# Patient Record
Sex: FEMALE | Race: WHITE | Employment: OTHER | URBAN - NONMETROPOLITAN AREA
[De-identification: names, ages, dates, MRNs, and addresses within clinical notes are randomized per-mention and may not be internally consistent; named-entity substitution may affect disease eponyms.]

---

## 2019-08-22 ENCOUNTER — TELEPHONE (OUTPATIENT)
Dept: FAMILY MEDICINE CLINIC | Age: 84
End: 2019-08-22

## 2019-09-13 ENCOUNTER — TELEPHONE (OUTPATIENT)
Dept: FAMILY MEDICINE CLINIC | Age: 84
End: 2019-09-13

## 2019-09-25 ENCOUNTER — APPOINTMENT (OUTPATIENT)
Dept: CT IMAGING | Age: 84
End: 2019-09-25
Payer: MEDICARE

## 2019-09-25 ENCOUNTER — HOSPITAL ENCOUNTER (OUTPATIENT)
Age: 84
Setting detail: OBSERVATION
Discharge: HOME HEALTH | End: 2019-09-26
Attending: EMERGENCY MEDICINE | Admitting: INTERNAL MEDICINE
Payer: MEDICARE

## 2019-09-25 DIAGNOSIS — S22.050A COMPRESSION FRACTURE OF T6 VERTEBRA (HCC): Primary | ICD-10-CM

## 2019-09-25 DIAGNOSIS — I71.20 THORACIC AORTIC ANEURYSM WITHOUT RUPTURE: ICD-10-CM

## 2019-09-25 DIAGNOSIS — S09.90XA CLOSED HEAD INJURY, INITIAL ENCOUNTER: ICD-10-CM

## 2019-09-25 DIAGNOSIS — S22.000A COMPRESSION FX, THORACIC SPINE, CLOSED, INITIAL ENCOUNTER (HCC): ICD-10-CM
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
## 2019-09-25 PROBLEM — Z86.73 HISTORY OF CARDIOEMBOLIC CEREBROVASCULAR ACCIDENT (CVA): Status: ACTIVE | Noted: 2019-09-25

## 2019-09-25 PROBLEM — M48.54XA COMPRESSION FRACTURE OF THORACIC SPINE, NON-TRAUMATIC, INITIAL ENCOUNTER (HCC): Status: ACTIVE | Noted: 2019-09-25

## 2019-09-25 PROBLEM — I10 HYPERTENSION: Status: ACTIVE | Noted: 2019-09-25

## 2019-09-25 PROBLEM — E03.9 HYPOTHYROIDISM: Status: ACTIVE | Noted: 2019-09-25

## 2019-09-25 LAB
ALBUMIN SERPL-MCNC: 3.8 G/DL (ref 3.5–5.2)
ALP BLD-CCNC: 105 U/L (ref 35–104)
ALT SERPL-CCNC: 18 U/L (ref 5–33)
ANION GAP SERPL CALCULATED.3IONS-SCNC: 14 MMOL/L (ref 7–19)
AST SERPL-CCNC: 17 U/L (ref 5–32)
BASOPHILS ABSOLUTE: 0 K/UL (ref 0–0.2)
BASOPHILS RELATIVE PERCENT: 0.3 % (ref 0–1)
BILIRUB SERPL-MCNC: 0.3 MG/DL (ref 0.2–1.2)
BUN BLDV-MCNC: 15 MG/DL (ref 8–23)
CALCIUM SERPL-MCNC: 8.9 MG/DL (ref 8.8–10.2)
CHLORIDE BLD-SCNC: 105 MMOL/L (ref 98–111)
CO2: 25 MMOL/L (ref 22–29)
CREAT SERPL-MCNC: 0.5 MG/DL (ref 0.5–0.9)
EOSINOPHILS ABSOLUTE: 0 K/UL (ref 0–0.6)
EOSINOPHILS RELATIVE PERCENT: 0.2 % (ref 0–5)
GFR NON-AFRICAN AMERICAN: >60
GLUCOSE BLD-MCNC: 125 MG/DL (ref 74–109)
HCT VFR BLD CALC: 47.6 % (ref 37–47)
HEMOGLOBIN: 14.9 G/DL (ref 12–16)
IMMATURE GRANULOCYTES #: 0.1 K/UL
INR BLD: 1.07 (ref 0.88–1.18)
LACTIC ACID: 1.8 MMOL/L (ref 0.5–1.9)
LYMPHOCYTES ABSOLUTE: 1.2 K/UL (ref 1.1–4.5)
LYMPHOCYTES RELATIVE PERCENT: 11.8 % (ref 20–40)
MAGNESIUM: 1.8 MG/DL (ref 1.6–2.4)
MCH RBC QN AUTO: 30.3 PG (ref 27–31)
MCHC RBC AUTO-ENTMCNC: 31.3 G/DL (ref 33–37)
MCV RBC AUTO: 96.9 FL (ref 81–99)
MONOCYTES ABSOLUTE: 0.8 K/UL (ref 0–0.9)
MONOCYTES RELATIVE PERCENT: 7.6 % (ref 0–10)
NEUTROPHILS ABSOLUTE: 7.9 K/UL (ref 1.5–7.5)
NEUTROPHILS RELATIVE PERCENT: 79.5 % (ref 50–65)
PDW BLD-RTO: 14.1 % (ref 11.5–14.5)
PLATELET # BLD: 229 K/UL (ref 130–400)
PMV BLD AUTO: 10 FL (ref 9.4–12.3)
POTASSIUM REFLEX MAGNESIUM: 4.2 MMOL/L (ref 3.5–5)
PROTHROMBIN TIME: 13.3 SEC (ref 12–14.6)
RBC # BLD: 4.91 M/UL (ref 4.2–5.4)
SODIUM BLD-SCNC: 144 MMOL/L (ref 136–145)
TOTAL PROTEIN: 7 G/DL (ref 6.6–8.7)
WBC # BLD: 9.9 K/UL (ref 4.8–10.8)

## 2019-09-25 PROCEDURE — 83605 ASSAY OF LACTIC ACID: CPT

## 2019-09-25 PROCEDURE — 85025 COMPLETE CBC W/AUTO DIFF WBC: CPT

## 2019-09-25 PROCEDURE — 70450 CT HEAD/BRAIN W/O DYE: CPT

## 2019-09-25 PROCEDURE — 2580000003 HC RX 258: Performed by: INTERNAL MEDICINE

## 2019-09-25 PROCEDURE — G0378 HOSPITAL OBSERVATION PER HR: HCPCS

## 2019-09-25 PROCEDURE — 93970 EXTREMITY STUDY: CPT

## 2019-09-25 PROCEDURE — 72125 CT NECK SPINE W/O DYE: CPT

## 2019-09-25 PROCEDURE — 97162 PT EVAL MOD COMPLEX 30 MIN: CPT

## 2019-09-25 PROCEDURE — 51798 US URINE CAPACITY MEASURE: CPT

## 2019-09-25 PROCEDURE — 6370000000 HC RX 637 (ALT 250 FOR IP): Performed by: INTERNAL MEDICINE

## 2019-09-25 PROCEDURE — 72128 CT CHEST SPINE W/O DYE: CPT

## 2019-09-25 PROCEDURE — 36415 COLL VENOUS BLD VENIPUNCTURE: CPT

## 2019-09-25 PROCEDURE — 6360000002 HC RX W HCPCS: Performed by: INTERNAL MEDICINE

## 2019-09-25 PROCEDURE — 96374 THER/PROPH/DIAG INJ IV PUSH: CPT

## 2019-09-25 PROCEDURE — 97530 THERAPEUTIC ACTIVITIES: CPT

## 2019-09-25 PROCEDURE — 6360000002 HC RX W HCPCS: Performed by: EMERGENCY MEDICINE

## 2019-09-25 PROCEDURE — 83735 ASSAY OF MAGNESIUM: CPT

## 2019-09-25 PROCEDURE — 99285 EMERGENCY DEPT VISIT HI MDM: CPT

## 2019-09-25 PROCEDURE — 85610 PROTHROMBIN TIME: CPT

## 2019-09-25 PROCEDURE — 99203 OFFICE O/P NEW LOW 30 MIN: CPT | Performed by: NEUROLOGICAL SURGERY

## 2019-09-25 PROCEDURE — 80053 COMPREHEN METABOLIC PANEL: CPT

## 2019-09-25 PROCEDURE — 96372 THER/PROPH/DIAG INJ SC/IM: CPT

## 2019-09-25 RX ORDER — OXYCODONE HYDROCHLORIDE AND ACETAMINOPHEN 5; 325 MG/1; MG/1
1 TABLET ORAL EVERY 6 HOURS PRN
Status: DISCONTINUED | OUTPATIENT
Start: 2019-09-25 | End: 2019-09-26 | Stop reason: HOSPADM

## 2019-09-25 RX ORDER — METOPROLOL SUCCINATE 50 MG/1
50 TABLET, EXTENDED RELEASE ORAL DAILY
COMMUNITY
End: 2019-10-17 | Stop reason: SDUPTHER

## 2019-09-25 RX ORDER — SERTRALINE HYDROCHLORIDE 100 MG/1
100 TABLET, FILM COATED ORAL DAILY
Status: DISCONTINUED | OUTPATIENT
Start: 2019-09-25 | End: 2019-09-26 | Stop reason: HOSPADM

## 2019-09-25 RX ORDER — ACETAMINOPHEN 325 MG/1
650 TABLET ORAL EVERY 4 HOURS PRN
Status: DISCONTINUED | OUTPATIENT
Start: 2019-09-25 | End: 2019-09-26 | Stop reason: HOSPADM

## 2019-09-25 RX ORDER — LEVOTHYROXINE SODIUM 175 UG/1
175 TABLET ORAL DAILY
COMMUNITY
End: 2019-10-17 | Stop reason: SDUPTHER

## 2019-09-25 RX ORDER — AMLODIPINE BESYLATE 5 MG/1
5 TABLET ORAL DAILY
COMMUNITY
End: 2019-10-17 | Stop reason: SDUPTHER

## 2019-09-25 RX ORDER — SODIUM CHLORIDE 0.9 % (FLUSH) 0.9 %
10 SYRINGE (ML) INJECTION PRN
Status: DISCONTINUED | OUTPATIENT
Start: 2019-09-25 | End: 2019-09-26 | Stop reason: HOSPADM

## 2019-09-25 RX ORDER — NITROFURANTOIN MACROCRYSTALS 100 MG/1
100 CAPSULE ORAL DAILY
COMMUNITY
End: 2019-09-27 | Stop reason: ALTCHOICE

## 2019-09-25 RX ORDER — PHENYTOIN SODIUM 100 MG/1
200 CAPSULE, EXTENDED RELEASE ORAL 2 TIMES DAILY
Status: DISCONTINUED | OUTPATIENT
Start: 2019-09-25 | End: 2019-09-26 | Stop reason: HOSPADM

## 2019-09-25 RX ORDER — PHENYTOIN SODIUM 100 MG/1
200 CAPSULE, EXTENDED RELEASE ORAL 2 TIMES DAILY
COMMUNITY
End: 2019-10-17 | Stop reason: SDUPTHER

## 2019-09-25 RX ORDER — SODIUM CHLORIDE 0.9 % (FLUSH) 0.9 %
10 SYRINGE (ML) INJECTION EVERY 12 HOURS SCHEDULED
Status: DISCONTINUED | OUTPATIENT
Start: 2019-09-25 | End: 2019-09-26 | Stop reason: HOSPADM

## 2019-09-25 RX ORDER — ONDANSETRON 2 MG/ML
INJECTION INTRAMUSCULAR; INTRAVENOUS
Status: DISPENSED
Start: 2019-09-25 | End: 2019-09-25

## 2019-09-25 RX ORDER — ONDANSETRON 2 MG/ML
4 INJECTION INTRAMUSCULAR; INTRAVENOUS EVERY 6 HOURS PRN
Status: DISCONTINUED | OUTPATIENT
Start: 2019-09-25 | End: 2019-09-26 | Stop reason: HOSPADM

## 2019-09-25 RX ORDER — ONDANSETRON 4 MG/1
4 TABLET, ORALLY DISINTEGRATING ORAL ONCE
Status: DISCONTINUED | OUTPATIENT
Start: 2019-09-25 | End: 2019-09-26 | Stop reason: HOSPADM

## 2019-09-25 RX ORDER — METOPROLOL SUCCINATE 50 MG/1
50 TABLET, EXTENDED RELEASE ORAL DAILY
Status: DISCONTINUED | OUTPATIENT
Start: 2019-09-25 | End: 2019-09-26 | Stop reason: HOSPADM

## 2019-09-25 RX ORDER — ACETAMINOPHEN 325 MG/1
650 TABLET ORAL EVERY 8 HOURS PRN
Status: DISCONTINUED | OUTPATIENT
Start: 2019-09-25 | End: 2019-09-26 | Stop reason: HOSPADM

## 2019-09-25 RX ORDER — SERTRALINE HYDROCHLORIDE 100 MG/1
100 TABLET, FILM COATED ORAL DAILY
COMMUNITY
End: 2019-10-17 | Stop reason: SDUPTHER

## 2019-09-25 RX ORDER — AMLODIPINE BESYLATE 5 MG/1
5 TABLET ORAL DAILY
Status: DISCONTINUED | OUTPATIENT
Start: 2019-09-25 | End: 2019-09-26 | Stop reason: HOSPADM

## 2019-09-25 RX ADMIN — LEVOTHYROXINE SODIUM 175 MCG: 50 TABLET ORAL at 12:42

## 2019-09-25 RX ADMIN — PHENYTOIN SODIUM 200 MG: 100 CAPSULE ORAL at 21:23

## 2019-09-25 RX ADMIN — METOPROLOL SUCCINATE 50 MG: 50 TABLET, EXTENDED RELEASE ORAL at 12:43

## 2019-09-25 RX ADMIN — SERTRALINE HYDROCHLORIDE 100 MG: 100 TABLET ORAL at 12:43

## 2019-09-25 RX ADMIN — PHENYTOIN SODIUM 200 MG: 100 CAPSULE ORAL at 12:43

## 2019-09-25 RX ADMIN — OXYCODONE HYDROCHLORIDE AND ACETAMINOPHEN 1 TABLET: 5; 325 TABLET ORAL at 18:43

## 2019-09-25 RX ADMIN — Medication 10 ML: at 12:43

## 2019-09-25 RX ADMIN — HYDROMORPHONE HYDROCHLORIDE 0.5 MG: 1 INJECTION, SOLUTION INTRAMUSCULAR; INTRAVENOUS; SUBCUTANEOUS at 04:13

## 2019-09-25 RX ADMIN — ENOXAPARIN SODIUM 40 MG: 40 INJECTION SUBCUTANEOUS at 12:44

## 2019-09-25 RX ADMIN — ACETAMINOPHEN 650 MG: 325 TABLET ORAL at 12:42

## 2019-09-25 RX ADMIN — AMLODIPINE BESYLATE 5 MG: 5 TABLET ORAL at 12:43

## 2019-09-25 SDOH — HEALTH STABILITY: MENTAL HEALTH: HOW OFTEN DO YOU HAVE A DRINK CONTAINING ALCOHOL?: NEVER

## 2019-09-25 ASSESSMENT — PAIN DESCRIPTION - ONSET
ONSET: ON-GOING
ONSET: ON-GOING

## 2019-09-25 ASSESSMENT — PAIN DESCRIPTION - DIRECTION: RADIATING_TOWARDS: RIGHT LEG

## 2019-09-25 ASSESSMENT — PAIN DESCRIPTION - ORIENTATION
ORIENTATION: LOWER
ORIENTATION: LOWER
ORIENTATION: MID

## 2019-09-25 ASSESSMENT — PAIN DESCRIPTION - PAIN TYPE
TYPE: ACUTE PAIN

## 2019-09-25 ASSESSMENT — PAIN DESCRIPTION - PROGRESSION
CLINICAL_PROGRESSION: NOT CHANGED
CLINICAL_PROGRESSION: NOT CHANGED

## 2019-09-25 ASSESSMENT — PAIN SCALES - GENERAL
PAINLEVEL_OUTOF10: 10
PAINLEVEL_OUTOF10: 2
PAINLEVEL_OUTOF10: 7
PAINLEVEL_OUTOF10: 3
PAINLEVEL_OUTOF10: 10
PAINLEVEL_OUTOF10: 7

## 2019-09-25 ASSESSMENT — PAIN DESCRIPTION - LOCATION
LOCATION: BACK

## 2019-09-25 ASSESSMENT — PAIN DESCRIPTION - DESCRIPTORS
DESCRIPTORS: PATIENT UNABLE TO DESCRIBE
DESCRIPTORS: PATIENT UNABLE TO DESCRIBE

## 2019-09-25 ASSESSMENT — PAIN - FUNCTIONAL ASSESSMENT
PAIN_FUNCTIONAL_ASSESSMENT: PREVENTS OR INTERFERES WITH MANY ACTIVE NOT PASSIVE ACTIVITIES
PAIN_FUNCTIONAL_ASSESSMENT: PREVENTS OR INTERFERES SOME ACTIVE ACTIVITIES AND ADLS

## 2019-09-25 ASSESSMENT — PAIN SCALES - WONG BAKER
WONGBAKER_NUMERICALRESPONSE: 6
WONGBAKER_NUMERICALRESPONSE: 0

## 2019-09-25 ASSESSMENT — ENCOUNTER SYMPTOMS
SHORTNESS OF BREATH: 0
EYES NEGATIVE: 1
RESPIRATORY NEGATIVE: 1
GASTROINTESTINAL NEGATIVE: 1
BACK PAIN: 1

## 2019-09-25 NOTE — PROGRESS NOTES
Walk-in shower  Bathroom Toilet: Handicap height  Bathroom Equipment: Shower chair, Grab bars around toilet  ADL Assistance: Independent  Homemaking Assistance: Independent  Ambulation Assistance: Independent  Transfer Assistance: Independent  Cognition   Cognition  Overall Cognitive Status: Exceptions  Following Commands: Follows one step commands with repetition  Attention Span: Attends with cues to redirect  Safety Judgement: Decreased awareness of need for assistance;Decreased awareness of need for safety  Insights: Decreased awareness of deficits  Initiation: Requires cues for some  Sequencing: Requires cues for some    Objective          PROM RLE (degrees)  RLE PROM: WFL  AROM RLE (degrees)  RLE General AROM: LIMITED OVERALL  PROM LLE (degrees)  LLE PROM: WFL  Strength RLE  Comment: GROSSLY +2/5  Strength LLE  Comment: GROSSLY 3/5        Bed mobility  Supine to Sit: Moderate assistance  Transfers  Sit to Stand: Minimal Assistance; Moderate Assistance  Bed to Chair: Dependent/Total(ERICKA MUNOZ)  Comment: STOOD X 2-3 WITH RW MIN-MOD ASSIST. UNABLE TO TAKE SIDE STEP EOB.  Pt FATIGUES QUICKLY  Ambulation  Ambulation?: No     Balance  Sitting - Dynamic: Good;-  Standing - Dynamic: [address]  Times per week: AT LEAST 7  Current Treatment Recommendations: ROM, Strengthening, Balance Training, Transfer Training, Functional Mobility Training, Gait Training, Patient/Caregiver Education & Training, Safety Education & Training  Safety Devices  Type of devices: Left in chair, Call light within reach    G-Code       OutComes Score                                                  AM-PAC Score             Goals  Short term goals  Time Frame for Short term goals: 14 DAYS  Short term goal 1: BED MOB SBA  Short term goal 2: SIT TO STAND CGA  Short term goal 3: BED TO CHAIR CGA  Short term goal 4: AMB 25' RW CGA       Therapy Time   Individual Concurrent Group Co-treatment   Time In           Time Out           Minutes Saul Fam, PT

## 2019-09-25 NOTE — H&P
[employer] - History & Physical      PCP: No primary care provider on file. Date of Admission: 9/25/2019    Date of Service: 9/25/2019    Chief Complaint:  Back pain    History Of Present Illness: The patient is a 80 y.o. female who is a poor historian with PMH CVA with residual slurred speech, generalized weakness and HTN who presented to the emergency department complaining of severe back pain that began after she fell at home. States she fell in the bathroom as she was turning to go back to bed and felt dizzy. She and daughter present in the room deny syncope. Back pain described in upper thoracic area, sharp, constant, worse with movement and deep breathing, better with rest. Denies radicular type pain in extremities. Denies loss of bowel/bladder function. Daughter notes that patient has had chronic swelling in left lower extremity after a recent fall resulting in knee pain. CT head/cervical spine negative. CT thoracic spine revealed acute mild superior endplate compression of T6 with approximately 25% loss of height. Past Medical History:        Diagnosis Date    Cerebral artery occlusion with cerebral infarction ([facility] [address]     Head injury     Hypertension     Hypothyroidism      Past Surgical History:        Procedure Laterality Date    JOINT REPLACEMENT      Bilateral Knee     Home Medications:  Prior to Admission medications    Medication Sig Start Date End Date Taking?  Authorizing Provider   levothyroxine (SYNTHROID) 175 MCG tablet Take 175 mcg by mouth Daily   Yes Historical Provider, MD   sertraline (ZOLOFT) 100 MG tablet Take 100 mg by mouth daily   Yes Historical Provider, MD   nitrofurantoin (MACRODANTIN) 100 MG capsule Take 100 mg by mouth daily   Yes Historical Provider, MD   amLODIPine (NORVASC) 5 MG tablet Take 5 mg by mouth daily   Yes Historical Provider, MD   metoprolol succinate (TOPROL XL) 50 MG extended release tablet Take 50 mg by mouth daily   Yes adenopathy, no carotid bruit, no JVD, supple, symmetrical, trachea midline   Lungs: diminished throughout, worse bilateral bases otherwise clear to auscultation bilaterally,no rales or wheezes   Heart: regular rate and rhythm, S1, S2 normal, no murmur  Abdomen:soft, non-tender; non-distended, normal bowel sounds no masses, no organomegaly  Extremities:1+ bilateral lower extremity edema worse on left,  No erythema, no tenderness to palpation. Upper/mid thoracic spine tenderness with light palpation  Skin: Skin color, texture, turgor normal. No rashes or lesions  Lymphatic: No palpable lymph node enlargment  Neurologic: Alert and oriented X 3,generalized weakness and normal tone. No focal deficits  Psychiatric: Alert and oriented, thought content appropriate, normal insight, mood appropriate    Diagnostic Data:  Ct Head Wo Contrast  No acute intracranial abnormality. No skull fracture. No evidence of a previous right middle ear and mastoid surgery. Chronic ischemic and atrophic changes. An area of encephalomalacia/chronic infarction in the left occipital lobe. The above study was initially reviewed and reported by stat rads. I do not find any discrepancies. Signed by Dr Paola Tripp on 9/25/2019 6:44 AM    CT Thoracic spine 09/25/2019  1. Acute mild superior endplate compression of T6 with approximately  25% loss of height. CT Cervical spine 09/25/2019  1. Age-related degenerative changes. 2. No acute fracture. Assessment/Plan:  Principal Problem:    Compression fx, thoracic spine, closed, initial encounter (HCC)-admit for pain control, PT/OT eval, consult [facility] with [place] vs need for rehab placement. Neurosurgery consultation to r/o any surgical need/recommendations on brace    Active Problems:    Fall-PT/OT eval      History of cardioembolic cerebrovascular accident (CVA)-noted, though patient not currently on aspirin/statin after verification of home medications.  Will investigate further

## 2019-09-25 NOTE — CONSULTS
(H) 09/25/2019    AST 17 09/25/2019    ALT 18 09/25/2019    LABGLOM >60 09/25/2019     Lab Results   Component Value Date    INR 1.07 09/25/2019    PROTIME 13.3 09/25/2019       Ct Head Wo Contrast    Result Date: 9/25/2019  No acute intracranial abnormality. No skull fracture. No evidence of a previous right middle ear and mastoid surgery. Chronic ischemic and atrophic changes. An area of encephalomalacia/chronic infarction in the left occipital lobe. The above study was initially reviewed and reported by stat rads. I do not find any discrepancies. Signed by Dr Vinod Lan on 9/25/2019 6:44 AM    Ct Cervical Spine Wo Contrast    Result Date: 9/25/2019  1. Age-related degenerative changes. 2. No acute fracture. Signed by Dr Geni Strong on 9/25/2019 7:20 AM    Ct Thoracic Spine Wo Contrast    Result Date: 9/25/2019  1. Acute mild superior endplate compression of T6 with approximately 25% loss of height. Signed by Dr Geni Strong on 9/25/2019 7:28 AM        IMAGING:    My interpretation of imaging studies:  I agree with the radiologist.  Mild compression deformity of T6 with ~20% height loss and minimal kyphosis. No retropulsion, canal compromise or posterior-element involvement. ASSESSMENT AND PLAN:  This is a 80 y.o. female who presents with back pain following a fall. Her CT reveals a mild T6 compression fracture without retropulsion or evidence of instability. I cannot explain her right leg weakness based on the available imaging. I recommend she mobilize with PT and if her pain is adequately controlled, I would not recommend bracing in her case as it is cumbersome and may increase her fall risk. If her pain cannot be controlled or she is unable to mobilize, she may be fit for a Jewitt or TLSO brace. I will plan to see her again as an outpatient in four weeks with follow up x-rays of her thoracic spine. Please call with additional questions or concerns.             Gabrielle Zarco Sierra Keller MD

## 2019-09-25 NOTE — ED PROVIDER NOTES
[facility] EMERGENCY DEPT  eMERGENCY dEPARTMENT eNCOUnter      Pt Name: Jennifer Tellez  MRN: [id number]  [place] 8/18/1931  Date of evaluation: 9/25/2019  Provider: Lew Stallings MD    [address] [place] [address]       Chief Complaint   Patient presents with    Fall    Back Pain         HISTORY OF PRESENT ILLNESS   (Location/Symptom, Timing/Onset,Context/Setting, Quality, Duration, Modifying Factors, Severity)  Note limiting factors. Jennifer Tellez is a 80 y.o. female who presents to the emergency department with complaints of a fall that occurred at home. Patient reports that she got up out of bed to go to the bathroom when she got into the bathroom she felt somewhat dizzy, stumbled backwards and fell against the wall. Reports that she struck her upper back on the floor and believes that she may have struck her head on the wall. She denies any loss of consciousness. Patient reports that she is not maintained on any anticoagulant medications. She described an acute onset of pain in her mid and upper back that is sharp in nature, without radiation and without relieving factors. She denies any numbness or tingling of her upper or lower extremities. Denies any loss of bladder control. Denies any pain in her hips, elbows or wrists. She describes these symptoms as moderate in severity and without relieving factors. HPI    NursingNotes were reviewed. REVIEW OF SYSTEMS    (2-9 systems for level 4, 10 or more for level 5)     Review of Systems   Constitutional: Negative for chills and fever. Respiratory: Negative for shortness of breath. Cardiovascular: Negative for chest pain and palpitations. Musculoskeletal: Positive for back pain and gait problem. Negative for neck pain. Neurological: Negative for dizziness and syncope. All other systems reviewed and are negative.            PAST MEDICALHISTORY     Past Medical History:   Diagnosis Date    Cerebral artery occlusion with cerebral infarction ([facility] [address]     Head injury Tyler Coma Scale  Eye Opening: Spontaneous  Best Verbal Response: Confused  Best Motor Response: Obeys commands  Tyler Coma Scale Score: 14        PHYSICAL EXAM    (up to 7 for level 4, 8 or more for level 5)     ED Triage Vitals [09/25/19 0317]   BP Temp Temp Source Pulse Resp SpO2 Height Weight   (!) 199/104 98.6 °F (37 °C) Oral 90 20 93 % -- 175 lb (79.4 kg)       Physical Exam   Constitutional: She is oriented to person, place, and time. She is cooperative. No distress. HENT:   Head: Atraumatic. Mouth/Throat: Oropharynx is clear and moist. Mucous membranes are not dry. No posterior oropharyngeal erythema. Eyes: Pupils are equal, round, and reactive to light. No scleral icterus. Neck: No tracheal deviation present. Cardiovascular: Normal rate, regular rhythm, normal heart sounds and intact distal pulses. No murmur heard. Pulmonary/Chest: Effort normal and breath sounds normal. No stridor. No respiratory distress. Abdominal: Soft. She exhibits no distension. There is no tenderness. There is no guarding. Musculoskeletal:        Right shoulder: She exhibits normal range of motion and no tenderness. Left shoulder: She exhibits normal range of motion and no tenderness. Right hip: She exhibits normal range of motion and no tenderness. Left hip: She exhibits normal range of motion and no tenderness. Right knee: She exhibits normal range of motion. No tenderness found. Left knee: She exhibits normal range of motion. No tenderness found. Back:    Tenderness to palpation is noted on the diagram.  No palpable step-off or deformity is noted. Neurological: She is alert and oriented to person, place, and time. She has normal strength. Skin: Capillary refill takes less than 2 seconds. No rash noted. No pallor. Nursing note and vitals reviewed.       DIAGNOSTIC RESULTS       RADIOLOGY:  Non-plain film images such as CT, Ultrasound and MRI are read by the radiologist. Bill Guevara radiographic images are visualized and preliminarily interpreted bythe emergency physician with the below findings:    CT HEAD:    No ICH, mass effect or edema. No evidence of acute cortical stroke. No skull fracture. Periventricular small vessel ischemic change. Encephalomalacia involving the left occipital lobe, likely from prior infarct. Visualized sinuses and mastoid air cells are clear. CT C SPINE:    No evidence of acute fracture or traumatic malalignment. Cervical spondylosis with varying degrees of central canal and foramina stenoses. CT T SPINE:    Suspected acute partial compression fracture involving the T6 vertebral body. MRI of the thoracic spine may be obtained for further evaluation, if clinically indicated. Heart is likely enlarged. Mild aneurysmal dilatation of the proximal descending intrathoracic aorta, measuring up to 3.5 cm in diameter. CTA of the chest may be obtained for further evaluation. CT Head WO Contrast   Final Result   No acute intracranial abnormality. No skull fracture. No evidence of a previous right middle ear and   mastoid surgery. Chronic ischemic and atrophic changes. An area of   encephalomalacia/chronic infarction in the left occipital lobe. The above study was initially reviewed and reported by stat rads. I do   not find any discrepancies. Signed by Dr Elin Echevarria on 9/25/2019 6:44 AM      CT Cervical Spine WO Contrast    (Results Pending)   CT Thoracic Spine WO Contrast    (Results Pending)           LABS:  Labs Reviewed - No data to display    All other labs were within normal range or not returned as of this dictation.     EMERGENCY DEPARTMENT COURSE and DIFFERENTIAL DIAGNOSIS/MDM:   Vitals:    Vitals:    09/25/19 0317 09/25/19 0515 09/25/19 0603   BP: (!) 199/104 (!) 170/73 (!) 168/88   Pulse: 90 92 88   Resp: 20 18 17   Temp: 98.6 °F (37 °C)     TempSrc: Oral     SpO2: 93% 92% 94%   Weight: 175 lb (79.4 kg)

## 2019-09-25 NOTE — ED NOTES
Attempted to sit patient up to ambulate. Pt unable to sit up assisted or unassisted. Pt HOB elevated to 45 degrees, explained to patient that we would give her a few minutes and try again. MD informed.       Oxana Adams RN  09/25/19 3013

## 2019-09-26 VITALS
WEIGHT: 175 LBS | SYSTOLIC BLOOD PRESSURE: 137 MMHG | OXYGEN SATURATION: 90 % | HEART RATE: 88 BPM | RESPIRATION RATE: 17 BRPM | DIASTOLIC BLOOD PRESSURE: 77 MMHG | TEMPERATURE: 96.8 F

## 2019-09-26 LAB
ANION GAP SERPL CALCULATED.3IONS-SCNC: 12 MMOL/L (ref 7–19)
BUN BLDV-MCNC: 17 MG/DL (ref 8–23)
CALCIUM SERPL-MCNC: 8.4 MG/DL (ref 8.8–10.2)
CHLORIDE BLD-SCNC: 104 MMOL/L (ref 98–111)
CO2: 27 MMOL/L (ref 22–29)
CREAT SERPL-MCNC: 0.6 MG/DL (ref 0.5–0.9)
GFR NON-AFRICAN AMERICAN: >60
GLUCOSE BLD-MCNC: 136 MG/DL (ref 74–109)
HCT VFR BLD CALC: 45 % (ref 37–47)
HEMOGLOBIN: 14.4 G/DL (ref 12–16)
MCH RBC QN AUTO: 30.1 PG (ref 27–31)
MCHC RBC AUTO-ENTMCNC: 32 G/DL (ref 33–37)
MCV RBC AUTO: 94.1 FL (ref 81–99)
PDW BLD-RTO: 14.2 % (ref 11.5–14.5)
PLATELET # BLD: 212 K/UL (ref 130–400)
PMV BLD AUTO: 9.9 FL (ref 9.4–12.3)
POTASSIUM SERPL-SCNC: 4.3 MMOL/L (ref 3.5–5)
RBC # BLD: 4.78 M/UL (ref 4.2–5.4)
SODIUM BLD-SCNC: 143 MMOL/L (ref 136–145)
WBC # BLD: 7.2 K/UL (ref 4.8–10.8)

## 2019-09-26 PROCEDURE — 6360000002 HC RX W HCPCS: Performed by: INTERNAL MEDICINE

## 2019-09-26 PROCEDURE — 2580000003 HC RX 258: Performed by: INTERNAL MEDICINE

## 2019-09-26 PROCEDURE — 97165 OT EVAL LOW COMPLEX 30 MIN: CPT

## 2019-09-26 PROCEDURE — 97530 THERAPEUTIC ACTIVITIES: CPT

## 2019-09-26 PROCEDURE — G0378 HOSPITAL OBSERVATION PER HR: HCPCS

## 2019-09-26 PROCEDURE — 97116 GAIT TRAINING THERAPY: CPT

## 2019-09-26 PROCEDURE — 6370000000 HC RX 637 (ALT 250 FOR IP): Performed by: INTERNAL MEDICINE

## 2019-09-26 PROCEDURE — 80048 BASIC METABOLIC PNL TOTAL CA: CPT

## 2019-09-26 PROCEDURE — 96372 THER/PROPH/DIAG INJ SC/IM: CPT

## 2019-09-26 PROCEDURE — 36415 COLL VENOUS BLD VENIPUNCTURE: CPT

## 2019-09-26 PROCEDURE — 85027 COMPLETE CBC AUTOMATED: CPT

## 2019-09-26 RX ORDER — OXYCODONE HYDROCHLORIDE AND ACETAMINOPHEN 5; 325 MG/1; MG/1
1 TABLET ORAL EVERY 6 HOURS PRN
Qty: 12 TABLET | Refills: 0 | Status: SHIPPED | OUTPATIENT
Start: 2019-09-26 | End: 2019-09-29

## 2019-09-26 RX ADMIN — PHENYTOIN SODIUM 200 MG: 100 CAPSULE ORAL at 08:33

## 2019-09-26 RX ADMIN — OXYCODONE HYDROCHLORIDE AND ACETAMINOPHEN 1 TABLET: 5; 325 TABLET ORAL at 13:39

## 2019-09-26 RX ADMIN — AMLODIPINE BESYLATE 5 MG: 5 TABLET ORAL at 08:33

## 2019-09-26 RX ADMIN — ACETAMINOPHEN 650 MG: 325 TABLET ORAL at 17:00

## 2019-09-26 RX ADMIN — METOPROLOL SUCCINATE 50 MG: 50 TABLET, EXTENDED RELEASE ORAL at 08:33

## 2019-09-26 RX ADMIN — Medication 10 ML: at 08:34

## 2019-09-26 RX ADMIN — ACETAMINOPHEN 650 MG: 325 TABLET ORAL at 08:33

## 2019-09-26 RX ADMIN — ENOXAPARIN SODIUM 40 MG: 40 INJECTION SUBCUTANEOUS at 08:33

## 2019-09-26 RX ADMIN — LEVOTHYROXINE SODIUM 175 MCG: 50 TABLET ORAL at 08:33

## 2019-09-26 RX ADMIN — SERTRALINE HYDROCHLORIDE 100 MG: 100 TABLET ORAL at 08:33

## 2019-09-26 RX ADMIN — OXYCODONE HYDROCHLORIDE AND ACETAMINOPHEN 1 TABLET: 5; 325 TABLET ORAL at 03:32

## 2019-09-26 ASSESSMENT — PAIN SCALES - GENERAL
PAINLEVEL_OUTOF10: 5
PAINLEVEL_OUTOF10: 7
PAINLEVEL_OUTOF10: 5
PAINLEVEL_OUTOF10: 7
PAINLEVEL_OUTOF10: 0
PAINLEVEL_OUTOF10: 3
PAINLEVEL_OUTOF10: 6
PAINLEVEL_OUTOF10: 7

## 2019-09-26 ASSESSMENT — PAIN DESCRIPTION - PAIN TYPE
TYPE: ACUTE PAIN
TYPE: ACUTE PAIN

## 2019-09-26 ASSESSMENT — PAIN DESCRIPTION - ONSET: ONSET: ON-GOING

## 2019-09-26 ASSESSMENT — PAIN DESCRIPTION - LOCATION
LOCATION: BACK
LOCATION: BACK;LEG
LOCATION: BACK;LEG

## 2019-09-26 ASSESSMENT — PAIN DESCRIPTION - ORIENTATION
ORIENTATION: LEFT
ORIENTATION: RIGHT
ORIENTATION: LOWER

## 2019-09-26 ASSESSMENT — PAIN DESCRIPTION - DESCRIPTORS: DESCRIPTORS: SORE

## 2019-09-26 ASSESSMENT — PAIN DESCRIPTION - DIRECTION: RADIATING_TOWARDS: RT LEG

## 2019-09-26 ASSESSMENT — PAIN DESCRIPTION - PROGRESSION: CLINICAL_PROGRESSION: NOT CHANGED

## 2019-09-26 ASSESSMENT — PAIN - FUNCTIONAL ASSESSMENT: PAIN_FUNCTIONAL_ASSESSMENT: PREVENTS OR INTERFERES SOME ACTIVE ACTIVITIES AND ADLS

## 2019-09-26 NOTE — PROGRESS NOTES
Occupational Therapy   Occupational Therapy Initial Assessment  Date: 2019   Patient Name: Louisa Nash  MRN: [id number]     : 1931    Date of Service: 2019    Discharge Recommendations:  Patient would benefit from continued therapy after discharge       Assessment   Assessment: Evaluation completed and tx initiated. Family observed difficulties with mobility patient still having and verbalize no concerns about their ability to manage her at home. Minimum mobility needs are to be able to perform a transfer to a wheelchair. May have difficulty ambulating on carpetted bandar. DME recommendations made. Instructed in gait belt use. Patient will require 24 hour care. Would benefit from further therapy. Treatment Diagnosis: T6 compression fx, fall  Assistance / Modification: hx of CVA  REQUIRES OT FOLLOW UP: Yes  Activity Tolerance  Activity Tolerance: Patient limited by pain; Patient limited by fatigue  Safety Devices  Safety Devices in place: Yes  Type of devices: Call light within reach; Left in chair;Chair alarm in place           Patient Diagnosis(es): The primary encounter diagnosis was Compression fracture of T6 vertebra ([facility] [address] Diagnoses of Fall, initial encounter, Closed head injury, initial encounter, Thoracic aortic aneurysm without rupture ([facility] [address] and Compression fx, thoracic spine, closed, initial encounter [facility]) were also pertinent to this visit. has a past medical history of Cerebral artery occlusion with cerebral infarction [facility]), Head injury, Hypertension, and Hypothyroidism. has a past surgical history that includes joint replacement.     Treatment Diagnosis: T6 compression fx, fall      Restrictions  Restrictions/Precautions  Restrictions/Precautions: Fall Risk    Subjective   General  Chart Reviewed: Yes  Patient assessed for rehabilitation services?: Yes  Family / Caregiver Present: Yes  Patient Currently in Pain: Yes  Pain Assessment  Pain Assessment: 0-10  Pain Level: 7  Pain

## 2019-09-26 NOTE — PROGRESS NOTES
Occupational Therapy  Facility/Department: [facility] SURG SERVICES  Daily Treatment Note  NAME: Julianne Flaherty  : 1931  MRN: [id number]    Date of Service: 2019    Discharge Recommendations:  Patient would benefit from continued therapy after discharge       Assessment   Performance deficits / Impairments: Decreased functional mobility ; Decreased strength;Decreased balance;Decreased safe awareness;Decreased endurance  Assessment: Pt had difficulty transfering BTB from recliner, required mod to max assist of 2. Pt demonstrated safety concerns due to reaching for bed to sit too soon during transfer BTB. Treatment Diagnosis: T6 compression fx, fall  Assistance / Modification: hx of CVA  REQUIRES OT FOLLOW UP: Yes  Activity Tolerance  Activity Tolerance: Patient limited by pain; Patient limited by fatigue  Safety Devices  Safety Devices in place: Yes  Type of devices: Call light within reach; Left in chair;Chair alarm in place         Patient Diagnosis(es): The primary encounter diagnosis was Compression fracture of T6 vertebra ([facility] [address] Diagnoses of Fall, initial encounter, Closed head injury, initial encounter, Thoracic aortic aneurysm without rupture ([facility] [address] and Compression fx, thoracic spine, closed, initial encounter [facility]) were also pertinent to this visit. has a past medical history of Cerebral artery occlusion with cerebral infarction [facility]), Head injury, Hypertension, and Hypothyroidism. has a past surgical history that includes joint replacement.     Restrictions  Restrictions/Precautions  Restrictions/Precautions: Fall Risk  Subjective   General  Chart Reviewed: Yes  Patient assessed for rehabilitation services?: Yes  Response to previous treatment: Patient with no complaints from previous session  Family / Caregiver Present: Yes  Pain Assessment  Pain Assessment: 0-10  Pain Level: 7  Pain Type: Acute pain  Pain Location: Back;Leg  Pain Orientation: Right  Vital Signs  Patient Currently in Pain: Yes

## 2019-09-26 NOTE — PROGRESS NOTES
Patient and family at bedside have attempted to set up physician appointments prior to hospital visit. Received specifics from home health, including appointment with Vinod Vale MD on October 17th . Spoke with office that confirmed this information although patient has two active charts due to given and preferred names. Office will return call. Home health will updated as information is provided. Voicemail placed for Dr. Nora Elena to determine follow up instructions regarding brace and discharge. Pending call back.  Electronically signed by Melody Arreguin RN on 9/26/2019 at 1:15 PM

## 2019-09-27 ENCOUNTER — OFFICE VISIT (OUTPATIENT)
Dept: FAMILY MEDICINE CLINIC | Age: 84
End: 2019-09-27
Payer: MEDICARE

## 2019-09-27 ENCOUNTER — TELEPHONE (OUTPATIENT)
Dept: FAMILY MEDICINE CLINIC | Age: 84
End: 2019-09-27

## 2019-09-27 VITALS
DIASTOLIC BLOOD PRESSURE: 68 MMHG | OXYGEN SATURATION: 94 % | SYSTOLIC BLOOD PRESSURE: 108 MMHG | HEART RATE: 83 BPM | TEMPERATURE: 96.9 F

## 2019-09-27 DIAGNOSIS — G40.909 SEIZURE DISORDER (HCC): ICD-10-CM

## 2019-09-27 DIAGNOSIS — I10 ESSENTIAL (PRIMARY) HYPERTENSION: ICD-10-CM

## 2019-09-27 DIAGNOSIS — Z23 ENCOUNTER FOR IMMUNIZATION: ICD-10-CM

## 2019-09-27 DIAGNOSIS — F41.8 DEPRESSION WITH ANXIETY: ICD-10-CM

## 2019-09-27 DIAGNOSIS — Z13.220 LIPID SCREENING: ICD-10-CM

## 2019-09-27 DIAGNOSIS — S22.000A COMPRESSION FX, THORACIC SPINE, CLOSED, INITIAL ENCOUNTER (HCC): Primary | ICD-10-CM

## 2019-09-27 DIAGNOSIS — R53.81 PHYSICAL DECONDITIONING: ICD-10-CM

## 2019-09-27 DIAGNOSIS — R73.9 HYPERGLYCEMIA: ICD-10-CM

## 2019-09-27 DIAGNOSIS — I25.10 CORONARY ARTERY DISEASE INVOLVING NATIVE CORONARY ARTERY WITHOUT ANGINA PECTORIS, UNSPECIFIED WHETHER NATIVE OR TRANSPLANTED HEART: ICD-10-CM

## 2019-09-27 DIAGNOSIS — M79.10 MYALGIA: ICD-10-CM

## 2019-09-27 DIAGNOSIS — E03.9 PRIMARY HYPOTHYROIDISM: ICD-10-CM

## 2019-09-27 DIAGNOSIS — E04.1 THYROID NODULE: ICD-10-CM

## 2019-09-27 PROCEDURE — 90653 IIV ADJUVANT VACCINE IM: CPT | Performed by: FAMILY MEDICINE

## 2019-09-27 PROCEDURE — G0008 ADMIN INFLUENZA VIRUS VAC: HCPCS | Performed by: FAMILY MEDICINE

## 2019-09-27 PROCEDURE — G8598 ASA/ANTIPLAT THER USED: HCPCS | Performed by: FAMILY MEDICINE

## 2019-09-27 PROCEDURE — 4040F PNEUMOC VAC/ADMIN/RCVD: CPT | Performed by: FAMILY MEDICINE

## 2019-09-27 PROCEDURE — G8421 BMI NOT CALCULATED: HCPCS | Performed by: FAMILY MEDICINE

## 2019-09-27 PROCEDURE — 1090F PRES/ABSN URINE INCON ASSESS: CPT | Performed by: FAMILY MEDICINE

## 2019-09-27 PROCEDURE — 1036F TOBACCO NON-USER: CPT | Performed by: FAMILY MEDICINE

## 2019-09-27 PROCEDURE — 99204 OFFICE O/P NEW MOD 45 MIN: CPT | Performed by: FAMILY MEDICINE

## 2019-09-27 PROCEDURE — G8427 DOCREV CUR MEDS BY ELIG CLIN: HCPCS | Performed by: FAMILY MEDICINE

## 2019-09-27 PROCEDURE — 1123F ACP DISCUSS/DSCN MKR DOCD: CPT | Performed by: FAMILY MEDICINE

## 2019-09-27 RX ORDER — ASPIRIN 81 MG/1
81 TABLET, CHEWABLE ORAL DAILY
Qty: 30 TABLET | Refills: 3 | Status: ON HOLD
Start: 2019-09-27 | End: 2020-02-26 | Stop reason: HOSPADM

## 2019-09-30 ENCOUNTER — TELEPHONE (OUTPATIENT)
Dept: INTERNAL MEDICINE CLINIC | Age: 84
End: 2019-09-30

## 2019-09-30 ENCOUNTER — TELEPHONE (OUTPATIENT)
Dept: FAMILY MEDICINE CLINIC | Age: 84
End: 2019-09-30

## 2019-09-30 NOTE — TELEPHONE ENCOUNTER
Called office for records on this pt and was told to fax a release form. I have faxed her signed release to them.  Just waiting for records to come through

## 2019-09-30 NOTE — TELEPHONE ENCOUNTER
She just established with me last week and I will follow for home health physical therapy and nursing

## 2019-10-02 ENCOUNTER — TELEPHONE (OUTPATIENT)
Dept: INTERNAL MEDICINE CLINIC | Age: 84
End: 2019-10-02

## 2019-10-02 PROBLEM — G40.909 SEIZURE DISORDER (HCC): Status: ACTIVE | Noted: 2019-10-02

## 2019-10-02 ASSESSMENT — ENCOUNTER SYMPTOMS
NAUSEA: 0
EYE PAIN: 0
ABDOMINAL PAIN: 0
EYE DISCHARGE: 0
CHEST TIGHTNESS: 0
CONSTIPATION: 0
FACIAL SWELLING: 0
COLOR CHANGE: 0
ANAL BLEEDING: 0
PHOTOPHOBIA: 0
STRIDOR: 0
DIARRHEA: 0
EYE ITCHING: 0
EYE REDNESS: 0
SINUS PRESSURE: 0
SHORTNESS OF BREATH: 0
COUGH: 0
WHEEZING: 0
SINUS PAIN: 0
BACK PAIN: 1
RHINORRHEA: 0

## 2019-10-04 ENCOUNTER — TELEPHONE (OUTPATIENT)
Dept: INTERNAL MEDICINE CLINIC | Age: 84
End: 2019-10-04

## 2019-10-04 DIAGNOSIS — M25.551 PAIN OF BOTH HIP JOINTS: Primary | ICD-10-CM

## 2019-10-04 DIAGNOSIS — M25.552 PAIN OF BOTH HIP JOINTS: Primary | ICD-10-CM

## 2019-10-04 LAB
ALBUMIN SERPL-MCNC: 3.7 G/DL (ref 3.5–5.2)
ALP BLD-CCNC: 138 U/L (ref 35–104)
ALT SERPL-CCNC: 27 U/L (ref 5–33)
ANION GAP SERPL CALCULATED.3IONS-SCNC: 12 MMOL/L (ref 7–19)
AST SERPL-CCNC: 21 U/L (ref 5–32)
BASOPHILS ABSOLUTE: 0 K/UL (ref 0–0.2)
BASOPHILS RELATIVE PERCENT: 0.5 % (ref 0–1)
BILIRUB SERPL-MCNC: <0.2 MG/DL (ref 0.2–1.2)
BUN BLDV-MCNC: 18 MG/DL (ref 8–23)
C-REACTIVE PROTEIN: 0.74 MG/DL (ref 0–0.5)
CALCIUM SERPL-MCNC: 8.5 MG/DL (ref 8.8–10.2)
CHLORIDE BLD-SCNC: 105 MMOL/L (ref 98–111)
CHOLESTEROL, TOTAL: 166 MG/DL (ref 160–199)
CO2: 25 MMOL/L (ref 22–29)
CREAT SERPL-MCNC: 0.5 MG/DL (ref 0.5–0.9)
EOSINOPHILS ABSOLUTE: 0.2 K/UL (ref 0–0.6)
EOSINOPHILS RELATIVE PERCENT: 2.6 % (ref 0–5)
GFR NON-AFRICAN AMERICAN: >60
GLUCOSE BLD-MCNC: 76 MG/DL (ref 74–109)
HBA1C MFR BLD: 5.6 % (ref 4–6)
HCT VFR BLD CALC: 44.3 % (ref 37–47)
HDLC SERPL-MCNC: 58 MG/DL (ref 65–121)
HEMOGLOBIN: 14.6 G/DL (ref 12–16)
IMMATURE GRANULOCYTES #: 0 K/UL
LDL CHOLESTEROL CALCULATED: 82 MG/DL
LYMPHOCYTES ABSOLUTE: 2.2 K/UL (ref 1.1–4.5)
LYMPHOCYTES RELATIVE PERCENT: 32.8 % (ref 20–40)
MCH RBC QN AUTO: 30.7 PG (ref 27–31)
MCHC RBC AUTO-ENTMCNC: 33 G/DL (ref 33–37)
MCV RBC AUTO: 93.3 FL (ref 81–99)
MONOCYTES ABSOLUTE: 0.6 K/UL (ref 0–0.9)
MONOCYTES RELATIVE PERCENT: 8.9 % (ref 0–10)
NEUTROPHILS ABSOLUTE: 3.6 K/UL (ref 1.5–7.5)
NEUTROPHILS RELATIVE PERCENT: 54.7 % (ref 50–65)
PDW BLD-RTO: 14.4 % (ref 11.5–14.5)
PLATELET # BLD: 327 K/UL (ref 130–400)
PMV BLD AUTO: 10.2 FL (ref 9.4–12.3)
POTASSIUM SERPL-SCNC: 4.2 MMOL/L (ref 3.5–5)
RBC # BLD: 4.75 M/UL (ref 4.2–5.4)
RHEUMATOID FACTOR: <10 IU/ML
SEDIMENTATION RATE, ERYTHROCYTE: 10 MM/HR (ref 0–25)
SODIUM BLD-SCNC: 142 MMOL/L (ref 136–145)
T4 FREE: 1.1 NG/DL (ref 0.9–1.7)
TOTAL PROTEIN: 6.8 G/DL (ref 6.6–8.7)
TRIGL SERPL-MCNC: 131 MG/DL (ref 0–149)
TSH SERPL DL<=0.05 MIU/L-ACNC: 3.22 UIU/ML (ref 0.27–4.2)
WBC # BLD: 6.6 K/UL (ref 4.8–10.8)

## 2019-10-07 ENCOUNTER — HOSPITAL ENCOUNTER (OUTPATIENT)
Dept: GENERAL RADIOLOGY | Age: 84
Discharge: HOME | End: 2019-10-07
Payer: MEDICARE

## 2019-10-07 DIAGNOSIS — M25.552 PAIN OF BOTH HIP JOINTS: ICD-10-CM

## 2019-10-07 DIAGNOSIS — M25.551 PAIN OF BOTH HIP JOINTS: ICD-10-CM

## 2019-10-07 LAB
ANA IGG, ELISA: NORMAL
C-PEPTIDE: 5.6 NG/ML (ref 0.8–3.5)
MISCELLANEOUS LAB TEST ORDER: NORMAL

## 2019-10-07 PROCEDURE — 73521 X-RAY EXAM HIPS BI 2 VIEWS: CPT

## 2019-10-08 DIAGNOSIS — S22.000A COMPRESSION FX, THORACIC SPINE, CLOSED, INITIAL ENCOUNTER (HCC): Primary | ICD-10-CM

## 2019-10-09 ENCOUNTER — OFFICE VISIT (OUTPATIENT)
Dept: NEUROSURGERY | Age: 84
End: 2019-10-09
Payer: MEDICARE

## 2019-10-09 ENCOUNTER — HOSPITAL ENCOUNTER (OUTPATIENT)
Dept: GENERAL RADIOLOGY | Age: 84
Discharge: HOME | End: 2019-10-09
Payer: MEDICARE

## 2019-10-09 ENCOUNTER — HOSPITAL ENCOUNTER (OUTPATIENT)
Dept: ULTRASOUND IMAGING | Age: 84
Discharge: HOME | End: 2019-10-09
Payer: MEDICARE

## 2019-10-09 VITALS
BODY MASS INDEX: 26.98 KG/M2 | HEART RATE: 78 BPM | WEIGHT: 178 LBS | SYSTOLIC BLOOD PRESSURE: 144 MMHG | DIASTOLIC BLOOD PRESSURE: 86 MMHG | HEIGHT: 68 IN

## 2019-10-09 DIAGNOSIS — E04.1 THYROID NODULE: ICD-10-CM

## 2019-10-09 DIAGNOSIS — S22.000A COMPRESSION FX, THORACIC SPINE, CLOSED, INITIAL ENCOUNTER (HCC): ICD-10-CM

## 2019-10-09 DIAGNOSIS — M54.50 ACUTE MIDLINE LOW BACK PAIN WITHOUT SCIATICA: ICD-10-CM

## 2019-10-09 DIAGNOSIS — S22.000A COMPRESSION FX, THORACIC SPINE, CLOSED, INITIAL ENCOUNTER (HCC): Primary | ICD-10-CM

## 2019-10-09 PROCEDURE — 99212 OFFICE O/P EST SF 10 MIN: CPT | Performed by: NEUROLOGICAL SURGERY

## 2019-10-09 PROCEDURE — 76536 US EXAM OF HEAD AND NECK: CPT

## 2019-10-09 PROCEDURE — 72070 X-RAY EXAM THORAC SPINE 2VWS: CPT

## 2019-10-09 PROCEDURE — 72100 X-RAY EXAM L-S SPINE 2/3 VWS: CPT

## 2019-10-09 ASSESSMENT — ENCOUNTER SYMPTOMS
EYES NEGATIVE: 1
GASTROINTESTINAL NEGATIVE: 1
RESPIRATORY NEGATIVE: 1

## 2019-10-17 RX ORDER — NITROFURANTOIN MACROCRYSTALS 100 MG/1
100 CAPSULE ORAL 4 TIMES DAILY
COMMUNITY
End: 2019-10-17 | Stop reason: SDUPTHER

## 2019-10-18 RX ORDER — NITROFURANTOIN MACROCRYSTALS 100 MG/1
100 CAPSULE ORAL NIGHTLY
Qty: 90 CAPSULE | Refills: 3 | Status: SHIPPED | OUTPATIENT
Start: 2019-10-18 | End: 2020-05-28 | Stop reason: SDUPTHER

## 2019-10-18 RX ORDER — AMLODIPINE BESYLATE 5 MG/1
5 TABLET ORAL DAILY
Qty: 90 TABLET | Refills: 3 | Status: SHIPPED | OUTPATIENT
Start: 2019-10-18 | End: 2020-10-12 | Stop reason: SDUPTHER

## 2019-10-18 RX ORDER — PHENYTOIN SODIUM 100 MG/1
200 CAPSULE, EXTENDED RELEASE ORAL 2 TIMES DAILY
Qty: 180 CAPSULE | Refills: 3 | Status: SHIPPED | OUTPATIENT
Start: 2019-10-18 | End: 2020-10-12 | Stop reason: SDUPTHER

## 2019-10-18 RX ORDER — METOPROLOL SUCCINATE 50 MG/1
50 TABLET, EXTENDED RELEASE ORAL DAILY
Qty: 90 TABLET | Refills: 3 | Status: SHIPPED | OUTPATIENT
Start: 2019-10-18 | End: 2020-10-12 | Stop reason: SDUPTHER

## 2019-10-18 RX ORDER — LEVOTHYROXINE SODIUM 175 UG/1
175 TABLET ORAL DAILY
Qty: 90 TABLET | Refills: 3 | Status: SHIPPED | OUTPATIENT
Start: 2019-10-18 | End: 2020-10-12 | Stop reason: SDUPTHER

## 2019-10-18 RX ORDER — SERTRALINE HYDROCHLORIDE 100 MG/1
100 TABLET, FILM COATED ORAL DAILY
Qty: 90 TABLET | Refills: 3 | Status: SHIPPED | OUTPATIENT
Start: 2019-10-18 | End: 2020-10-12 | Stop reason: SDUPTHER

## 2019-10-22 RX ORDER — LEVOTHYROXINE SODIUM 175 UG/1
175 TABLET ORAL DAILY
Qty: 90 TABLET | Refills: 3 | OUTPATIENT
Start: 2019-10-22

## 2019-10-22 RX ORDER — PHENYTOIN SODIUM 100 MG/1
200 CAPSULE, EXTENDED RELEASE ORAL 2 TIMES DAILY
Qty: 180 CAPSULE | Refills: 3 | OUTPATIENT
Start: 2019-10-22

## 2019-10-22 RX ORDER — NITROFURANTOIN MACROCRYSTALS 100 MG/1
100 CAPSULE ORAL NIGHTLY
Qty: 90 CAPSULE | Refills: 3 | OUTPATIENT
Start: 2019-10-22
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
## 2019-10-28 ENCOUNTER — TELEPHONE (OUTPATIENT)
Dept: INTERNAL MEDICINE CLINIC | Age: 84
End: 2019-10-28

## 2019-10-29 ENCOUNTER — TELEPHONE (OUTPATIENT)
Dept: FAMILY MEDICINE CLINIC | Age: 84
End: 2019-10-29

## 2019-10-30 ENCOUNTER — TELEPHONE (OUTPATIENT)
Dept: VASCULAR SURGERY | Facility: CLINIC | Age: 84
End: 2019-10-30

## 2019-10-30 DIAGNOSIS — B35.1 ONYCHOMYCOSIS OF TOENAIL: Primary | ICD-10-CM

## 2019-10-31 ENCOUNTER — TELEPHONE (OUTPATIENT)
Dept: INTERNAL MEDICINE CLINIC | Age: 84
End: 2019-10-31

## 2019-12-02 ENCOUNTER — OFFICE VISIT (OUTPATIENT)
Dept: FAMILY MEDICINE CLINIC | Age: 84
End: 2019-12-02
Payer: MEDICARE

## 2019-12-02 VITALS
WEIGHT: 180 LBS | HEART RATE: 76 BPM | DIASTOLIC BLOOD PRESSURE: 80 MMHG | BODY MASS INDEX: 28.93 KG/M2 | TEMPERATURE: 98.2 F | OXYGEN SATURATION: 97 % | HEIGHT: 66 IN | SYSTOLIC BLOOD PRESSURE: 114 MMHG

## 2019-12-02 DIAGNOSIS — I25.10 CORONARY ARTERY DISEASE INVOLVING NATIVE CORONARY ARTERY WITHOUT ANGINA PECTORIS, UNSPECIFIED WHETHER NATIVE OR TRANSPLANTED HEART: ICD-10-CM

## 2019-12-02 DIAGNOSIS — R06.02 SHORTNESS OF BREATH: ICD-10-CM

## 2019-12-02 DIAGNOSIS — G40.909 SEIZURE DISORDER (HCC): ICD-10-CM

## 2019-12-02 DIAGNOSIS — Z00.00 ANNUAL PHYSICAL EXAM: Primary | ICD-10-CM

## 2019-12-02 DIAGNOSIS — E03.9 PRIMARY HYPOTHYROIDISM: ICD-10-CM

## 2019-12-02 DIAGNOSIS — I10 ESSENTIAL (PRIMARY) HYPERTENSION: ICD-10-CM

## 2019-12-02 LAB — PRO-BNP: 126 PG/ML (ref 0–1800)

## 2019-12-02 PROCEDURE — G0439 PPPS, SUBSEQ VISIT: HCPCS | Performed by: FAMILY MEDICINE

## 2019-12-02 PROCEDURE — 1090F PRES/ABSN URINE INCON ASSESS: CPT | Performed by: FAMILY MEDICINE

## 2019-12-02 PROCEDURE — 99214 OFFICE O/P EST MOD 30 MIN: CPT | Performed by: FAMILY MEDICINE

## 2019-12-02 PROCEDURE — G8417 CALC BMI ABV UP PARAM F/U: HCPCS | Performed by: FAMILY MEDICINE

## 2019-12-02 PROCEDURE — 1123F ACP DISCUSS/DSCN MKR DOCD: CPT | Performed by: FAMILY MEDICINE

## 2019-12-02 PROCEDURE — G8598 ASA/ANTIPLAT THER USED: HCPCS | Performed by: FAMILY MEDICINE

## 2019-12-02 PROCEDURE — G8482 FLU IMMUNIZE ORDER/ADMIN: HCPCS | Performed by: FAMILY MEDICINE

## 2019-12-02 PROCEDURE — 1036F TOBACCO NON-USER: CPT | Performed by: FAMILY MEDICINE

## 2019-12-02 PROCEDURE — G8427 DOCREV CUR MEDS BY ELIG CLIN: HCPCS | Performed by: FAMILY MEDICINE

## 2019-12-02 PROCEDURE — 4040F PNEUMOC VAC/ADMIN/RCVD: CPT | Performed by: FAMILY MEDICINE

## 2019-12-02 ASSESSMENT — PATIENT HEALTH QUESTIONNAIRE - PHQ9
SUM OF ALL RESPONSES TO PHQ QUESTIONS 1-9: 0

## 2019-12-02 ASSESSMENT — LIFESTYLE VARIABLES: HOW OFTEN DO YOU HAVE A DRINK CONTAINING ALCOHOL: 0

## 2019-12-07 ASSESSMENT — ENCOUNTER SYMPTOMS
DIARRHEA: 0
ABDOMINAL PAIN: 0
CONSTIPATION: 0
COUGH: 0
BACK PAIN: 1
ANAL BLEEDING: 0
CHEST TIGHTNESS: 0
NAUSEA: 0
SHORTNESS OF BREATH: 0

## 2019-12-31 ENCOUNTER — HOSPITAL ENCOUNTER (OUTPATIENT)
Dept: GENERAL RADIOLOGY | Age: 84
Discharge: HOME | End: 2019-12-31
Payer: MEDICARE

## 2019-12-31 ENCOUNTER — OFFICE VISIT (OUTPATIENT)
Dept: URGENT CARE | Age: 84
End: 2019-12-31
Payer: MEDICARE

## 2019-12-31 VITALS
WEIGHT: 170 LBS | BODY MASS INDEX: 27.44 KG/M2 | DIASTOLIC BLOOD PRESSURE: 83 MMHG | HEART RATE: 74 BPM | TEMPERATURE: 98.6 F | OXYGEN SATURATION: 95 % | RESPIRATION RATE: 16 BRPM | SYSTOLIC BLOOD PRESSURE: 144 MMHG

## 2019-12-31 DIAGNOSIS — R05.9 COUGH: ICD-10-CM

## 2019-12-31 DIAGNOSIS — J06.9 URI WITH COUGH AND CONGESTION: Primary | ICD-10-CM

## 2019-12-31 PROCEDURE — G8427 DOCREV CUR MEDS BY ELIG CLIN: HCPCS | Performed by: NURSE PRACTITIONER

## 2019-12-31 PROCEDURE — 71046 X-RAY EXAM CHEST 2 VIEWS: CPT

## 2019-12-31 PROCEDURE — G8598 ASA/ANTIPLAT THER USED: HCPCS | Performed by: NURSE PRACTITIONER

## 2019-12-31 PROCEDURE — G8417 CALC BMI ABV UP PARAM F/U: HCPCS | Performed by: NURSE PRACTITIONER

## 2019-12-31 PROCEDURE — 4040F PNEUMOC VAC/ADMIN/RCVD: CPT | Performed by: NURSE PRACTITIONER

## 2019-12-31 PROCEDURE — 1123F ACP DISCUSS/DSCN MKR DOCD: CPT | Performed by: NURSE PRACTITIONER

## 2019-12-31 PROCEDURE — 1090F PRES/ABSN URINE INCON ASSESS: CPT | Performed by: NURSE PRACTITIONER

## 2019-12-31 PROCEDURE — 1036F TOBACCO NON-USER: CPT | Performed by: NURSE PRACTITIONER

## 2019-12-31 PROCEDURE — 99213 OFFICE O/P EST LOW 20 MIN: CPT | Performed by: NURSE PRACTITIONER

## 2019-12-31 PROCEDURE — G8482 FLU IMMUNIZE ORDER/ADMIN: HCPCS | Performed by: NURSE PRACTITIONER

## 2019-12-31 RX ORDER — AZITHROMYCIN 250 MG/1
TABLET, FILM COATED ORAL
Qty: 1 PACKET | Refills: 0 | Status: SHIPPED | OUTPATIENT
Start: 2019-12-31 | End: 2020-01-10 | Stop reason: ALTCHOICE

## 2019-12-31 RX ORDER — METHYLPREDNISOLONE 4 MG/1
TABLET ORAL
Qty: 1 KIT | Refills: 0 | Status: SHIPPED | OUTPATIENT
Start: 2019-12-31 | End: 2020-01-10 | Stop reason: ALTCHOICE

## 2019-12-31 RX ORDER — BENZONATATE 100 MG/1
100 CAPSULE ORAL 3 TIMES DAILY PRN
Qty: 21 CAPSULE | Refills: 0 | Status: SHIPPED | OUTPATIENT
Start: 2019-12-31 | End: 2020-01-07

## 2020-01-07 ENCOUNTER — TELEPHONE (OUTPATIENT)
Dept: CARDIOLOGY | Age: 85
End: 2020-01-07

## 2020-01-07 NOTE — TELEPHONE ENCOUNTER
Tried to call patient since upon reviewing Tejas Berry notes that he did not have all her cardiac records upon her last visit with him-had to L/M for patient to return my call-she may have only been to [facility] ([facility]) and [employer]

## 2020-01-10 ENCOUNTER — OFFICE VISIT (OUTPATIENT)
Dept: FAMILY MEDICINE CLINIC | Age: 85
End: 2020-01-10
Payer: MEDICARE

## 2020-01-10 VITALS
DIASTOLIC BLOOD PRESSURE: 78 MMHG | HEART RATE: 82 BPM | SYSTOLIC BLOOD PRESSURE: 124 MMHG | WEIGHT: 184 LBS | OXYGEN SATURATION: 97 % | TEMPERATURE: 97.4 F | BODY MASS INDEX: 29.7 KG/M2

## 2020-01-10 PROCEDURE — 4040F PNEUMOC VAC/ADMIN/RCVD: CPT | Performed by: CLINICAL NURSE SPECIALIST

## 2020-01-10 PROCEDURE — G8482 FLU IMMUNIZE ORDER/ADMIN: HCPCS | Performed by: CLINICAL NURSE SPECIALIST

## 2020-01-10 PROCEDURE — 96372 THER/PROPH/DIAG INJ SC/IM: CPT | Performed by: CLINICAL NURSE SPECIALIST

## 2020-01-10 PROCEDURE — 99213 OFFICE O/P EST LOW 20 MIN: CPT | Performed by: CLINICAL NURSE SPECIALIST

## 2020-01-10 PROCEDURE — 1090F PRES/ABSN URINE INCON ASSESS: CPT | Performed by: CLINICAL NURSE SPECIALIST

## 2020-01-10 PROCEDURE — 1123F ACP DISCUSS/DSCN MKR DOCD: CPT | Performed by: CLINICAL NURSE SPECIALIST

## 2020-01-10 PROCEDURE — G8417 CALC BMI ABV UP PARAM F/U: HCPCS | Performed by: CLINICAL NURSE SPECIALIST

## 2020-01-10 PROCEDURE — G8427 DOCREV CUR MEDS BY ELIG CLIN: HCPCS | Performed by: CLINICAL NURSE SPECIALIST

## 2020-01-10 PROCEDURE — 1036F TOBACCO NON-USER: CPT | Performed by: CLINICAL NURSE SPECIALIST

## 2020-01-10 RX ORDER — PREDNISONE 20 MG/1
TABLET ORAL
Qty: 12 TABLET | Refills: 0 | Status: SHIPPED | OUTPATIENT
Start: 2020-01-10 | End: 2020-02-17

## 2020-01-10 RX ORDER — CEFTRIAXONE 1 G/1
1 INJECTION, POWDER, FOR SOLUTION INTRAMUSCULAR; INTRAVENOUS ONCE
Status: COMPLETED | OUTPATIENT
Start: 2020-01-10 | End: 2020-01-10

## 2020-01-10 RX ORDER — METHYLPREDNISOLONE ACETATE 80 MG/ML
80 INJECTION, SUSPENSION INTRA-ARTICULAR; INTRALESIONAL; INTRAMUSCULAR; SOFT TISSUE ONCE
Status: COMPLETED | OUTPATIENT
Start: 2020-01-10 | End: 2020-01-10

## 2020-01-10 RX ORDER — DEXTROMETHORPHAN HYDROBROMIDE AND PROMETHAZINE HYDROCHLORIDE 15; 6.25 MG/5ML; MG/5ML
5 SYRUP ORAL 4 TIMES DAILY PRN
Qty: 140 ML | Refills: 0 | Status: SHIPPED | OUTPATIENT
Start: 2020-01-10 | End: 2020-01-17

## 2020-01-10 RX ORDER — CEFDINIR 300 MG/1
300 CAPSULE ORAL 2 TIMES DAILY
Qty: 14 CAPSULE | Refills: 0 | Status: SHIPPED | OUTPATIENT
Start: 2020-01-10 | End: 2020-01-17

## 2020-01-10 RX ADMIN — CEFTRIAXONE 1 G: 1 INJECTION, POWDER, FOR SOLUTION INTRAMUSCULAR; INTRAVENOUS at 15:15

## 2020-01-10 RX ADMIN — METHYLPREDNISOLONE ACETATE 80 MG: 80 INJECTION, SUSPENSION INTRA-ARTICULAR; INTRALESIONAL; INTRAMUSCULAR; SOFT TISSUE at 15:17

## 2020-01-10 ASSESSMENT — ENCOUNTER SYMPTOMS
RHINORRHEA: 0
FACIAL SWELLING: 0
SINUS PRESSURE: 0
EYE DISCHARGE: 0
SHORTNESS OF BREATH: 1
WHEEZING: 1
VOMITING: 0
COUGH: 1
NAUSEA: 0
CHEST TIGHTNESS: 1
SORE THROAT: 0
EYE PAIN: 0

## 2020-01-10 NOTE — PROGRESS NOTES
vomiting. Genitourinary: Negative for dysuria, frequency and urgency. Musculoskeletal: Negative for arthralgias and myalgias. Neurological: Negative for weakness, light-headedness and headaches. Hematological: Negative for adenopathy. OBJECTIVE:  /78   Pulse 82   Temp 97.4 °F (36.3 °C) (Temporal)   Wt 184 lb (83.5 kg)   SpO2 97%   BMI 29.70 kg/m²    Physical Exam  Vitals signs reviewed. Constitutional:       General: She is not in acute distress. Appearance: She is well-developed. She is not ill-appearing or toxic-appearing. HENT:      Head: Normocephalic and atraumatic. Nose: Nose normal. No congestion or rhinorrhea. Mouth/Throat:      Mouth: Mucous membranes are moist.      Pharynx: No oropharyngeal exudate or posterior oropharyngeal erythema. Eyes:      General:         Right eye: No discharge. Left eye: No discharge. Conjunctiva/sclera: Conjunctivae normal.      Pupils: Pupils are equal, round, and reactive to light. Neck:      Musculoskeletal: Normal range of motion and neck supple. Thyroid: No thyromegaly. Trachea: No tracheal deviation. Cardiovascular:      Rate and Rhythm: Normal rate and regular rhythm. Heart sounds: No murmur. Pulmonary:      Effort: Pulmonary effort is normal. No respiratory distress. Breath sounds: Examination of the right-upper field reveals wheezing. Examination of the left-upper field reveals wheezing. Examination of the right-middle field reveals wheezing. Examination of the right-lower field reveals wheezing. Examination of the left-lower field reveals wheezing. Wheezing present. No rales. Abdominal:      General: Bowel sounds are normal. There is no distension. Palpations: Abdomen is soft. Tenderness: There is no tenderness. There is no rebound. Genitourinary:     Vagina: Normal.   Musculoskeletal: Normal range of motion. General: No swelling, tenderness or deformity.

## 2020-01-17 ENCOUNTER — TELEPHONE (OUTPATIENT)
Dept: CARDIOLOGY | Age: 85
End: 2020-01-17

## 2020-01-17 NOTE — TELEPHONE ENCOUNTER
Tried patient again and able to leave message to inquire if she has had any cardiac work done in last few weeks since I should have all other records-left number to return [facility] call. [facility]

## 2020-02-17 ENCOUNTER — TELEPHONE (OUTPATIENT)
Dept: NEUROSURGERY | Age: 85
End: 2020-02-17

## 2020-02-17 ENCOUNTER — OFFICE VISIT (OUTPATIENT)
Dept: NEUROSURGERY | Age: 85
End: 2020-02-17
Payer: MEDICARE

## 2020-02-17 VITALS
OXYGEN SATURATION: 94 % | HEART RATE: 91 BPM | WEIGHT: 188 LBS | DIASTOLIC BLOOD PRESSURE: 86 MMHG | HEIGHT: 68 IN | SYSTOLIC BLOOD PRESSURE: 151 MMHG | BODY MASS INDEX: 28.49 KG/M2

## 2020-02-17 DIAGNOSIS — R56.9 CONVULSIONS, UNSPECIFIED CONVULSION TYPE (HCC): ICD-10-CM

## 2020-02-17 LAB
ALBUMIN SERPL-MCNC: 4.2 G/DL (ref 3.5–5.2)
ALP BLD-CCNC: 107 U/L (ref 35–104)
ALT SERPL-CCNC: 18 U/L (ref 5–33)
ANION GAP SERPL CALCULATED.3IONS-SCNC: 15 MMOL/L (ref 7–19)
AST SERPL-CCNC: 16 U/L (ref 5–32)
BASOPHILS ABSOLUTE: 0 K/UL (ref 0–0.2)
BASOPHILS RELATIVE PERCENT: 0.4 % (ref 0–1)
BILIRUB SERPL-MCNC: 0.3 MG/DL (ref 0.2–1.2)
BUN BLDV-MCNC: 17 MG/DL (ref 8–23)
CALCIUM SERPL-MCNC: 8.5 MG/DL (ref 8.8–10.2)
CHLORIDE BLD-SCNC: 105 MMOL/L (ref 98–111)
CO2: 26 MMOL/L (ref 22–29)
CREAT SERPL-MCNC: 0.5 MG/DL (ref 0.5–0.9)
EOSINOPHILS ABSOLUTE: 0.1 K/UL (ref 0–0.6)
EOSINOPHILS RELATIVE PERCENT: 1.1 % (ref 0–5)
GFR NON-AFRICAN AMERICAN: >60
GLUCOSE BLD-MCNC: 84 MG/DL (ref 74–109)
HCT VFR BLD CALC: 48.2 % (ref 37–47)
HEMOGLOBIN: 15.4 G/DL (ref 12–16)
IMMATURE GRANULOCYTES #: 0 K/UL
LYMPHOCYTES ABSOLUTE: 2.3 K/UL (ref 1.1–4.5)
LYMPHOCYTES RELATIVE PERCENT: 27.2 % (ref 20–40)
MCH RBC QN AUTO: 31.5 PG (ref 27–31)
MCHC RBC AUTO-ENTMCNC: 32 G/DL (ref 33–37)
MCV RBC AUTO: 98.6 FL (ref 81–99)
MONOCYTES ABSOLUTE: 0.6 K/UL (ref 0–0.9)
MONOCYTES RELATIVE PERCENT: 7.6 % (ref 0–10)
NEUTROPHILS ABSOLUTE: 5.4 K/UL (ref 1.5–7.5)
NEUTROPHILS RELATIVE PERCENT: 63.3 % (ref 50–65)
PDW BLD-RTO: 14.3 % (ref 11.5–14.5)
PHENYTOIN LEVEL: 21 UG/ML (ref 10–20)
PLATELET # BLD: 257 K/UL (ref 130–400)
PMV BLD AUTO: 10.6 FL (ref 9.4–12.3)
POTASSIUM SERPL-SCNC: 3.7 MMOL/L (ref 3.5–5)
RBC # BLD: 4.89 M/UL (ref 4.2–5.4)
SODIUM BLD-SCNC: 146 MMOL/L (ref 136–145)
TOTAL PROTEIN: 7.3 G/DL (ref 6.6–8.7)
WBC # BLD: 8.5 K/UL (ref 4.8–10.8)

## 2020-02-17 PROCEDURE — 4040F PNEUMOC VAC/ADMIN/RCVD: CPT | Performed by: NURSE PRACTITIONER

## 2020-02-17 PROCEDURE — G8427 DOCREV CUR MEDS BY ELIG CLIN: HCPCS | Performed by: NURSE PRACTITIONER

## 2020-02-17 PROCEDURE — 1036F TOBACCO NON-USER: CPT | Performed by: NURSE PRACTITIONER

## 2020-02-17 PROCEDURE — 1090F PRES/ABSN URINE INCON ASSESS: CPT | Performed by: NURSE PRACTITIONER

## 2020-02-17 PROCEDURE — G8482 FLU IMMUNIZE ORDER/ADMIN: HCPCS | Performed by: NURSE PRACTITIONER

## 2020-02-17 PROCEDURE — G8417 CALC BMI ABV UP PARAM F/U: HCPCS | Performed by: NURSE PRACTITIONER

## 2020-02-17 PROCEDURE — 99214 OFFICE O/P EST MOD 30 MIN: CPT | Performed by: NURSE PRACTITIONER

## 2020-02-17 PROCEDURE — 1123F ACP DISCUSS/DSCN MKR DOCD: CPT | Performed by: NURSE PRACTITIONER

## 2020-02-17 NOTE — PROGRESS NOTES
Hypothyroidism     Hypothyroidism     Memory problem     Neuropathy     Shingles     TIA (transient ischemic attack)     Urinary incontinence     UTI (urinary tract infection)        Past Surgical History:   Procedure Laterality Date    APPENDECTOMY  1947    CHOLECYSTECTOMY      EYE SURGERY  2009    bilateral cataract     HYSTERECTOMY, TOTAL ABDOMINAL      JOINT REPLACEMENT      Bilateral Knee    KNEE ARTHROPLASTY Right     LOBECTOMY  10/2015    sx--bilateral    PTCA  2002    with stent placement/bms to rca       Family History   Problem Relation Age of Onset    Diabetes Mother     Hypertension Mother    [facility] Rheum Arthritis Mother     Stroke Mother     Tuberculosis Father        Social History     Socioeconomic History    Marital status:       Spouse name: Not on file    Number of children: Not on file    Years of education: Not on file    Highest education level: Not on file   Occupational History    Not on file   Social Needs    Financial resource strain: Not on file    Food insecurity:     Worry: Not on file     Inability: Not on file    Transportation needs:     Medical: Not on file     Non-medical: Not on file   Tobacco Use    Smoking status: Never Smoker    Smokeless tobacco: Never Used   Substance and Sexual Activity    Alcohol use: Never     Frequency: Never    Drug use: Never    Sexual activity: Not on file   Lifestyle    Physical activity:     Days per week: Not on file     Minutes per session: Not on file    Stress: Not on file   Relationships    Social connections:     Talks on phone: Not on file     Gets together: Not on file     Attends Jew service: Not on file     Active member of club or organization: Not on file     Attends meetings of clubs or organizations: Not on file     Relationship status: Not on file    Intimate partner violence:     Fear of current or ex partner: Not on file     Emotionally abused: Not on file     Physically abused: Not on file 4 extremities  [x]Normal bulk and tone  [x]No tremor present  [x]No rigidity or bradykinesia noted  COMMENTS:    Sensory  [x]Sensation intact to light touch, pin prick, vibration, and proprioception BLE  [x]Sensation intact to light touch, pin prick, vibration, and proprioception BUE  COMMENTS:   Coordination [x]FTN normal bilaterally   [x]HTS normal bilaterally  [x]HIRA normal bilaterally. COMMENTS:   Reflexes  []Symmetric and non-pathological  [x]Toes down going bilaterally  [x]No clonus present  COMMENTS: hypoactive throughout    Gait                  []Normal steady gait    []Ataxic    []Spastic     []Magnetic     []Shuffling  COMMENTS: unsteady, with walker        LABS RECORD AND IMAGING REVIEW (As below and per HPI)    No results found for: [id number]  Lab Results   Component Value Date    WBC 6.6 10/04/2019    HGB 14.6 10/04/2019    HCT 44.3 10/04/2019    MCV 93.3 10/04/2019     10/04/2019     Lab Results   Component Value Date     10/04/2019    K 4.2 10/04/2019     10/04/2019    CO2 25 10/04/2019    BUN 18 10/04/2019    CREATININE 0.5 10/04/2019    GLUCOSE 76 10/04/2019    CALCIUM 8.5 (L) 10/04/2019    PROT 6.8 10/04/2019    LABALBU 3.7 10/04/2019    BILITOT <0.2 10/04/2019    ALKPHOS 138 (H) 10/04/2019    AST 21 10/04/2019    ALT 27 10/04/2019    LABGLOM >60 10/04/2019     Lab Results   Component Value Date    CHOL 166 10/04/2019    TRIG 131 10/04/2019    HDL 58 (L) 10/04/2019    LDLCALC 82 10/04/2019     Lab Results   Component Value Date    TSH 3.220 10/04/2019    T4FREE 1.1 10/04/2019     Lab Results   Component Value Date    CRP 0.74 (H) 10/04/2019    SEDRATE 10 10/04/2019        Xr Chest Standard (2 Vw)    Result Date: 12/31/2019  EXAMINATION: Chest 2 views 12/31/2019 HISTORY: Cough FINDINGS: Upright frontal and lateral projections of the chest demonstrate increased interstitial markings of the lungs which are chronic in nature.  There is atheromatous calcification and ectasia of the thoracic aorta and great vessels. There is thickening of the minor fissure on right. There is no evidence of acute consolidative pneumonia or effusion. There is no free air beneath the hemidiaphragms. 1.. Chronic changes of the lungs including increased interstitial markings. There is an ectatic thoracic aorta. 2. No evidence of lobar pneumonia or effusion. Signed by Dr Louise Yen on 12/31/2019 3:17 PM    Reviewed referral records     ASSESSMENT:    Rylan Saldivar is a 80y.o. year old female here for evaluation of seizures, prior history of skull fracture. Somewhat of a difficulty history to obtain today and no records from where she was treated previously. She is on Dilantin. Will plan for further work up with MRI brain and EEG. Will also plan for records review to help clarify history. Will plan to continue Dilantin, discussed side effects. Could consider memory agent on down the line. ICD-10-CM    1. Convulsions, unspecified convulsion type ([facility] [address] R56.9 EEG     MRI Brain W WO Contrast     Phenytoin level, total     CBC Auto Differential     Comprehensive Metabolic Panel   2. History of skull fracture Z87.81 MRI Brain W WO Contrast   3. Falls frequently R29.6 MRI Brain W WO Contrast       PLAN:  1. MRI brain   2. EEG  3. Continue Dilantin 200mg BID. Discussed side effects with patient and niece. 4. Dilantin level, routine labs today   5. Safety precautions, medication management/monitoring, no driving, increased supervision. 6. Fall precautions, use walker. 7. Epilepsy precautions and seizure first aid discussed. No driving, heights, swimming, tub baths, open flames, or heavy machinery. 8. Records from [facility] in [place], [place] (treated for skull fracture there possibly) and [facility] in [facility]   9. Return in about 2 months (around 4/17/2020) for follow up, sooner if worsening.     Yeni Valle, APRN    Note:  A total of >50% (>23 minutes) of 45 minutes was spent discussing the pathophysiology and treatment and/or coordination of care of the above diagnoses. This dictation was generated by voice recognition computer software. Although all attempts are made to edit the dictation for accuracy, there may be errors in the transcription that are not intended.

## 2020-02-17 NOTE — PROGRESS NOTES
REVIEW OF SYSTEMS    Constitutional: []Fever []Sweat []Chills [] Recent Injury [x] Denies all unless marked  HEENT:[]Headache  [] Head Injury/Hearing Loss  [] Sore Throat  [] Ear Ache/Dizziness  [x] Denies all unless marked  Spine:  [] Neck pain  [] Back pain  [] Sciaticia  [x] Denies all unless marked  Cardiovascular:[]Heart Disease []Chest Pain [] Palpitations  [x] Denies all unless marked  Pulmonary: []Shortness of Breath []Cough   [x] Denies all unless marke  Gastrointestinal: []Nausea  []Vomiting  []Abdominal Pain  []Constipation  []Diarrhea  []Dark Bloody Stools  [x] Denies all unless marked  Psychiatric/Behavioral:[] Depression [] Anxiety [x] Denies all unless marked  Genitourinary:   [] Frequency  [] Urgency  [] Incontinence [] Pain with Urination  [x] Denies all unless marked  Extremities: []Pain  []Swelling  [x] Denies all unless marked  Musculoskeletal: [] Muscle Pain  [] Joint Pain  [] Arthritis [] Muscle Cramps [] Muscle Twitches  [x] Denies all unless marked  Sleep: [] Insomnia [] Snoring [] Restless Legs [] Sleep Apnea  [] Daytime Sleepiness  [x] Denies all unless marked  Skin:[] Rash [] Skin Discoloration [x] Denies all unless marked   Neurological: []Visual Disturbance/Memory Loss [] Loss of Balance [] Slurred Speech/Weakness [x] Seizures  [] Vertigo/Dizziness [] Denies all unless marked

## 2020-02-23 ENCOUNTER — HOSPITAL ENCOUNTER (INPATIENT)
Age: 85
LOS: 3 days | Discharge: HOME HEALTH | DRG: 176 | End: 2020-02-26
Attending: PEDIATRICS | Admitting: HOSPITALIST
Payer: MEDICARE

## 2020-02-23 ENCOUNTER — APPOINTMENT (OUTPATIENT)
Dept: CT IMAGING | Age: 85
DRG: 176 | End: 2020-02-23
Payer: MEDICARE

## 2020-02-23 ENCOUNTER — APPOINTMENT (OUTPATIENT)
Dept: GENERAL RADIOLOGY | Age: 85
DRG: 176 | End: 2020-02-23
Payer: MEDICARE

## 2020-02-23 PROBLEM — I26.99 PULMONARY EMBOLISM WITHOUT ACUTE COR PULMONALE (HCC): Status: ACTIVE | Noted: 2020-02-23

## 2020-02-23 LAB
ALBUMIN SERPL-MCNC: 4.3 G/DL (ref 3.5–5.2)
ALP BLD-CCNC: 116 U/L (ref 35–104)
ALT SERPL-CCNC: 16 U/L (ref 5–33)
ANION GAP SERPL CALCULATED.3IONS-SCNC: 16 MMOL/L (ref 7–19)
APTT: 38.7 SEC (ref 26–36.2)
AST SERPL-CCNC: 15 U/L (ref 5–32)
BASE EXCESS ARTERIAL: 1 MMOL/L (ref -2–2)
BASOPHILS ABSOLUTE: 0 K/UL (ref 0–0.2)
BASOPHILS RELATIVE PERCENT: 0.4 % (ref 0–1)
BILIRUB SERPL-MCNC: 0.7 MG/DL (ref 0.2–1.2)
BILIRUBIN URINE: NEGATIVE
BLOOD, URINE: NEGATIVE
BUN BLDV-MCNC: 19 MG/DL (ref 8–23)
CALCIUM SERPL-MCNC: 8.9 MG/DL (ref 8.8–10.2)
CARBOXYHEMOGLOBIN ARTERIAL: 2.3 % (ref 0–5)
CHLORIDE BLD-SCNC: 102 MMOL/L (ref 98–111)
CLARITY: CLEAR
CO2: 24 MMOL/L (ref 22–29)
COLOR: ABNORMAL
CREAT SERPL-MCNC: 0.6 MG/DL (ref 0.5–0.9)
EOSINOPHILS ABSOLUTE: 0 K/UL (ref 0–0.6)
EOSINOPHILS RELATIVE PERCENT: 0.4 % (ref 0–5)
GFR NON-AFRICAN AMERICAN: >60
GLUCOSE BLD-MCNC: 123 MG/DL (ref 74–109)
GLUCOSE URINE: NEGATIVE MG/DL
HCO3 ARTERIAL: 25.2 MMOL/L (ref 22–26)
HCT VFR BLD CALC: 41.4 % (ref 37–47)
HCT VFR BLD CALC: 51.9 % (ref 37–47)
HEMOGLOBIN, ART, EXTENDED: 13.2 G/DL (ref 12–16)
HEMOGLOBIN: 13.2 G/DL (ref 12–16)
HEMOGLOBIN: 16.7 G/DL (ref 12–16)
IMMATURE GRANULOCYTES #: 0.1 K/UL
KETONES, URINE: NEGATIVE MG/DL
LACTIC ACID: 1.6 MMOL/L (ref 0.5–1.9)
LEUKOCYTE ESTERASE, URINE: NEGATIVE
LIPASE: 15 U/L (ref 13–60)
LYMPHOCYTES ABSOLUTE: 2.6 K/UL (ref 1.1–4.5)
LYMPHOCYTES RELATIVE PERCENT: 22.8 % (ref 20–40)
MCH RBC QN AUTO: 31.2 PG (ref 27–31)
MCH RBC QN AUTO: 31.2 PG (ref 27–31)
MCHC RBC AUTO-ENTMCNC: 31.9 G/DL (ref 33–37)
MCHC RBC AUTO-ENTMCNC: 32.2 G/DL (ref 33–37)
MCV RBC AUTO: 96.8 FL (ref 81–99)
MCV RBC AUTO: 97.9 FL (ref 81–99)
METHEMOGLOBIN ARTERIAL: 0.6 %
MONOCYTES ABSOLUTE: 1.1 K/UL (ref 0–0.9)
MONOCYTES RELATIVE PERCENT: 9.3 % (ref 0–10)
NEUTROPHILS ABSOLUTE: 7.5 K/UL (ref 1.5–7.5)
NEUTROPHILS RELATIVE PERCENT: 66.6 % (ref 50–65)
NITRITE, URINE: NEGATIVE
O2 CONTENT ARTERIAL: 15.8 ML/DL
O2 SAT, ARTERIAL: 85.4 %
O2 THERAPY: ABNORMAL
PCO2 ARTERIAL: 38 MMHG (ref 35–45)
PDW BLD-RTO: 13.8 % (ref 11.5–14.5)
PDW BLD-RTO: 14.1 % (ref 11.5–14.5)
PH ARTERIAL: 7.43 (ref 7.35–7.45)
PH UA: 6 (ref 5–8)
PHENYTOIN LEVEL: 16.4 UG/ML (ref 10–20)
PLATELET # BLD: 184 K/UL (ref 130–400)
PLATELET # BLD: 235 K/UL (ref 130–400)
PMV BLD AUTO: 10.4 FL (ref 9.4–12.3)
PMV BLD AUTO: 11.2 FL (ref 9.4–12.3)
PO2 ARTERIAL: 46 MMHG (ref 80–100)
POTASSIUM REFLEX MAGNESIUM: 4.3 MMOL/L (ref 3.5–5)
POTASSIUM, WHOLE BLOOD: 3.6
PRO-BNP: 674 PG/ML (ref 0–1800)
PROTEIN UA: ABNORMAL MG/DL
RAPID INFLUENZA  B AGN: NEGATIVE
RAPID INFLUENZA A AGN: NEGATIVE
RBC # BLD: 4.23 M/UL (ref 4.2–5.4)
RBC # BLD: 5.36 M/UL (ref 4.2–5.4)
SODIUM BLD-SCNC: 142 MMOL/L (ref 136–145)
SPECIFIC GRAVITY UA: 1.02 (ref 1–1.03)
TOTAL PROTEIN: 7.9 G/DL (ref 6.6–8.7)
TROPONIN: 0.01 NG/ML (ref 0–0.03)
URINE REFLEX TO CULTURE: ABNORMAL
UROBILINOGEN, URINE: 1 E.U./DL
WBC # BLD: 11.3 K/UL (ref 4.8–10.8)
WBC # BLD: 8.7 K/UL (ref 4.8–10.8)

## 2020-02-23 PROCEDURE — 85730 THROMBOPLASTIN TIME PARTIAL: CPT

## 2020-02-23 PROCEDURE — 70450 CT HEAD/BRAIN W/O DYE: CPT

## 2020-02-23 PROCEDURE — 99285 EMERGENCY DEPT VISIT HI MDM: CPT

## 2020-02-23 PROCEDURE — 83690 ASSAY OF LIPASE: CPT

## 2020-02-23 PROCEDURE — 80185 ASSAY OF PHENYTOIN TOTAL: CPT

## 2020-02-23 PROCEDURE — 84132 ASSAY OF SERUM POTASSIUM: CPT

## 2020-02-23 PROCEDURE — 2000000000 HC ICU R&B

## 2020-02-23 PROCEDURE — 36415 COLL VENOUS BLD VENIPUNCTURE: CPT

## 2020-02-23 PROCEDURE — 87040 BLOOD CULTURE FOR BACTERIA: CPT

## 2020-02-23 PROCEDURE — 2700000000 HC OXYGEN THERAPY PER DAY

## 2020-02-23 PROCEDURE — 93005 ELECTROCARDIOGRAM TRACING: CPT | Performed by: PEDIATRICS

## 2020-02-23 PROCEDURE — 84484 ASSAY OF TROPONIN QUANT: CPT

## 2020-02-23 PROCEDURE — 85027 COMPLETE CBC AUTOMATED: CPT

## 2020-02-23 PROCEDURE — 71046 X-RAY EXAM CHEST 2 VIEWS: CPT

## 2020-02-23 PROCEDURE — 81003 URINALYSIS AUTO W/O SCOPE: CPT

## 2020-02-23 PROCEDURE — 82803 BLOOD GASES ANY COMBINATION: CPT

## 2020-02-23 PROCEDURE — 85025 COMPLETE CBC W/AUTO DIFF WBC: CPT

## 2020-02-23 PROCEDURE — 83605 ASSAY OF LACTIC ACID: CPT

## 2020-02-23 PROCEDURE — 80053 COMPREHEN METABOLIC PANEL: CPT

## 2020-02-23 PROCEDURE — 36600 WITHDRAWAL OF ARTERIAL BLOOD: CPT

## 2020-02-23 PROCEDURE — 87804 INFLUENZA ASSAY W/OPTIC: CPT

## 2020-02-23 PROCEDURE — 2580000003 HC RX 258: Performed by: PEDIATRICS

## 2020-02-23 PROCEDURE — 6370000000 HC RX 637 (ALT 250 FOR IP): Performed by: HOSPITALIST

## 2020-02-23 PROCEDURE — 2580000003 HC RX 258: Performed by: HOSPITALIST

## 2020-02-23 PROCEDURE — 6360000002 HC RX W HCPCS: Performed by: PEDIATRICS

## 2020-02-23 PROCEDURE — 83880 ASSAY OF NATRIURETIC PEPTIDE: CPT

## 2020-02-23 PROCEDURE — 6360000004 HC RX CONTRAST MEDICATION: Performed by: PEDIATRICS

## 2020-02-23 PROCEDURE — 71275 CT ANGIOGRAPHY CHEST: CPT

## 2020-02-23 RX ORDER — MAGNESIUM SULFATE IN WATER 40 MG/ML
2 INJECTION, SOLUTION INTRAVENOUS PRN
Status: DISCONTINUED | OUTPATIENT
Start: 2020-02-23 | End: 2020-02-26 | Stop reason: HOSPADM

## 2020-02-23 RX ORDER — POTASSIUM CHLORIDE 7.45 MG/ML
10 INJECTION INTRAVENOUS PRN
Status: DISCONTINUED | OUTPATIENT
Start: 2020-02-23 | End: 2020-02-26 | Stop reason: HOSPADM

## 2020-02-23 RX ORDER — PHENYTOIN SODIUM 100 MG/1
200 CAPSULE, EXTENDED RELEASE ORAL 2 TIMES DAILY
Status: DISCONTINUED | OUTPATIENT
Start: 2020-02-23 | End: 2020-02-26 | Stop reason: HOSPADM

## 2020-02-23 RX ORDER — HEPARIN SODIUM 1000 [USP'U]/ML
80 INJECTION, SOLUTION INTRAVENOUS; SUBCUTANEOUS ONCE
Status: DISCONTINUED | OUTPATIENT
Start: 2020-02-23 | End: 2020-02-23

## 2020-02-23 RX ORDER — SODIUM CHLORIDE, SODIUM LACTATE, POTASSIUM CHLORIDE, AND CALCIUM CHLORIDE .6; .31; .03; .02 G/100ML; G/100ML; G/100ML; G/100ML
30 INJECTION, SOLUTION INTRAVENOUS ONCE
Status: COMPLETED | OUTPATIENT
Start: 2020-02-23 | End: 2020-02-23

## 2020-02-23 RX ORDER — SODIUM CHLORIDE 0.9 % (FLUSH) 0.9 %
10 SYRINGE (ML) INJECTION EVERY 12 HOURS SCHEDULED
Status: DISCONTINUED | OUTPATIENT
Start: 2020-02-23 | End: 2020-02-26 | Stop reason: HOSPADM

## 2020-02-23 RX ORDER — ONDANSETRON 2 MG/ML
4 INJECTION INTRAMUSCULAR; INTRAVENOUS EVERY 6 HOURS PRN
Status: DISCONTINUED | OUTPATIENT
Start: 2020-02-23 | End: 2020-02-26 | Stop reason: HOSPADM

## 2020-02-23 RX ORDER — AMLODIPINE BESYLATE 5 MG/1
5 TABLET ORAL DAILY
Status: DISCONTINUED | OUTPATIENT
Start: 2020-02-24 | End: 2020-02-26 | Stop reason: HOSPADM

## 2020-02-23 RX ORDER — SODIUM CHLORIDE 0.9 % (FLUSH) 0.9 %
10 SYRINGE (ML) INJECTION PRN
Status: DISCONTINUED | OUTPATIENT
Start: 2020-02-23 | End: 2020-02-26 | Stop reason: HOSPADM

## 2020-02-23 RX ORDER — HEPARIN SODIUM 10000 [USP'U]/100ML
18 INJECTION, SOLUTION INTRAVENOUS CONTINUOUS
Status: DISCONTINUED | OUTPATIENT
Start: 2020-02-24 | End: 2020-02-26 | Stop reason: HOSPADM

## 2020-02-23 RX ORDER — POTASSIUM CHLORIDE 20 MEQ/1
40 TABLET, EXTENDED RELEASE ORAL PRN
Status: DISCONTINUED | OUTPATIENT
Start: 2020-02-23 | End: 2020-02-26 | Stop reason: HOSPADM

## 2020-02-23 RX ORDER — ACETAMINOPHEN 325 MG/1
650 TABLET ORAL EVERY 6 HOURS PRN
Status: DISCONTINUED | OUTPATIENT
Start: 2020-02-23 | End: 2020-02-26 | Stop reason: HOSPADM

## 2020-02-23 RX ORDER — PROMETHAZINE HYDROCHLORIDE 25 MG/1
12.5 TABLET ORAL EVERY 6 HOURS PRN
Status: DISCONTINUED | OUTPATIENT
Start: 2020-02-23 | End: 2020-02-26 | Stop reason: HOSPADM

## 2020-02-23 RX ORDER — HEPARIN SODIUM 1000 [USP'U]/ML
80 INJECTION, SOLUTION INTRAVENOUS; SUBCUTANEOUS PRN
Status: DISCONTINUED | OUTPATIENT
Start: 2020-02-23 | End: 2020-02-26 | Stop reason: HOSPADM

## 2020-02-23 RX ORDER — ACETAMINOPHEN 650 MG/1
650 SUPPOSITORY RECTAL EVERY 6 HOURS PRN
Status: DISCONTINUED | OUTPATIENT
Start: 2020-02-23 | End: 2020-02-26 | Stop reason: HOSPADM

## 2020-02-23 RX ORDER — HEPARIN SODIUM 1000 [USP'U]/ML
40 INJECTION, SOLUTION INTRAVENOUS; SUBCUTANEOUS PRN
Status: DISCONTINUED | OUTPATIENT
Start: 2020-02-23 | End: 2020-02-26 | Stop reason: HOSPADM

## 2020-02-23 RX ORDER — IPRATROPIUM BROMIDE AND ALBUTEROL SULFATE 2.5; .5 MG/3ML; MG/3ML
1 SOLUTION RESPIRATORY (INHALATION)
Status: DISCONTINUED | OUTPATIENT
Start: 2020-02-24 | End: 2020-02-26 | Stop reason: HOSPADM

## 2020-02-23 RX ADMIN — IOPAMIDOL 90 ML: 755 INJECTION, SOLUTION INTRAVENOUS at 18:54

## 2020-02-23 RX ADMIN — Medication 500 MG: at 16:25

## 2020-02-23 RX ADMIN — SODIUM CHLORIDE, POTASSIUM CHLORIDE, SODIUM LACTATE AND CALCIUM CHLORIDE 1000 ML: 600; 310; 30; 20 INJECTION, SOLUTION INTRAVENOUS at 16:24

## 2020-02-23 RX ADMIN — CEFTRIAXONE 1 G: 1 INJECTION, POWDER, FOR SOLUTION INTRAMUSCULAR; INTRAVENOUS at 16:25

## 2020-02-23 RX ADMIN — PHENYTOIN SODIUM 200 MG: 100 CAPSULE ORAL at 23:16

## 2020-02-23 RX ADMIN — ENOXAPARIN SODIUM 90 MG: 100 INJECTION SUBCUTANEOUS at 19:36

## 2020-02-23 RX ADMIN — Medication 10 ML: at 23:17

## 2020-02-23 ASSESSMENT — ENCOUNTER SYMPTOMS
BLOOD IN STOOL: 0
CHEST TIGHTNESS: 0
ABDOMINAL PAIN: 0
CHOKING: 0
NAUSEA: 0
RHINORRHEA: 0
DIARRHEA: 0
VOMITING: 0
COUGH: 1
BACK PAIN: 0
SHORTNESS OF BREATH: 0
TROUBLE SWALLOWING: 0
SORE THROAT: 0
SINUS PAIN: 0

## 2020-02-23 ASSESSMENT — PAIN SCALES - GENERAL: PAINLEVEL_OUTOF10: 0

## 2020-02-23 NOTE — ED PROVIDER NOTES
[facility] EMERGENCY DEPT  eMERGENCY dEPARTMENT eNCOUnter      Pt Name: Saira Morris  MRN: [id number]  [place] 8/18/1931  Date of evaluation: 2/23/2020  Provider: Marc Manzanares MD    [address] [place] [address]       Chief Complaint   Patient presents with    Fatigue     states unable to walk today, normally able to ambulate with walker in home.  Cough     x2-3 days productive cough with white frothy cough         HISTORY OF PRESENT ILLNESS   (Location/Symptom, Timing/Onset,Context/Setting, Quality, Duration, Modifying Factors, Severity)  Note limiting factors. Saira Morris is a 80 y.o. female who presents to the emergency department due to fatigue and weakness. Patient states that she woke up at 7 AM today and was unable to walk with her walker. Patient has had a productive cough of white sputum for the past 2 to 3 days. Patient states she has had no fever, chills, vomiting, diarrhea, difficulty breathing, dysuria, or chest pain. Patient states \"I am not sick. \"  History is given by both patient and female family member. Patient did not want to come to the emergency department however since she is unable to ambulate family member thought it best to bring her in for evaluation. HPI    NursingNotes were reviewed. REVIEW OF SYSTEMS    (2-9 systems for level 4, 10 or more for level 5)     Review of Systems   Constitutional: Positive for activity change and fatigue. Negative for chills and fever. HENT: Negative for congestion, rhinorrhea, sinus pain, sore throat and trouble swallowing. Respiratory: Positive for cough (White sputum). Negative for choking, chest tightness and shortness of breath. Cardiovascular: Positive for leg swelling (Chronic, unchanged. ). Negative for chest pain and palpitations. Gastrointestinal: Negative for abdominal pain, blood in stool, diarrhea, nausea and vomiting. Genitourinary: Negative for difficulty urinating, dysuria, flank pain, hematuria and urgency.    Musculoskeletal: Negative for back pain, neck pain and neck stiffness. Skin: Negative for pallor and rash. Neurological: Positive for weakness (Generalized). Negative for dizziness, syncope and light-headedness. Psychiatric/Behavioral: Negative for agitation, behavioral problems and confusion. All other systems reviewed and are negative.            PAST MEDICALHISTORY     Past Medical History:   Diagnosis Date    Arthritis     Back pain     bulging disc    CAD (coronary artery disease)     BMS to RCA    Cerebral artery occlusion with cerebral infarction ([facility] [address]     small strokes,balance issues    Cystitis     Depression     Dizziness     Dysuria     Edema     ankles,mild pedal edema    Falls frequently     Head injury     Headache     Heartburn     [place] (hard of hearing)     Hyperlipidemia     Hypertension     Hypothyroidism     Hypothyroidism     Memory problem     Neuropathy     Shingles     TIA (transient ischemic attack)     Urinary incontinence     UTI (urinary tract infection)          SURGICAL HISTORY       Past Surgical History:   Procedure Laterality Date    APPENDECTOMY  1947    CHOLECYSTECTOMY      EYE SURGERY  2009    bilateral cataract     HYSTERECTOMY, TOTAL ABDOMINAL      JOINT REPLACEMENT      Bilateral Knee    KNEE ARTHROPLASTY Right     LOBECTOMY  10/2015    sx--bilateral    PTCA  2002    with stent placement/bms to rca         CURRENT MEDICATIONS     Previous Medications    AMLODIPINE (NORVASC) 5 MG TABLET    Take 1 tablet by mouth daily    ASPIRIN (ASPIRIN CHILDRENS) 81 MG CHEWABLE TABLET    Take 1 tablet by mouth daily    LEVOTHYROXINE (SYNTHROID) 175 MCG TABLET    Take 1 tablet by mouth Daily    METOPROLOL SUCCINATE (TOPROL XL) 50 MG EXTENDED RELEASE TABLET    Take 1 tablet by mouth daily    NITROFURANTOIN (MACRODANTIN) 100 MG CAPSULE    Take 1 capsule by mouth nightly    PHENYTOIN (DILANTIN) 100 MG ER CAPSULE    Take 2 capsules by mouth 2 times daily    SERTRALINE (ZOLOFT) 100 MG TABLET    Take 1 tablet by mouth daily       ALLERGIES     Morphine    FAMILY HISTORY       Family History   Problem Relation Age of Onset    Diabetes Mother     Hypertension Mother    [facility] Rheum Arthritis Mother     Stroke Mother     Tuberculosis Father           SOCIAL HISTORY       Social History     Socioeconomic History    Marital status:       Spouse name: None    Number of children: None    Years of education: None    Highest education level: None   Occupational History    None   Social Needs    Financial resource strain: None    Food insecurity:     Worry: None     Inability: None    Transportation needs:     Medical: None     Non-medical: None   Tobacco Use    Smoking status: Never Smoker    Smokeless tobacco: Never Used   Substance and Sexual Activity    Alcohol use: Never     Frequency: Never    Drug use: Never    Sexual activity: None   Lifestyle    Physical activity:     Days per week: None     Minutes per session: None    Stress: None   Relationships    Social connections:     Talks on phone: None     Gets together: None     Attends Yazidi service: None     Active member of club or organization: None     Attends meetings of clubs or organizations: None     Relationship status: None    Intimate partner violence:     Fear of current or ex partner: None     Emotionally abused: None     Physically abused: None     Forced sexual activity: None   Other Topics Concern    None   Social History Narrative    None       SCREENINGS    Tyler Coma Scale  Eye Opening: Spontaneous  Best Verbal Response: Oriented  Best Motor Response: Obeys commands  Tyler Coma Scale Score: 15        PHYSICAL EXAM    (up to 7 for level 4, 8 or more for level 5)     ED Triage Vitals   BP Temp Temp Source Pulse Resp SpO2 Height Weight   02/23/20 1325 02/23/20 1325 02/23/20 1325 02/23/20 1325 02/23/20 1325 02/23/20 1325 02/23/20 1322 02/23/20 1322   129/88 97.2 °F (36.2 °C) Oral 136 20 (!) 88 % 5' 8\" (1.727 m) 188 lb (85.3 kg)       Physical Exam  Vitals signs and nursing note reviewed. Constitutional:       Appearance: She is ill-appearing (Mildly). HENT:      Head: Normocephalic and atraumatic. Right Ear: External ear normal.      Left Ear: External ear normal.      Nose: Congestion present. Mouth/Throat:      Mouth: Mucous membranes are moist.      Pharynx: Oropharynx is clear. Posterior oropharyngeal erythema (Mild) present. No oropharyngeal exudate. Eyes:      General: No scleral icterus. Conjunctiva/sclera: Conjunctivae normal.      Pupils: Pupils are equal, round, and reactive to light. Neck:      Musculoskeletal: Neck supple. No neck rigidity. Cardiovascular:      Rate and Rhythm: Regular rhythm. Tachycardia present. Pulses: Normal pulses. Heart sounds: Normal heart sounds. Pulmonary:      Effort: Pulmonary effort is normal.      Breath sounds: Rales (Right lower lobe crackles) present. Abdominal:      General: Bowel sounds are normal.      Palpations: Abdomen is soft. Tenderness: There is no abdominal tenderness. There is no right CVA tenderness, left CVA tenderness or guarding. Musculoskeletal:         General: Swelling: Bilateral (chronic per patient)      Right lower leg: Edema (Bilateral, chronic per patient) present. Left lower leg: Edema present. Skin:     General: Skin is warm and dry. Capillary Refill: Capillary refill takes less than 2 seconds. Coloration: Skin is pale. Findings: No erythema. Neurological:      Mental Status: She is alert and oriented to person, place, and time. Cranial Nerves: Cranial nerve deficit (Diminished hearing, chronic) present. Motor: Weakness (Generalized) present.    Psychiatric:         Mood and Affect: Mood normal.         Behavior: Behavior normal.         DIAGNOSTIC RESULTS     EKG: All EKG's areinterpreted by the Emergency Department Physician who either signs or Co-signs this chart in the absence of a cardiologist.  EKG dated 2/23/2030 at 4:34 PM:  Normal sinus rhythm rate of 89. Mature atrial complex. . . QTc 484. Nonspecific T wave flattening in 1 and aVL. RADIOLOGY:  Non-plain film images such as CT, Ultrasound and MRI are read by the radiologist. Plain radiographic images are visualized and preliminarily interpreted bythe emergency physician with the below findings:          CTA PULMONARY W CONTRAST   Final Result   . 1. Numerous acute pulmonary emboli, right greater than left, with no   obvious right heart strain and no saddle embolism. Moderate thoracic   aortic ectasia with no evidence of dissection. 2. Heavy coronary artery calcified plaque is present. 3. Bibasilar atelectasis and no consolidating infiltrate. 4. Chronic compression fractures of T6, L1 and L2. Report called to Jemima West in the emergency department at 1917   hours on the date of examination. Signed by Dr Jemal Fernandez. Ryan on 2/23/2020 7:18 PM      CT Head WO Contrast   Final Result   Impression: No acute intracranial abnormality. Moderate atrophy and   advanced chronic small vessel white matter ischemic changes. Chronic   infarct in the medial left occipital lobe, stable. Signed by Dr Jemal Fernandez. Ryan on 2/23/2020 5:09 PM      XR CHEST STANDARD (2 VW)   Final Result   Impression:   1. No acute cardiopulmonary process.    Signed by Dr Drake Clements on 2/23/2020 4:14 PM              LABS:  Labs Reviewed   CBC WITH AUTO DIFFERENTIAL - Abnormal; Notable for the following components:       Result Value    WBC 11.3 (*)     Hemoglobin 16.7 (*)     Hematocrit 51.9 (*)     MCH 31.2 (*)     MCHC 32.2 (*)     Neutrophils % 66.6 (*)     Monocytes Absolute 1.10 (*)     All other components within normal limits   COMPREHENSIVE METABOLIC PANEL W/ REFLEX TO MG FOR LOW K - Abnormal; Notable for the following components:    Glucose 123 (*)     Alkaline Phosphatase 116 (*)     All other components within normal limits   BLOOD GAS, ARTERIAL - Abnormal; Notable for the following components:    pO2, Arterial 46.0 (*)     O2 Sat, Arterial 85.4 (*)     All other components within normal limits    Narrative:     CALL  Pinto  KLED tel. , verbal  RD NEWMAN RN, 02/23/2020 17:34, by [address] REFLEX TO CULTURE - Abnormal; Notable for the following components:    Protein, UA TRACE (*)     All other components within normal limits   RAPID INFLUENZA A/B ANTIGENS   CULTURE, BLOOD 1   CULTURE, BLOOD 2   LIPASE   LACTIC ACID, PLASMA   BRAIN NATRIURETIC PEPTIDE   TROPONIN   POTASSIUM, WHOLE BLOOD    Narrative:     CALL  Pinto  KLEOLIVIA tel. , verbal  RD NEWMAN RN, 02/23/2020 17:34, by [address], TOTAL       All other labs were within normal range or not returned as of this dictation. EMERGENCY DEPARTMENT COURSE and DIFFERENTIAL DIAGNOSIS/MDM:   Vitals:    Vitals:    02/23/20 1620 02/23/20 1730 02/23/20 1736 02/23/20 1800   BP:  (!) 101/59  (!) 142/50   Pulse: 96 85  83   Resp:  22 23 17   Temp:       TempSrc:       SpO2:  91% (!) 89% 94%   Weight:       Height:           MDM  70-year-old female presents with weakness and productive cough. Lab, EKG, and radiology results reviewed. CTA of the chest with multiple PEs. Discussed with Dr. Edi Sanders, hospitalist.  Will admit patient for further evaluation and treatment       Procedures    FINAL IMPRESSION      1. Acute pulmonary embolism without acute cor pulmonale, unspecified pulmonary embolism type ([address]    2. Hypoxemia          DISPOSITION/PLAN   DISPOSITION  Admit.              (Please note that portions of this note were completed with a voice recognition program.  Efforts were made to edit thedictations but occasionally words are mis-transcribed.)    Vida Pantoja MD (electronically signed)  Attending Emergency Physician          Vida Pantoja MD  02/23/20 2004

## 2020-02-23 NOTE — PROGRESS NOTES
Incoming Lab Results From [facility]     Component Value Ref Range & Units Status Collected Lab   pH, Arterial 7.430  7.350 - 7.450 Final 02/23/2020  5:31 PM [facility] Lab   pCO2, Arterial 38.0  35.0 - 45.0 mmHg Final 02/23/2020  5:31 PM [facility] Lab   pO2, Arterial 46. 0Low Panic   80.0 - 100.0 mmHg Final 02/23/2020  5:31 PM [address] [place] [address] Lab   HCO3, Arterial 25.2  22.0 - 26.0 mmol/L Final 02/23/2020  5:31 PM [facility] Excess, Arterial 1.0  -2.0 - 2.0 mmol/L Final 02/23/2020  5:31 PM [address] [place] [address] Lab   Hemoglobin, Art, Extended 13.2  12.0 - 16.0 g/dL Final 02/23/2020  5:31 PM [address] [place] [address] Lab   O2 Sat, Arterial 85.4Low Panic   >92 % Final 02/23/2020  5:31 PM [address] [place] [address] Lab   Carboxyhgb, Arterial 2.3  0.0 - 5.0 % Final 02/23/2020  5:31 PM  - [address]   0.0-1.5   (Smokers 1.5-5.0)    Methemoglobin, Arterial 0.6  <1.5 % Final 02/23/2020  5:31 PM [address] [place] [address] Lab   O2 Content, Arterial 15.8  Not Established mL/dL Final 02/23/2020  5:31 PM [address] [place] [address] Lab   O2 Therapy Unknown   Final 02/23/2020  5:31  [facility] Performed By     [address] Name Director Address Valid Date Range   [address] - [address] LAB Maria Guadalupe Stiles  [place],[address]  [address] [address] 08/30/17 0733-Present   Narrative   Performed by: [facility]   CALL  Blair VELASCO tel. , verbal  RD NEWMAN RN, 02/23/2020 17:34, by [facility]   Lab and Collection     Blood Gas, Arterial - 2/23/2020   Result History     Blood Gas, Arterial on 2/23/2020   Result Information     Flag: CriticalPanic   Status: Final result (Collected: 2/23/2020 17:31) Provider Status: Ordered   Click to Print Result   View SmartLink Info     Blood Gas, Arterial (Order #[id number]) on 2/23/20   Order Report     Order Details   SANTIAGO RR

## 2020-02-24 PROBLEM — I10 ESSENTIAL HYPERTENSION: Chronic | Status: ACTIVE | Noted: 2019-09-25

## 2020-02-24 PROBLEM — I82.402 DEEP VEIN THROMBOSIS (DVT) OF LEFT LOWER EXTREMITY (HCC): Status: ACTIVE | Noted: 2020-02-24

## 2020-02-24 PROBLEM — G40.909 SEIZURE DISORDER (HCC): Chronic | Status: ACTIVE | Noted: 2019-10-02

## 2020-02-24 PROBLEM — E03.9 ACQUIRED HYPOTHYROIDISM: Chronic | Status: ACTIVE | Noted: 2019-09-25

## 2020-02-24 LAB
ANION GAP SERPL CALCULATED.3IONS-SCNC: 11 MMOL/L (ref 7–19)
APTT: 101.8 SEC (ref 26–36.2)
APTT: 34.7 SEC (ref 26–36.2)
APTT: 59.2 SEC (ref 26–36.2)
APTT: 79.7 SEC (ref 26–36.2)
BUN BLDV-MCNC: 12 MG/DL (ref 8–23)
CALCIUM SERPL-MCNC: 8 MG/DL (ref 8.8–10.2)
CHLORIDE BLD-SCNC: 104 MMOL/L (ref 98–111)
CO2: 25 MMOL/L (ref 22–29)
CREAT SERPL-MCNC: 0.5 MG/DL (ref 0.5–0.9)
EKG P AXIS: 73 DEGREES
EKG P-R INTERVAL: 132 MS
EKG Q-T INTERVAL: 400 MS
EKG QRS DURATION: 98 MS
EKG QTC CALCULATION (BAZETT): 450 MS
EKG T AXIS: 66 DEGREES
GFR NON-AFRICAN AMERICAN: >60
GLUCOSE BLD-MCNC: 121 MG/DL (ref 74–109)
HCT VFR BLD CALC: 42.4 % (ref 37–47)
HEMOGLOBIN: 13.8 G/DL (ref 12–16)
MAGNESIUM: 1.8 MG/DL (ref 1.6–2.4)
MCH RBC QN AUTO: 31.6 PG (ref 27–31)
MCHC RBC AUTO-ENTMCNC: 32.5 G/DL (ref 33–37)
MCV RBC AUTO: 97 FL (ref 81–99)
PDW BLD-RTO: 13.8 % (ref 11.5–14.5)
PLATELET # BLD: 179 K/UL (ref 130–400)
PMV BLD AUTO: 10.3 FL (ref 9.4–12.3)
POTASSIUM REFLEX MAGNESIUM: 3.7 MMOL/L (ref 3.5–5)
RBC # BLD: 4.37 M/UL (ref 4.2–5.4)
SODIUM BLD-SCNC: 140 MMOL/L (ref 136–145)
WBC # BLD: 7.3 K/UL (ref 4.8–10.8)

## 2020-02-24 PROCEDURE — 87070 CULTURE OTHR SPECIMN AEROBIC: CPT

## 2020-02-24 PROCEDURE — 87205 SMEAR GRAM STAIN: CPT

## 2020-02-24 PROCEDURE — 93010 ELECTROCARDIOGRAM REPORT: CPT | Performed by: INTERNAL MEDICINE

## 2020-02-24 PROCEDURE — 99222 1ST HOSP IP/OBS MODERATE 55: CPT | Performed by: NURSE PRACTITIONER

## 2020-02-24 PROCEDURE — 2700000000 HC OXYGEN THERAPY PER DAY

## 2020-02-24 PROCEDURE — 1210000000 HC MED SURG R&B

## 2020-02-24 PROCEDURE — 85730 THROMBOPLASTIN TIME PARTIAL: CPT

## 2020-02-24 PROCEDURE — 93970 EXTREMITY STUDY: CPT

## 2020-02-24 PROCEDURE — 85027 COMPLETE CBC AUTOMATED: CPT

## 2020-02-24 PROCEDURE — 6370000000 HC RX 637 (ALT 250 FOR IP): Performed by: INTERNAL MEDICINE

## 2020-02-24 PROCEDURE — 6370000000 HC RX 637 (ALT 250 FOR IP): Performed by: HOSPITALIST

## 2020-02-24 PROCEDURE — 36415 COLL VENOUS BLD VENIPUNCTURE: CPT

## 2020-02-24 PROCEDURE — 83735 ASSAY OF MAGNESIUM: CPT

## 2020-02-24 PROCEDURE — 94640 AIRWAY INHALATION TREATMENT: CPT

## 2020-02-24 PROCEDURE — 6360000002 HC RX W HCPCS: Performed by: HOSPITALIST

## 2020-02-24 PROCEDURE — 80048 BASIC METABOLIC PNL TOTAL CA: CPT

## 2020-02-24 PROCEDURE — 2580000003 HC RX 258: Performed by: HOSPITALIST

## 2020-02-24 RX ADMIN — IPRATROPIUM BROMIDE AND ALBUTEROL SULFATE 1 AMPULE: .5; 3 SOLUTION RESPIRATORY (INHALATION) at 19:44

## 2020-02-24 RX ADMIN — PHENYTOIN SODIUM 200 MG: 100 CAPSULE ORAL at 10:10

## 2020-02-24 RX ADMIN — IPRATROPIUM BROMIDE AND ALBUTEROL SULFATE 1 AMPULE: .5; 3 SOLUTION RESPIRATORY (INHALATION) at 06:57

## 2020-02-24 RX ADMIN — IPRATROPIUM BROMIDE AND ALBUTEROL SULFATE 1 AMPULE: .5; 3 SOLUTION RESPIRATORY (INHALATION) at 10:46

## 2020-02-24 RX ADMIN — AZITHROMYCIN 500 MG: 500 INJECTION, POWDER, LYOPHILIZED, FOR SOLUTION INTRAVENOUS at 16:24

## 2020-02-24 RX ADMIN — ACETAMINOPHEN 650 MG: 325 TABLET ORAL at 10:10

## 2020-02-24 RX ADMIN — IPRATROPIUM BROMIDE AND ALBUTEROL SULFATE 1 AMPULE: .5; 3 SOLUTION RESPIRATORY (INHALATION) at 14:15

## 2020-02-24 RX ADMIN — Medication 10 ML: at 10:14

## 2020-02-24 RX ADMIN — HEPARIN SODIUM 18 UNITS/KG/HR: 10000 INJECTION, SOLUTION INTRAVENOUS at 06:00

## 2020-02-24 RX ADMIN — CEFTRIAXONE 1 G: 1 INJECTION, POWDER, FOR SOLUTION INTRAMUSCULAR; INTRAVENOUS at 16:25

## 2020-02-24 RX ADMIN — AMLODIPINE BESYLATE 5 MG: 5 TABLET ORAL at 10:10

## 2020-02-24 RX ADMIN — LEVOTHYROXINE SODIUM 175 MCG: 125 TABLET ORAL at 06:06

## 2020-02-24 ASSESSMENT — PAIN SCALES - GENERAL
PAINLEVEL_OUTOF10: 0
PAINLEVEL_OUTOF10: 5

## 2020-02-24 ASSESSMENT — ENCOUNTER SYMPTOMS
DIARRHEA: 0
BACK PAIN: 0
VOMITING: 0
CONSTIPATION: 0
SHORTNESS OF BREATH: 0
COUGH: 0
NAUSEA: 0

## 2020-02-24 ASSESSMENT — PULMONARY FUNCTION TESTS: PIF_VALUE: 23

## 2020-02-24 NOTE — PROGRESS NOTES
Transported to [facility] per w/c with transport and niece accompanied. Pt had no belongings such as glasses or dentures. Niece has a small overnite case with her.

## 2020-02-24 NOTE — CARE COORDINATION
CHARLES verified with [facility] outpt pharmacy that the maintenance dose of Eliquis would be 271.40 with insurance     Physician notified.     Electronically signed by IVA Oneil on 2/24/2020 at 3:31 PM

## 2020-02-24 NOTE — H&P
REPLACEMENT      Bilateral Knee    KNEE ARTHROPLASTY Right     LOBECTOMY  10/2015    sx--bilateral    PTCA  2002    with stent placement/bms to rca       Home Medications:  Prior to Admission medications    Medication Sig Start Date End Date Taking? Authorizing Provider   amLODIPine (NORVASC) 5 MG tablet Take 1 tablet by mouth daily 10/18/19  Yes Emily Villanueva MD   phenytoin (DILANTIN) 100 MG ER capsule Take 2 capsules by mouth 2 times daily 10/18/19  Yes Emily Villanueva MD   sertraline (ZOLOFT) 100 MG tablet Take 1 tablet by mouth daily 10/18/19  Yes Emily Villanueva MD   nitrofurantoin (MACRODANTIN) 100 MG capsule Take 1 capsule by mouth nightly 10/18/19  Yes Emily Villanueva MD   levothyroxine (SYNTHROID) 175 MCG tablet Take 1 tablet by mouth Daily 10/18/19  Yes Emily Villanueva MD   metoprolol succinate (TOPROL XL) 50 MG extended release tablet Take 1 tablet by mouth daily 10/18/19  Yes Emily Villanueva MD   aspirin (ASPIRIN CHILDRENS) 81 MG chewable tablet Take 1 tablet by mouth daily 9/27/19  Yes Emily Villanueva MD       Allergies:    Morphine    Social History:      Tobacco:   reports that she has never smoked. She has never used smokeless tobacco.  Alcohol:   reports no history of alcohol use. Illicit Drugs:     Family History:    Review of Systems:   Constitutional / general:  No fever / chills / sweats  HEENT: No sore throat / hoarseness / vision changes  GI: No nausea / vomiting / abdominal pain / diarrhea / constipation  :  No dysuria / hesitancy / urgency / hematuria   Musculoskeletal:  No edema / cyanosis / pain  Psychiatric:  No depression / anxiety / insomnia  Skin:  No new rashes / lesions    Physical Examination:        BP (!) 142/50   Pulse 83   Temp 97.2 °F (36.2 °C) (Oral)   Resp 17   Ht 5' 8\" (1.727 m)   Wt 188 lb (85.3 kg)   SpO2 94%   BMI 28.59 kg/m²   General appearance: No apparent distress, appears stated age and cooperative.  Well developed and well HCT 51.9*        BMP:  Recent Labs     02/23/20  1423 02/23/20  1731     --    K 4.3 3.6     --    CO2 24  --    BUN 19  --    CREATININE 0.6  --    CALCIUM 8.9  --      Recent Labs     02/23/20  1423   AST 15   ALT 16   BILITOT 0.7   ALKPHOS 116*     Coag Panel: No results for input(s): INR, PROTIME, APTT in the last 72 hours. Cardiac Enzymes:   Recent Labs     02/23/20  1423   TROPONINI 0.01     ABGs:  Lab Results   Component Value Date    PHART 7.430 02/23/2020    PO2ART 46.0 02/23/2020    ZWQ1MFK 38.0 02/23/2020     Urinalysis:  Lab Results   Component Value Date    NITRU Negative 02/23/2020    BLOODU Negative 02/23/2020    SPECGRAV 1.025 02/23/2020    GLUCOSEU Negative 02/23/2020       Active Hospital Problems    Diagnosis Date Noted    Pulmonary embolism without acute cor pulmonale ([facility] [address] [I26.99] 02/23/2020       Assessment and Plan: Active Problems:    Pulmonary embolism without acute cor pulmonale (HCC)  Resolved Problems:    * No resolved hospital problems. *    Admit, heparin gtt, vascular consult to be on safe side , will check venous doppler   H/o CAD  s/p stenting stable  ?  Atelectasis VS pneumonia started abx RT   DVT prophylaxis with Heparin          [facility]  2/23/2020  8:04 PM

## 2020-02-24 NOTE — PROGRESS NOTES
Hospitalist Progress Note    Patient:  Liam Kerr  YOB: 1931  Date of Service: 2/24/2020  MRN: [id number]   Acct: [de-identified]   Primary Care Physician: Celestina Guajardo MD  Advance Directive: Full Code  Admit Date: 2/23/2020       Hospital Day: 1  Referring Provider: Erum Dewitt DO    Patient Seen, Chart, Consults, Notes, Labs, Radiology studies reviewed. Subjective:  Liam Kerr is a 80 y.o. female  whom we are following for pulmonary embolism. She had a venous scan and has DVT extensively on the left side. She is currently on unfractionated heparin. We will see if we can get Eliquis covered on her insurance plan. Hemodynamics are stable she denies any pulmonary symptoms at present. I feel she is stable enough for transfer out of ICU.     Allergies:  Morphine    Medicines:  Current Facility-Administered Medications   Medication Dose Route Frequency Provider Last Rate Last Dose    amLODIPine (NORVASC) tablet 5 mg  5 mg Oral Daily Diane Funes MD   5 mg at 02/24/20 1010    levothyroxine (SYNTHROID) tablet 175 mcg  175 mcg Oral Daily Diane Funes MD   175 mcg at 02/24/20 0606    phenytoin (DILANTIN) ER capsule 200 mg  200 mg Oral BID Diane Funes MD   200 mg at 02/24/20 1010    sodium chloride flush 0.9 % injection 10 mL  10 mL Intravenous 2 times per day Diane Funes MD   10 mL at 02/24/20 1014    sodium chloride flush 0.9 % injection 10 mL  10 mL Intravenous PRN Diane Funes MD        acetaminophen (TYLENOL) tablet 650 mg  650 mg Oral Q6H PRN Diane Funes MD   650 mg at 02/24/20 1010    acetaminophen (TYLENOL) suppository 650 mg  650 mg Rectal Q6H PRN Diane Funes MD        magnesium hydroxide (MILK OF MAGNESIA) 400 MG/5ML suspension 30 mL  30 mL Oral Daily PRN Diane Funes MD        promethazine (PHENERGAN) tablet 12.5 mg  12.5 mg Oral Q6H PRN Diane Funes MD        ondansetron (ZOFRAN) injection 4 mg  4 mg Intravenous Q6H PRN Diane Funes MD       [employer] potassium chloride (KLOR-CON M) extended release tablet 40 mEq  40 mEq Oral PRN Racheal Conde MD        Or    potassium bicarb-citric acid (EFFER-K) effervescent tablet 40 mEq  40 mEq Oral PRN Racheal Conde MD        Or   Oscar Gonzalez potassium chloride 10 mEq/100 mL IVPB (Peripheral Line)  10 mEq Intravenous PRN Racheal Conde MD        potassium chloride 10 mEq/100 mL IVPB (Peripheral Line)  10 mEq Intravenous PRN Racheal Conde MD        magnesium sulfate 2 g in 50 mL IVPB premix  2 g Intravenous PRN Racheal Conde MD        heparin (porcine) injection 6,580 Units  80 Units/kg Intravenous PRN Racheal Conde MD        heparin (porcine) injection 3,290 Units  40 Units/kg Intravenous PRN Racheal Conde MD        heparin 25,000 units in dextrose 5% 250 mL infusion  18 Units/kg/hr Intravenous Continuous Racheal Conde MD 14.8 mL/hr at 02/24/20 0600 18 Units/kg/hr at 02/24/20 0600    cefTRIAXone (ROCEPHIN) 1 g in sterile water 10 mL IV syringe  1 g Intravenous Q24H Racheal Conde MD        azithromycin (ZITHROMAX) 500 mg in D5W 250ml Vial Mate  500 mg Intravenous Q24H Racheal Conde MD        ipratropium-albuterol (DUONEB) nebulizer solution 1 ampule  1 ampule Inhalation Q4H WA Racheal Conde MD   1 ampule at 02/24/20 1046       Past Medical History:  Past Medical History:   Diagnosis Date    Arthritis     Back pain     bulging disc    CAD (coronary artery disease)     BMS to RCA    Cerebral artery occlusion with cerebral infarction (HCC)     small strokes,balance issues    Cystitis     Depression     Dizziness     Dysuria     Edema     ankles,mild pedal edema    Falls frequently     Head injury     Headache     Heartburn     [place]     Hyperlipidemia     Hypertension     Hypothyroidism     Hypothyroidism     Memory problem     Neuropathy     Shingles     TIA (transient ischemic attack)     Urinary incontinence     UTI (urinary tract infection)        Past Surgical History:  Past Systems   Constitutional: Negative for activity change and fatigue. Respiratory: Negative for cough and shortness of breath. Cardiovascular: Negative for chest pain and leg swelling. Gastrointestinal: Negative for constipation, diarrhea, nausea and vomiting. Genitourinary: Negative for difficulty urinating and dysuria. Musculoskeletal: Negative for arthralgias and back pain. Neurological: Negative for dizziness and headaches. Objective:  Blood pressure (!) 131/59, pulse 82, temperature 100.3 °F (37.9 °C), resp. rate 24, height 5' 8\" (1.727 m), weight 183 lb 9.6 oz (83.3 kg), SpO2 90 %. Intake/Output Summary (Last 24 hours) at 2/24/2020 1236  Last data filed at 2/24/2020 1000  Gross per 24 hour   Intake 1488.18 ml   Output 1250 ml   Net 238.18 ml       Physical Exam  Vitals signs reviewed. Constitutional:       Appearance: She is well-developed. HENT:      Head: Normocephalic and atraumatic. Eyes:      Conjunctiva/sclera: Conjunctivae normal.      Pupils: Pupils are equal, round, and reactive to light. Cardiovascular:      Rate and Rhythm: Normal rate and regular rhythm. Heart sounds: Normal heart sounds. Pulmonary:      Effort: Pulmonary effort is normal.      Breath sounds: Normal breath sounds. Abdominal:      Palpations: Abdomen is soft. Musculoskeletal: Normal range of motion. Skin:     General: Skin is warm and dry. Neurological:      Mental Status: She is alert and oriented to person, place, and time.          Labs:  BMP:   Recent Labs     02/23/20  1423 02/23/20  1731 02/24/20  0501     --  140   K 4.3 3.6 3.7     --  104   CO2 24  --  25   BUN 19  --  12   CREATININE 0.6  --  0.5   CALCIUM 8.9  --  8.0*     CBC:   Recent Labs     02/23/20  1423 02/23/20  2126 02/24/20  0501   WBC 11.3* 8.7 7.3   HGB 16.7* 13.2 13.8   HCT 51.9* 41.4 42.4   MCV 96.8 97.9 97.0    184 179     LIVER PROFILE:   Recent Labs     02/23/20  1423   AST 15   ALT 16

## 2020-02-24 NOTE — CONSULTS
Frequency Provider Last Rate Last Dose    amLODIPine (NORVASC) tablet 5 mg  5 mg Oral Daily Julissa Lucas MD        levothyroxine (SYNTHROID) tablet 175 mcg  175 mcg Oral Daily Julissa Lucas MD   175 mcg at 02/24/20 0606    phenytoin (DILANTIN) ER capsule 200 mg  200 mg Oral BID Julissa Lucas MD   200 mg at 02/23/20 2316    sodium chloride flush 0.9 % injection 10 mL  10 mL Intravenous 2 times per day Julissa Lucas MD   10 mL at 02/23/20 2317    sodium chloride flush 0.9 % injection 10 mL  10 mL Intravenous PRN Julissa Lucas MD        acetaminophen (TYLENOL) tablet 650 mg  650 mg Oral Q6H PRN Julissa Lucas MD        acetaminophen (TYLENOL) suppository 650 mg  650 mg Rectal Q6H PRN Julissa Lucas MD        magnesium hydroxide (MILK OF MAGNESIA) 400 MG/5ML suspension 30 mL  30 mL Oral Daily PRN Julissa Lucas MD        promethazine (PHENERGAN) tablet 12.5 mg  12.5 mg Oral Q6H PRN Julissa Lucas MD        ondansetron [facility]) injection 4 mg  4 mg Intravenous Q6H PRN Julissa Lucas MD        potassium chloride (KLOR-CON M) extended release tablet 40 mEq  40 mEq Oral PRN Julissa Lucas MD        Or    potassium bicarb-citric acid (EFFER-K) effervescent tablet 40 mEq  40 mEq Oral PRN Julissa Lucas MD        Or   [address] [place] [address] potassium chloride 10 mEq/100 mL IVPB (Peripheral Line)  10 mEq Intravenous PRN Julissa Lucas MD        potassium chloride 10 mEq/100 mL IVPB (Peripheral Line)  10 mEq Intravenous PRN Julissa Lucas MD        magnesium sulfate 2 g in 50 mL IVPB premix  2 g Intravenous PRN Julissa Lucas MD        heparin (porcine) injection 6,580 Units  80 Units/kg Intravenous PRN Julissa Lucas MD        heparin (porcine) injection 3,290 Units  40 Units/kg Intravenous PRN Julissa Lucas MD        heparin 25,000 units in dextrose 5% 250 mL infusion  18 Units/kg/hr Intravenous Continuous Julissa Lucas MD 14.8 mL/hr at 02/24/20 0600 18 Units/kg/hr at 02/24/20 0600    cefTRIAXone (ROCEPHIN) 1 g in sterile water 10 mL IV syringe  1 g Intravenous Q24H Yue Asif MD        azithromycin (ZITHROMAX) 500 mg in D5W 250ml Vial Mate  500 mg Intravenous Q24H Yue Asif MD        ipratropium-albuterol (DUONEB) nebulizer solution 1 ampule  1 ampule Inhalation Q4H WA Yue Asif MD   1 ampule at 02/24/20 2653       Social History:   reports that she has never smoked. She has never used smokeless tobacco. She reports that she does not drink alcohol or use drugs. Family History:      Problem Relation Age of Onset    Diabetes Mother     Hypertension Mother    [facility] Rheum Arthritis Mother     Stroke Mother     Tuberculosis Father        REVIEW OF SYSTEMS:  General: Denies any fever or chills. Denies any unexplained weight loss or gain. Profound fatigue. HEENT: Denies any headaches or visual changes. Respiratory: Productive cough x 2-3 days. Shortness of breath. Cardiac: Denies any chest pain or pressure. Denies any palpitations. Denies any presyncope or syncope. Denies any orthopnea or PND. Denies any lower extremity edema. GI: Denies any abdominal pain. Denies any nausea or vomiting. Denies any change in bowel habits. Denies any recent history of GI tract blood loss. : Denies any hematuria, frequency, hesitancy, or dysuria. Musculoskeletal: Denies any pain or swelling in her joints. Chronic back pain with known thoracic/lumbar compression fracture. Neurological: Denies any paraesthesias. Denies any history of seizure or stroke symptoms. Psychological: Denies any problems with anxiety or depression. All other systems are negative except where stated above. PHYSICAL EXAM:  BP (!) 143/65   Pulse 87   Temp 97.3 °F (36.3 °C) (Temporal)   Resp 28   Ht 5' 8\" (1.727 m)   Wt 183 lb 9.6 oz (83.3 kg)   SpO2 93%   BMI 27.92 kg/m²   General appearance: Demonstrates a well-developed, well-nourished  female who is alert and oriented in no acute distress. HEENT: Normocephalic. Atraumatic. PRESTON.    NECK:

## 2020-02-25 ENCOUNTER — TELEPHONE (OUTPATIENT)
Dept: INTERNAL MEDICINE CLINIC | Age: 85
End: 2020-02-25

## 2020-02-25 LAB
APTT: 61.1 SEC (ref 26–36.2)
APTT: 76.8 SEC (ref 26–36.2)
APTT: 85.1 SEC (ref 26–36.2)
APTT: 89.2 SEC (ref 26–36.2)
INR BLD: 1.02 (ref 0.88–1.18)
MAGNESIUM: 1.7 MG/DL (ref 1.6–2.4)
PLATELET # BLD: 197 K/UL (ref 130–400)
PROTHROMBIN TIME: 13.3 SEC (ref 12–14.6)

## 2020-02-25 PROCEDURE — 83735 ASSAY OF MAGNESIUM: CPT

## 2020-02-25 PROCEDURE — 85610 PROTHROMBIN TIME: CPT

## 2020-02-25 PROCEDURE — 85049 AUTOMATED PLATELET COUNT: CPT

## 2020-02-25 PROCEDURE — 6360000002 HC RX W HCPCS: Performed by: INTERNAL MEDICINE

## 2020-02-25 PROCEDURE — 94640 AIRWAY INHALATION TREATMENT: CPT

## 2020-02-25 PROCEDURE — 6370000000 HC RX 637 (ALT 250 FOR IP): Performed by: INTERNAL MEDICINE

## 2020-02-25 PROCEDURE — 36415 COLL VENOUS BLD VENIPUNCTURE: CPT

## 2020-02-25 PROCEDURE — 2580000003 HC RX 258: Performed by: INTERNAL MEDICINE

## 2020-02-25 PROCEDURE — 1210000000 HC MED SURG R&B

## 2020-02-25 PROCEDURE — 85730 THROMBOPLASTIN TIME PARTIAL: CPT

## 2020-02-25 PROCEDURE — 94760 N-INVAS EAR/PLS OXIMETRY 1: CPT

## 2020-02-25 PROCEDURE — 2700000000 HC OXYGEN THERAPY PER DAY

## 2020-02-25 RX ORDER — WARFARIN SODIUM 5 MG/1
5 TABLET ORAL DAILY
Status: DISCONTINUED | OUTPATIENT
Start: 2020-02-25 | End: 2020-02-26 | Stop reason: HOSPADM

## 2020-02-25 RX ADMIN — IPRATROPIUM BROMIDE AND ALBUTEROL SULFATE 1 AMPULE: .5; 3 SOLUTION RESPIRATORY (INHALATION) at 11:27

## 2020-02-25 RX ADMIN — IPRATROPIUM BROMIDE AND ALBUTEROL SULFATE 1 AMPULE: .5; 3 SOLUTION RESPIRATORY (INHALATION) at 07:14

## 2020-02-25 RX ADMIN — LEVOTHYROXINE SODIUM 175 MCG: 125 TABLET ORAL at 06:53

## 2020-02-25 RX ADMIN — PHENYTOIN SODIUM 200 MG: 100 CAPSULE ORAL at 08:21

## 2020-02-25 RX ADMIN — IPRATROPIUM BROMIDE AND ALBUTEROL SULFATE 1 AMPULE: .5; 3 SOLUTION RESPIRATORY (INHALATION) at 15:11

## 2020-02-25 RX ADMIN — PHENYTOIN SODIUM 200 MG: 100 CAPSULE ORAL at 00:24

## 2020-02-25 RX ADMIN — HEPARIN SODIUM 16 UNITS/KG/HR: 10000 INJECTION, SOLUTION INTRAVENOUS at 00:24

## 2020-02-25 RX ADMIN — Medication 10 ML: at 00:24

## 2020-02-25 RX ADMIN — AMLODIPINE BESYLATE 5 MG: 5 TABLET ORAL at 08:21

## 2020-02-25 RX ADMIN — IPRATROPIUM BROMIDE AND ALBUTEROL SULFATE 1 AMPULE: .5; 3 SOLUTION RESPIRATORY (INHALATION) at 19:08

## 2020-02-25 ASSESSMENT — ENCOUNTER SYMPTOMS
BACK PAIN: 0
VOMITING: 0
DIARRHEA: 0
NAUSEA: 0
COUGH: 0
SHORTNESS OF BREATH: 0
CONSTIPATION: 0

## 2020-02-25 ASSESSMENT — PAIN SCALES - GENERAL: PAINLEVEL_OUTOF10: 0

## 2020-02-25 NOTE — CARE COORDINATION
Per Dr. Brii Montes Pt would need 3 days worth of Lovenox injections at 80mg BID; [facility] will be 112.68 with insurance; but cash price would be 48.98; family agreeable to cash price.     Electronically signed by IVA Bell on 2/25/2020 at 4:21 PM

## 2020-02-25 NOTE — PLAN OF CARE
Problem: Falls - Risk of:  Goal: Will remain free from falls  Description  Will remain free from falls  2/25/2020 1550 by Penny Shell RN  Outcome: Ongoing  2/25/2020 0824 by Rajinder Torres RN  Outcome: Ongoing  Goal: Absence of physical injury  Description  Absence of physical injury  2/25/2020 1550 by Penny Shell RN  Outcome: Ongoing  2/25/2020 0824 by Rajinder Torres RN  Outcome: Ongoing     Problem: Bleeding:  Goal: Will show no signs and symptoms of excessive bleeding  Description  Will show no signs and symptoms of excessive bleeding  2/25/2020 1550 by Penny Shell RN  Outcome: Ongoing  2/25/2020 0824 by Rajinder Torres RN  Outcome: Ongoing     Problem: ABCDS Injury Assessment  Goal: Absence of physical injury  2/25/2020 1550 by Penny Shell RN  Outcome: Ongoing  2/25/2020 0824 by Rajinder Torres RN  Outcome: Ongoing     Problem: Infection:  Goal: Will remain free from infection  Description  Will remain free from infection  2/25/2020 1550 by Penny Shell RN  Outcome: Ongoing  2/25/2020 0824 by Rajinder Torres RN  Outcome: Ongoing     Problem: Safety:  Goal: Free from accidental physical injury  Description  Free from accidental physical injury  2/25/2020 1550 by Penny Shell RN  Outcome: Ongoing  2/25/2020 0824 by Rajinder Torres RN  Outcome: Ongoing  Goal: Free from intentional harm  Description  Free from intentional harm  2/25/2020 1550 by Penny Shell RN  Outcome: Ongoing  2/25/2020 0824 by Rajinder Torres RN  Outcome: Ongoing     Problem: Daily Care:  Goal: Daily care needs are met  Description  Daily care needs are met  2/25/2020 1550 by Penny Shell RN  Outcome: Ongoing  2/25/2020 0824 by Rajinder Torres RN  Outcome: Ongoing     Problem: Pain:  Goal: Patient's pain/discomfort is manageable  Description  Patient's pain/discomfort is manageable  2/25/2020 1550 by Penny Shell RN  Outcome: Ongoing  2/25/2020 0824 by Rajinder Torres RN  Outcome: Ongoing     Problem: Skin Integrity:  Goal: Skin integrity will stabilize  Description  Skin integrity will stabilize  2/25/2020 1550 by Fay Banks RN  Outcome: Ongoing  2/25/2020 0824 by Omi Serrato RN  Outcome: Ongoing     Problem: Discharge Planning:  Goal: Patients continuum of care needs are met  Description  Patients continuum of care needs are met  2/25/2020 1550 by Fay Banks RN  Outcome: Ongoing  2/25/2020 0824 by Omi Serrato RN  Outcome: Ongoing     Problem: Skin Integrity:  Goal: Will show no infection signs and symptoms  Description  Will show no infection signs and symptoms  2/25/2020 1550 by Fay Banks RN  Outcome: Ongoing  2/25/2020 0824 by Omi Serrato RN  Outcome: Ongoing  Goal: Absence of new skin breakdown  Description  Absence of new skin breakdown  2/25/2020 1550 by Fay Banks RN  Outcome: Ongoing  2/25/2020 0824 by Omi Serrato RN  Outcome: Ongoing     Problem: Discharge Planning:  Goal: Discharged to appropriate level of care  Description  Discharged to appropriate level of care  2/25/2020 1550 by Fay Banks RN  Outcome: Ongoing  2/25/2020 0824 by Omi Serrato RN  Outcome: Ongoing     Problem: Tissue Perfusion - Peripheral, Altered:  Goal: Electrolytes within specified parameters  Description  Electrolytes within specified parameters  2/25/2020 1550 by Fay Banks RN  Outcome: Ongoing  2/25/2020 0824 by Omi Serrato RN  Outcome: Ongoing  Goal: Hemodynamic stability will improve  Description  Hemodynamic stability will improve  2/25/2020 1550 by Fay Banks RN  Outcome: Ongoing  2/25/2020 0824 by Omi Serrato RN  Outcome: Ongoing  Goal: Circulatory function within specified parameters  Description  Circulatory function within specified parameters  2/25/2020 1550 by Fay Banks RN  Outcome: Ongoing  2/25/2020 0824 by Omi Serrato RN  Outcome: Ongoing

## 2020-02-25 NOTE — TELEPHONE ENCOUNTER
[id number] [address] has referral on this pt from [facility] for [address] [place] [facility] [place]

## 2020-02-25 NOTE — PROGRESS NOTES
Hospitalist Progress Note    Patient:  Zenon Santiago  YOB: 1931  Date of Service: 2/25/2020  MRN: [id number]   Acct: [de-identified]   Primary Care Physician: Davy Ramirez MD  Advance Directive: Full Code  Admit Date: 2/23/2020       Hospital Day: 2  Referring Provider: Paresh Tom DO    Patient Seen, Chart, Consults, Notes, Labs, Radiology studies reviewed. Subjective:  Zenon Santiago is a 80 y.o. female  whom we are following for pulmonary embolism and DVT. She is doing well. Unfortunately insurance will not cover Eliquis. Tentatively plan for discharge tomorrow with Coumadin and Lovenox. We will arrange for home health to check INRs.     Allergies:  Morphine    Medicines:  Current Facility-Administered Medications   Medication Dose Route Frequency Provider Last Rate Last Dose    warfarin (COUMADIN) tablet 5 mg  5 mg Oral Daily Paresh Sinks, DO        amLODIPine (NORVASC) tablet 5 mg  5 mg Oral Daily Paresh Sinks, DO   5 mg at 02/25/20 6368    levothyroxine (SYNTHROID) tablet 175 mcg  175 mcg Oral Daily Paresh Sinks, DO   175 mcg at 02/25/20 9956    phenytoin (DILANTIN) ER capsule 200 mg  200 mg Oral BID Paresh Sinks, DO   200 mg at 02/25/20 5771    sodium chloride flush 0.9 % injection 10 mL  10 mL Intravenous 2 times per day Paresh Sinks, DO   10 mL at 02/25/20 0024    sodium chloride flush 0.9 % injection 10 mL  10 mL Intravenous PRN Paresh Sinks, DO        acetaminophen (TYLENOL) tablet 650 mg  650 mg Oral Q6H PRN Paresh Sinks, DO   650 mg at 02/24/20 1010    acetaminophen (TYLENOL) suppository 650 mg  650 mg Rectal Q6H PRN Paresh Sinks, DO        magnesium hydroxide (MILK OF MAGNESIA) 400 MG/5ML suspension 30 mL  30 mL Oral Daily PRN Paresh Sinks, DO        promethazine (PHENERGAN) tablet 12.5 mg  12.5 mg Oral Q6H PRN Paresh Sinks, DO        ondansetron [facility]) injection 4 mg  4 mg Intravenous Q6H PRN Shingles     TIA (transient ischemic attack)     Urinary incontinence     UTI (urinary tract infection)        Past Surgical History:  Past Surgical History:   Procedure Laterality Date    APPENDECTOMY  1947    CHOLECYSTECTOMY      EYE SURGERY  2009    bilateral cataract     HYSTERECTOMY, TOTAL ABDOMINAL      JOINT REPLACEMENT      Bilateral Knee    KNEE ARTHROPLASTY Right     LOBECTOMY  10/2015    sx--bilateral    PTCA  2002    with stent placement/bms to rca       Family History  Family History   Problem Relation Age of Onset    Diabetes Mother     Hypertension Mother    [de-identified] Rheum Arthritis Mother     Stroke Mother     Tuberculosis Father        Social History  Social History     Socioeconomic History    Marital status:       Spouse name: Not on file    Number of children: Not on file    Years of education: Not on file    Highest education level: Not on file   Occupational History    Not on file   Social Needs    Financial resource strain: Not on file    Food insecurity:     Worry: Not on file     Inability: Not on file    Transportation needs:     Medical: Not on file     Non-medical: Not on file   Tobacco Use    Smoking status: Never Smoker    Smokeless tobacco: Never Used   Substance and Sexual Activity    Alcohol use: Never     Frequency: Never    Drug use: Never    Sexual activity: Not on file   Lifestyle    Physical activity:     Days per week: Not on file     Minutes per session: Not on file    Stress: Not on file   Relationships    Social connections:     Talks on phone: Not on file     Gets together: Not on file     Attends [employer] service: Not on file     Active member of club or organization: Not on file     Attends meetings of clubs or organizations: Not on file     Relationship status: Not on file    Intimate partner violence:     Fear of current or ex partner: Not on file     Emotionally abused: Not on file     Physically abused: Not on file     Forced sexual activity: Not on file   Other Topics Concern    Not on file   Social History Narrative    Not on file         Review of Systems:    Review of Systems   Constitutional: Negative for activity change and fatigue. Respiratory: Negative for cough and shortness of breath. Cardiovascular: Negative for chest pain and leg swelling. Gastrointestinal: Negative for constipation, diarrhea, nausea and vomiting. Genitourinary: Negative for difficulty urinating and dysuria. Musculoskeletal: Negative for arthralgias and back pain. Neurological: Negative for dizziness and headaches. Objective:  Blood pressure 127/77, pulse 94, temperature 98.5 °F (36.9 °C), temperature source Temporal, resp. rate 16, height 5' 8\" (1.727 m), weight 180 lb 4.8 oz (81.8 kg), SpO2 93 %. Intake/Output Summary (Last 24 hours) at 2/25/2020 1352  Last data filed at 2/25/2020 0654  Gross per 24 hour   Intake 517.95 ml   Output 400 ml   Net 117.95 ml       Physical Exam  Vitals signs reviewed. Constitutional:       Appearance: She is well-developed. HENT:      Head: Normocephalic and atraumatic. Eyes:      Conjunctiva/sclera: Conjunctivae normal.      Pupils: Pupils are equal, round, and reactive to light. Cardiovascular:      Rate and Rhythm: Normal rate and regular rhythm. Heart sounds: Normal heart sounds. Pulmonary:      Effort: Pulmonary effort is normal.      Breath sounds: Normal breath sounds. Abdominal:      Palpations: Abdomen is soft. Musculoskeletal: Normal range of motion. Skin:     General: Skin is warm and dry. Neurological:      Mental Status: She is alert and oriented to person, place, and time.          Labs:  BMP:   Recent Labs     02/23/20  1423 02/23/20  1731 02/24/20  0501     --  140   K 4.3 3.6 3.7     --  104   CO2 24  --  25   BUN 19  --  12   CREATININE 0.6  --  0.5   CALCIUM 8.9  --  8.0*     CBC:   Recent Labs     02/23/20  1423 02/23/20  2126 02/24/20  0501 02/25/20  0255   WBC 11.3* 8.7 7.3  --    HGB 16.7* 13.2 13.8  --    HCT 51.9* 41.4 42.4  --    MCV 96.8 97.9 97.0  --     184 179 197     LIVER PROFILE:   Recent Labs     02/23/20  1423   AST 15   ALT 16   LIPASE 15   BILITOT 0.7   ALKPHOS 116*     PT/INR: No results for input(s): PROTIME, INR in the last 72 hours. APTT:   Recent Labs     02/24/20  2217 02/25/20  0255 02/25/20  1003   APTT 59.2* 89.2* 85.1*     BNP:  No results for input(s): BNP in the last 72 hours. Ionized Calcium:No results for input(s): IONCA in the last 72 hours. Magnesium:  Recent Labs     02/24/20  0501 02/25/20  0255   MG 1.8 1.7     Phosphorus:No results for input(s): PHOS in the last 72 hours. HgbA1C: No results for input(s): LABA1C in the last 72 hours. Hepatic:   Recent Labs     02/23/20  1423   ALKPHOS 116*   ALT 16   AST 15   PROT 7.9   BILITOT 0.7   LABALBU 4.3     Lactic Acid:   Recent Labs     02/23/20  1627   LACTA 1.6     Troponin: No results for input(s): CKTOTAL, CKMB, TROPONINT in the last 72 hours. ABGs: No results for input(s): PH, PCO2, PO2, HCO3, O2SAT in the last 72 hours. CRP:  No results for input(s): CRP in the last 72 hours. Sed Rate:  No results for input(s): SEDRATE in the last 72 hours. Cultures:   No results for input(s): CULTURE in the last 72 hours. Recent Labs     02/23/20  1617 02/23/20  1627   BC No Growth to date. Any change in status will be called. --    BLOODCULT2  --  No Growth to date. Any change in status will be called. No results for input(s): CXSURG in the last 72 hours. Radiology reports as per the Radiologist  Radiology: Xr Chest Standard (2 Vw)    Result Date: 2/23/2020  XR CHEST (2 VW) 2/23/2020 2:45 PM Two-view chest: History: Productive cough Frontal and lateral projection chest radiograph obtained. Comparison:  12/31/2015 chest radiography Findings: The lungs are clear without infiltrates.  The heart is normal in size, pulmonary circulation appropriate, without heart failure. Ectasia of the thoracic aorta again observed. There is osteoporosis and kyphosis the thoracic spine. Stable superior endplate depression of mid thoracic vertebral body observed. The bony structures are intact without acute osseous abnormality. .                                                                                    Impression: 1. No acute cardiopulmonary process. Signed by Dr Severo Mueller on 2/23/2020 4:14 PM    Ct Head Wo Contrast    Result Date: 2/23/2020  EXAMINATION: CT HEAD WO CONTRAST 2/23/2020 5:07 PM HISTORY: Weakness, confusion. DOSE: 675 mGycm. Automatic exposure control was utilized in an effort to use as little radiation as possible, without compromising image quality. REPORT: Axial CT of the head was performed without contrast, reconstructed sagittal and coronal images are also reviewed. COMPARISON: CT head without contrast 9/25/2019. No intracranial hemorrhage, mass or mass effect is identified. The ventricles and basal cisterns are within normal limits. There is decreased attenuation in the medial left occipital lobe compatible with previous infarction. There is moderately decreased attenuation in the periventricular and subcortical white matter tracts compatible chronic small vessel white matter ischemic disease. Moderate diffuse cortical atrophy is present. Review of bone windows is remarkable for chronic upper parietal skull thinning bilaterally. Impression: No acute intracranial abnormality. Moderate atrophy and advanced chronic small vessel white matter ischemic changes. 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 Signed by Dr Dakota Davis on 2/23/2020 5:09 PM    Cta Pulmonary W Contrast    Result Date: 2/23/2020  EXAMINATION: CTA PULMONARY W CONTRAST 2/23/2020 7:09 PM HISTORY: Cough and shortness of breath, fatigue. Weakness. DOSE: 579 mGycm.  Automatic exposure control was utilized in an effort to use as little radiation as possible, without compromising image quality. REPORT: Spiral CT of the chest was performed after administration of intravenous contrast using CTA protocol, which includes reconstructed coronal, sagittal and 3-D images. Comparison: Chest x-ray 2/23/2020. The contrast bolus is satisfactory. There is mild dilation central pulmonary arteries. There are multiple filling defects within the first, second, third and fourth order pulmonary artery branches on the right and the first and second order pulmonary artery branches in the left, compatible with multiple acute pulmonary emboli. Mild cardiomegaly is present. There is no obvious right heart strain. There is ectasia of the thoracic aorta, with heavy calcified plaque in the aortic arch. No aortic dissection is identified. There is heavy coronary artery calcified plaque. No intrathoracic lymphadenopathy is identified. Review of lung windows shows mild bibasilar atelectasis, no pulmonary consolidation is identified. There is mild respiratory motion artifact. The airways are patent. No pneumothorax or pleural effusion is seen. In the upper abdomen, there is evidence of previous cholecystectomy. No acute findings are seen in the upper abdomen. There is a small accessory splenule adjacent to the inferior medial spleen. Review of bone windows shows a chronic compression fracture of T6, as well as the superior endplate of L1 and L2. . 1. Numerous acute pulmonary emboli, right greater than left, with no obvious right heart strain and no saddle embolism. Moderate thoracic aortic ectasia with no evidence of dissection. 2. Heavy coronary artery calcified plaque is present. 3. Bibasilar atelectasis and no consolidating infiltrate. 4. Chronic compression fractures of T6, L1 and L2. Report called to Erica Pop in the emergency department at 1917 hours on the date of examination. Signed by Dr Anne Marie Watson.  Ryan on 2/23/2020 7:18 PM    Vl Extremity Venous Bilateral    Result Date: ! +------------------------------------+----------+---------------+----------+ ! Prox Femoral                        !Yes       ! Yes            ! None      ! +------------------------------------+----------+---------------+----------+ ! Mid Femoral                         !Yes       ! Yes            ! None      ! +------------------------------------+----------+---------------+----------+ ! Dist Femoral                        !No        !               !          ! +------------------------------------+----------+---------------+----------+ ! Popliteal                           !Yes       ! Yes            ! None      ! +------------------------------------+----------+---------------+----------+ ! SSV                                 ! Yes       ! Yes            ! None      ! +------------------------------------+----------+---------------+----------+ ! Gastroc                             ! Yes       ! Yes            ! None      ! +------------------------------------+----------+---------------+----------+ ! PTV                                 ! Yes       ! Yes            ! None      ! +------------------------------------+----------+---------------+----------+ ! GSV                                 ! Yes       ! Yes            ! None      ! +------------------------------------+----------+---------------+----------+ ! ATV                                 ! Yes       ! Yes            ! None      ! +------------------------------------+----------+---------------+----------+ ! Peroneal                            !Yes       ! Yes            ! None      ! +------------------------------------+----------+---------------+----------+ Left Lower Extremities DVT Study Measurements Left 2D Measurements +------------------------------------+----------+---------------+----------+ ! Location                            ! Visualized! Compressibility! Thrombosis! +------------------------------------+----------+---------------+----------+ ! Sapheno Femoral Junction !Yes       !Yes            ! None      ! +------------------------------------+----------+---------------+----------+ ! Common Femoral                      !Yes       ! Yes            ! None      ! +------------------------------------+----------+---------------+----------+ ! Prox Femoral                        !Yes       ! No             !          ! +------------------------------------+----------+---------------+----------+ ! Mid Femoral                         !Yes       ! No             !          ! +------------------------------------+----------+---------------+----------+ ! Dist Femoral                        !Yes       ! No             !          ! +------------------------------------+----------+---------------+----------+ ! Popliteal                           !Yes       ! No             !          ! +------------------------------------+----------+---------------+----------+ ! SSV                                 ! Yes       ! Yes            ! None      ! +------------------------------------+----------+---------------+----------+ ! Gastroc                             ! Yes       ! No             !          ! +------------------------------------+----------+---------------+----------+ ! PTV                                 ! Yes       ! No             !          ! +------------------------------------+----------+---------------+----------+ ! GSV                                 ! Yes       ! Yes            ! None      ! +------------------------------------+----------+---------------+----------+ ! ATV                                 ! Yes       ! No             !          ! +------------------------------------+----------+---------------+----------+ ! Peroneal                            !Yes       ! No             !          ! +------------------------------------+----------+---------------+----------+       Assessment     Pulmonary embolism without acute cor pulmonale (HCC)  Continue heparin. Begin Coumadin.   Unable to use Eliquis

## 2020-02-26 VITALS
BODY MASS INDEX: 27.32 KG/M2 | WEIGHT: 180.3 LBS | HEART RATE: 108 BPM | HEIGHT: 68 IN | SYSTOLIC BLOOD PRESSURE: 117 MMHG | RESPIRATION RATE: 18 BRPM | DIASTOLIC BLOOD PRESSURE: 74 MMHG | OXYGEN SATURATION: 90 % | TEMPERATURE: 98.4 F

## 2020-02-26 LAB
APTT: 68.1 SEC (ref 26–36.2)
APTT: 71 SEC (ref 26–36.2)
INR BLD: 1.04 (ref 0.88–1.18)
PROTHROMBIN TIME: 13.6 SEC (ref 12–14.6)

## 2020-02-26 PROCEDURE — 94640 AIRWAY INHALATION TREATMENT: CPT

## 2020-02-26 PROCEDURE — 2580000003 HC RX 258: Performed by: INTERNAL MEDICINE

## 2020-02-26 PROCEDURE — 6370000000 HC RX 637 (ALT 250 FOR IP): Performed by: INTERNAL MEDICINE

## 2020-02-26 PROCEDURE — 36415 COLL VENOUS BLD VENIPUNCTURE: CPT

## 2020-02-26 PROCEDURE — 85610 PROTHROMBIN TIME: CPT

## 2020-02-26 PROCEDURE — 85730 THROMBOPLASTIN TIME PARTIAL: CPT

## 2020-02-26 RX ORDER — WARFARIN SODIUM 5 MG/1
TABLET ORAL
Qty: 30 TABLET | Refills: 3 | Status: SHIPPED | OUTPATIENT
Start: 2020-02-26 | End: 2020-02-28 | Stop reason: SDUPTHER

## 2020-02-26 RX ORDER — PANTOPRAZOLE SODIUM 20 MG/1
20 TABLET, DELAYED RELEASE ORAL DAILY
Qty: 30 TABLET | Refills: 3 | Status: SHIPPED | OUTPATIENT
Start: 2020-02-26 | End: 2020-10-12 | Stop reason: SDUPTHER

## 2020-02-26 RX ADMIN — LEVOTHYROXINE SODIUM 175 MCG: 125 TABLET ORAL at 08:00

## 2020-02-26 RX ADMIN — AMLODIPINE BESYLATE 5 MG: 5 TABLET ORAL at 08:10

## 2020-02-26 RX ADMIN — PHENYTOIN SODIUM 200 MG: 100 CAPSULE ORAL at 00:29

## 2020-02-26 RX ADMIN — IPRATROPIUM BROMIDE AND ALBUTEROL SULFATE 1 AMPULE: .5; 3 SOLUTION RESPIRATORY (INHALATION) at 10:54

## 2020-02-26 RX ADMIN — PHENYTOIN SODIUM 200 MG: 100 CAPSULE ORAL at 08:10

## 2020-02-26 RX ADMIN — Medication 10 ML: at 00:29

## 2020-02-26 RX ADMIN — IPRATROPIUM BROMIDE AND ALBUTEROL SULFATE 1 AMPULE: .5; 3 SOLUTION RESPIRATORY (INHALATION) at 06:59

## 2020-02-26 RX ADMIN — WARFARIN SODIUM 5 MG: 5 TABLET ORAL at 00:29

## 2020-02-26 ASSESSMENT — PAIN SCALES - GENERAL: PAINLEVEL_OUTOF10: 0

## 2020-02-26 ASSESSMENT — ENCOUNTER SYMPTOMS
BACK PAIN: 0
DIARRHEA: 0
CONSTIPATION: 0
COUGH: 0
NAUSEA: 0
VOMITING: 0
SHORTNESS OF BREATH: 0

## 2020-02-26 NOTE — PROGRESS NOTES
Pilar Benoit, DO        promethazine (PHENERGAN) tablet 12.5 mg  12.5 mg Oral Q6H PRN Stevphen Revels, DO        ondansetron [facility]) injection 4 mg  4 mg Intravenous Q6H PRN Stevphen Revels, DO        potassium chloride (KLOR-CON M) extended release tablet 40 mEq  40 mEq Oral PRN Stevphen Revels, DO        Or    potassium bicarb-citric acid (EFFER-K) effervescent tablet 40 mEq  40 mEq Oral PRN Stevphen Revels, DO        Or    potassium chloride 10 mEq/100 mL IVPB (Peripheral Line)  10 mEq Intravenous PRN Stevphen Revels, DO        potassium chloride 10 mEq/100 mL IVPB (Peripheral Line)  10 mEq Intravenous PRN Stevphen Revels, DO        magnesium sulfate 2 g in 50 mL IVPB premix  2 g Intravenous PRN Stevphen Revels, DO        heparin (porcine) injection 6,580 Units  80 Units/kg Intravenous PRN Stevphen Revels, DO        heparin (porcine) injection 3,290 Units  40 Units/kg Intravenous PRN Stevphen Revels, DO        heparin 25,000 units in dextrose 5% 250 mL infusion  18 Units/kg/hr Intravenous Continuous Stevphen Revels, DO 11.5 mL/hr at 02/25/20 2351 14 Units/kg/hr at 02/25/20 2351    ipratropium-albuterol (DUONEB) nebulizer solution 1 ampule  1 ampule Inhalation Q4H WA Stevphen Revels, DO   1 ampule at 02/26/20 4848       Past Medical History:  Past Medical History:   Diagnosis Date    Arthritis     Back pain     bulging disc    CAD (coronary artery disease)     BMS to RCA    Cerebral artery occlusion with cerebral infarction (HCC)     small strokes,balance issues    Cystitis     Depression     Dizziness     Dysuria     Edema     ankles,mild pedal edema    Falls frequently     Head injury     Headache     Heartburn     [place] (hard of hearing)     Hyperlipidemia     Hypertension     Hypothyroidism     Hypothyroidism     Memory problem     Neuropathy     Shingles     TIA (transient ischemic attack)     Urinary incontinence     UTI (urinary 97.0  --     184 179 197     LIVER PROFILE:   Recent Labs     02/23/20  1423   AST 15   ALT 16   LIPASE 15   BILITOT 0.7   ALKPHOS 116*     PT/INR:   Recent Labs     02/25/20  1630 02/26/20  0405   PROTIME 13.3 13.6   INR 1.02 1.04     APTT:   Recent Labs     02/25/20  1529 02/25/20  2235 02/26/20  0405   APTT 76.8* 61.1* 71.0*     BNP:  No results for input(s): BNP in the last 72 hours. Ionized Calcium:No results for input(s): IONCA in the last 72 hours. Magnesium:  Recent Labs     02/24/20  0501 02/25/20  0255   MG 1.8 1.7     Phosphorus:No results for input(s): PHOS in the last 72 hours. HgbA1C: No results for input(s): LABA1C in the last 72 hours. Hepatic:   Recent Labs     02/23/20  1423   ALKPHOS 116*   ALT 16   AST 15   PROT 7.9   BILITOT 0.7   LABALBU 4.3     Lactic Acid:   Recent Labs     02/23/20  1627   LACTA 1.6     Troponin: No results for input(s): CKTOTAL, CKMB, TROPONINT in the last 72 hours. ABGs: No results for input(s): PH, PCO2, PO2, HCO3, O2SAT in the last 72 hours. CRP:  No results for input(s): CRP in the last 72 hours. Sed Rate:  No results for input(s): SEDRATE in the last 72 hours. Cultures:   No results for input(s): CULTURE in the last 72 hours. Recent Labs     02/23/20  1617 02/23/20  1627   BC No Growth to date. Any change in status will be called. --    BLOODCULT2  --  No Growth to date. Any change in status will be called. No results for input(s): CXSURG in the last 72 hours. Radiology reports as per the Radiologist  Radiology: Xr Chest Standard (2 Vw)    Result Date: 2/23/2020  XR CHEST (2 VW) 2/23/2020 2:45 PM Two-view chest: History: Productive cough Frontal and lateral projection chest radiograph obtained. Comparison:  12/31/2015 chest radiography Findings: The lungs are clear without infiltrates. The heart is normal in size, pulmonary circulation appropriate, without heart failure. Ectasia of the thoracic aorta again observed.  There is osteoporosis and kyphosis the thoracic spine. Stable superior endplate depression of mid thoracic vertebral body observed. The bony structures are intact without acute osseous abnormality. .                                                                                    Impression: 1. No acute cardiopulmonary process. Signed by Dr Rajesh Alvarez on 2/23/2020 4:14 PM    Ct Head Wo Contrast    Result Date: 2/23/2020  EXAMINATION: CT HEAD WO CONTRAST 2/23/2020 5:07 PM HISTORY: Weakness, confusion. DOSE: 675 mGycm. Automatic exposure control was utilized in an effort to use as little radiation as possible, without compromising image quality. REPORT: Axial CT of the head was performed without contrast, reconstructed sagittal and coronal images are also reviewed. COMPARISON: CT head without contrast 9/25/2019. No intracranial hemorrhage, mass or mass effect is identified. The ventricles and basal cisterns are within normal limits. There is decreased attenuation in the medial left occipital lobe compatible with previous infarction. There is moderately decreased attenuation in the periventricular and subcortical white matter tracts compatible chronic small vessel white matter ischemic disease. Moderate diffuse cortical atrophy is present. Review of bone windows is remarkable for chronic upper parietal skull thinning bilaterally. Impression: No acute intracranial abnormality. Moderate atrophy and advanced chronic small vessel white matter ischemic changes. 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 Signed by Dr Olivier Davis on 2/23/2020 5:09 PM    Cta Pulmonary W Contrast    Result Date: 2/23/2020  EXAMINATION: CTA PULMONARY W CONTRAST 2/23/2020 7:09 PM HISTORY: Cough and shortness of breath, fatigue. Weakness. DOSE: 579 mGycm. Automatic exposure control was utilized in an effort to use as little radiation as possible, without compromising image quality.  REPORT: Spiral CT of the chest was performed after [address] Age                    80   Patient Number   [id number]        Gender                 Female   Visit Number     [id number]     Interpreting Physician Emerald Leblanc   Date of Birth    08/18/1931    Referring Physician    Deja Esquivel   Accession Number [id number]     Sonographer            [de-identified], RVT Bridgette  Procedure Type of Study:   Veins:Lower Extremities DVT Study, VL LOWER EXTREMITY BILATERAL VENOUS  DUPLEX . Indications for Study:Edema, bilateral lower extremity and Pulmonary embolism. - Results were reported to:Nurse Dover at 9:35am.  Impression   There is evidence of acute deep vein thrombosis in the left lower  extremity involving the femoral, popliteal, anterior tibial, peroneal,  posterior tibial, gastrocnemius, veins. There is no evidence of superficial thrombophlebitis of the left lower  extremity(ies). There is no evidence of deep venous thrombosis (DVT) in the right lower  extremity(ies). There is no evidence of superficial thrombophlebitis of the right lower  extremity(ies). Signature   ----------------------------------------------------------------  Electronically signed by Tiffanie Blood(Interpreting  physician) on 02/24/2020 04:11 PM  ----------------------------------------------------------------  Velocities are measured in cm/s ; Diameters are measured in mm Right Lower Extremities DVT Study Measurements Right 2D Measurements +------------------------------------+----------+---------------+----------+ ! Location                            ! Visualized! Compressibility! Thrombosis! +------------------------------------+----------+---------------+----------+ ! Sapheno Femoral Junction            ! Yes       ! Yes            ! None      ! +------------------------------------+----------+---------------+----------+ ! Common Femoral                      !Yes       ! Yes            ! None      ! +------------------------------------+----------+---------------+----------+ ! Prox +------------------------------------+----------+---------------+----------+ ! Common Femoral                      !Yes       ! Yes            ! None      ! +------------------------------------+----------+---------------+----------+ ! Prox Femoral                        !Yes       ! No             !          ! +------------------------------------+----------+---------------+----------+ ! Mid Femoral                         !Yes       ! No             !          ! +------------------------------------+----------+---------------+----------+ ! Dist Femoral                        !Yes       ! No             !          ! +------------------------------------+----------+---------------+----------+ ! Popliteal                           !Yes       ! No             !          ! +------------------------------------+----------+---------------+----------+ ! SSV                                 ! Yes       ! Yes            ! None      ! +------------------------------------+----------+---------------+----------+ ! Gastroc                             ! Yes       ! No             !          ! +------------------------------------+----------+---------------+----------+ ! PTV                                 ! Yes       ! No             !          ! +------------------------------------+----------+---------------+----------+ ! GSV                                 ! Yes       ! Yes            ! None      ! +------------------------------------+----------+---------------+----------+ ! ATV                                 ! Yes       ! No             !          ! +------------------------------------+----------+---------------+----------+ ! Peroneal                            !Yes       ! No             !          ! +------------------------------------+----------+---------------+----------+       Assessment     Pulmonary embolism without acute cor pulmonale (HCC)  Continue heparin. Begin Coumadin.   Unable to use Eliquis due to cost.     Deep vein thrombosis (DVT)

## 2020-02-26 NOTE — DISCHARGE SUMMARY
Hospitalist   Discharge Summary    Karthikeyan Wilcox  :  1931  MRN:  [id number]    Admit date:  2020  Discharge date:  2020    Admitting Physician:  Rosa Bartlett MD    Advance Directive: Full Code    Consults: home health     Primary Care Physician:  Daja Garcia MD    Discharge Diagnoses:  Principal Problem:    Pulmonary embolism without acute cor pulmonale ([address]  Active Problems:    Essential hypertension    Acquired hypothyroidism    Seizure disorder ([address]    Deep vein thrombosis (DVT) of left lower extremity ([address]  Resolved Problems:    * No resolved hospital problems. *      Significant Diagnostic Studies:   Xr Chest Standard (2 Vw)    Result Date: 2020  XR CHEST (2 VW) 2020 2:45 PM Two-view chest: History: Productive cough Frontal and lateral projection chest radiograph obtained. Comparison:  2015 chest radiography Findings: The lungs are clear without infiltrates. The heart is normal in size, pulmonary circulation appropriate, without heart failure. Ectasia of the thoracic aorta again observed. There is osteoporosis and kyphosis the thoracic spine. Stable superior endplate depression of mid thoracic vertebral body observed. The bony structures are intact without acute osseous abnormality. .                                                                                    Impression: 1. No acute cardiopulmonary process. Signed by Dr Chas Hightower on 2020 4:14 PM    Ct Head Wo Contrast    Result Date: 2020  EXAMINATION: CT HEAD WO CONTRAST 2020 5:07 PM HISTORY: Weakness, confusion. DOSE: 675 mGycm. Automatic exposure control was utilized in an effort to use as little radiation as possible, without compromising image quality. REPORT: Axial CT of the head was performed without contrast, reconstructed sagittal and coronal images are also reviewed. COMPARISON: CT head without contrast 2019. No intracranial hemorrhage, mass or mass effect is identified.  The There is mild respiratory motion artifact. The airways are patent. No pneumothorax or pleural effusion is seen. In the upper abdomen, there is evidence of previous cholecystectomy. No acute findings are seen in the upper abdomen. There is a small accessory splenule adjacent to the inferior medial spleen. Review of bone windows shows a chronic compression fracture of T6, as well as the superior endplate of L1 and L2. . 1. Numerous acute pulmonary emboli, right greater than left, with no obvious right heart strain and no saddle embolism. Moderate thoracic aortic ectasia with no evidence of dissection. 2. Heavy coronary artery calcified plaque is present. 3. Bibasilar atelectasis and no consolidating infiltrate. 4. Chronic compression fractures of T6, L1 and L2. Report called to Angle Nayak in the emergency department at 1917 hours on the date of examination. Signed by Dr Theodore Davis on 2020 7:18 PM    Vl Extremity Venous Bilateral    Result Date: 2020  Vascular Lower Extremities DVT Study Procedure  Demographics   Patient Name     Zelda Lugo Age                    80   Patient Number   [id number]        Gender                 Female   Visit Number     [id number]     Interpreting Physician Emerald Leblanc   Date of Birth    1931    Referring Physician    Deja Esquivel   Accession Number [id number]     Sonographer            [de-identified], RVT Bridgette  Procedure Type of Study:   Veins:Lower Extremities DVT Study, VL LOWER EXTREMITY BILATERAL VENOUS  DUPLEX . Indications for Study:Edema, bilateral lower extremity and Pulmonary embolism. - Results were reported to:Nurse Dover at 9:35am.  Impression   There is evidence of acute deep vein thrombosis in the left lower  extremity involving the femoral, popliteal, anterior tibial, peroneal,  posterior tibial, gastrocnemius, veins. There is no evidence of superficial thrombophlebitis of the left lower  extremity(ies).   There is no evidence of deep venous thrombosis (DVT) in the right lower  extremity(ies). There is no evidence of superficial thrombophlebitis of the right lower  extremity(ies). Signature   ----------------------------------------------------------------  Electronically signed by Oapl DonaldInterpreting  physician) on 02/24/2020 04:11 PM  ----------------------------------------------------------------  Velocities are measured in cm/s ; Diameters are measured in mm Right Lower Extremities DVT Study Measurements Right 2D Measurements +------------------------------------+----------+---------------+----------+ ! Location                            ! Visualized! Compressibility! Thrombosis! +------------------------------------+----------+---------------+----------+ ! Sapheno Femoral Junction            ! Yes       ! Yes            ! None      ! +------------------------------------+----------+---------------+----------+ ! Common Femoral                      !Yes       ! Yes            ! None      ! +------------------------------------+----------+---------------+----------+ ! Prox Femoral                        !Yes       ! Yes            ! None      ! +------------------------------------+----------+---------------+----------+ ! Mid Femoral                         !Yes       ! Yes            ! None      ! +------------------------------------+----------+---------------+----------+ ! Dist Femoral                        !No        !               !          ! +------------------------------------+----------+---------------+----------+ ! Popliteal                           !Yes       ! Yes            ! None      ! +------------------------------------+----------+---------------+----------+ ! SSV                                 ! Yes       ! Yes            ! None      ! +------------------------------------+----------+---------------+----------+ ! Gastroc                             ! Yes       ! Yes            ! None      ! !Yes       !Yes            ! None      ! +------------------------------------+----------+---------------+----------+ ! Gastroc                             ! Yes       ! No             !          ! +------------------------------------+----------+---------------+----------+ ! PTV                                 ! Yes       ! No             !          ! +------------------------------------+----------+---------------+----------+ ! GSV                                 ! Yes       ! Yes            ! None      ! +------------------------------------+----------+---------------+----------+ ! ATV                                 ! Yes       ! No             !          ! +------------------------------------+----------+---------------+----------+ ! Peroneal                            !Yes       ! No             !          ! +------------------------------------+----------+---------------+----------+      Labs:    CBC:   Recent Labs     02/23/20  1423 02/23/20  2126 02/24/20  0501 02/25/20  0255   WBC 11.3* 8.7 7.3  --    HGB 16.7* 13.2 13.8  --     184 179 197     BMP:    Recent Labs     02/23/20  1423 02/23/20  1731 02/24/20  0501     --  140   K 4.3 3.6 3.7     --  104   CO2 24  --  25   BUN 19  --  12   CREATININE 0.6  --  0.5   GLUCOSE 123*  --  121*     Hepatic:   Recent Labs     02/23/20  1423   AST 15   ALT 16   BILITOT 0.7   ALKPHOS 116*     Troponin:   Recent Labs     02/23/20  1423   TROPONINI 0.01     BNP: No results for input(s): BNP in the last 72 hours. Lipids: No results for input(s): CHOL, HDL in the last 72 hours. Invalid input(s): LDLCALCU  INR:   Recent Labs     02/25/20  1630 02/26/20  0405   INR 1.02 1.04     ABG:   Recent Labs     02/23/20  1731   PHART 7.430   EYO9PYT 38.0   PO2ART 46.0*   OPP6HHR 25.2   Y1YNPELZ 85.4*   BEART 1.0       30 Day lookback of cultures:    Blood Culture Recent:   Recent Labs     02/23/20  1617   BC No Growth to date. Any change in status will be called.      Gram

## 2020-02-26 NOTE — PLAN OF CARE
Integrity:  Goal: Skin integrity will stabilize  Description  Skin integrity will stabilize  2/26/2020 0505 by Blake Alexis RN  Outcome: Ongoing  2/25/2020 1550 by Julian Lan RN  Outcome: Ongoing     Problem: Discharge Planning:  Goal: Patients continuum of care needs are met  Description  Patients continuum of care needs are met  2/26/2020 0505 by Blake Alexis RN  Outcome: Ongoing  2/25/2020 1550 by Julian Lan RN  Outcome: Ongoing     Problem: Skin Integrity:  Goal: Will show no infection signs and symptoms  Description  Will show no infection signs and symptoms  2/26/2020 0505 by Blake Alexis RN  Outcome: Ongoing  2/25/2020 1550 by Julian Lan RN  Outcome: Ongoing  Goal: Absence of new skin breakdown  Description  Absence of new skin breakdown  2/26/2020 0505 by Blake Alexis RN  Outcome: Ongoing  2/25/2020 1550 by Julian Lan RN  Outcome: Ongoing     Problem: Discharge Planning:  Goal: Discharged to appropriate level of care  Description  Discharged to appropriate level of care  2/26/2020 0505 by Blake Alexis RN  Outcome: Ongoing  2/25/2020 1550 by Julian Lan RN  Outcome: Ongoing     Problem: Tissue Perfusion - Peripheral, Altered:  Goal: Electrolytes within specified parameters  Description  Electrolytes within specified parameters  2/26/2020 0505 by Blake Alexis RN  Outcome: Ongoing  2/25/2020 1550 by Julian Lan RN  Outcome: Ongoing  Goal: Hemodynamic stability will improve  Description  Hemodynamic stability will improve  2/26/2020 0505 by Blake Alexis RN  Outcome: Ongoing  2/25/2020 1550 by Julian Lan RN  Outcome: Ongoing  Goal: Circulatory function within specified parameters  Description  Circulatory function within specified parameters  2/26/2020 0505 by Blake Alexis RN  Outcome: Ongoing  2/25/2020 1550 by Julian Lan RN  Outcome: Ongoing     Problem: Risk for Impaired Skin Integrity  Goal: Tissue integrity - skin and mucous membranes  Description  Structural intactness and normal physiological function of skin and  mucous membranes.   Outcome: Ongoing

## 2020-02-26 NOTE — CARE COORDINATION
[facility] notified of patient discharge today. DC Summary faxed. Please Fax DC med list once completed.    Electronically signed by Sunita Woodard RN on 2/26/20 at 11:30 AM

## 2020-02-26 NOTE — CONSULTS
Nutrition Assessment    Type and Reason for Visit: Initial, Consult    Nutrition Recommendations: continue current POC    Nutrition Assessment: Received consult for low Peter risk score = 17. Pt appears adequately nourished and po intake is fairly good ranging between 25-50%. Weight is returning to \"normal weight for me. \"  I hope to go home    Malnutrition Assessment:  · Malnutrition Status: No malnutrition  · Context: Acute illness or injury  · Findings of the 6 clinical characteristics of malnutrition (Minimum of 2 out of 6 clinical characteristics is required to make the diagnosis of moderate or severe Protein Calorie Malnutrition based on AND/ASPEN Guidelines):  1. Energy Intake-Less than or equal to 75% of estimated energy requirement, (3 days)    2. Weight Loss-No significant weight loss,    3. Fat Loss-No significant subcutaneous fat loss,    4. Muscle Loss-No significant muscle mass loss,    5. Fluid Accumulation-Moderate to severe fluid accumulation, Extremities  6.  Strength-Not measured    Nutrition Risk Level: Low    Nutrition Diagnosis:   · Problem: Overweight/Obese  · Etiology: related to Excessive energy intake     Signs and symptoms:  as evidenced by BMI    Objective Information:  · Nutrition-Focused Physical Findings: well nourished  · Wound Type: None  · Current Nutrition Therapies:  · Oral Diet Orders: Cardiac   · Oral Diet intake: 26-50%  · Oral Nutrition Supplement (ONS) Orders: None    · Anthropometric Measures:  · Ht: 5' 8\" (172.7 cm)   · Current Body Wt: 180 lb 4 oz (81.8 kg)  · Admission Body Wt: 188 lb (85.3 kg)(stated)  · Usual Body Wt: 178 lb (80.7 kg)(10/2019)  · Ideal Body Wt: 140 lb (63.5 kg),   · BMI Classification: BMI 25.0 - 29.9 Overweight    Nutrition Interventions:   Continue current diet  Continued Inpatient Monitoring    Nutrition Evaluation:   · Evaluation: Goals set   · Goals: po intake 50% or greater.   Weight stable    · Monitoring: Meal Intake, Diet Tolerance, Skin

## 2020-02-26 NOTE — PROGRESS NOTES
CLINICAL PHARMACY NOTE: MEDS TO [address] Select Patient?: Yes  Total # of Prescriptions Filled: 2   The following medications were delivered to the patient:  · Lovenox 80mg/0.8ml  · Warfarin 5 mg  Total # of Interventions Completed: 0  Time Spent (min): 30    Additional Documentation:

## 2020-02-27 ENCOUNTER — TELEPHONE (OUTPATIENT)
Dept: INTERNAL MEDICINE | Age: 85
End: 2020-02-27

## 2020-02-27 LAB
CULTURE, RESPIRATORY: ABNORMAL
CULTURE, RESPIRATORY: ABNORMAL
GRAM STAIN RESULT: ABNORMAL
ORGANISM: ABNORMAL

## 2020-02-27 NOTE — TELEPHONE ENCOUNTER
[facility]    Outreach made within 2 business days of discharge: Yes    Patient: Gallo Harris Patient : 1931   MRN: [id number]  Reason for Admission: Admitted 20 for pulmonary embolism with acute cor pulmonale   Discharge Date: 20    Discharge Diagnoses:  Principal Problem:    Pulmonary embolism without acute cor pulmonale ([facility]  Active Problems:    Essential hypertension    Acquired hypothyroidism    Seizure disorder ([facility]    Deep vein thrombosis (DVT) of left lower extremity    Spoke with: [facility]    Discharge department/facility: [facility]     TCM Interactive Patient Contact:  Was patient able to fill all prescriptions: Yes  Was patient instructed to bring all medications to the follow-up visit: Yes  Is patient taking all medications as directed in the discharge summary? Yes  Does patient understand their discharge instructions: Yes  Does patient have questions or concerns that need addressed prior to 7-14 day follow up office visit: no    I spoke with [facility], patient's daughter. She states Mrs. Sheth is sleeping a lot. She does not have an appetite. She feels ok, but is very tired. She is still short of breath. Her bowels and bladder are moving ok. She is taking all of her medications as directed and denies any needs at this time. She will follow up next week. I advised her to call or go to the ER for new or worsening symptoms.      Scheduled appointment with PCP within 7-14 days    Follow Up  Future Appointments   Date Time Provider Laura Page   3/3/2020 10:15 AM VIN Cardenas  [facility]   3/4/2020  1:30 PM [facility] 1 [facility]   2020  2:00 PM MD ADE Maher  [facility]   2020  2:45 PM VIN [place] [facility] [address] [place] [address]

## 2020-02-28 ENCOUNTER — TELEPHONE (OUTPATIENT)
Dept: INTERNAL MEDICINE CLINIC | Age: 85
End: 2020-02-28

## 2020-02-28 LAB
BLOOD CULTURE, ROUTINE: NORMAL
CULTURE, BLOOD 2: NORMAL

## 2020-02-28 RX ORDER — WARFARIN SODIUM 5 MG/1
5 TABLET ORAL DAILY
Qty: 30 TABLET | Refills: 5 | Status: SHIPPED | OUTPATIENT
Start: 2020-02-28 | End: 2020-08-24 | Stop reason: SDUPTHER

## 2020-02-28 NOTE — TELEPHONE ENCOUNTER
1. Faxed Coumadin and Lovenox to her pharmacy - [facility]  2. Is home health coming out? She needs another INR on Monday  3. Ok for SN  4.  I would recommend weekly INR after she is therapeutic

## 2020-02-28 NOTE — TELEPHONE ENCOUNTER
Yvonne Clemons. Patient was admitted to home care services on 2.27.2020. Please see the below questions regarding the POC and patients care:    1. Currently taking Lovenox 80mg BID and Coumadin 5mg daily until INR reaches 2.0. Patient only has enough meds to make it through today. Can you please send in refills? 2. INR on admission was 1.3.    3. SN requesting POC for daily visits for 5 days, then 2 times a week for 2 weeks, then 1 time a week for 6 weeks. Is this OK? 4. Once daily visits are done for PT/INR checks- how often to you want us to check her INR? Thank you!

## 2020-03-03 ENCOUNTER — TELEPHONE (OUTPATIENT)
Dept: INTERNAL MEDICINE CLINIC | Age: 85
End: 2020-03-03

## 2020-03-03 NOTE — TELEPHONE ENCOUNTER
[facility] PT eval done and requests visit approval for 3 X wk for 4 weeks   OT also completed eval and plans to see pt 2x/week x 4 weeks beginning 3/1/2020, pending SNF placement  Pt with significant weakness/limited activity tolerance and decreased strength. SP02 drops to 87/88% with exertion. Pt was not offered SNF placement when discharged from hospital, but agrees that she would benefit from this level of care.     Please advise If approved

## 2020-03-04 ENCOUNTER — TELEPHONE (OUTPATIENT)
Dept: FAMILY MEDICINE CLINIC | Age: 85
End: 2020-03-04

## 2020-03-04 ENCOUNTER — OFFICE VISIT (OUTPATIENT)
Dept: FAMILY MEDICINE CLINIC | Age: 85
End: 2020-03-04
Payer: MEDICARE

## 2020-03-04 ENCOUNTER — HOSPITAL ENCOUNTER (OUTPATIENT)
Dept: MRI IMAGING | Age: 85
Discharge: HOME | End: 2020-03-04
Payer: MEDICARE

## 2020-03-04 VITALS
SYSTOLIC BLOOD PRESSURE: 134 MMHG | HEART RATE: 70 BPM | BODY MASS INDEX: 27.28 KG/M2 | OXYGEN SATURATION: 95 % | RESPIRATION RATE: 18 BRPM | TEMPERATURE: 97.6 F | DIASTOLIC BLOOD PRESSURE: 78 MMHG | HEIGHT: 68 IN | WEIGHT: 180 LBS

## 2020-03-04 PROCEDURE — 1111F DSCHRG MED/CURRENT MED MERGE: CPT | Performed by: NURSE PRACTITIONER

## 2020-03-04 PROCEDURE — 70551 MRI BRAIN STEM W/O DYE: CPT

## 2020-03-04 PROCEDURE — 99496 TRANSJ CARE MGMT HIGH F2F 7D: CPT | Performed by: NURSE PRACTITIONER

## 2020-03-04 ASSESSMENT — ENCOUNTER SYMPTOMS
WHEEZING: 0
CHEST TIGHTNESS: 0
COUGH: 0
DIARRHEA: 0
ABDOMINAL PAIN: 0
SHORTNESS OF BREATH: 0
VOMITING: 0
NAUSEA: 0
SORE THROAT: 0

## 2020-03-04 NOTE — PROGRESS NOTES
Post-Discharge Transitional Care Management Services or Hospital Follow Up      [facility] COGNITIVE DISORDERS   YOB: 1931    Date of Office Visit:  3/4/2020  Date of Hospital Admission: 2/23/20  Date of Hospital Discharge: 2/26/20  Readmission Risk Score(high >=14%.  Medium >=10%):Readmission Risk Score: 13      Care management risk score Rising risk (score 2-5) and Complex Care (Scores >=6): 0     Non face to face  following discharge, date last encounter closed (first attempt may have been earlier): 2/27/2020 11:34 AM 2/27/2020 11:34 AM    Call initiated 2 business days of discharge: Yes     Patient Active Problem List   Diagnosis    Compression fx, thoracic spine, closed, initial encounter ([facility]    History of cardioembolic cerebrovascular accident (CVA)    Essential hypertension    Acquired hypothyroidism    Seizure disorder ([facility]    Pulmonary embolism without acute cor pulmonale ([facility]    Deep vein thrombosis (DVT) of left lower extremity ([facility]       Allergies   Allergen Reactions    Morphine      Pt becomes mean (refusing, cursing)         Medications listed as ordered at the time of discharge from hospitals, [address] [place] [address] \"GENE\"   Home Medication Instructions SHERRY:    Printed on:03/04/20 1948   Medication Information                      amLODIPine (NORVASC) 5 MG tablet  Take 1 tablet by mouth daily             levothyroxine (SYNTHROID) 175 MCG tablet  Take 1 tablet by mouth Daily             metoprolol succinate (TOPROL XL) 50 MG extended release tablet  Take 1 tablet by mouth daily             nitrofurantoin (MACRODANTIN) 100 MG capsule  Take 1 capsule by mouth nightly             pantoprazole (PROTONIX) 20 MG tablet  Take 1 tablet by mouth daily             phenytoin (DILANTIN) 100 MG ER capsule  Take 2 capsules by mouth 2 times daily             sertraline (ZOLOFT) 100 MG tablet  Take 1 tablet by mouth daily             warfarin (COUMADIN) 5 MG tablet  Take 1 tablet by mouth daily Medications marked \"taking\" at this time  Outpatient Medications Marked as Taking for the 3/4/20 encounter (Office Visit) with VIN Mei   Medication Sig Dispense Refill    warfarin (COUMADIN) 5 MG tablet Take 1 tablet by mouth daily 30 tablet 5    [DISCONTINUED] enoxaparin (LOVENOX) 80 MG/0.8ML injection Inject 0.8 mLs into the skin 2 times daily Continue until INR > 2 10 Syringe 3    pantoprazole (PROTONIX) 20 MG tablet Take 1 tablet by mouth daily 30 tablet 3    amLODIPine (NORVASC) 5 MG tablet Take 1 tablet by mouth daily 90 tablet 3    phenytoin (DILANTIN) 100 MG ER capsule Take 2 capsules by mouth 2 times daily 180 capsule 3    sertraline (ZOLOFT) 100 MG tablet Take 1 tablet by mouth daily 90 tablet 3    nitrofurantoin (MACRODANTIN) 100 MG capsule Take 1 capsule by mouth nightly 90 capsule 3    levothyroxine (SYNTHROID) 175 MCG tablet Take 1 tablet by mouth Daily 90 tablet 3    metoprolol succinate (TOPROL XL) 50 MG extended release tablet Take 1 tablet by mouth daily 90 tablet 3        Medications patient taking as of now reconciled against medications ordered at time of hospital discharge: Yes    Chief Complaint   Patient presents with    Follow-up       HPI    Inpatient course: Discharge summary reviewed- see chart. Interval history/Current status:  She [facility] with acute onset of fatigue and weakness, shortness of breath. She has had swelling in both legs chronically, worse on left side. Venous scan revealed DVT to the left lower extremity involving femoral, popliteal, anterior tibial, peroneal, posterior tibial, gastrocnemius veins. No DVT noted to right lower extremity. CT of chest revealed numerous acute pulmonary emboli, right greater than left. No right heart strain, no saddle embolism. Bibasilar atelectasis but no infiltrate. She was started on heparin drip. Transitioned to Lovenox and Coumadin. Insurance will not cover Eliquis.   No

## 2020-03-04 NOTE — TELEPHONE ENCOUNTER
Yes, I have added those labs to the [facility] orders for next week   Thanks

## 2020-03-06 ENCOUNTER — LAB REQUISITION (OUTPATIENT)
Dept: LAB | Facility: HOSPITAL | Age: 85
End: 2020-03-06

## 2020-03-06 DIAGNOSIS — Z00.00 ENCOUNTER FOR GENERAL ADULT MEDICAL EXAMINATION WITHOUT ABNORMAL FINDINGS: ICD-10-CM

## 2020-03-06 LAB
INR PPP: 2.59 (ref 0.91–1.09)
PROTHROMBIN TIME: 27.9 SECONDS (ref 11.9–14.6)

## 2020-03-06 PROCEDURE — 85610 PROTHROMBIN TIME: CPT | Performed by: FAMILY MEDICINE

## 2020-03-06 PROCEDURE — 36415 COLL VENOUS BLD VENIPUNCTURE: CPT | Performed by: FAMILY MEDICINE

## 2020-03-10 ENCOUNTER — TELEPHONE (OUTPATIENT)
Dept: PODIATRY | Facility: CLINIC | Age: 85
End: 2020-03-10

## 2020-03-13 ENCOUNTER — LAB REQUISITION (OUTPATIENT)
Dept: LAB | Facility: HOSPITAL | Age: 85
End: 2020-03-13

## 2020-03-13 DIAGNOSIS — Z00.00 ENCOUNTER FOR GENERAL ADULT MEDICAL EXAMINATION WITHOUT ABNORMAL FINDINGS: ICD-10-CM

## 2020-03-13 LAB
INR PPP: 1.25 (ref 0.91–1.09)
PROTHROMBIN TIME: 15.6 SECONDS (ref 11.9–14.6)

## 2020-03-13 PROCEDURE — 36415 COLL VENOUS BLD VENIPUNCTURE: CPT | Performed by: FAMILY MEDICINE

## 2020-03-13 PROCEDURE — 85610 PROTHROMBIN TIME: CPT | Performed by: FAMILY MEDICINE

## 2020-03-16 ENCOUNTER — LAB REQUISITION (OUTPATIENT)
Dept: LAB | Facility: HOSPITAL | Age: 85
End: 2020-03-16

## 2020-03-16 DIAGNOSIS — Z00.00 ENCOUNTER FOR GENERAL ADULT MEDICAL EXAMINATION WITHOUT ABNORMAL FINDINGS: ICD-10-CM

## 2020-03-16 LAB
INR PPP: 1.21 (ref 0.91–1.09)
PROTHROMBIN TIME: 15.2 SECONDS (ref 11.9–14.6)

## 2020-03-16 PROCEDURE — 85610 PROTHROMBIN TIME: CPT | Performed by: FAMILY MEDICINE

## 2020-03-16 PROCEDURE — 36415 COLL VENOUS BLD VENIPUNCTURE: CPT | Performed by: FAMILY MEDICINE

## 2020-03-18 ENCOUNTER — LAB REQUISITION (OUTPATIENT)
Dept: LAB | Facility: HOSPITAL | Age: 85
End: 2020-03-18

## 2020-03-18 DIAGNOSIS — Z00.00 ENCOUNTER FOR GENERAL ADULT MEDICAL EXAMINATION WITHOUT ABNORMAL FINDINGS: ICD-10-CM

## 2020-03-18 LAB
INR PPP: 1.19 (ref 0.91–1.09)
PROTHROMBIN TIME: 15 SECONDS (ref 11.9–14.6)

## 2020-03-18 PROCEDURE — 85610 PROTHROMBIN TIME: CPT | Performed by: FAMILY MEDICINE

## 2020-03-18 PROCEDURE — 36415 COLL VENOUS BLD VENIPUNCTURE: CPT | Performed by: FAMILY MEDICINE

## 2020-03-19 ENCOUNTER — TELEPHONE (OUTPATIENT)
Dept: INTERNAL MEDICINE CLINIC | Age: 85
End: 2020-03-19

## 2020-03-20 ENCOUNTER — TELEPHONE (OUTPATIENT)
Dept: INTERNAL MEDICINE CLINIC | Age: 85
End: 2020-03-20

## 2020-03-20 ENCOUNTER — LAB REQUISITION (OUTPATIENT)
Dept: LAB | Facility: HOSPITAL | Age: 85
End: 2020-03-20

## 2020-03-20 DIAGNOSIS — Z00.00 ENCOUNTER FOR GENERAL ADULT MEDICAL EXAMINATION WITHOUT ABNORMAL FINDINGS: ICD-10-CM

## 2020-03-20 LAB
INR PPP: 1.35 (ref 0.91–1.09)
PROTHROMBIN TIME: 16.6 SECONDS (ref 11.9–14.6)

## 2020-03-20 PROCEDURE — 36415 COLL VENOUS BLD VENIPUNCTURE: CPT | Performed by: FAMILY MEDICINE

## 2020-03-20 PROCEDURE — 85610 PROTHROMBIN TIME: CPT | Performed by: FAMILY MEDICINE

## 2020-03-20 NOTE — TELEPHONE ENCOUNTER
[address] [id number] has referral for this pt and [facility] sent INR orders with the referral but it has 2 different order dates      so they need to clarify when does your office need a PT/INR on this pt  ?    Please advise

## 2020-03-23 ENCOUNTER — TELEPHONE (OUTPATIENT)
Dept: CASE MANAGEMENT | Age: 85
End: 2020-03-23

## 2020-03-23 NOTE — TELEPHONE ENCOUNTER
Yes, check another INR today. I don't want to change coumadin until todays INR.   Thanks, Andrew Villela

## 2020-03-24 ENCOUNTER — TELEPHONE (OUTPATIENT)
Dept: INTERNAL MEDICINE CLINIC | Age: 85
End: 2020-03-24

## 2020-03-24 NOTE — TELEPHONE ENCOUNTER
S: OT evaluation  B: Pt of Dr. Roman Gambino  A: Pt with significant improvement upon discharge home from SNF  R: OT 1x/week x 1 week for HEP recommendations and maximal safety with adl routines    Is this ok?

## 2020-03-30 ENCOUNTER — TELEPHONE (OUTPATIENT)
Dept: INTERNAL MEDICINE CLINIC | Age: 85
End: 2020-03-30

## 2020-04-02 ENCOUNTER — TELEMEDICINE (OUTPATIENT)
Dept: FAMILY MEDICINE CLINIC | Age: 85
End: 2020-04-02
Payer: MEDICARE

## 2020-04-02 PROCEDURE — 99213 OFFICE O/P EST LOW 20 MIN: CPT | Performed by: FAMILY MEDICINE

## 2020-04-02 PROCEDURE — G8428 CUR MEDS NOT DOCUMENT: HCPCS | Performed by: FAMILY MEDICINE

## 2020-04-02 PROCEDURE — 1123F ACP DISCUSS/DSCN MKR DOCD: CPT | Performed by: FAMILY MEDICINE

## 2020-04-02 PROCEDURE — 4040F PNEUMOC VAC/ADMIN/RCVD: CPT | Performed by: FAMILY MEDICINE

## 2020-04-02 PROCEDURE — 1090F PRES/ABSN URINE INCON ASSESS: CPT | Performed by: FAMILY MEDICINE

## 2020-04-02 ASSESSMENT — ENCOUNTER SYMPTOMS
SHORTNESS OF BREATH: 0
DIARRHEA: 0
SINUS PAIN: 0
RHINORRHEA: 0
CHOKING: 0
CONSTIPATION: 0
SINUS PRESSURE: 0
COUGH: 0
ABDOMINAL PAIN: 0
BACK PAIN: 0
BLOOD IN STOOL: 0

## 2020-04-02 NOTE — PROGRESS NOTES
(DILANTIN) 100 MG ER capsule Take 2 capsules by mouth 2 times daily  Marylou Carolina MD   sertraline (ZOLOFT) 100 MG tablet Take 1 tablet by mouth daily  Marylou Carolina MD   nitrofurantoin (MACRODANTIN) 100 MG capsule Take 1 capsule by mouth nightly  Marylou Carolina MD   levothyroxine (SYNTHROID) 175 MCG tablet Take 1 tablet by mouth Daily  Marylou Carolina MD   metoprolol succinate (TOPROL XL) 50 MG extended release tablet Take 1 tablet by mouth daily  Marylou Carolina MD       Social History     Tobacco Use    Smoking status: Never Smoker    Smokeless tobacco: Never Used   Substance Use Topics    Alcohol use: Never     Frequency: Never    Drug use: Never            PHYSICAL EXAMINATION:  [ INSTRUCTIONS:  \"[x]\" Indicates a positive item  \"[]\" Indicates a negative item  -- DELETE ALL ITEMS NOT EXAMINED]  Vital Signs: (As obtained by patient/caregiver or practitioner observation)    Blood pressure-  Heart rate-    Respiratory rate-    Temperature-  Pulse oximetry-     Constitutional: [x] Appears well-developed and well-nourished [] No apparent distress      [] Abnormal-   Mental status  [x] Alert and awake  [] Oriented to person/place/time []Able to follow commands      Eyes:  EOM    []  Normal  [] Abnormal-  Sclera  []  Normal  [] Abnormal -         Discharge []  None visible  [] Abnormal -    HENT:   [x] Normocephalic, atraumatic.   [] Abnormal   [] Mouth/Throat: Mucous membranes are moist.     External Ears [x] Normal  [] Abnormal-     Neck: [x] No visualized mass     Pulmonary/Chest: [x] Respiratory effort normal.  [] No visualized signs of difficulty breathing or respiratory distress        [] Abnormal-      Musculoskeletal:   [] Normal gait with no signs of ataxia         [] Normal range of motion of neck        [] Abnormal-       Neurological:        [x] No Facial Asymmetry (Cranial nerve 7 motor function) (limited exam to video visit)          [] No gaze palsy        [] Abnormal-         Skin:

## 2020-04-03 LAB
INR BLD: 1.67 (ref 0.88–1.18)
PROTHROMBIN TIME: 19.9 SEC (ref 12–14.6)

## 2020-04-06 ENCOUNTER — TELEMEDICINE (OUTPATIENT)
Dept: NEUROSURGERY | Age: 85
End: 2020-04-06
Payer: MEDICARE

## 2020-04-06 ENCOUNTER — TELEPHONE (OUTPATIENT)
Dept: NEUROSURGERY | Age: 85
End: 2020-04-06

## 2020-04-06 PROCEDURE — G8427 DOCREV CUR MEDS BY ELIG CLIN: HCPCS | Performed by: NURSE PRACTITIONER

## 2020-04-06 PROCEDURE — 1123F ACP DISCUSS/DSCN MKR DOCD: CPT | Performed by: NURSE PRACTITIONER

## 2020-04-06 PROCEDURE — 1090F PRES/ABSN URINE INCON ASSESS: CPT | Performed by: NURSE PRACTITIONER

## 2020-04-06 PROCEDURE — 4040F PNEUMOC VAC/ADMIN/RCVD: CPT | Performed by: NURSE PRACTITIONER

## 2020-04-06 PROCEDURE — 99213 OFFICE O/P EST LOW 20 MIN: CPT | Performed by: NURSE PRACTITIONER

## 2020-04-06 RX ORDER — MEMANTINE HYDROCHLORIDE 10 MG/1
10 TABLET ORAL 2 TIMES DAILY
Qty: 60 TABLET | Refills: 2 | Status: SHIPPED | OUTPATIENT
Start: 2020-04-06 | End: 2020-07-20

## 2020-04-06 NOTE — PROGRESS NOTES
[x]? Denies all unless marked  Spine:  []? Neck pain  []? Back pain  []? Sciaticia  [x]? Denies all unless marked  Psychiatric/Behavioral:[]? Depression []? Anxiety [x]? Denies all unless marked  Extremities: []? Pain  []? Swelling  [x]? Denies all unless marked  Musculoskeletal: []? Myalgias  []? Joint Pain  []? Arthritis []? Muscle Cramps []? Muscle Twitches  [x]? Denies all unless marked  Sleep: []? Insomnia[]? Snoring []? Restless Legs  []? Sleep Apnea  []? Daytime Sleepiness  [x]? Denies all unless marked  Neurological:[]? Visual Disturbance/Memory Loss []? Loss of Balance []? Slurred Speech/Weakness []? Seizures  []? Vertigo/Dizziness [x]? Denies all unless marked     The MA has completed the ROS with the patient. I have reviewed it in its' entirety with the patient and agree with the documentation.       Past Medical History:   Diagnosis Date    Arthritis     Back pain     bulging disc    CAD (coronary artery disease)     BMS to RCA    Cerebral artery occlusion with cerebral infarction (HCC)     small strokes,balance issues    Cystitis     Depression     Dizziness     Dysuria     Edema     ankles,mild pedal edema    Falls frequently     H/O blood clots     left leg and lungs    Head injury     Headache     Heartburn     [place] (hard of hearing)     Hyperlipidemia     Hypertension     Hypothyroidism     Hypothyroidism     Memory problem     Neuropathy     Shingles     TIA (transient ischemic attack)     Urinary incontinence     UTI (urinary tract infection)        Past Surgical History:   Procedure Laterality Date    APPENDECTOMY  1947    CHOLECYSTECTOMY      EYE SURGERY  2009    bilateral cataract     HYSTERECTOMY, TOTAL ABDOMINAL      JOINT REPLACEMENT      Bilateral Knee    KNEE ARTHROPLASTY Right     LOBECTOMY  10/2015    sx--bilateral    PTCA  2002    with stent placement/bms to rca       Family History   Problem Relation Age of Onset    Diabetes Mother     Hypertension Mother    [employer] (MACRODANTIN) 100 MG capsule Take 1 capsule by mouth nightly 90 capsule 3    levothyroxine (SYNTHROID) 175 MCG tablet Take 1 tablet by mouth Daily 90 tablet 3    metoprolol succinate (TOPROL XL) 50 MG extended release tablet Take 1 tablet by mouth daily 90 tablet 3    pantoprazole (PROTONIX) 20 MG tablet Take 1 tablet by mouth daily (Patient not taking: Reported on 4/6/2020) 30 tablet 3     No current facility-administered medications for this visit. Allergies   Allergen Reactions    Morphine      Pt becomes mean (refusing, cursing)         PHYSICAL EXAMINATION:    Constitutional -   General appearance: No acute distress   EYES -   Conjunctiva normal  ENT-    No scars, masses, or lesions over external nose or ears  Cardiovascular -   No clubbing, cyanosis, or edema   Pulmonary-   Good expansion, normal effort without use of accessory muscles  Musculoskeletal -   No significant wasting of muscles noted  Gait as below, see gait exam in the neurologic exam  No gross bony deformities  Skin -   No rash, erythema, or pallor  Psychiatric -   Mood, affect, and behavior appear normal    Memory as below see mental status examination in the neurologic exam    NEUROLOGICAL EXAM    Mental status   [x]Awake, alert, oriented   [x]Affect attention and concentration appear appropriate  []Recent and remote memory appears unremarkable  [x]Speech normal without dysarthria or aphasia, comprehension and repetition intact.    COMMENTS: cognitive impairment noted    Cranial Nerves [x] PER, EOMI, no nystagmus, conjugate eye movements, no ptosis   [x]Face symmetric  [x]Tongue midline   [x]Shoulder shrug normal bilaterally  COMMENTS:    Motor   [x]Antigravity x 4 extremities  [x]Normal bulk and tone  [x]No tremor present  COMMENTS:       Coordination [x] HTS normal bilaterally   COMMENTS:        Gait                  []Normal steady gait    []Ataxic    []Spastic     []Magnetic     []Shuffling  COMMENTS:unsteady        [] OTHER: Due to this being a TeleHealth encounter, evaluation of the following organ systems is limited: Vitals/Constitutional/EENT/Resp/CV/GI//MS/Neuro/Skin/Heme-Lymph-Imm. LABS RECORD AND IMAGING REVIEW (As below and per HPI)    No results found for: [id number]  Lab Results   Component Value Date    WBC 7.3 02/24/2020    HGB 13.8 02/24/2020    HCT 42.4 02/24/2020    MCV 97.0 02/24/2020     02/25/2020     Lab Results   Component Value Date     02/24/2020    K 3.7 02/24/2020     02/24/2020    CO2 25 02/24/2020    BUN 12 02/24/2020    CREATININE 0.5 02/24/2020    GLUCOSE 121 (H) 02/24/2020    CALCIUM 8.0 (L) 02/24/2020    PROT 7.9 02/23/2020    LABALBU 4.3 02/23/2020    BILITOT 0.7 02/23/2020    ALKPHOS 116 (H) 02/23/2020    AST 15 02/23/2020    ALT 16 02/23/2020    LABGLOM >60 02/24/2020       Mri Brain Wo Contrast    Result Date: 3/4/2020  EXAMINATION:  MRI BRAIN WO CONTRAST  3/4/2020 2:59 PM HISTORY:  Seizure disorder. Falling episodes. TECHNIQUE: Multiplanar imaging was performed in a high field magnet. COMPARISON: No comparison study. FINDINGS: The study is degraded by motion artifact. There is no evidence of acute infarct. There are small chronic appearing cerebellar infarcts bilaterally. There is a chronic left occipital cortical and subcortical infarct. There are small areas of susceptibility artifact in the thalamus on the left side consistent with remote hemorrhage. There is moderate to severe T2 high signal within the hemispheric white matter. There is moderate to severe cerebral atrophy with associated ventricular prominence. There is by parietal thinning of the cortex. This is best seen on CT. 1. Chronic small bilateral cerebellar infarcts. Chronic cortical and subcortical left occipital infarct. 2. Small areas of chronic hemorrhage in the left thalamus. 3. Moderate to severe atrophy with associated ventricular prominence.  4. T2 high signal within the hemispheric white matter is Appropriations Act, this Virtual  Visit was conducted, with patient's consent, to reduce the patient's risk of exposure to COVID-19 and provide continuity of care for an established patient. Services were provided through a video synchronous discussion virtually to substitute for in-person clinic visit.

## 2020-04-07 ENCOUNTER — TELEPHONE (OUTPATIENT)
Dept: INTERNAL MEDICINE CLINIC | Age: 85
End: 2020-04-07

## 2020-04-07 LAB — PHENYTOIN LEVEL: 15.8 UG/ML (ref 10–20)

## 2020-04-07 NOTE — TELEPHONE ENCOUNTER
[facility] reporting that  pts PT/INR today is 48.8 / 4.1    Pt is taking coumadin 5 mg daily     Please advise

## 2020-04-13 ENCOUNTER — TELEPHONE (OUTPATIENT)
Dept: FAMILY MEDICINE CLINIC | Age: 85
End: 2020-04-13

## 2020-04-13 NOTE — TELEPHONE ENCOUNTER
Coumadin 2.5 mg on Monday Wednesday Friday  Coumadin 5 mg all other days  Check PT/INR before the weekend

## 2020-04-17 ENCOUNTER — TELEPHONE (OUTPATIENT)
Dept: FAMILY MEDICINE CLINIC | Age: 85
End: 2020-04-17

## 2020-04-23 ENCOUNTER — TELEPHONE (OUTPATIENT)
Dept: FAMILY MEDICINE CLINIC | Age: 85
End: 2020-04-23

## 2020-04-23 NOTE — TELEPHONE ENCOUNTER
HH called to report INR- 2.5    I advised to continue current dose of 2.5mg daily and recheck in 1 wk

## 2020-04-24 ENCOUNTER — TELEPHONE (OUTPATIENT)
Dept: FAMILY MEDICINE CLINIC | Age: 85
End: 2020-04-24

## 2020-04-24 ENCOUNTER — APPOINTMENT (OUTPATIENT)
Dept: GENERAL RADIOLOGY | Age: 85
End: 2020-04-24
Payer: MEDICARE

## 2020-04-24 ENCOUNTER — HOSPITAL ENCOUNTER (EMERGENCY)
Age: 85
Discharge: HOME | End: 2020-04-24
Attending: EMERGENCY MEDICINE
Payer: MEDICARE

## 2020-04-24 VITALS
TEMPERATURE: 98.2 F | RESPIRATION RATE: 17 BRPM | OXYGEN SATURATION: 91 % | BODY MASS INDEX: 27.37 KG/M2 | DIASTOLIC BLOOD PRESSURE: 64 MMHG | SYSTOLIC BLOOD PRESSURE: 120 MMHG | WEIGHT: 180 LBS | HEART RATE: 75 BPM

## 2020-04-24 LAB
ALBUMIN SERPL-MCNC: 4 G/DL (ref 3.5–5.2)
ALP BLD-CCNC: 102 U/L (ref 35–104)
ALT SERPL-CCNC: 20 U/L (ref 5–33)
ANION GAP SERPL CALCULATED.3IONS-SCNC: 14 MMOL/L (ref 7–19)
APTT: 30.6 SEC (ref 26–36.2)
AST SERPL-CCNC: 18 U/L (ref 5–32)
BACTERIA: ABNORMAL /HPF
BASOPHILS ABSOLUTE: 0 K/UL (ref 0–0.2)
BASOPHILS RELATIVE PERCENT: 0.5 % (ref 0–1)
BILIRUB SERPL-MCNC: <0.2 MG/DL (ref 0.2–1.2)
BILIRUBIN URINE: NEGATIVE
BLOOD, URINE: ABNORMAL
BUN BLDV-MCNC: 17 MG/DL (ref 8–23)
CALCIUM SERPL-MCNC: 8.6 MG/DL (ref 8.8–10.2)
CHLORIDE BLD-SCNC: 104 MMOL/L (ref 98–111)
CLARITY: ABNORMAL
CO2: 27 MMOL/L (ref 22–29)
COLOR: YELLOW
CREAT SERPL-MCNC: 0.6 MG/DL (ref 0.5–0.9)
EOSINOPHILS ABSOLUTE: 0.1 K/UL (ref 0–0.6)
EOSINOPHILS RELATIVE PERCENT: 1.7 % (ref 0–5)
EPITHELIAL CELLS, UA: 0 /HPF (ref 0–5)
GFR NON-AFRICAN AMERICAN: >60
GLUCOSE BLD-MCNC: 95 MG/DL (ref 74–109)
GLUCOSE URINE: NEGATIVE MG/DL
HCT VFR BLD CALC: 46.3 % (ref 37–47)
HEMOGLOBIN: 15 G/DL (ref 12–16)
HYALINE CASTS: 1 /HPF (ref 0–8)
IMMATURE GRANULOCYTES #: 0 K/UL
INR BLD: 1.71 (ref 0.88–1.18)
KETONES, URINE: NEGATIVE MG/DL
LEUKOCYTE ESTERASE, URINE: ABNORMAL
LYMPHOCYTES ABSOLUTE: 2.2 K/UL (ref 1.1–4.5)
LYMPHOCYTES RELATIVE PERCENT: 34.9 % (ref 20–40)
MCH RBC QN AUTO: 30.9 PG (ref 27–31)
MCHC RBC AUTO-ENTMCNC: 32.4 G/DL (ref 33–37)
MCV RBC AUTO: 95.5 FL (ref 81–99)
MONOCYTES ABSOLUTE: 0.7 K/UL (ref 0–0.9)
MONOCYTES RELATIVE PERCENT: 10.4 % (ref 0–10)
NEUTROPHILS ABSOLUTE: 3.3 K/UL (ref 1.5–7.5)
NEUTROPHILS RELATIVE PERCENT: 52 % (ref 50–65)
NITRITE, URINE: POSITIVE
PDW BLD-RTO: 13.6 % (ref 11.5–14.5)
PH UA: 6 (ref 5–8)
PLATELET # BLD: 256 K/UL (ref 130–400)
PMV BLD AUTO: 9.9 FL (ref 9.4–12.3)
POTASSIUM SERPL-SCNC: 3.6 MMOL/L (ref 3.5–5)
PRO-BNP: 135 PG/ML (ref 0–1800)
PROTEIN UA: NEGATIVE MG/DL
PROTHROMBIN TIME: 20.2 SEC (ref 12–14.6)
RBC # BLD: 4.85 M/UL (ref 4.2–5.4)
RBC UA: 3 /HPF (ref 0–4)
SODIUM BLD-SCNC: 145 MMOL/L (ref 136–145)
SPECIFIC GRAVITY UA: 1.02 (ref 1–1.03)
TOTAL PROTEIN: 7 G/DL (ref 6.6–8.7)
URINE REFLEX TO CULTURE: YES
UROBILINOGEN, URINE: 1 E.U./DL
WBC # BLD: 6.4 K/UL (ref 4.8–10.8)
WBC UA: 444 /HPF (ref 0–5)

## 2020-04-24 PROCEDURE — 93922 UPR/L XTREMITY ART 2 LEVELS: CPT

## 2020-04-24 PROCEDURE — 71045 X-RAY EXAM CHEST 1 VIEW: CPT

## 2020-04-24 PROCEDURE — 93971 EXTREMITY STUDY: CPT

## 2020-04-24 PROCEDURE — 85610 PROTHROMBIN TIME: CPT

## 2020-04-24 PROCEDURE — 85730 THROMBOPLASTIN TIME PARTIAL: CPT

## 2020-04-24 PROCEDURE — 96374 THER/PROPH/DIAG INJ IV PUSH: CPT

## 2020-04-24 PROCEDURE — 93005 ELECTROCARDIOGRAM TRACING: CPT | Performed by: PHYSICIAN ASSISTANT

## 2020-04-24 PROCEDURE — 87077 CULTURE AEROBIC IDENTIFY: CPT

## 2020-04-24 PROCEDURE — 81001 URINALYSIS AUTO W/SCOPE: CPT

## 2020-04-24 PROCEDURE — 87086 URINE CULTURE/COLONY COUNT: CPT

## 2020-04-24 PROCEDURE — 6360000002 HC RX W HCPCS: Performed by: PHYSICIAN ASSISTANT

## 2020-04-24 PROCEDURE — 99284 EMERGENCY DEPT VISIT MOD MDM: CPT

## 2020-04-24 PROCEDURE — 85025 COMPLETE CBC W/AUTO DIFF WBC: CPT

## 2020-04-24 PROCEDURE — 87186 SC STD MICRODIL/AGAR DIL: CPT

## 2020-04-24 PROCEDURE — 80053 COMPREHEN METABOLIC PANEL: CPT

## 2020-04-24 PROCEDURE — 36415 COLL VENOUS BLD VENIPUNCTURE: CPT

## 2020-04-24 PROCEDURE — 83880 ASSAY OF NATRIURETIC PEPTIDE: CPT

## 2020-04-24 PROCEDURE — 2580000003 HC RX 258: Performed by: PHYSICIAN ASSISTANT

## 2020-04-24 RX ORDER — CEPHALEXIN 500 MG/1
500 CAPSULE ORAL 2 TIMES DAILY
Qty: 14 CAPSULE | Refills: 0 | Status: SHIPPED | OUTPATIENT
Start: 2020-04-24 | End: 2020-05-01

## 2020-04-24 RX ADMIN — CEFTRIAXONE 1 G: 1 INJECTION, POWDER, FOR SOLUTION INTRAMUSCULAR; INTRAVENOUS at 20:57

## 2020-04-24 NOTE — TELEPHONE ENCOUNTER
sw daughter after noticing she hadn't read [employer] message from 3:45. Advised her to take pt to ED. Pt daughter is getting her dressed and taking her.

## 2020-04-24 NOTE — ED NOTES
Patient placed in a gown Patient placed on cardiac monitor, continuous pulse oximeter, and NIBP monitor.  Monitor alarms on.       Kerry Bowden RN  04/24/20 [id number]

## 2020-04-24 NOTE — ED TRIAGE NOTES
Pt here with leg swelling. Pt states that earlier her right leg felt cooler to touch.   Both legs warm to touch at this time pitting edema to DHAVAL

## 2020-04-26 LAB
ORGANISM: ABNORMAL
URINE CULTURE, ROUTINE: ABNORMAL
URINE CULTURE, ROUTINE: ABNORMAL

## 2020-04-27 LAB
EKG P AXIS: 66 DEGREES
EKG P-R INTERVAL: 190 MS
EKG Q-T INTERVAL: 444 MS
EKG QRS DURATION: 112 MS
EKG QTC CALCULATION (BAZETT): 454 MS
EKG T AXIS: 56 DEGREES

## 2020-04-27 PROCEDURE — 93010 ELECTROCARDIOGRAM REPORT: CPT | Performed by: INTERNAL MEDICINE

## 2020-04-29 ASSESSMENT — ENCOUNTER SYMPTOMS
APNEA: 0
ABDOMINAL PAIN: 0
RHINORRHEA: 0
SORE THROAT: 0
EYE DISCHARGE: 0
COUGH: 0
COLOR CHANGE: 0
SHORTNESS OF BREATH: 0
ABDOMINAL DISTENTION: 0
PHOTOPHOBIA: 0
BACK PAIN: 0
ROS SKIN COMMENTS: COLD
NAUSEA: 0
EYE PAIN: 0

## 2020-04-30 ENCOUNTER — TELEPHONE (OUTPATIENT)
Dept: FAMILY MEDICINE CLINIC | Age: 85
End: 2020-04-30

## 2020-05-05 ENCOUNTER — TELEPHONE (OUTPATIENT)
Dept: VASCULAR SURGERY | Age: 85
End: 2020-05-05

## 2020-05-23 LAB — INR BLD: 1.4

## 2020-05-26 ENCOUNTER — ANTI-COAG VISIT (OUTPATIENT)
Dept: INTERNAL MEDICINE | Age: 85
End: 2020-05-26
Payer: MEDICARE

## 2020-05-26 PROCEDURE — 93793 ANTICOAG MGMT PT WARFARIN: CPT | Performed by: FAMILY MEDICINE

## 2020-05-28 ENCOUNTER — ANTI-COAG VISIT (OUTPATIENT)
Dept: INTERNAL MEDICINE | Age: 85
End: 2020-05-28
Payer: MEDICARE

## 2020-05-28 LAB — INR BLD: 1.5

## 2020-05-28 PROCEDURE — 93793 ANTICOAG MGMT PT WARFARIN: CPT | Performed by: FAMILY MEDICINE

## 2020-05-29 ENCOUNTER — HOSPITAL ENCOUNTER (OUTPATIENT)
Dept: VASCULAR LAB | Age: 85
Discharge: HOME | End: 2020-05-29
Payer: MEDICARE

## 2020-05-29 PROCEDURE — 93971 EXTREMITY STUDY: CPT

## 2020-06-02 ENCOUNTER — VIRTUAL VISIT (OUTPATIENT)
Dept: VASCULAR SURGERY | Age: 85
End: 2020-06-02
Payer: MEDICARE

## 2020-06-02 PROBLEM — M79.89 LEG SWELLING: Status: ACTIVE | Noted: 2020-06-02

## 2020-06-02 PROBLEM — M79.605 LEG PAIN, LEFT: Status: ACTIVE | Noted: 2020-06-02

## 2020-06-02 PROCEDURE — 4040F PNEUMOC VAC/ADMIN/RCVD: CPT | Performed by: NURSE PRACTITIONER

## 2020-06-02 PROCEDURE — G8417 CALC BMI ABV UP PARAM F/U: HCPCS | Performed by: NURSE PRACTITIONER

## 2020-06-02 PROCEDURE — 1036F TOBACCO NON-USER: CPT | Performed by: NURSE PRACTITIONER

## 2020-06-02 PROCEDURE — 99213 OFFICE O/P EST LOW 20 MIN: CPT | Performed by: NURSE PRACTITIONER

## 2020-06-02 PROCEDURE — G8427 DOCREV CUR MEDS BY ELIG CLIN: HCPCS | Performed by: NURSE PRACTITIONER

## 2020-06-02 PROCEDURE — 1123F ACP DISCUSS/DSCN MKR DOCD: CPT | Performed by: NURSE PRACTITIONER

## 2020-06-02 PROCEDURE — 1090F PRES/ABSN URINE INCON ASSESS: CPT | Performed by: NURSE PRACTITIONER

## 2020-06-02 RX ORDER — NITROFURANTOIN MACROCRYSTALS 100 MG/1
100 CAPSULE ORAL NIGHTLY
Qty: 90 CAPSULE | Refills: 3 | Status: SHIPPED | OUTPATIENT
Start: 2020-06-02 | End: 2020-10-12 | Stop reason: ALTCHOICE

## 2020-06-02 NOTE — PROGRESS NOTES
Never Used   Substance Use Topics    Alcohol use: Never     Frequency: Never    Drug use: Never        Allergies   Allergen Reactions    Morphine      Pt becomes mean (refusing, cursing)     ,   Past Medical History:   Diagnosis Date    Arthritis     Back pain     bulging disc    CAD (coronary artery disease)     BMS to RCA    Cerebral artery occlusion with cerebral infarction (HCC)     small strokes,balance issues    Cystitis     Depression     Dizziness     Dysuria     Edema     ankles,mild pedal edema    Falls frequently     H/O blood clots     left leg and lungs    Head injury     Headache     Heartburn     [place] (hard of hearing)     Hyperlipidemia     Hypertension     Hypothyroidism     Hypothyroidism     Memory problem     Neuropathy     Shingles     TIA (transient ischemic attack)     Urinary incontinence     UTI (urinary tract infection)    ,   Past Surgical History:   Procedure Laterality Date    APPENDECTOMY  1947    CHOLECYSTECTOMY      EYE SURGERY  2009    bilateral cataract     HYSTERECTOMY, TOTAL ABDOMINAL      JOINT REPLACEMENT      Bilateral Knee    KNEE ARTHROPLASTY Right     LOBECTOMY  10/2015    sx--bilateral    PTCA  2002    with stent placement/bms to rca   ,   Social History     Tobacco Use    Smoking status: Never Smoker    Smokeless tobacco: Never Used   Substance Use Topics    Alcohol use: Never     Frequency: Never    Drug use: Never   ,   Family History   Problem Relation Age of Onset    Diabetes Mother     Hypertension Mother    Shante Elaine Rheum Arthritis Mother     Stroke Mother     Tuberculosis Father    ,   Health Maintenance   Topic Date Due    DTaP/Tdap/Td vaccine (1 - Tdap) [birth year]/1950    Shingles Vaccine (1 of 2) 08/18/1981    Pneumococcal 65+ years Vaccine (1 of 1 - PPSV23) 08/18/1996    TSH testing  10/04/2020    Annual Wellness Visit (AWV)  12/02/2020    Potassium monitoring  04/24/2021    Creatinine monitoring  04/24/2021    Flu vaccine Completed    Hepatitis A vaccine  Aged Out    Hepatitis B vaccine  Aged Out    Hib vaccine  Aged Out    Meningococcal (ACWY) vaccine  Aged Out       Review of Systems    Constitutional - no significant activity change, appetite change, or unexpected weight change. No fever or chills. No diaphoresis or significant fatigue. HENT - no significant rhinorrhea or epistaxis. No tinnitus or significant hearing loss. Eyes - no sudden vision change or amaurosis. Respiratory - no significant shortness of breath, wheezing, or stridor. No apnea, cough, or chest tightness associated with shortness of breath. Cardiovascular - no chest pain, syncope, or significant dizziness. No palpitations or significant leg swelling.  has not had claudication. Gastrointestinal -  has not had abdominal swelling or pain. No blood in stool. No severe constipation, diarrhea, nausea, or vomiting. Genitourinary - No difficulty urinating, dysuria, frequency, or urgency. No flank pain or hematuria. Musculoskeletal - has not had back pain, gait disturbance, or myalgia. Skin - no color change, rash, pallor, or new wound. Neurologic - no dizziness, facial asymmetry, or light headedness. No seizures. No speech difficulty or lateralizing weakness. Hematologic - no easy bruising or excessive bleeding. Psychiatric - no severe anxiety or nervousness. No confusion. All other review of systems are negative. PHYSICAL EXAMINATION:    Due to this being a TeleHealth encounter, evaluation of the following organ systems is limited: Vitals/Constitutional/EENT/Resp/CV/GI//MS/Neuro/Skin/Heme-Lymph-Imm. Constitutional - well developed, well nourished. No diaphoresis or acute distress. HENT - head normocephalic. Right external ear canal appears normal.  Left external ear canal appears normal.  Septum appears midline. Eyes - conjunctiva normal.   No exudate. No icterus. Neck- ROM appears normal, no tracheal deviation.   Extremities -No cyanosis, clubbing, has edema. No signs atheroembolic event. Pulmonary - effort appears normal.  No respiratory distress. No accessory muscle use  Musculoskeletal -   Motor grossly intact in visible lower extremities and upper extremities  Neurologic - alert and oriented X 3. Cranial Nerves II-XII grossly intact  Skin - intact. No rash, erythema, or pallor. Psychiatric - mood, affect, and behavior appear normal.  Judgment and thought processes appear normal.    Venous scan -   There is evidence of chronic, recannalized deep vein thrombosis in the    left lower extremity involving the femoral, veins.    There is no evidence of superficial thrombophlebitis of the left lower    extremity(ies). Individual images were reviewed. Results were reviewed with the patient. ASSESSMENT/PLAN:  1. Deep vein thrombosis (DVT) of distal vein of left lower extremity, unspecified chronicity (HCC)    2. Leg pain, left    3. Leg swelling        No follow-ups on file. Patient instructed to keep leg elevated as much as possible due to the increased swelling that is associated with DVT. Call the office if pain, swelling, and tenderness extends beyond where it is today. Wear  support hose every day from the time they get up in the morning until they go to be at night. Use warm moist heat for comfort if needed. Elevate above level of heart 30 minutes 3 times a day  Recommended support hose fritz  Strongly encouraged start/continue statin therapy  Recommended no smoking  An  electronic signature was used to authenticate this note.     --VIN Guillen on 6/2/2020 at 11:14 AM        Pursuant to the emergency declaration under the [facility], [facility] waiver authority and the [employer] Act, this Virtual  Visit was conducted, with patient's consent, to reduce the patient's risk of exposure to COVID-19 and provide

## 2020-06-03 ENCOUNTER — ANTI-COAG VISIT (OUTPATIENT)
Dept: INTERNAL MEDICINE | Age: 85
End: 2020-06-03
Payer: MEDICARE

## 2020-06-03 LAB — INR BLD: 2.5

## 2020-06-03 PROCEDURE — 93793 ANTICOAG MGMT PT WARFARIN: CPT | Performed by: FAMILY MEDICINE

## 2020-06-10 ENCOUNTER — ANTI-COAG VISIT (OUTPATIENT)
Dept: FAMILY MEDICINE CLINIC | Age: 85
End: 2020-06-10
Payer: MEDICARE

## 2020-06-10 LAB — INR BLD: 1.9

## 2020-06-10 PROCEDURE — 93793 ANTICOAG MGMT PT WARFARIN: CPT | Performed by: FAMILY MEDICINE

## 2020-06-10 NOTE — PROGRESS NOTES
HOME MONITORING REPORT    INR today:   Results for orders placed or performed in visit on 06/10/20   Protime-INR   Result Value Ref Range    INR 1.90        INR Goal: 2.0-3.0    Dosing Plan  As of 6/10/2020    TTR:   76.7 % (5 d)   Full warfarin instructions:   6/11: 5 mg; Otherwise 5 mg every Tue, Thu, Sat; 2.5 mg all other days            Pt has only been in range 1 time in the last 4 weeks. Adjusted dose again, slightly. If INR increases too much, we can change back to previous dose next week. PLAN: Advised pt's niece to change pt's dose to 5 mg Tues, Thurs, Sat and 2.5mg all other days. Recheck in one week.

## 2020-06-17 LAB — INR BLD: 2.2

## 2020-06-18 ENCOUNTER — ANTI-COAG VISIT (OUTPATIENT)
Dept: FAMILY MEDICINE CLINIC | Age: 85
End: 2020-06-18
Payer: MEDICARE

## 2020-06-18 PROCEDURE — 93793 ANTICOAG MGMT PT WARFARIN: CPT | Performed by: FAMILY MEDICINE

## 2020-06-18 NOTE — PROGRESS NOTES
HOME MONITORING REPORT    INR today:   Results for orders placed or performed in visit on 06/18/20   Protime-INR   Result Value Ref Range    INR 2.20        INR Goal: 2.0-3.0    Dosing Plan  As of 6/18/2020    TTR:   70.8 % (1.7 wk)   Full warfarin instructions:   5 mg every Tue, Thu, Sat; 2.5 mg all other days              PLAN: [employer] message sent advising pt to stay on current dose and recheck in one week.

## 2020-06-24 ENCOUNTER — ANTI-COAG VISIT (OUTPATIENT)
Dept: FAMILY MEDICINE CLINIC | Age: 85
End: 2020-06-24
Payer: MEDICARE

## 2020-06-24 LAB — INR BLD: 2.6

## 2020-06-24 PROCEDURE — 93793 ANTICOAG MGMT PT WARFARIN: CPT | Performed by: FAMILY MEDICINE

## 2020-07-01 ENCOUNTER — ANTI-COAG VISIT (OUTPATIENT)
Dept: FAMILY MEDICINE CLINIC | Age: 85
End: 2020-07-01
Payer: MEDICARE

## 2020-07-01 LAB — INR BLD: 2.9

## 2020-07-01 PROCEDURE — 93793 ANTICOAG MGMT PT WARFARIN: CPT | Performed by: CLINICAL NURSE SPECIALIST

## 2020-07-01 NOTE — PROGRESS NOTES
HOME MONITORING REPORT    INR today:   Results for orders placed or performed in visit on 07/01/20   Protime-INR   Result Value Ref Range    INR 2.90        INR Goal: 2.0-3.0    Dosing Plan  As of 7/1/2020    TTR:   86.5 % (3.7 wk)   Full warfarin instructions:   7/1: Hold; Otherwise 5 mg every Tue, Thu, Sat; 2.5 mg all other days            Pt on thin side, and today is her lower dose. [facility] msg sent advising daughter to hold dose today, then resume current dose tomorrow and recheck in one week.

## 2020-07-09 ENCOUNTER — PATIENT MESSAGE (OUTPATIENT)
Dept: FAMILY MEDICINE CLINIC | Age: 85
End: 2020-07-09

## 2020-07-09 ENCOUNTER — ANTI-COAG VISIT (OUTPATIENT)
Dept: INTERNAL MEDICINE | Age: 85
End: 2020-07-09
Payer: MEDICARE

## 2020-07-09 LAB — INR BLD: 3.3

## 2020-07-09 PROCEDURE — 93793 ANTICOAG MGMT PT WARFARIN: CPT | Performed by: FAMILY MEDICINE

## 2020-07-09 NOTE — PROGRESS NOTES
HOME MONITORING REPORT    INR today:   Results for orders placed or performed in visit on 07/09/20   Protime-INR   Result Value Ref Range    INR 3.30        INR Goal: 2.0-3.0    Dosing Plan  As of 7/9/2020    TTR:   72.1 % (1.1 mo)   Full warfarin instructions:   7/9: Hold; Otherwise 5 mg every Tue, Sat; 2.5 mg all other days              PLAN:Advised niece to hold tonight's dose then change to 5mg on Tues and Sat and 2.5mg all other days. Recheck in one week.

## 2020-07-10 ENCOUNTER — TELEMEDICINE (OUTPATIENT)
Dept: FAMILY MEDICINE CLINIC | Age: 85
End: 2020-07-10
Payer: MEDICARE

## 2020-07-10 PROCEDURE — 1123F ACP DISCUSS/DSCN MKR DOCD: CPT | Performed by: FAMILY MEDICINE

## 2020-07-10 PROCEDURE — 4040F PNEUMOC VAC/ADMIN/RCVD: CPT | Performed by: FAMILY MEDICINE

## 2020-07-10 PROCEDURE — 99214 OFFICE O/P EST MOD 30 MIN: CPT | Performed by: FAMILY MEDICINE

## 2020-07-10 PROCEDURE — G8428 CUR MEDS NOT DOCUMENT: HCPCS | Performed by: FAMILY MEDICINE

## 2020-07-10 PROCEDURE — 1090F PRES/ABSN URINE INCON ASSESS: CPT | Performed by: FAMILY MEDICINE

## 2020-07-10 NOTE — PROGRESS NOTES
7/10/2020    TELEHEALTH EVALUATION -- Audio/Visual (During QSXBU-58 public health emergency)    HPI:    Vincent Cook (:  [id number]) has requested an audio/video evaluation for the following concern(s):    Hypertension  Compliant with medications. No adverse effects from medication. No lightheadedness, palpitations, or chest pain. Depression with Anxiety  Compliant with medications. No adverse effects from medication. Depression and anxiety symptoms are stable today. No suicidal or homicidal ideation. Excessive worry, insomnia, and anxiousness are stable. Energy, concentration, and apathy are stable. Hypothyroidism  Symptoms are stable. No temperature intolerance, fatigue, or mood disturbance from baseline. Complaint with current medication. Gastroesophageal Reflux Disease  Symptoms currently under control. Medication adequately controls his symptoms. No hematochezia or melena. Review of Systems  Review of Systems   Constitutional: Negative for chills and fever. HENT: Negative for congestion. Respiratory: Negative for cough, chest tightness and shortness of breath. Cardiovascular: Negative for chest pain, palpitations and leg swelling. Gastrointestinal: Negative for abdominal pain, anal bleeding, constipation, diarrhea and nausea. Genitourinary: Negative for difficulty urinating. Psychiatric/Behavioral: Negative. Prior to Visit Medications    Medication Sig Taking?  Authorizing Provider   nitrofurantoin (MACRODANTIN) 100 MG capsule Take 1 capsule by mouth nightly  Zulema Barrios MD   memantine (NAMENDA) 10 MG tablet Take 1 tablet by mouth 2 times daily  VIN Fernandez   warfarin (COUMADIN) 5 MG tablet Take 1 tablet by mouth daily  Zulema Barrios MD   pantoprazole (PROTONIX) 20 MG tablet Take 1 tablet by mouth daily  Manasa Kirkpatrick MD   amLODIPine (NORVASC) 5 MG tablet Take 1 tablet by mouth daily  Zulema Barrios MD   phenytoin (DILANTIN) 100 MG ER capsule Take 2 capsules by mouth 2 times daily  Alpa Montiel MD   sertraline (ZOLOFT) 100 MG tablet Take 1 tablet by mouth daily  Alpa Montiel MD   levothyroxine (SYNTHROID) 175 MCG tablet Take 1 tablet by mouth Daily  Alpa Montiel MD   metoprolol succinate (TOPROL XL) 50 MG extended release tablet Take 1 tablet by mouth daily  Alpa Montiel MD       Social History     Tobacco Use    Smoking status: Never Smoker    Smokeless tobacco: Never Used   Substance Use Topics    Alcohol use: Never     Frequency: Never    Drug use: Never            PHYSICAL EXAMINATION:  [ INSTRUCTIONS:  \"[x]\" Indicates a positive item  \"[]\" Indicates a negative item  -- DELETE ALL ITEMS NOT EXAMINED]  Vital Signs: (As obtained by patient/caregiver or practitioner observation)    Blood pressure-  Heart rate-    Respiratory rate-    Temperature-  Pulse oximetry-     Constitutional: [x] Appears well-developed and well-nourished [x] No apparent distress      [] Abnormal-   Mental status  [x] Alert and awake  [] Oriented to person/place/time [x]Able to follow commands      Eyes:  EOM    [x]  Normal  [] Abnormal-  Sclera  [x]  Normal  [] Abnormal -         Discharge []  None visible  [] Abnormal -    HENT:   [x] Normocephalic, atraumatic.   [] Abnormal   [] Mouth/Throat: Mucous membranes are moist.     External Ears [x] Normal  [] Abnormal-     Neck: [x] No visualized mass     Pulmonary/Chest: [x] Respiratory effort normal.  [x] No visualized signs of difficulty breathing or respiratory distress        [] Abnormal-      Musculoskeletal:   [] Normal gait with no signs of ataxia         [] Normal range of motion of neck        [] Abnormal-       Neurological:        [] No Facial Asymmetry (Cranial nerve 7 motor function) (limited exam to video visit)          [] No gaze palsy        [] Abnormal-         Skin:        [x] No significant exanthematous lesions or discoloration noted on facial skin         [] Abnormal- Psychiatric:       [x] Normal Affect [x] No Hallucinations        [] Abnormal-     Other pertinent observable physical exam findings-     Recent Results (from the past 672 hour(s))   Protime-INR    Collection Time: 06/24/20 12:00 AM   Result Value Ref Range    INR 2.60    Protime-INR    Collection Time: 07/01/20 12:00 AM   Result Value Ref Range    INR 2.90    Protime-INR    Collection Time: 07/09/20 12:00 AM   Result Value Ref Range    INR 3.30          ASSESSMENT/PLAN:  1. Essential hypertension  BP Readings from Last 3 Encounters:   04/24/20 120/64   03/04/20 134/78   02/26/20 117/74     Blood pressures have been stable    2. Deep vein thrombosis (DVT) of distal vein of left lower extremity, unspecified chronicity (HCC)  Continue with anticoagulation    3. Pulmonary embolism without acute cor pulmonale, unspecified chronicity, unspecified pulmonary embolism type ([address]  Continue with anticoagulation    4. Gastroesophageal reflux disease without esophagitis  Stable    5. At maximum risk for fall  She has difficulty ambulating around the house. She is having deconditioning and generalized muscle weakness. She is a fall risk. Suggested she use a walker at home. We will see if we can get home PT OT  - Internal Referral to [address] [place] [address] Ayo Cates    6. Muscular deconditioning    - Internal Referral to Home Health      Return in about 3 months (around 10/10/2020) for Routine follow up - 15 minute visit. Idalia Carr is a 80 y.o. female being evaluated by a Virtual Visit (video visit) encounter to address concerns as mentioned above. A caregiver was present when appropriate. Due to this being a TeleHealth encounter (During LHZEJ-11 public health emergency), evaluation of the following organ systems was limited: Vitals/Constitutional/EENT/Resp/CV/GI//MS/Neuro/Skin/Heme-Lymph-Imm.   Pursuant to the emergency declaration under the [facility] waiver authority and the Coronavirus Preparedness and Response Supplemental Appropriations Act, this Virtual Visit was conducted with patient's (and/or legal guardian's) consent, to reduce the patient's risk of exposure to COVID-19 and provide necessary medical care. The patient (and/or legal guardian) has also been advised to contact this office for worsening conditions or problems, and seek emergency medical treatment and/or call 911 if deemed necessary. Patient identification was verified at the start of the visit: Yes    Total time spent on this encounter: Not billed by time    Services were provided through a video synchronous discussion virtually to substitute for in-person clinic visit. Patient and provider were located at their individual homes. --Patricia Biggs MD on 7/19/2020 at 12:16 PM    An electronic signature was used to authenticate this note.

## 2020-07-13 ENCOUNTER — TELEPHONE (OUTPATIENT)
Dept: INTERNAL MEDICINE CLINIC | Age: 85
End: 2020-07-13

## 2020-07-13 NOTE — TELEPHONE ENCOUNTER
Mike. Nik [facility] to make [facility] in the pt chart is for them ?    (It does say Internal but sometimes that is not correct with some offices so we have to check )

## 2020-07-14 ENCOUNTER — TELEPHONE (OUTPATIENT)
Dept: INTERNAL MEDICINE CLINIC | Age: 85
End: 2020-07-14

## 2020-07-14 NOTE — TELEPHONE ENCOUNTER
[address] [address] PT anju done [facility]  2w1, 3w2, 2w2 for deep breathing, trunk/core, and LE exercises; bed mobility, transfer, gait, and stair training; balance activities; home safety instruction.     Please advise if approved

## 2020-07-14 NOTE — TELEPHONE ENCOUNTER
See below and also nursing will see pt weekly for 3 weeks and would like to add OT and Speech therapy as well - is that okay  ?

## 2020-07-20 RX ORDER — MEMANTINE HYDROCHLORIDE 10 MG/1
TABLET ORAL
Qty: 60 TABLET | Refills: 2 | Status: SHIPPED | OUTPATIENT
Start: 2020-07-20 | End: 2020-10-12

## 2020-07-20 NOTE — TELEPHONE ENCOUNTER
Requested Prescriptions     Pending Prescriptions Disp Refills    memantine (NAMENDA) 10 MG tablet [Pharmacy Med Name: MEMANTINE HCL 10 MG TABLET] 60 tablet 2     Sig: TAKE 1 TABLET BY MOUTH TWICE A DAY       Last Office Visit: 2/17/2020  Next Office Visit: 7/31/2020  Last Medication Refill: 4/6/20 with 2 refills

## 2020-07-22 ENCOUNTER — ANTI-COAG VISIT (OUTPATIENT)
Dept: FAMILY MEDICINE CLINIC | Age: 85
End: 2020-07-22

## 2020-07-22 ENCOUNTER — PATIENT MESSAGE (OUTPATIENT)
Dept: FAMILY MEDICINE CLINIC | Age: 85
End: 2020-07-22

## 2020-07-22 LAB — INR BLD: 2.6

## 2020-07-22 NOTE — PROGRESS NOTES
HOME MONITORING REPORT    INR today:   Results for orders placed or performed in visit on 07/22/20   Protime-INR   Result Value Ref Range    INR 2.60        INR Goal: 2.0-3.0    Dosing Plan  As of 7/22/2020    TTR:   67.9 % (1.6 mo)   Full warfarin instructions:   5 mg every Tue, Sat; 2.5 mg all other days              PLAN:PATIENT NOTIFIED TO  CONTINUE CURRENT DOSE AND RECHECK IN ONE WEEK.

## 2020-07-29 LAB — INR BLD: 2.2

## 2020-07-30 ENCOUNTER — ANTI-COAG VISIT (OUTPATIENT)
Dept: FAMILY MEDICINE CLINIC | Age: 85
End: 2020-07-30
Payer: MEDICARE

## 2020-07-30 PROCEDURE — 93793 ANTICOAG MGMT PT WARFARIN: CPT | Performed by: FAMILY MEDICINE

## 2020-07-30 NOTE — PROGRESS NOTES
HOME MONITORING REPORT    INR today:   Results for orders placed or performed in visit on 07/30/20   Protime-INR   Result Value Ref Range    INR 2.20        INR Goal: 2.0-3.0    Dosing Plan  As of 7/30/2020    TTR:   72.1 % (1.8 mo)   Full warfarin instructions:   5 mg every Tue, Sat; 2.5 mg all other days              PLAN:  CONTINUE CURRENT DOSE AND RECHECK IN ONE WEEK.

## 2020-08-05 LAB — INR BLD: 1.7

## 2020-08-06 ENCOUNTER — ANTI-COAG VISIT (OUTPATIENT)
Dept: FAMILY MEDICINE CLINIC | Age: 85
End: 2020-08-06
Payer: MEDICARE

## 2020-08-06 PROCEDURE — 93793 ANTICOAG MGMT PT WARFARIN: CPT | Performed by: FAMILY MEDICINE

## 2020-08-06 NOTE — PROGRESS NOTES
HOME MONITORING REPORT    INR today:   Results for orders placed or performed in visit on 08/06/20   Protime-INR   Result Value Ref Range    INR 1.70        INR Goal: 2.0-3.0    Dosing Plan  As of 8/6/2020    TTR:   68.4 % (2 mo)   Full warfarin instructions:   8/6: 5 mg; Otherwise 5 mg every Tue, Sat; 2.5 mg all other days              PLAN: 5mg tonight, then resume current dose and recheck in one week-instructions sent via [address]

## 2020-08-12 ENCOUNTER — TELEPHONE (OUTPATIENT)
Dept: INTERNAL MEDICINE CLINIC | Age: 85
End: 2020-08-12

## 2020-08-12 NOTE — TELEPHONE ENCOUNTER
[address] [facility] 9 PT re eval done and per PT pt is   improving overall, walking more,   continues to remain slightly weaker in legs. but improving.   recommend  continue  therapy 3 more weeks     Please advise if approved

## 2020-08-14 LAB — INR BLD: 2.2

## 2020-08-17 ENCOUNTER — ANTI-COAG VISIT (OUTPATIENT)
Dept: FAMILY MEDICINE CLINIC | Age: 85
End: 2020-08-17
Payer: MEDICARE

## 2020-08-17 PROCEDURE — 93793 ANTICOAG MGMT PT WARFARIN: CPT | Performed by: FAMILY MEDICINE

## 2020-08-19 ENCOUNTER — ANTI-COAG VISIT (OUTPATIENT)
Dept: FAMILY MEDICINE CLINIC | Age: 85
End: 2020-08-19
Payer: MEDICARE

## 2020-08-19 LAB — INR BLD: 2.4

## 2020-08-19 PROCEDURE — 93793 ANTICOAG MGMT PT WARFARIN: CPT | Performed by: FAMILY MEDICINE

## 2020-08-24 RX ORDER — WARFARIN SODIUM 5 MG/1
5 TABLET ORAL DAILY
Qty: 30 TABLET | Refills: 5 | Status: SHIPPED | OUTPATIENT
Start: 2020-08-24 | End: 2020-10-12 | Stop reason: SDUPTHER

## 2020-08-24 NOTE — TELEPHONE ENCOUNTER
David Funes called to request a refill on her medication.       Last office visit : 7/10/2020   Next office visit : 10/12/2020     Requested Prescriptions     Pending Prescriptions Disp Refills    warfarin (COUMADIN) 5 MG tablet 30 tablet 5     Sig: Take 1 tablet by mouth daily            Mechelle Parry

## 2020-08-24 NOTE — PROGRESS NOTES
HOME MONITORING REPORT    INR today:   Results for orders placed or performed in visit on 08/19/20   Protime-INR   Result Value Ref Range    INR 2.40        INR Goal: 2.0-3.0    Dosing Plan  As of 8/19/2020    TTR:   --   Full warfarin instructions:   5 mg every Tue, Sat; 2.5 mg all other days              PLAN:  CONTINUE CURRENT DOSE AND RECHECK IN ONE WEEK.

## 2020-08-27 ENCOUNTER — ANTI-COAG VISIT (OUTPATIENT)
Dept: INTERNAL MEDICINE | Age: 85
End: 2020-08-27
Payer: MEDICARE

## 2020-08-27 LAB — INR BLD: 2.1

## 2020-08-27 PROCEDURE — 93793 ANTICOAG MGMT PT WARFARIN: CPT | Performed by: FAMILY MEDICINE

## 2020-08-27 NOTE — PROGRESS NOTES
HOME MONITORING REPORT    INR today:   Results for orders placed or performed in visit on 08/27/20   Protime-INR   Result Value Ref Range    INR 2.10        INR Goal: 2.0-3.0    Dosing Plan  As of 8/27/2020    TTR:   70.3 % (2.8 mo)   Full warfarin instructions:   5 mg every Tue, Sat; 2.5 mg all other days              PLAN:PATIENT NOTIFIED TO  CONTINUE CURRENT DOSE AND RECHECK IN ONE WEEK.

## 2020-09-02 ENCOUNTER — ANTI-COAG VISIT (OUTPATIENT)
Dept: PRIMARY CARE CLINIC | Age: 85
End: 2020-09-02
Payer: MEDICARE

## 2020-09-02 ENCOUNTER — PATIENT MESSAGE (OUTPATIENT)
Dept: FAMILY MEDICINE CLINIC | Age: 85
End: 2020-09-02

## 2020-09-02 LAB — INR BLD: 2.2

## 2020-09-02 PROCEDURE — 93793 ANTICOAG MGMT PT WARFARIN: CPT | Performed by: FAMILY MEDICINE

## 2020-09-02 NOTE — PROGRESS NOTES
HOME MONITORING REPORT    INR today:   Results for orders placed or performed in visit on 09/02/20   Protime-INR   Result Value Ref Range    INR 2.20        INR Goal: 2.0-3.0    Dosing Plan  As of 9/2/2020    TTR:   72.3 % (3 mo)   Full warfarin instructions:   5 mg every Tue, Sat; 2.5 mg all other days              PLAN:PATIENT NOTIFIED TO  CONTINUE CURRENT DOSE AND RECHECK IN ONE WEEK.

## 2020-09-09 ENCOUNTER — ANTI-COAG VISIT (OUTPATIENT)
Dept: FAMILY MEDICINE CLINIC | Age: 85
End: 2020-09-09
Payer: MEDICARE

## 2020-09-09 LAB — INR BLD: 2

## 2020-09-09 PROCEDURE — 93793 ANTICOAG MGMT PT WARFARIN: CPT | Performed by: FAMILY MEDICINE

## 2020-09-09 NOTE — PROGRESS NOTES
HOME MONITORING REPORT    INR today:   Results for orders placed or performed in visit on 09/09/20   Protime-INR   Result Value Ref Range    INR 2.00        INR Goal: 2.0-3.0    Dosing Plan  As of 9/9/2020    TTR:   74.3 % (3.2 mo)   Full warfarin instructions:   5 mg every Tue, Sat; 2.5 mg all other days              PLAN:PATIENT NOTIFIED TO  CONTINUE CURRENT DOSE AND RECHECK IN ONE WEEK.

## 2020-09-16 ENCOUNTER — OFFICE VISIT (OUTPATIENT)
Dept: FAMILY MEDICINE CLINIC | Age: 85
End: 2020-09-16
Payer: MEDICARE

## 2020-09-16 VITALS
HEART RATE: 70 BPM | HEIGHT: 68 IN | BODY MASS INDEX: 30.16 KG/M2 | TEMPERATURE: 97.6 F | OXYGEN SATURATION: 96 % | WEIGHT: 199 LBS | DIASTOLIC BLOOD PRESSURE: 84 MMHG | RESPIRATION RATE: 18 BRPM | SYSTOLIC BLOOD PRESSURE: 152 MMHG

## 2020-09-16 PROCEDURE — 1090F PRES/ABSN URINE INCON ASSESS: CPT | Performed by: NURSE PRACTITIONER

## 2020-09-16 PROCEDURE — 99214 OFFICE O/P EST MOD 30 MIN: CPT | Performed by: NURSE PRACTITIONER

## 2020-09-16 PROCEDURE — G8427 DOCREV CUR MEDS BY ELIG CLIN: HCPCS | Performed by: NURSE PRACTITIONER

## 2020-09-16 PROCEDURE — G0008 ADMIN INFLUENZA VIRUS VAC: HCPCS | Performed by: NURSE PRACTITIONER

## 2020-09-16 PROCEDURE — 90694 VACC AIIV4 NO PRSRV 0.5ML IM: CPT | Performed by: NURSE PRACTITIONER

## 2020-09-16 PROCEDURE — G8417 CALC BMI ABV UP PARAM F/U: HCPCS | Performed by: NURSE PRACTITIONER

## 2020-09-16 PROCEDURE — 4040F PNEUMOC VAC/ADMIN/RCVD: CPT | Performed by: NURSE PRACTITIONER

## 2020-09-16 PROCEDURE — 1036F TOBACCO NON-USER: CPT | Performed by: NURSE PRACTITIONER

## 2020-09-16 PROCEDURE — 1123F ACP DISCUSS/DSCN MKR DOCD: CPT | Performed by: NURSE PRACTITIONER

## 2020-09-16 RX ORDER — HYDROCHLOROTHIAZIDE 12.5 MG/1
12.5 CAPSULE, GELATIN COATED ORAL EVERY MORNING
Qty: 30 CAPSULE | Refills: 5 | Status: SHIPPED | OUTPATIENT
Start: 2020-09-16 | End: 2020-10-12 | Stop reason: SDUPTHER

## 2020-09-16 ASSESSMENT — ENCOUNTER SYMPTOMS
COUGH: 0
SORE THROAT: 0
WHEEZING: 0
SHORTNESS OF BREATH: 0
CHEST TIGHTNESS: 0
NAUSEA: 0
ABDOMINAL PAIN: 0
DIARRHEA: 0

## 2020-09-16 NOTE — PROGRESS NOTES
Current Outpatient Medications   Medication Sig Dispense Refill    hydroCHLOROthiazide (MICROZIDE) 12.5 MG capsule Take 1 capsule by mouth every morning 30 capsule 5    warfarin (COUMADIN) 5 MG tablet Take 1 tablet by mouth daily 30 tablet 5    memantine (NAMENDA) 10 MG tablet TAKE 1 TABLET BY MOUTH TWICE A DAY 60 tablet 2    nitrofurantoin (MACRODANTIN) 100 MG capsule Take 1 capsule by mouth nightly 90 capsule 3    pantoprazole (PROTONIX) 20 MG tablet Take 1 tablet by mouth daily 30 tablet 3    amLODIPine (NORVASC) 5 MG tablet Take 1 tablet by mouth daily 90 tablet 3    phenytoin (DILANTIN) 100 MG ER capsule Take 2 capsules by mouth 2 times daily 180 capsule 3    sertraline (ZOLOFT) 100 MG tablet Take 1 tablet by mouth daily 90 tablet 3    levothyroxine (SYNTHROID) 175 MCG tablet Take 1 tablet by mouth Daily 90 tablet 3    metoprolol succinate (TOPROL XL) 50 MG extended release tablet Take 1 tablet by mouth daily 90 tablet 3     No current facility-administered medications for this visit.         Allergies   Allergen Reactions    Morphine      Pt becomes mean (refusing, cursing)         Past Surgical History:   Procedure Laterality Date    APPENDECTOMY  1947    CHOLECYSTECTOMY      EYE SURGERY  2009    bilateral cataract     HYSTERECTOMY, TOTAL ABDOMINAL      JOINT REPLACEMENT      Bilateral Knee    KNEE ARTHROPLASTY Right     LOBECTOMY  10/2015    sx--bilateral    PTCA  2002    with stent placement/bms to rca       Social History     Tobacco Use    Smoking status: Never Smoker    Smokeless tobacco: Never Used   Substance Use Topics    Alcohol use: Never     Frequency: Never    Drug use: Never       Family History   Problem Relation Age of Onset    Diabetes Mother     Hypertension Mother    Olam Yasmeen Rheum Arthritis Mother     Stroke Mother     Tuberculosis Father        BP (!) 152/84   Pulse 70   Temp 97.6 °F (36.4 °C) (Temporal)   Resp 18   Ht 5' 8\" (1.727 m)   Wt 199 lb (90.3 kg) SpO2 96%   BMI 30.26 kg/m²     Physical Exam  Vitals signs reviewed. Constitutional:       General: She is not in acute distress. Appearance: Normal appearance. She is well-developed. HENT:      Head: Normocephalic. Eyes:      Conjunctiva/sclera: Conjunctivae normal.      Pupils: Pupils are equal, round, and reactive to light. Neck:      Musculoskeletal: Normal range of motion and neck supple. Thyroid: No thyromegaly. Vascular: No carotid bruit or JVD. Trachea: No tracheal deviation. Cardiovascular:      Rate and Rhythm: Normal rate and regular rhythm. Heart sounds: Normal heart sounds. No murmur. Pulmonary:      Effort: Pulmonary effort is normal. No respiratory distress. Breath sounds: Normal breath sounds. No wheezing or rhonchi. Musculoskeletal: Normal range of motion. General: Swelling (moderate swelling bilat lower legs) present. Lymphadenopathy:      Cervical: No cervical adenopathy. Skin:     General: Skin is warm and dry. Findings: Lesion (slightly raised lesion L calf, brown with some color variation, scaly. approx 4-5 cm) present. No rash. Neurological:      Mental Status: She is alert. Psychiatric:         Mood and Affect: Mood normal.         Behavior: Behavior normal.         Thought Content: Thought content normal.         ASSESSMENT/PLAN:  1. Edema of both lower extremities  -Likely multifactorial due to past DVT as well as inactivity, venous insufficiency. Possibly amlodipine contributing as well. Discussed this with pt.    -Will add hctz 12.5 mg daily to see if this helps. Also bp is suboptimal today.  -Check renal function next month prior to appt with Dr. Clemons.  -Advised compression stockings (has at home) and keep legs elevated above heart level. 2. Deep vein thrombosis (DVT) of distal vein of left lower extremity, unspecified chronicity (HCC)  -Continue coumadin    3.  Acquired hypothyroidism  -Stable, continue

## 2020-09-16 NOTE — PROGRESS NOTES
Immunizations Administered     Name Date Dose Route    Influenza, Quadv, adjuvanted, 65 yrs +, IM, PF (Fluad) 9/16/2020 0.5 mL Intramuscular    Site: Deltoid- Left    Lot: [id number]    NDC: 82157-923-90

## 2020-09-17 ENCOUNTER — ANTI-COAG VISIT (OUTPATIENT)
Dept: INTERNAL MEDICINE | Age: 85
End: 2020-09-17
Payer: MEDICARE

## 2020-09-17 LAB — INR BLD: 2.2

## 2020-09-17 PROCEDURE — 93793 ANTICOAG MGMT PT WARFARIN: CPT | Performed by: FAMILY MEDICINE

## 2020-09-17 NOTE — PROGRESS NOTES
HOME MONITORING REPORT    INR today:   Results for orders placed or performed in visit on 09/17/20   Protime-INR   Result Value Ref Range    INR 2.20        INR Goal: 2.0-3.0    Dosing Plan  As of 9/17/2020    TTR:   76.3 % (3.5 mo)   Full warfarin instructions:   5 mg every Tue, Sat; 2.5 mg all other days              PLAN: Advised patient/caregiver to continue current dose and recheck in one week.   Patient/Caregiver voiced understanding

## 2020-09-23 ENCOUNTER — ANTI-COAG VISIT (OUTPATIENT)
Dept: FAMILY MEDICINE CLINIC | Age: 85
End: 2020-09-23
Payer: MEDICARE

## 2020-09-23 LAB — INR BLD: 2.3

## 2020-09-23 PROCEDURE — 93793 ANTICOAG MGMT PT WARFARIN: CPT | Performed by: FAMILY MEDICINE

## 2020-09-28 ENCOUNTER — OFFICE VISIT (OUTPATIENT)
Dept: NEUROSURGERY | Age: 85
End: 2020-09-28
Payer: MEDICARE

## 2020-09-28 VITALS
HEIGHT: 68 IN | SYSTOLIC BLOOD PRESSURE: 142 MMHG | HEART RATE: 69 BPM | WEIGHT: 199 LBS | TEMPERATURE: 98.6 F | BODY MASS INDEX: 30.16 KG/M2 | DIASTOLIC BLOOD PRESSURE: 86 MMHG

## 2020-09-28 DIAGNOSIS — R41.3 MEMORY LOSS: ICD-10-CM

## 2020-09-28 DIAGNOSIS — R56.9 SEIZURE (HCC): ICD-10-CM

## 2020-09-28 LAB
ALBUMIN SERPL-MCNC: 4.1 G/DL (ref 3.5–5.2)
ALP BLD-CCNC: 100 U/L (ref 35–104)
ALT SERPL-CCNC: 19 U/L (ref 5–33)
ANION GAP SERPL CALCULATED.3IONS-SCNC: 13 MMOL/L (ref 7–19)
AST SERPL-CCNC: 19 U/L (ref 5–32)
BASOPHILS ABSOLUTE: 0 K/UL (ref 0–0.2)
BASOPHILS RELATIVE PERCENT: 0.3 % (ref 0–1)
BILIRUB SERPL-MCNC: 0.4 MG/DL (ref 0.2–1.2)
BUN BLDV-MCNC: 17 MG/DL (ref 8–23)
CALCIUM SERPL-MCNC: 8.4 MG/DL (ref 8.8–10.2)
CHLORIDE BLD-SCNC: 101 MMOL/L (ref 98–111)
CO2: 27 MMOL/L (ref 22–29)
CREAT SERPL-MCNC: 0.6 MG/DL (ref 0.5–0.9)
EOSINOPHILS ABSOLUTE: 0.1 K/UL (ref 0–0.6)
EOSINOPHILS RELATIVE PERCENT: 1 % (ref 0–5)
GFR AFRICAN AMERICAN: >59
GFR NON-AFRICAN AMERICAN: >60
GLUCOSE BLD-MCNC: 92 MG/DL (ref 74–109)
HCT VFR BLD CALC: 48.9 % (ref 37–47)
HEMOGLOBIN: 16.1 G/DL (ref 12–16)
IMMATURE GRANULOCYTES #: 0 K/UL
LYMPHOCYTES ABSOLUTE: 2 K/UL (ref 1.1–4.5)
LYMPHOCYTES RELATIVE PERCENT: 27.1 % (ref 20–40)
MCH RBC QN AUTO: 31.1 PG (ref 27–31)
MCHC RBC AUTO-ENTMCNC: 32.9 G/DL (ref 33–37)
MCV RBC AUTO: 94.4 FL (ref 81–99)
MONOCYTES ABSOLUTE: 0.6 K/UL (ref 0–0.9)
MONOCYTES RELATIVE PERCENT: 8.6 % (ref 0–10)
NEUTROPHILS ABSOLUTE: 4.5 K/UL (ref 1.5–7.5)
NEUTROPHILS RELATIVE PERCENT: 62.4 % (ref 50–65)
PDW BLD-RTO: 13.6 % (ref 11.5–14.5)
PLATELET # BLD: 269 K/UL (ref 130–400)
PMV BLD AUTO: 10.2 FL (ref 9.4–12.3)
POTASSIUM SERPL-SCNC: 3.7 MMOL/L (ref 3.5–5)
RBC # BLD: 5.18 M/UL (ref 4.2–5.4)
SODIUM BLD-SCNC: 141 MMOL/L (ref 136–145)
TOTAL PROTEIN: 7.3 G/DL (ref 6.6–8.7)
VITAMIN B-12: 284 PG/ML (ref 211–946)
WBC # BLD: 7.2 K/UL (ref 4.8–10.8)

## 2020-09-28 PROCEDURE — G8417 CALC BMI ABV UP PARAM F/U: HCPCS | Performed by: NURSE PRACTITIONER

## 2020-09-28 PROCEDURE — 1090F PRES/ABSN URINE INCON ASSESS: CPT | Performed by: NURSE PRACTITIONER

## 2020-09-28 PROCEDURE — 1036F TOBACCO NON-USER: CPT | Performed by: NURSE PRACTITIONER

## 2020-09-28 PROCEDURE — 4040F PNEUMOC VAC/ADMIN/RCVD: CPT | Performed by: NURSE PRACTITIONER

## 2020-09-28 PROCEDURE — 1123F ACP DISCUSS/DSCN MKR DOCD: CPT | Performed by: NURSE PRACTITIONER

## 2020-09-28 PROCEDURE — G8427 DOCREV CUR MEDS BY ELIG CLIN: HCPCS | Performed by: NURSE PRACTITIONER

## 2020-09-28 PROCEDURE — 99213 OFFICE O/P EST LOW 20 MIN: CPT | Performed by: NURSE PRACTITIONER

## 2020-09-28 NOTE — PROGRESS NOTES
REVIEW OF SYSTEMS    Constitutional: []Fever []Sweat []Chills [] Recent Injury [x] Denies all unless marked  HEENT:[]Headache  [x] Head Injury/Hearing Loss  [] Sore Throat  [] Ear Ache/Dizziness  [x] Denies all unless marked  Spine:  [] Neck pain  [] Back pain  [] Sciaticia  [x] Denies all unless marked  Cardiovascular:[]Heart Disease []Chest Pain [] Palpitations  [x] Denies all unless marked  Pulmonary: []Shortness of Breath [x]Cough   [x] Denies all unless marke  Gastrointestinal: []Nausea  []Vomiting  []Abdominal Pain  []Constipation  []Diarrhea  []Dark Bloody Stools  [x] Denies all unless marked  Psychiatric/Behavioral:[] Depression [] Anxiety [x] Denies all unless marked  Genitourinary:   [] Frequency  [] Urgency  [] Incontinence [] Pain with Urination  [x] Denies all unless marked  Extremities: []Pain  []Swelling  [x] Denies all unless marked  Musculoskeletal: [] Muscle Pain  [] Joint Pain  [x] Arthritis [] Muscle Cramps [] Muscle Twitches  [x] Denies all unless marked  Sleep: [] Insomnia [] Snoring [] Restless Legs [] Sleep Apnea  [x] Daytime Sleepiness  [x] Denies all unless marked  Skin:[] Rash [] Skin Discoloration [x] Denies all unless marked   Neurological: [x]Visual Disturbance/Memory Loss [] Loss of Balance [] Slurred Speech/Weakness [] Seizures  [] Vertigo/Dizziness [x] Denies all unless marked

## 2020-09-28 NOTE — PROGRESS NOTES
[facility] Office Note      Patient:   David Funes  MR#:    [id number]  Account Number:                         YOB: 1931  Date of Evaluation:  9/28/2020  Time of Note:                          9:01 PM  Primary/Referring Physician:  David Hall MD   Consulting Physician:  VIN Palmer    FOLLOW UP    Chief Complaint   Patient presents with    Follow-up     3mo    Seizures     Pt reports no more events       HISTORY OF PRESENT ILLNESS    David Funes is a 80y.o. year old female here for follow up. She was started on Namenda at last visit, no clear improvement. She doesn't want to take this anymore. Primarily short term memory loss. Some word recall difficulty noted, has noted this since her stroke. Noting repetition of stories and questions. Appetite is good, sleeping good. Memory loss is not interfering with ADLs. Some issues with medications, family is handling these now. She lives with her niece. Denies further seizures. Prior events described as suddenly falling with LOC. No tongue biting or loss of bladder. No clear warning prior to falling. With one fall she did suffer a skull fracture per family. No recent falls noted. Taking Dilantin 200mg BID. On Coumadin for DVT/PE, PCP is following INRs.       Past Medical History:   Diagnosis Date    Arthritis     Back pain     bulging disc    CAD (coronary artery disease)     BMS to RCA    Cerebral artery occlusion with cerebral infarction (HCC)     small strokes,balance issues    Cystitis     Depression     Dizziness     Dysuria     Edema     ankles,mild pedal edema    Falls frequently     H/O blood clots     left leg and lungs    Head injury     Headache     Heartburn     [place] (hard of hearing)     Hyperlipidemia     Hypertension     Hypothyroidism     Hypothyroidism     Memory problem     Neuropathy     Shingles     TIA (transient ischemic attack)     Urinary incontinence     UTI (urinary tract infection)        Past Surgical History:   Procedure Laterality Date    APPENDECTOMY  1947    CHOLECYSTECTOMY      EYE SURGERY  2009    bilateral cataract     HYSTERECTOMY, TOTAL ABDOMINAL      JOINT REPLACEMENT      Bilateral Knee    KNEE ARTHROPLASTY Right     LOBECTOMY  10/2015    sx--bilateral    PTCA  2002    with stent placement/bms to rca       Family History   Problem Relation Age of Onset    Diabetes Mother     Hypertension Mother    Ronaldo Oshea Rheum Arthritis Mother     Stroke Mother     Tuberculosis Father        Social History     Socioeconomic History    Marital status:       Spouse name: Not on file    Number of children: Not on file    Years of education: Not on file    Highest education level: Not on file   Occupational History    Not on file   Social Needs    Financial resource strain: Not on file    Food insecurity     Worry: Not on file     Inability: Not on file    Transportation needs     Medical: Not on file     Non-medical: Not on file   Tobacco Use    Smoking status: Never Smoker    Smokeless tobacco: Never Used   Substance and Sexual Activity    Alcohol use: Never     Frequency: Never    Drug use: Never    Sexual activity: Not on file   Lifestyle    Physical activity     Days per week: Not on file     Minutes per session: Not on file    Stress: Not on file   Relationships    Social connections     Talks on phone: Not on file     Gets together: Not on file     Attends Christianity service: Not on file     Active member of club or organization: Not on file     Attends meetings of clubs or organizations: Not on file     Relationship status: Not on file    Intimate partner violence     Fear of current or ex partner: Not on file     Emotionally abused: Not on file     Physically abused: Not on file     Forced sexual activity: Not on file   Other Topics Concern    Not on file   Social History Narrative    Not on file       Current Outpatient Medications   Medication Sig Dispense Refill    hydroCHLOROthiazide (MICROZIDE) 12.5 MG capsule Take 1 capsule by mouth every morning 30 capsule 5    warfarin (COUMADIN) 5 MG tablet Take 1 tablet by mouth daily 30 tablet 5    memantine (NAMENDA) 10 MG tablet TAKE 1 TABLET BY MOUTH TWICE A DAY 60 tablet 2    nitrofurantoin (MACRODANTIN) 100 MG capsule Take 1 capsule by mouth nightly 90 capsule 3    pantoprazole (PROTONIX) 20 MG tablet Take 1 tablet by mouth daily 30 tablet 3    amLODIPine (NORVASC) 5 MG tablet Take 1 tablet by mouth daily 90 tablet 3    phenytoin (DILANTIN) 100 MG ER capsule Take 2 capsules by mouth 2 times daily 180 capsule 3    sertraline (ZOLOFT) 100 MG tablet Take 1 tablet by mouth daily 90 tablet 3    levothyroxine (SYNTHROID) 175 MCG tablet Take 1 tablet by mouth Daily 90 tablet 3    metoprolol succinate (TOPROL XL) 50 MG extended release tablet Take 1 tablet by mouth daily 90 tablet 3     No current facility-administered medications for this visit. Allergies   Allergen Reactions    Morphine      Pt becomes mean (refusing, cursing)         REVIEW OF SYSTEMS  Constitutional: []? Fever []? Sweat []? Chills []? Recent Injury [x]? Denies all unless marked  HEENT:[]? Headache  [x]? Head Injury/Hearing Loss  []? Sore Throat  []? Ear Ache/Dizziness  [x]? Denies all unless marked  Spine:  []? Neck pain  []? Back pain  []? Sciaticia  [x]? Denies all unless marked  Cardiovascular:[]? Heart Disease []? Chest Pain []? Palpitations  [x]? Denies all unless marked  Pulmonary: []? Shortness of Breath [x]? Cough   [x]? Denies all unless marke  Gastrointestinal: []? Nausea  []? Vomiting  []? Abdominal Pain  []? Constipation  []? Diarrhea  []? Dark Bloody Stools  [x]? Denies all unless marked  Psychiatric/Behavioral:[]? Depression []? Anxiety [x]? Denies all unless marked  Genitourinary:   []? Frequency  []? Urgency  []? Incontinence []? Pain with Urination  [x]? Denies all unless marked  Extremities: []? Pain  []? Swelling  [x]?  Denies bilaterally  [x]Shoulder shrug and SCM testing normal bilaterally  COMMENTS:   Motor   [x]5/5 strength x 4 extremities  [x]Normal bulk and tone  [x]No tremor present  [x]No rigidity or bradykinesia noted  COMMENTS:    Sensory  [x]Sensation intact to light touch, pin prick, vibration, and proprioception BLE  [x]Sensation intact to light touch, pin prick, vibration, and proprioception BUE  COMMENTS:   Coordination [x]FTN normal bilaterally   [x]HTS normal bilaterally  [x]HIRA normal bilaterally. COMMENTS:   Reflexes  []Symmetric and non-pathological  [x]Toes down going bilaterally  [x]No clonus present  COMMENTS: hypoactive throughout    Gait                  []Normal steady gait    []Ataxic    []Spastic     []Magnetic     []Shuffling  COMMENTS: unsteady, with walker        LABS RECORD AND IMAGING REVIEW (As below and per HPI)    Lab Results   Component Value Date    [id number] 284 09/28/2020     Lab Results   Component Value Date    WBC 7.2 09/28/2020    HGB 16.1 (H) 09/28/2020    HCT 48.9 (H) 09/28/2020    MCV 94.4 09/28/2020     09/28/2020     Lab Results   Component Value Date     09/28/2020    K 3.7 09/28/2020     09/28/2020    CO2 27 09/28/2020    BUN 17 09/28/2020    CREATININE 0.6 09/28/2020    GLUCOSE 92 09/28/2020    CALCIUM 8.4 (L) 09/28/2020    PROT 7.3 09/28/2020    LABALBU 4.1 09/28/2020    BILITOT 0.4 09/28/2020    ALKPHOS 100 09/28/2020    AST 19 09/28/2020    ALT 19 09/28/2020    LABGLOM >60 09/28/2020    GFRAA >59 09/28/2020     Lab Results   Component Value Date    CHOL 166 10/04/2019    TRIG 131 10/04/2019    HDL 58 (L) 10/04/2019    LDLCALC 82 10/04/2019     Lab Results   Component Value Date    TSH 3.220 10/04/2019    T4FREE 1.1 10/04/2019     Lab Results   Component Value Date    CRP 0.74 (H) 10/04/2019    SEDRATE 10 10/04/2019      MRI brain (3/2020)-   Impression    1. Chronic small bilateral cerebellar infarcts. Chronic cortical and    subcortical left occipital infarct. 2. Small areas of chronic hemorrhage in the left thalamus. 3. Moderate to severe atrophy with associated ventricular prominence. 4. T2 high signal within the hemispheric white matter is nonspecific    and most likely due to chronic small vessel disease. Signed by Dr Niranjan Garber on 3/4/2020 3:07 PM      EEG at OSH in 2019 was normal     Reviewed referral records     ASSESSMENT:    Jose Enrique Del Valle is a 80y.o. year old female for follow up of seizures, memory loss. Doing about the same from last visit. No breakthrough events noted. EEG pending at this time, did not complete related to COVID 19, will plan to re-try this today. Prior EEG at OSH was normal per records. She is on Dilantin, will plan to recheck level today. Memory is largely unchanged, will continue Namenda. Prior history is somewhat limited related to the patient memory and niece is unsure of much of the prior history since patient moved to the area recently. ICD-10-CM    1. Seizure ([facility] [address]  R56.9 Phenytoin level, total     CBC Auto Differential     Comprehensive Metabolic Panel     EEG   2. Memory loss  R41.3 Vitamin B1, Whole Blood     Vitamin B12       PLAN:  1. Re-try EEG   2. Lab work today as listed above   3. Namenda 10mg BID. 4. Continue Dilantin 200mg BID. Discussed side effects, monitoring. 5.  Safety precautions, medication management/monitoring, no driving, increased supervision. 6. Continue Coumadin, INR monitoring through PCP  7. Epilepsy precautions and seizure first aid discussed.  No driving, heights, swimming, tub baths, open flames, or heavy machinery. 8. Return in about 4 months (around 1/28/2021) for follow up, sooner if worsening. VIN Brizuela    Note:  A total of >50% (>8 minutes) of 15 minutes was spent discussing the pathophysiology and treatment and/or coordination of care of the above diagnoses. This dictation was generated by voice recognition computer software.   Although all attempts are made to edit the dictation for accuracy, there may be errors in the transcription that are not intended.

## 2020-10-01 ENCOUNTER — ANTI-COAG VISIT (OUTPATIENT)
Dept: FAMILY MEDICINE CLINIC | Age: 85
End: 2020-10-01
Payer: MEDICARE

## 2020-10-01 LAB — INR BLD: 1.8

## 2020-10-01 PROCEDURE — 93793 ANTICOAG MGMT PT WARFARIN: CPT | Performed by: FAMILY MEDICINE

## 2020-10-02 LAB — VITAMIN B1 WHOLE BLOOD: 131 NMOL/L (ref 70–180)

## 2020-10-06 ENCOUNTER — HOSPITAL ENCOUNTER (OUTPATIENT)
Dept: NEUROLOGY | Age: 85
Discharge: HOME | End: 2020-10-06
Payer: MEDICARE

## 2020-10-06 PROCEDURE — 95816 EEG AWAKE AND DROWSY: CPT

## 2020-10-06 PROCEDURE — 95819 EEG AWAKE AND ASLEEP: CPT | Performed by: PSYCHIATRY & NEUROLOGY

## 2020-10-07 ENCOUNTER — ANTI-COAG VISIT (OUTPATIENT)
Dept: FAMILY MEDICINE CLINIC | Age: 85
End: 2020-10-07
Payer: MEDICARE

## 2020-10-07 LAB — INR BLD: 3.1

## 2020-10-07 PROCEDURE — 93793 ANTICOAG MGMT PT WARFARIN: CPT | Performed by: CLINICAL NURSE SPECIALIST

## 2020-10-07 NOTE — PROGRESS NOTES
HOME MONITORING REPORT    INR today:   Results for orders placed or performed in visit on 10/07/20   Protime-INR   Result Value Ref Range    INR 3.10        INR Goal: 2.0-3.0    Dosing Plan  As of 10/7/2020    TTR:   76.4 % (4.1 mo)   Full warfarin instructions:   10/7: Hold; Otherwise 5 mg every Tue, Sat; 2.5 mg all other days              PLAN: Advised patient/caregiver to hold tonight, then continue current dose and recheck in one week.

## 2020-10-12 ENCOUNTER — OFFICE VISIT (OUTPATIENT)
Dept: FAMILY MEDICINE CLINIC | Age: 85
End: 2020-10-12
Payer: MEDICARE

## 2020-10-12 ENCOUNTER — HOSPITAL ENCOUNTER (OUTPATIENT)
Dept: GENERAL RADIOLOGY | Age: 85
Discharge: HOME | End: 2020-10-12
Payer: MEDICARE

## 2020-10-12 VITALS
TEMPERATURE: 97.2 F | HEART RATE: 75 BPM | OXYGEN SATURATION: 95 % | DIASTOLIC BLOOD PRESSURE: 72 MMHG | SYSTOLIC BLOOD PRESSURE: 136 MMHG

## 2020-10-12 PROCEDURE — 1036F TOBACCO NON-USER: CPT | Performed by: FAMILY MEDICINE

## 2020-10-12 PROCEDURE — 99214 OFFICE O/P EST MOD 30 MIN: CPT | Performed by: FAMILY MEDICINE

## 2020-10-12 PROCEDURE — G8417 CALC BMI ABV UP PARAM F/U: HCPCS | Performed by: FAMILY MEDICINE

## 2020-10-12 PROCEDURE — 4040F PNEUMOC VAC/ADMIN/RCVD: CPT | Performed by: FAMILY MEDICINE

## 2020-10-12 PROCEDURE — 1090F PRES/ABSN URINE INCON ASSESS: CPT | Performed by: FAMILY MEDICINE

## 2020-10-12 PROCEDURE — G8484 FLU IMMUNIZE NO ADMIN: HCPCS | Performed by: FAMILY MEDICINE

## 2020-10-12 PROCEDURE — 1123F ACP DISCUSS/DSCN MKR DOCD: CPT | Performed by: FAMILY MEDICINE

## 2020-10-12 PROCEDURE — G8427 DOCREV CUR MEDS BY ELIG CLIN: HCPCS | Performed by: FAMILY MEDICINE

## 2020-10-12 PROCEDURE — 71046 X-RAY EXAM CHEST 2 VIEWS: CPT

## 2020-10-12 RX ORDER — HYDROCHLOROTHIAZIDE 12.5 MG/1
12.5 CAPSULE, GELATIN COATED ORAL EVERY MORNING
Qty: 90 CAPSULE | Refills: 3 | Status: SHIPPED | OUTPATIENT
Start: 2020-10-12 | End: 2021-03-11 | Stop reason: ALTCHOICE

## 2020-10-12 RX ORDER — METOPROLOL SUCCINATE 50 MG/1
50 TABLET, EXTENDED RELEASE ORAL DAILY
Qty: 90 TABLET | Refills: 3 | Status: SHIPPED | OUTPATIENT
Start: 2020-10-12 | End: 2021-12-13

## 2020-10-12 RX ORDER — PHENYTOIN SODIUM 100 MG/1
200 CAPSULE, EXTENDED RELEASE ORAL 2 TIMES DAILY
Qty: 180 CAPSULE | Refills: 3 | Status: SHIPPED | OUTPATIENT
Start: 2020-10-12 | End: 2021-06-29 | Stop reason: SDUPTHER

## 2020-10-12 RX ORDER — NITROFURANTOIN MACROCRYSTALS 100 MG/1
100 CAPSULE ORAL NIGHTLY
Qty: 90 CAPSULE | Refills: 3 | Status: CANCELLED | OUTPATIENT
Start: 2020-10-12

## 2020-10-12 RX ORDER — PANTOPRAZOLE SODIUM 20 MG/1
20 TABLET, DELAYED RELEASE ORAL DAILY
Qty: 90 TABLET | Refills: 3 | Status: ON HOLD | OUTPATIENT
Start: 2020-10-12 | End: 2021-04-10

## 2020-10-12 RX ORDER — MEMANTINE HYDROCHLORIDE 10 MG/1
TABLET ORAL
Qty: 60 TABLET | Refills: 2 | Status: SHIPPED | OUTPATIENT
Start: 2020-10-12 | End: 2020-10-12 | Stop reason: ALTCHOICE

## 2020-10-12 RX ORDER — AMLODIPINE BESYLATE 5 MG/1
5 TABLET ORAL DAILY
Qty: 90 TABLET | Refills: 3 | Status: SHIPPED | OUTPATIENT
Start: 2020-10-12 | End: 2021-12-13

## 2020-10-12 RX ORDER — SERTRALINE HYDROCHLORIDE 100 MG/1
100 TABLET, FILM COATED ORAL DAILY
Qty: 90 TABLET | Refills: 3 | Status: SHIPPED | OUTPATIENT
Start: 2020-10-12 | End: 2021-12-23

## 2020-10-12 RX ORDER — LEVOTHYROXINE SODIUM 175 UG/1
175 TABLET ORAL DAILY
Qty: 90 TABLET | Refills: 3 | Status: SHIPPED | OUTPATIENT
Start: 2020-10-12 | End: 2021-12-23

## 2020-10-12 RX ORDER — WARFARIN SODIUM 5 MG/1
5 TABLET ORAL DAILY
Qty: 90 TABLET | Refills: 3 | Status: ON HOLD | OUTPATIENT
Start: 2020-10-12 | End: 2021-08-19 | Stop reason: SDUPTHER

## 2020-10-12 NOTE — PROCEDURES
drowsy, and sleep states. CLINICAL CORRELATION:     The absence of epileptiform abnormalities does not preclude a clinical diagnosis of seizures. Breach rhythm is associated with an overtly skull defect usually from prior surgery or skull fracture and is not epileptiform, correlate clinically.        Mellisa Pickens DO  Board Certified Neurologist      Date reported: 10/12/2020  Date signed: 10/12/2020

## 2020-10-12 NOTE — TELEPHONE ENCOUNTER
Requested Prescriptions     Pending Prescriptions Disp Refills    memantine (NAMENDA) 10 MG tablet [Pharmacy Med Name: MEMANTINE HCL 10 MG TABLET] 60 tablet 2     Sig: TAKE 1 TABLET BY MOUTH TWICE A DAY       Last Office Visit: 9/28/2020  Next Office Visit: 2/1/2021  Last Medication Refill: 7/20/20 with 2 refills

## 2020-10-12 NOTE — PATIENT INSTRUCTIONS
We are committed to providing you with the best care possible. In order to help us achieve these goals please remember to bring all medications, herbal products, and over the counter supplements with you to each visit. If your provider has ordered testing for you, please be sure to follow up with our office if you have not received results within 7 days after the testing took place. *If you receive a survey after visiting one of our offices, please take time to share your experience concerning your physician office visit. These surveys are confidential and no health information about you is shared.   We are eager to improve for you and we are counting on your feedback to help make that happen.      _______________________________________________________________    Calcium 1200 mg once a day  Vitamin D 800 units once a day

## 2020-10-12 NOTE — PROGRESS NOTES
[facility] PHYSICIAN SERVICES  [facility]  [address] [address]  Dept: [phone]  Dept Fax: [phone]: [phone]    Salty Barragan is a 80 y.o. female who presents today for her medical conditions/complaints as noted below. Salty Barragan is here for 3 Month Follow-Up and Hand Pain        HPI:   CC: Here today to discuss the following:    Hypertension  Compliant with medications. No adverse effects from medication. No lightheadedness, palpitations, or chest pain. Hypothyroidism  Symptoms are stable. No temperature intolerance, fatigue, or mood disturbance from baseline. Complaint with current medication. She has a history of seizure disorder. She remains on Dilantin. She has been evaluated by neurology on October 6. EEG was normal.     She remains on Coumadin for history of DVT and pulmonary embolism. Gastroesophageal Reflux Disease  Symptoms currently under control. Medication adequately controls his symptoms. No hematochezia or melena. Is been having a chronic cough. No fever chills. Cough is productive of thin clear sputum. No chest pain or palpitations. No wheezing. She is having some postnasal drainage    Depression with Anxiety  Compliant with medications. No adverse effects from medication. Depression and anxiety symptoms are stable today. No suicidal or homicidal ideation. Excessive worry, insomnia, and anxiousness are stable. Energy, concentration, and apathy are stable.       HPI    Past Medical History:   Diagnosis Date    Arthritis     Back pain     bulging disc    CAD (coronary artery disease)     BMS to RCA    Cerebral artery occlusion with cerebral infarction (HCC)     small strokes,balance issues    Cystitis     Depression     Dizziness     Dysuria     Edema     ankles,mild pedal edema    Falls frequently     H/O blood clots     left leg and lungs    Head injury     Headache     Heartburn     [facility]/18/1950    Shingles Vaccine (1 of 2) 08/18/1981    Pneumococcal 65+ years Vaccine (1 of 1 - PPSV23) 08/18/1996    TSH testing  10/04/2020    Annual Wellness Visit (AWV)  12/02/2020    Potassium monitoring  09/28/2021    Creatinine monitoring  09/28/2021    Flu vaccine  Completed    Hepatitis A vaccine  Aged Out    Hepatitis B vaccine  Aged Out    Hib vaccine  Aged Out    Meningococcal (ACWY) vaccine  Aged Out       Subjective:      Review of Systems  Review of Systems   Constitutional: Negative for chills and fever. HENT: Negative for congestion. Respiratory: Negative for chest tightness and shortness of breath. Cardiovascular: Negative for chest pain, palpitations and leg swelling. Gastrointestinal: Negative for abdominal pain, anal bleeding, constipation, diarrhea and nausea. Genitourinary: Negative for difficulty urinating. Psychiatric/Behavioral: Negative. SeeHPI for visit specific review of symptoms. All others negative      Objective:   /72   Pulse 75   Temp 97.2 °F (36.2 °C)   SpO2 95%   Physical Exam  Physical Exam   Constitutional: She appears well-developed. Does not appear ill. Eyes: Pupils are equal, round, and reactive to light. Conjunctiva and Lids normal.  Neck: Normal range of motion. Neck supple. No masses. Neck Symmetric. Normal tracheal position. No thyroid enlargement  Cardiovascular: Normal rate and regular rhythm. Exam reveals no friction rub. Carotid arteries: no bruit observed. No murmur heard. Respiratory:  Effort normal and breath sounds normal. No respiratory distress. No wheezes. No rales. No use of accessory muscles or intercostal retractions. Abdominal: Soft. Bowel sounds are normal. exhibits no distension. There is no tenderness. There is no rebound and no guarding. Musculoskeletal: exhibits no edema. Normal gait. Neurological: alert. Psychiatric: normal mood and affect.  Her behavior is normal. Normal judgement and insight observed.       Recent Results (from the past 672 hour(s))   Protime-INR    Collection Time: 09/17/20 12:00 AM   Result Value Ref Range    INR 2.20    Protime-INR    Collection Time: 09/23/20 12:00 AM   Result Value Ref Range    INR 2.30    Vitamin B12    Collection Time: 09/28/20  2:54 PM   Result Value Ref Range    Vitamin B-12 284 211 - 946 pg/mL   Vitamin B1, Whole Blood    Collection Time: 09/28/20  2:54 PM   Result Value Ref Range    Vitamin B1,Whole Blood 131 70 - 180 nmol/L   Comprehensive Metabolic Panel    Collection Time: 09/28/20  2:54 PM   Result Value Ref Range    Sodium 141 136 - 145 mmol/L    Potassium 3.7 3.5 - 5.0 mmol/L    Chloride 101 98 - 111 mmol/L    CO2 27 22 - 29 mmol/L    Anion Gap 13 7 - 19 mmol/L    Glucose 92 74 - 109 mg/dL    BUN 17 8 - 23 mg/dL    CREATININE 0.6 0.5 - 0.9 mg/dL    GFR Non-African American >60 >60    GFR African American >59 >59    Calcium 8.4 (L) 8.8 - 10.2 mg/dL    Total Protein 7.3 6.6 - 8.7 g/dL    Alb 4.1 3.5 - 5.2 g/dL    Total Bilirubin 0.4 0.2 - 1.2 mg/dL    Alkaline Phosphatase 100 35 - 104 U/L    ALT 19 5 - 33 U/L    AST 19 5 - 32 U/L   CBC Auto Differential    Collection Time: 09/28/20  2:54 PM   Result Value Ref Range    WBC 7.2 4.8 - 10.8 K/uL    RBC 5.18 4.20 - 5.40 M/uL    Hemoglobin 16.1 (H) 12.0 - 16.0 g/dL    Hematocrit 48.9 (H) 37.0 - 47.0 %    MCV 94.4 81.0 - 99.0 fL    MCH 31.1 (H) 27.0 - 31.0 pg    MCHC 32.9 (L) 33.0 - 37.0 g/dL    RDW 13.6 11.5 - 14.5 %    Platelets 192 014 - 335 K/uL    MPV 10.2 9.4 - 12.3 fL    Neutrophils % 62.4 50.0 - 65.0 %    Lymphocytes % 27.1 20.0 - 40.0 %    Monocytes % 8.6 0.0 - 10.0 %    Eosinophils % 1.0 0.0 - 5.0 %    Basophils % 0.3 0.0 - 1.0 %    Neutrophils Absolute 4.5 1.5 - 7.5 K/uL    Immature Granulocytes # 0.0 K/uL    Lymphocytes Absolute 2.0 1.1 - 4.5 K/uL    Monocytes Absolute 0.60 0.00 - 0.90 K/uL    Eosinophils Absolute 0.10 0.00 - 0.60 K/uL    Basophils Absolute 0.00 0.00 - 0.20 K/uL   Protime-INR Collection Time: 10/01/20 12:00 AM   Result Value Ref Range    INR 1.8    Protime-INR    Collection Time: 10/07/20 12:00 AM   Result Value Ref Range    INR 3.10                Assessment & Plan: The following diagnoses and conditions are stable with no further orders unless indicated:  1. Essential hypertension  BP Readings from Last 3 Encounters:   10/12/20 136/72   09/28/20 (!) 142/86   09/16/20 (!) 152/84     Blood pressure stable  - amLODIPine (NORVASC) 5 MG tablet; Take 1 tablet by mouth daily  Dispense: 90 tablet; Refill: 3  - hydroCHLOROthiazide (MICROZIDE) 12.5 MG capsule; Take 1 capsule by mouth every morning  Dispense: 90 capsule; Refill: 3  - metoprolol succinate (TOPROL XL) 50 MG extended release tablet; Take 1 tablet by mouth daily  Dispense: 90 tablet; Refill: 3    2. Acquired hypothyroidism  Lab Results   Component Value Date    TSH 3.220 10/04/2019    T4FREE 1.1 10/04/2019   W will recheck next visit    - levothyroxine (SYNTHROID) 175 MCG tablet; Take 1 tablet by mouth Daily  Dispense: 90 tablet; Refill: 3    3. Seizure disorder [facility])  She has an order for a Dilantin level from her neurologist.  Suggested she get my laboratory work the same time    4. Deep vein thrombosis (DVT) of distal vein of left lower extremity, unspecified chronicity (HCC)  Continue with Coumadin    5. Gastroesophageal reflux disease without esophagitis  Stable continue with Protonix  - pantoprazole (PROTONIX) 20 MG tablet; Take 1 tablet by mouth daily  Dispense: 90 tablet; Refill: 3    6. Chronic cough  Obtain a chest x-ray today  - XR CHEST STANDARD (2 VW); Future    7. Depression with anxiety  Stable            Return in about 5 months (around 3/12/2021) for Routine follow up - 15 minute visit, AWV - 30 minute visit. Discussed use, benefit, and side effects of prescribed medications. All patient questions answered. Pt voiced understanding. Reviewed health maintenance.   Instructedto continue current medications, diet and exercise. Patient agreed with treatmentplan. Follow up as directed. Note dictated using Dragon Dictation software      _______________________________________________________________    Addendum October 23, 2020  Patient's daughter-in-law contacted us on October 18 requesting home health services. She is having difficulty with her balance and strength. May benefit from physical therapy.   Would like to have evaluated and treated for any other services which may be available as well

## 2020-10-15 ENCOUNTER — APPOINTMENT (OUTPATIENT)
Dept: GENERAL RADIOLOGY | Age: 85
End: 2020-10-15
Payer: MEDICARE

## 2020-10-15 ENCOUNTER — APPOINTMENT (OUTPATIENT)
Dept: CT IMAGING | Age: 85
End: 2020-10-15
Payer: MEDICARE

## 2020-10-15 ENCOUNTER — ANTI-COAG VISIT (OUTPATIENT)
Dept: FAMILY MEDICINE CLINIC | Age: 85
End: 2020-10-15

## 2020-10-15 ENCOUNTER — ANTI-COAG VISIT (OUTPATIENT)
Dept: FAMILY MEDICINE CLINIC | Age: 85
End: 2020-10-15
Payer: MEDICARE

## 2020-10-15 ENCOUNTER — HOSPITAL ENCOUNTER (EMERGENCY)
Age: 85
Discharge: HOME | End: 2020-10-16
Attending: EMERGENCY MEDICINE
Payer: MEDICARE

## 2020-10-15 VITALS
HEIGHT: 67 IN | TEMPERATURE: 98.8 F | DIASTOLIC BLOOD PRESSURE: 85 MMHG | SYSTOLIC BLOOD PRESSURE: 170 MMHG | OXYGEN SATURATION: 97 % | RESPIRATION RATE: 20 BRPM | HEART RATE: 81 BPM | WEIGHT: 195 LBS | BODY MASS INDEX: 30.61 KG/M2
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
## 2020-10-15 DIAGNOSIS — S62.619A CLOSED FRACTURE OF PROXIMAL PHALANX OF DIGIT OF LEFT HAND, INITIAL ENCOUNTER: ICD-10-CM

## 2020-10-15 DIAGNOSIS — S62.641A CLOSED NONDISPLACED FRACTURE OF PROXIMAL PHALANX OF LEFT INDEX FINGER, INITIAL ENCOUNTER: ICD-10-CM

## 2020-10-15 DIAGNOSIS — Z79.01 ON ANTICOAGULANT THERAPY: ICD-10-CM

## 2020-10-15 DIAGNOSIS — S09.90XA CLOSED HEAD INJURY, INITIAL ENCOUNTER: ICD-10-CM

## 2020-10-15 LAB
ALBUMIN SERPL-MCNC: 3.7 G/DL (ref 3.5–5.2)
ALP BLD-CCNC: 93 U/L (ref 35–104)
ALT SERPL-CCNC: 18 U/L (ref 5–33)
ANION GAP SERPL CALCULATED.3IONS-SCNC: 8 MMOL/L (ref 7–19)
APTT: 26.2 SEC (ref 26–36.2)
AST SERPL-CCNC: 17 U/L (ref 5–32)
BASOPHILS ABSOLUTE: 0 K/UL (ref 0–0.2)
BASOPHILS RELATIVE PERCENT: 0.3 % (ref 0–1)
BILIRUB SERPL-MCNC: <0.2 MG/DL (ref 0.2–1.2)
BUN BLDV-MCNC: 17 MG/DL (ref 8–23)
CALCIUM SERPL-MCNC: 8.6 MG/DL (ref 8.8–10.2)
CHLORIDE BLD-SCNC: 106 MMOL/L (ref 98–111)
CO2: 31 MMOL/L (ref 22–29)
CREAT SERPL-MCNC: 0.8 MG/DL (ref 0.5–0.9)
EOSINOPHILS ABSOLUTE: 0.1 K/UL (ref 0–0.6)
EOSINOPHILS RELATIVE PERCENT: 1.6 % (ref 0–5)
GFR AFRICAN AMERICAN: >59
GFR NON-AFRICAN AMERICAN: >60
GLUCOSE BLD-MCNC: 149 MG/DL (ref 74–109)
HCT VFR BLD CALC: 45.2 % (ref 37–47)
HEMOGLOBIN: 14.8 G/DL (ref 12–16)
IMMATURE GRANULOCYTES #: 0 K/UL
INR BLD: 1.26 (ref 0.88–1.18)
INR BLD: 1.7
LYMPHOCYTES ABSOLUTE: 1.8 K/UL (ref 1.1–4.5)
LYMPHOCYTES RELATIVE PERCENT: 24 % (ref 20–40)
MAGNESIUM: 2 MG/DL (ref 1.6–2.4)
MCH RBC QN AUTO: 31.1 PG (ref 27–31)
MCHC RBC AUTO-ENTMCNC: 32.7 G/DL (ref 33–37)
MCV RBC AUTO: 95 FL (ref 81–99)
MONOCYTES ABSOLUTE: 0.7 K/UL (ref 0–0.9)
MONOCYTES RELATIVE PERCENT: 9.7 % (ref 0–10)
NEUTROPHILS ABSOLUTE: 4.7 K/UL (ref 1.5–7.5)
NEUTROPHILS RELATIVE PERCENT: 63.9 % (ref 50–65)
PDW BLD-RTO: 13.8 % (ref 11.5–14.5)
PLATELET # BLD: 249 K/UL (ref 130–400)
PMV BLD AUTO: 9.8 FL (ref 9.4–12.3)
POTASSIUM REFLEX MAGNESIUM: 3.5 MMOL/L (ref 3.5–5)
PROTHROMBIN TIME: 15.8 SEC (ref 12–14.6)
RBC # BLD: 4.76 M/UL (ref 4.2–5.4)
SODIUM BLD-SCNC: 145 MMOL/L (ref 136–145)
TOTAL PROTEIN: 6.7 G/DL (ref 6.6–8.7)
WBC # BLD: 7.4 K/UL (ref 4.8–10.8)

## 2020-10-15 PROCEDURE — 99283 EMERGENCY DEPT VISIT LOW MDM: CPT

## 2020-10-15 PROCEDURE — 72125 CT NECK SPINE W/O DYE: CPT

## 2020-10-15 PROCEDURE — 71045 X-RAY EXAM CHEST 1 VIEW: CPT

## 2020-10-15 PROCEDURE — 6360000002 HC RX W HCPCS: Performed by: EMERGENCY MEDICINE

## 2020-10-15 PROCEDURE — 29125 APPL SHORT ARM SPLINT STATIC: CPT

## 2020-10-15 PROCEDURE — 73130 X-RAY EXAM OF HAND: CPT

## 2020-10-15 PROCEDURE — 96374 THER/PROPH/DIAG INJ IV PUSH: CPT

## 2020-10-15 PROCEDURE — 93793 ANTICOAG MGMT PT WARFARIN: CPT | Performed by: FAMILY MEDICINE

## 2020-10-15 PROCEDURE — 70450 CT HEAD/BRAIN W/O DYE: CPT

## 2020-10-15 PROCEDURE — 99999 PR OFFICE/OUTPT VISIT,PROCEDURE ONLY: CPT | Performed by: EMERGENCY MEDICINE

## 2020-10-15 RX ORDER — HYDROMORPHONE HYDROCHLORIDE 1 MG/ML
0.5 INJECTION, SOLUTION INTRAMUSCULAR; INTRAVENOUS; SUBCUTANEOUS ONCE
Status: COMPLETED | OUTPATIENT
Start: 2020-10-15 | End: 2020-10-15

## 2020-10-15 RX ADMIN — HYDROMORPHONE HYDROCHLORIDE 0.5 MG: 1 INJECTION, SOLUTION INTRAMUSCULAR; INTRAVENOUS; SUBCUTANEOUS at 23:47

## 2020-10-15 ASSESSMENT — PAIN DESCRIPTION - ORIENTATION: ORIENTATION: LEFT

## 2020-10-15 ASSESSMENT — PAIN SCALES - GENERAL
PAINLEVEL_OUTOF10: 10
PAINLEVEL_OUTOF10: 10

## 2020-10-15 ASSESSMENT — PAIN DESCRIPTION - LOCATION: LOCATION: HAND

## 2020-10-15 NOTE — PROGRESS NOTES
HOME MONITORING REPORT    INR today: No results found for this visit on 10/15/20. INR Goal: 2.0-3.0    Dosing Plan  As of 10/15/2020    TTR:   76.1 % (4.4 mo)   Full warfarin instructions:   5 mg every Tue, Sat; 2.5 mg all other days              PLAN: Advised patient/caregiver to continue current dose and recheck in one week.   Patient/Caregiver voiced understanding

## 2020-10-16 PROCEDURE — 85025 COMPLETE CBC W/AUTO DIFF WBC: CPT

## 2020-10-16 PROCEDURE — 36415 COLL VENOUS BLD VENIPUNCTURE: CPT

## 2020-10-16 PROCEDURE — 85610 PROTHROMBIN TIME: CPT

## 2020-10-16 PROCEDURE — 80053 COMPREHEN METABOLIC PANEL: CPT

## 2020-10-16 PROCEDURE — 85730 THROMBOPLASTIN TIME PARTIAL: CPT

## 2020-10-16 PROCEDURE — 83735 ASSAY OF MAGNESIUM: CPT

## 2020-10-16 RX ORDER — HYDROCODONE BITARTRATE AND ACETAMINOPHEN 5; 325 MG/1; MG/1
1 TABLET ORAL EVERY 6 HOURS PRN
Qty: 12 TABLET | Refills: 0 | Status: SHIPPED | OUTPATIENT
Start: 2020-10-16 | End: 2020-10-19

## 2020-10-16 ASSESSMENT — ENCOUNTER SYMPTOMS
EYE PAIN: 0
ABDOMINAL PAIN: 0
EYE REDNESS: 0
DIARRHEA: 0
RHINORRHEA: 0
SHORTNESS OF BREATH: 0
COUGH: 0
VOICE CHANGE: 0
VOMITING: 0

## 2020-10-16 NOTE — ED PROVIDER NOTES
[facility] EMERGENCY DEPT  EMERGENCY DEPARTMENT ENCOUNTER      Pt Name: Mirella Castillo  MRN: [id number]  [place] 8/18/1931  Date of evaluation: 10/15/2020  Provider: Johanna Ordonez MD    [address] [place] [address]       Chief Complaint   Patient presents with    Fall     around 2145 pt fell coming out the bathroom. was not using walker. did hit head. no loss of consciousness.  Hand Injury     left hand         HISTORY OF PRESENT ILLNESS   (Location/Symptom, Timing/Onset,Context/Setting, Quality, Duration, Modifying Factors, Severity)  Note limiting factors. Mirella Castillo is a 80 y.o. female who presents to the emergency department for evaluation after a fall after she got tripped up on her walker tonight. Patient fell and hit her head and landed on her left hand where most of her pain is. Patient does take Coumadin for history of pulmonary embolus. HPI    NursingNotes were reviewed. REVIEW OF SYSTEMS    (2-9 systems for level 4, 10 or more for level 5)     Review of Systems   Constitutional: Negative for fatigue and fever. HENT: Negative for congestion, rhinorrhea and voice change. Eyes: Negative for pain and redness. Respiratory: Negative for cough and shortness of breath. Cardiovascular: Negative for chest pain. Gastrointestinal: Negative for abdominal pain, diarrhea and vomiting. Endocrine: Negative. Genitourinary: Negative. Musculoskeletal: Positive for arthralgias. Negative for gait problem. Skin: Negative for rash and wound. Neurological: Negative for weakness. Hematological: Negative. Psychiatric/Behavioral: Negative. All other systems reviewed and are negative. A complete review of systems was performed and is negative except as noted above in the HPI.        PAST MEDICAL HISTORY     Past Medical History:   Diagnosis Date    Arthritis     Back pain     bulging disc    CAD (coronary artery disease)     BMS to RCA    Cerebral artery occlusion with cerebral infarction ([facility] [address] small strokes,balance issues    Cystitis     Depression     Dizziness     Dysuria     Edema     ankles,mild pedal edema    Falls frequently     H/O blood clots     left leg and lungs    Head injury     Headache     Heartburn     [place] (hard of hearing)     Hyperlipidemia     Hypertension     Hypothyroidism     Hypothyroidism     Memory problem     Neuropathy     Shingles     TIA (transient ischemic attack)     Urinary incontinence     UTI (urinary tract infection)          SURGICAL HISTORY       Past Surgical History:   Procedure Laterality Date    APPENDECTOMY  1947    CHOLECYSTECTOMY      EYE SURGERY  2009    bilateral cataract     HYSTERECTOMY, TOTAL ABDOMINAL      JOINT REPLACEMENT      Bilateral Knee    KNEE ARTHROPLASTY Right     LOBECTOMY  10/2015    sx--bilateral    PTCA  2002    with stent placement/bms to rca         CURRENT MEDICATIONS       Previous Medications    AMLODIPINE (NORVASC) 5 MG TABLET    Take 1 tablet by mouth daily    HYDROCHLOROTHIAZIDE (MICROZIDE) 12.5 MG CAPSULE    Take 1 capsule by mouth every morning    LEVOTHYROXINE (SYNTHROID) 175 MCG TABLET    Take 1 tablet by mouth Daily    METOPROLOL SUCCINATE (TOPROL XL) 50 MG EXTENDED RELEASE TABLET    Take 1 tablet by mouth daily    PANTOPRAZOLE (PROTONIX) 20 MG TABLET    Take 1 tablet by mouth daily    PHENYTOIN (DILANTIN) 100 MG ER CAPSULE    Take 2 capsules by mouth 2 times daily    SERTRALINE (ZOLOFT) 100 MG TABLET    Take 1 tablet by mouth daily    WARFARIN (COUMADIN) 5 MG TABLET    Take 1 tablet by mouth daily       ALLERGIES     Morphine    FAMILY HISTORY       Family History   Problem Relation Age of Onset    Diabetes Mother     Hypertension Mother    Mattie Louis Rheum Arthritis Mother     Stroke Mother     Tuberculosis Father           SOCIAL HISTORY       Social History     Socioeconomic History    Marital status:       Spouse name: None    Number of children: None    Years of education: None    Mouth: No oral lesions. Eyes:      Conjunctiva/sclera: Conjunctivae normal.      Pupils: Pupils are equal, round, and reactive to light. Neck:      Musculoskeletal: No neck rigidity. Trachea: No tracheal deviation. Cardiovascular:      Rate and Rhythm: Normal rate and regular rhythm. Pulses:           Radial pulses are 2+ on the right side and 2+ on the left side. Dorsalis pedis pulses are 2+ on the right side and 2+ on the left side. Pulmonary:      Effort: No accessory muscle usage or respiratory distress. Breath sounds: Normal breath sounds. No decreased breath sounds, rhonchi or rales. Abdominal:      General: There is no distension. Palpations: Abdomen is not rigid. Tenderness: There is no abdominal tenderness. There is no guarding. Musculoskeletal:      Right shoulder: Normal.      Left shoulder: Normal.      Right hip: Normal.      Left hip: Normal.      Right knee: Normal.      Left knee: Normal.      Cervical back: Normal. She exhibits normal range of motion, no tenderness, no bony tenderness and no deformity. Thoracic back: Normal. She exhibits no tenderness, no bony tenderness and no deformity. Lumbar back: Normal. She exhibits no tenderness, no bony tenderness and no deformity. Left hand: She exhibits decreased range of motion and tenderness. Comments: Bruising across the palmar aspect of the base of the digits of left hand   Skin:     Findings: No laceration. Neurological:      Mental Status: She is alert and oriented to person, place, and time. GCS: GCS eye subscore is 4. GCS verbal subscore is 5. GCS motor subscore is 6. Cranial Nerves: No cranial nerve deficit. Sensory: No sensory deficit.    Psychiatric:         Speech: Speech normal.         DIAGNOSTIC RESULTS     EKG: All EKG's are interpreted by the Emergency Department Physician who either signs or Co-signs this chart in the absence of a cardiologist.        RADIOLOGY:   Non-plain film images such as CT, Ultrasound and MRI are read by the radiologist. [facility] images are visualized and preliminarily interpreted by the emergency physician with the below findings:        Interpretation per the Radiologist below, if available at the time of this note:    CT Head WO Contrast    (Results Pending)   CT Cervical Spine WO Contrast    (Results Pending)   XR CHEST PORTABLE    (Results Pending)   XR HAND LEFT (MIN 3 VIEWS)    (Results Pending)         ED BEDSIDE ULTRASOUND:   Performed by ED Physician - none    LABS:  Labs Reviewed   CBC WITH AUTO DIFFERENTIAL - Abnormal; Notable for the following components:       Result Value    MCH 31.1 (*)     MCHC 32.7 (*)     All other components within normal limits   COMPREHENSIVE METABOLIC PANEL W/ REFLEX TO MG FOR LOW K - Abnormal; Notable for the following components:    CO2 31 (*)     Glucose 149 (*)     Calcium 8.6 (*)     All other components within normal limits   PROTIME-INR - Abnormal; Notable for the following components:    Protime 15.8 (*)     INR 1.26 (*)     All other components within normal limits   APTT   MAGNESIUM       All other labs were within normal range or not returned as of this dictation. EMERGENCY DEPARTMENT COURSE and DIFFERENTIALDIAGNOSIS/MDM:   Vitals:    Vitals:    10/15/20 2250   BP: (!) 170/85   Pulse: 81   Resp: 20   Temp: 98.8 °F (37.1 °C)   TempSrc: Oral   SpO2: 97%   Weight: 195 lb (88.5 kg)   Height: 5' 7\" (1.702 m)       MDM      Evaluation and work-up here revealed no signs of emergent or life-threatening pathology that would necessitate admission for further work-up or management at this time. Patient is felt to be stable for discharge home with return precautions for worsening of the condition or development of new concerning symptoms. Patient was encouraged to follow-up with their primary care doctor in the appropriate timeframe.   Necessary prescriptions and information have been provided for treatment at home. Patient voices understanding and agreement with the plan. CONSULTS:  None    PROCEDURES:  Unless otherwise notedbelow, none     Splint Application    Date/Time: 10/16/2020 12:46 AM  Performed by: Jovani East MD  Authorized by: Jovani East MD     Consent:     Consent obtained:  Verbal    Consent given by:  Patient    Risks discussed:  Discoloration, pain, swelling and numbness    Alternatives discussed:  Alternative treatment  Pre-procedure details:     Sensation:  Normal  Procedure details:     Laterality:  Left    Location:  Hand    Hand:  L hand    Splint type:  Radial gutter    Supplies:  Ortho-Glass  Post-procedure details:     Pain:  Unchanged    Sensation:  Normal    Patient tolerance of procedure: Tolerated well, no immediate complications           FINAL IMPRESSION     1. Fall from standing, initial encounter    2. Closed head injury, initial encounter    3. On anticoagulant therapy    4. Closed fracture of proximal phalanx of digit of left hand, initial encounter    5. Closed nondisplaced fracture of proximal phalanx of left index finger, initial encounter          DISPOSITION/PLAN   DISPOSITION Decision To Discharge 10/16/2020 12:43:08 AM      PATIENT REFERRED TO:  [address] [place] [address] EMERGENCY DEPT  [facility]  [phone]    If symptoms worsen    Seferino Arcos MD  [address] [address] [phone]      As needed    Kane Rodriguez MD  [address] [address]  [phone]    In 1 week        DISCHARGE MEDICATIONS:  New Prescriptions    HYDROCODONE-ACETAMINOPHEN (LORTAB) 5-325 MG PER TABLET    Take 1 tablet by mouth every 6 hours as needed for Pain for up to 3 days. Intended supply: 3 days.  Take lowest dose possible to manage pain          (Please note that portions of this note were completed with a voice recognition program.  Efforts were made to edit the dictations butoccasionally words are mis-transcribed.)    Blanca Bain MD (electronically signed)  Emergency Physician          Blanca Bain., MD  10/16/20 [id number]

## 2020-10-23 ENCOUNTER — ANTI-COAG VISIT (OUTPATIENT)
Dept: FAMILY MEDICINE CLINIC | Age: 85
End: 2020-10-23
Payer: MEDICARE

## 2020-10-23 PROCEDURE — 93793 ANTICOAG MGMT PT WARFARIN: CPT | Performed by: FAMILY MEDICINE

## 2020-10-28 ENCOUNTER — TELEPHONE (OUTPATIENT)
Dept: INTERNAL MEDICINE CLINIC | Age: 85
End: 2020-10-28

## 2020-10-28 NOTE — TELEPHONE ENCOUNTER
[address] [address] asking for an additional day to get this pt admitted - they were supposed to go today but are having issues with a very high rate of referrals this week     Can they admit tomorrow  ?

## 2020-10-30 ENCOUNTER — TELEPHONE (OUTPATIENT)
Dept: INTERNAL MEDICINE CLINIC | Age: 85
End: 2020-10-30

## 2020-10-30 NOTE — TELEPHONE ENCOUNTER
[facility] PT evaluation completed and per PT Assessment: weakness,  cannot functionally use L hand due to fracture 3 fingers wrapped in splint,  gait difficulty,  increase fall risk,  labored in walking in home setting,  lives  with family round the clock  Recommendation:   plan PT 1wk1, then 2wk3 to work on strengthening, walking, safety, using walker as tolerated,    has supportive family   family asking about OT referral for bathing assessment and assist if needed due to labored in activity and transfers    Please advise if PT orders approved and ok to add OT eval

## 2020-10-30 NOTE — TELEPHONE ENCOUNTER
[address] [address] nurse admitted pt 10/29 and the nurse will see pt 2 X week for 1 weeks then once weekly for 2 weeks     Please advise if approved

## 2020-11-03 ENCOUNTER — ANTI-COAG VISIT (OUTPATIENT)
Dept: FAMILY MEDICINE CLINIC | Age: 85
End: 2020-11-03

## 2020-11-03 ENCOUNTER — ANTI-COAG VISIT (OUTPATIENT)
Dept: FAMILY MEDICINE CLINIC | Age: 85
End: 2020-11-03
Payer: MEDICARE

## 2020-11-03 LAB
INR BLD: 2.5
INR BLD: 2.5

## 2020-11-03 PROCEDURE — 93793 ANTICOAG MGMT PT WARFARIN: CPT | Performed by: FAMILY MEDICINE

## 2020-11-03 NOTE — PROGRESS NOTES
HOME MONITORING REPORT    INR today:   Results for orders placed or performed in visit on 11/03/20   Protime-INR   Result Value Ref Range    INR 2.50        INR Goal: 2.0-3.0    Dosing Plan  As of 11/3/2020    TTR:   76.5 % (5 mo)   Full warfarin instructions:   5 mg every Tue, Sat; 2.5 mg all other days              PLAN: Advised patient/caregiver to continue current dose and recheck in one week.

## 2020-11-03 NOTE — PROGRESS NOTES
HOME MONITORING REPORT    INR today:   Results for orders placed or performed in visit on 11/03/20   Protime-INR   Result Value Ref Range    INR 2.50        INR Goal: 2.0-3.0    Dosing Plan  As of 11/3/2020    TTR:   76.5 % (5 mo)   Full warfarin instructions:   5 mg every Tue, Sat; 2.5 mg all other days              PLAN: Patient aware to continue current dose and recheck in one week or as ordered.

## 2020-11-13 ENCOUNTER — ANTI-COAG VISIT (OUTPATIENT)
Dept: FAMILY MEDICINE CLINIC | Age: 85
End: 2020-11-13
Payer: MEDICARE

## 2020-11-13 LAB — INR BLD: 1.8

## 2020-11-13 PROCEDURE — 93793 ANTICOAG MGMT PT WARFARIN: CPT | Performed by: FAMILY MEDICINE

## 2020-11-18 ENCOUNTER — ANTI-COAG VISIT (OUTPATIENT)
Dept: PRIMARY CARE CLINIC | Age: 85
End: 2020-11-18
Payer: MEDICARE

## 2020-11-18 LAB — INR BLD: 2.4

## 2020-11-18 PROCEDURE — 93793 ANTICOAG MGMT PT WARFARIN: CPT | Performed by: FAMILY MEDICINE

## 2020-11-18 NOTE — PROGRESS NOTES
HOME MONITORING REPORT    INR today:   Results for orders placed or performed in visit on 11/18/20   Protime-INR   Result Value Ref Range    INR 2.40        INR Goal: 2.0-3.0    Dosing Plan  As of 11/18/2020    TTR:   75.9 % (5.5 mo)   Full warfarin instructions:   5 mg every Tue, Sat; 2.5 mg all other days              PLAN: Advised patient/caregiver to continue current dose of 5 mg Tuesday and Thursday and 2.5 mg all other days, then recheck in one week.   Patient/Caregiver voiced understanding

## 2020-12-01 ENCOUNTER — TELEPHONE (OUTPATIENT)
Dept: INTERNAL MEDICINE CLINIC | Age: 85
End: 2020-12-01

## 2020-12-01 NOTE — TELEPHONE ENCOUNTER
[address] [address] nurse reporting that pt met all goals and was discharged from [facility] 11/27 fyi

## 2020-12-02 ENCOUNTER — TELEPHONE (OUTPATIENT)
Dept: FAMILY MEDICINE CLINIC | Age: 85
End: 2020-12-02

## 2020-12-02 LAB — INR BLD: 2.6

## 2020-12-03 ENCOUNTER — ANTI-COAG VISIT (OUTPATIENT)
Dept: FAMILY MEDICINE CLINIC | Age: 85
End: 2020-12-03
Payer: MEDICARE

## 2020-12-03 PROCEDURE — 93793 ANTICOAG MGMT PT WARFARIN: CPT | Performed by: FAMILY MEDICINE

## 2020-12-03 NOTE — TELEPHONE ENCOUNTER
Spoke with Anthony Bhardwaj patient's niece. She reports patient takes 5 mg on mon, weds, and Friday and take 2.5 mg on all other days. They will repeat INR in 1 week. Our coumadin clinic does handle these however Radha Banks has been out all week due to pneumonia and bronchitis so that is why you have been getting so many of these.

## 2020-12-03 NOTE — TELEPHONE ENCOUNTER
Her INR is therapeutic  Confirm her Coumadin dose and repeat in 1 week  Is our Coumadin clinic managing home INRs?

## 2020-12-23 ENCOUNTER — ANTI-COAG VISIT (OUTPATIENT)
Dept: PRIMARY CARE CLINIC | Age: 85
End: 2020-12-23
Payer: MEDICARE

## 2020-12-23 LAB — INR BLD: 3.2

## 2020-12-23 PROCEDURE — 93793 ANTICOAG MGMT PT WARFARIN: CPT | Performed by: FAMILY MEDICINE

## 2020-12-23 NOTE — PROGRESS NOTES
HOME MONITORING REPORT    INR today:   Results for orders placed or performed in visit on 12/23/20   Protime-INR   Result Value Ref Range    INR 3.20        INR Goal: 2.0-3.0    Dosing Plan  As of 12/23/2020    TTR:  76.6 % (6.7 mo)   Full warfarin instructions:  12/23: Hold; Otherwise 5 mg every Tue, Sat; 2.5 mg all other days              PLAN: Advised patient/caregiver to hold tonight's dose then continue current dose and recheck in one week.   Patient/Caregiver voiced understanding

## 2020-12-30 ENCOUNTER — ANTI-COAG VISIT (OUTPATIENT)
Dept: PRIMARY CARE CLINIC | Age: 85
End: 2020-12-30
Payer: MEDICARE

## 2020-12-30 LAB — INR BLD: 2.4

## 2020-12-30 PROCEDURE — 93793 ANTICOAG MGMT PT WARFARIN: CPT | Performed by: FAMILY MEDICINE

## 2020-12-30 NOTE — PROGRESS NOTES
HOME MONITORING REPORT    INR today:   Results for orders placed or performed in visit on 12/30/20   Protime-INR   Result Value Ref Range    INR 2.40        INR Goal: 2.0-3.0    Dosing Plan  As of 12/30/2020    TTR:  76.5 % (6.9 mo)   Full warfarin instructions:  5 mg every Tue, Sat; 2.5 mg all other days              PLAN: Advised patient/caregiver to continue current dose and recheck in one week.   Patient/Caregiver voiced understanding

## 2021-01-14 ENCOUNTER — ANTI-COAG VISIT (OUTPATIENT)
Dept: PRIMARY CARE CLINIC | Age: 86
End: 2021-01-14
Payer: MEDICARE

## 2021-01-14 DIAGNOSIS — I26.99 ACUTE PULMONARY EMBOLISM WITHOUT ACUTE COR PULMONALE, UNSPECIFIED PULMONARY EMBOLISM TYPE (HCC): ICD-10-CM

## 2021-01-14 DIAGNOSIS — I82.5Z2 CHRONIC DEEP VEIN THROMBOSIS (DVT) OF DISTAL VEIN OF LEFT LOWER EXTREMITY (HCC): ICD-10-CM

## 2021-01-14 DIAGNOSIS — Z86.73 HISTORY OF CARDIOEMBOLIC CEREBROVASCULAR ACCIDENT (CVA): ICD-10-CM

## 2021-01-14 LAB — INR BLD: 2.3

## 2021-01-14 PROCEDURE — 93793 ANTICOAG MGMT PT WARFARIN: CPT | Performed by: FAMILY MEDICINE

## 2021-01-14 NOTE — PROGRESS NOTES
HOME MONITORING REPORT    INR today:   Results for orders placed or performed in visit on 01/14/21   Protime-INR   Result Value Ref Range    INR 2.30        INR Goal: 2.0-3.0    Dosing Plan  As of 1/14/2021    TTR:  78.1 % (7.4 mo)   Full warfarin instructions:  5 mg every Tue, Sat; 2.5 mg all other days              PLAN: Advised patient/caregiver to continue current dose and recheck in one week.   Patient/Caregiver voiced understanding

## 2021-01-29 ENCOUNTER — ANTI-COAG VISIT (OUTPATIENT)
Dept: FAMILY MEDICINE CLINIC | Age: 86
End: 2021-01-29
Payer: MEDICARE

## 2021-01-29 DIAGNOSIS — I26.99 ACUTE PULMONARY EMBOLISM WITHOUT ACUTE COR PULMONALE, UNSPECIFIED PULMONARY EMBOLISM TYPE (HCC): ICD-10-CM

## 2021-01-29 DIAGNOSIS — I82.5Z2 CHRONIC DEEP VEIN THROMBOSIS (DVT) OF DISTAL VEIN OF LEFT LOWER EXTREMITY (HCC): ICD-10-CM

## 2021-01-29 LAB — INR BLD: 2.7

## 2021-01-29 PROCEDURE — 93793 ANTICOAG MGMT PT WARFARIN: CPT | Performed by: FAMILY MEDICINE

## 2021-02-15 ENCOUNTER — CARE COORDINATION (OUTPATIENT)
Dept: CARE COORDINATION | Age: 86
End: 2021-02-15

## 2021-02-15 NOTE — CARE COORDINATION
Patient's niece called. She is the patient's care giver. Spoke about patient needs. Patient's approximate gross monthly income from [employer] is $ 1,800. Provided niece with information about [facility] Waiver services. Niece stated she is not ready to pursue that yet, but will keep it in mind for possibility in the future. Gave niece Caregiver Support Group information. Asked her to call Yissel or Rosie Jason at [phone] to ask if they are doing virtual meetings during the pandemic. Omayra verbalized understanding.     Submitted by René/OTTOW

## 2021-02-19 ENCOUNTER — TELEMEDICINE (OUTPATIENT)
Dept: NEUROSURGERY | Age: 86
End: 2021-02-19
Payer: MEDICARE

## 2021-02-19 DIAGNOSIS — R56.9 SEIZURE (HCC): ICD-10-CM

## 2021-02-19 DIAGNOSIS — R41.3 MEMORY LOSS: Primary | ICD-10-CM

## 2021-02-19 PROCEDURE — 4040F PNEUMOC VAC/ADMIN/RCVD: CPT | Performed by: NURSE PRACTITIONER

## 2021-02-19 PROCEDURE — 1123F ACP DISCUSS/DSCN MKR DOCD: CPT | Performed by: NURSE PRACTITIONER

## 2021-02-19 PROCEDURE — 1090F PRES/ABSN URINE INCON ASSESS: CPT | Performed by: NURSE PRACTITIONER

## 2021-02-19 PROCEDURE — G8427 DOCREV CUR MEDS BY ELIG CLIN: HCPCS | Performed by: NURSE PRACTITIONER

## 2021-02-19 PROCEDURE — 99213 OFFICE O/P EST LOW 20 MIN: CPT | Performed by: NURSE PRACTITIONER

## 2021-02-19 NOTE — PROGRESS NOTES
[id number] [facility] Neurology Virtual Visit Note    2021    TELEHEALTH EVALUATION -- Audio/Visual (During EVFUX-15 public health emergency)      Patient:   Anil Chapa  MR#:    [id number]  Account Number:                         YOB: 1931  Date of Evaluation:  2021  Time of Note:                          11:07 AM  Primary/Referring Physician:  Aileen Sanders MD   Consulting Physician:  VIN Gamez     FOLLOW UP    Chief Complaint   Patient presents with    Follow-up    Seizures     pt family states no seziures in a year or more.  Memory Loss     word finding problems       HPI:    Anil Chapa (:  1931) has requested an audio/video evaluation for the following concern(s): For follow up. Her niece is on the video visit today as well. She has not had any further seizures, last event was over a year ago now. On Dilantin 200mg BID, no side effects. Prior events described as suddenly falling with LOC. No tongue biting or loss of bladder. No clear warning prior to falling. With one fall she did suffer a skull fracture per family. Memory loss is slowly progressive. Primarily short term memory loss noted. Noting more word finding difficulty. Noting repetitiation of stories and questions. Appetite is good, no weight loss. Sleeps good at night. Memory loss is not interfering with ADLs. Physical limitations do affect ADLs. Her niece handles all her medications now. Does not drive. Off Namenda, patient did not want to take any more, no clear difference while on this medication. Recent fall noted, broke 3 fingers. Wasn't using walker at the time of the fall. On Coumadin for DVT/PE, PCP is following. Prior stroke history noted as well. No other complaints. REVIEW OF SYSTEMS    Constitutional: []? Fever []? Sweats []? Chills []? Recent Injury [x]? Denies all unless marked  HEENT:[]? Headache  []? Head Injury []? Hearing Loss  []? Sore Throat  []? Ear Ache [x]?  Denies all unless marked  Spine:  []? Neck pain  []? Back pain  []? Sciaticia  [x]? Denies all unless marked  Cardiovascular:[]? Heart Disease []? Palpitations []? Chest Pain   [x]? Denies all unless marked  Pulmonary: []? Shortness of Breath []? Cough   [x]? Denies all unless marked  Psychiatric/Behavioral:[]? Depression []? Anxiety [x]? Denies all unless marked  Gastrointestinal: []? Nausea  []? Vomiting  []? Abdominal Pain  []? Constipation  []? Diarrhea  [x]? Denies all unless marked  Genitourinary:   []? Frequency  []? Urgency  []? Dysuria []? Incontinence  [x]? Denies all unless marked  Extremities: []? Pain  []? Swelling  [x]? Denies all unless marked  Musculoskeletal: []? Myalgias  []? Joint Pain  []? Arthritis []? Muscle Cramps []? Muscle Twitches  [x]? Denies all unless marked  Sleep: []? Insomnia[]? Snoring []? Restless Legs  []? Sleep Apnea  []? Daytime Sleepiness  [x]? Denies all unless marked  Skin:[]? Rash []? Color Change [x]? Denies all unless marked   Neurological:[]? Visual Disturbance [x]? Memory Loss []? Loss of Balance []? Slurred Speech []? Weakness [x]? Seizures  []? Dizziness [x]? Denies all unless marked    The MA has completed the ROS with the patient. I have reviewed it in its' entirety with the patient and agree with the documentation.      Past Medical History:   Diagnosis Date    Arthritis     Back pain     bulging disc    CAD (coronary artery disease)     BMS to RCA    Cerebral artery occlusion with cerebral infarction (HCC)     small strokes,balance issues    Cystitis     Depression     Dizziness     Dysuria     Edema     ankles,mild pedal edema    Falls frequently     H/O blood clots     left leg and lungs    Head injury     Headache     Heartburn     Jackson (hard of hearing)     Hyperlipidemia     Hypertension     Hypothyroidism     Hypothyroidism     Memory problem     Neuropathy     Shingles     TIA (transient ischemic attack)     Urinary incontinence     UTI (urinary tract infection)        Past amLODIPine (NORVASC) 5 MG tablet Take 1 tablet by mouth daily 90 tablet 3    hydroCHLOROthiazide (MICROZIDE) 12.5 MG capsule Take 1 capsule by mouth every morning 90 capsule 3    levothyroxine (SYNTHROID) 175 MCG tablet Take 1 tablet by mouth Daily 90 tablet 3    metoprolol succinate (TOPROL XL) 50 MG extended release tablet Take 1 tablet by mouth daily 90 tablet 3    pantoprazole (PROTONIX) 20 MG tablet Take 1 tablet by mouth daily 90 tablet 3    phenytoin (DILANTIN) 100 MG ER capsule Take 2 capsules by mouth 2 times daily 180 capsule 3    sertraline (ZOLOFT) 100 MG tablet Take 1 tablet by mouth daily 90 tablet 3    warfarin (COUMADIN) 5 MG tablet Take 1 tablet by mouth daily 90 tablet 3     No current facility-administered medications for this visit. Allergies   Allergen Reactions    Morphine      Pt becomes mean (refusing, cursing)         PHYSICAL EXAMINATION:    Constitutional -   General appearance: No acute distress   EYES -   Conjunctiva normal  ENT-    No scars, masses, or lesions over external nose or ears  Cardiovascular -   No clubbing, cyanosis, or edema   Pulmonary-   Good expansion, normal effort without use of accessory muscles  Musculoskeletal -   No significant wasting of muscles noted  Gait as below, see gait exam in the neurologic exam  No gross bony deformities  Skin -   No rash, erythema, or pallor  Psychiatric -   Mood, affect, and behavior appear normal    Memory as below see mental status examination in the neurologic exam    NEUROLOGICAL EXAM    Mental status   [x]Awake, alert, oriented   []Affect attention and concentration appear appropriate  []Recent and remote memory appears unremarkable  []Speech normal without dysarthria or aphasia, comprehension and repetition intact.    COMMENTS: memory loss noted     Cranial Nerves [x] PER, EOMI, no nystagmus, conjugate eye movements, no ptosis  [x]Face symmetric  [x]Tongue midline   [x]Shoulder shrug normal bilaterally  COMMENTS: Motor   []Antigravity x 4 extremities  [x]Normal bulk and tone  [x]No tremor present  COMMENTS: generalized weakness        Coordination [x] HTS normal bilaterally   COMMENTS:       Gait                  []Normal steady gait    []Ataxic    []Spastic      []Magnetic     []Shuffling  COMMENTS: not tested, using walker        [] OTHER:      Due to this being a TeleHealth encounter, evaluation of the following organ systems is limited: Vitals/Constitutional/EENT/Resp/CV/GI//MS/Neuro/Skin/Heme-Lymph-Imm. LABS RECORD AND IMAGING REVIEW (As below and per HPI)    Lab Results   Component Value Date    [id number] 284 09/28/2020     Lab Results   Component Value Date    WBC 7.4 10/15/2020    HGB 14.8 10/15/2020    HCT 45.2 10/15/2020    MCV 95.0 10/15/2020     10/15/2020     Lab Results   Component Value Date     10/15/2020    K 3.5 10/15/2020     10/15/2020    CO2 31 (H) 10/15/2020    BUN 17 10/15/2020    CREATININE 0.8 10/15/2020    GLUCOSE 149 (H) 10/15/2020    CALCIUM 8.6 (L) 10/15/2020    PROT 6.7 10/15/2020    LABALBU 3.7 10/15/2020    BILITOT <0.2 10/15/2020    ALKPHOS 93 10/15/2020    AST 17 10/15/2020    ALT 18 10/15/2020    LABGLOM >60 10/15/2020    GFRAA >59 10/15/2020     MRI brain (3/2020)-   Impression    1. Chronic small bilateral cerebellar infarcts. Chronic cortical and    subcortical left occipital infarct. 2. Small areas of chronic hemorrhage in the left thalamus. 3. Moderate to severe atrophy with associated ventricular prominence. 4. T2 high signal within the hemispheric white matter is nonspecific    and most likely due to chronic small vessel disease. Signed by Dr Felix Oates on 3/4/2020 3:07 PM       EEG at [facility] in 2019 was normal      EEG (10/2020)- normal EEG.  Breach rhythm associated with prior skull fracture and is not epileptiform     ASSESSMENT:    Polly Castellanos is a 80y.o. year old female evaluated via Telehealth encounter for follow up of seizures and memory loss. Doing about the same from last visit. No breakthrough seizures noted. Memory loss is slowly progressive. Prior MRI brain with chronic cerebellar, cortical and subcortical infarcts and chronic hemorrhage in the left thalamus along with atrophy. EEG with breach rhythm, likely associated with prior skull fracture but no epileptiform abnormalities. Given history, will continue Dilantin, check level in the next week but does not appear toxic on exam today. Previously tried on Namenda but no change in memory, patient did not want to take it, so now off of this. Suspect underlying dementia, likely has vascular component to it. Prior history is somewhat limited related to the patient memory and niece is unsure of much of the prior history since patient moved to the area recently. Diagnosis Orders   1. Memory loss     2. Seizure ([address]  Phenytoin level, total    CBC Auto Differential    Comprehensive Metabolic Panel        PLAN:  1. Continue Dilantin 200mg BID. Will check level today. Discussed side effects with patient and niece. 2. Discussed safety concerns, increase supervision, no driving, medication monitoring/management. Recommend 30 minutes daily exercise, daily mental stimulation, and healthy diet with fish, fruits, vegetables, fiber and water. Hold off on memory agent, not felt to be beneficial previously. 3. Continue Coumadin, INR monitoring through PCP  4. Epilepsy precautions and seizure first aid discussed.  No driving, heights, swimming, tub baths, open flames, or heavy machinery.   5. Follow up in 6 months, sooner with any worsening     An  electronic signature was used to authenticate this note.     VIN Sal DNP    Pursuant to the emergency declaration under the [facility], [facility]5 waiver authority and the [employer] and [employer] Act, this Virtual  Visit was conducted, with patient's consent, to reduce the patient's risk of exposure to COVID-19 and provide continuity of care for an established patient. Services were provided through a video synchronous discussion virtually to substitute for in-person clinic visit. Patient was located at there primary place of residence (home). [facility] [place], [place]. No one else was present during the video visit.

## 2021-02-19 NOTE — PROGRESS NOTES

## 2021-02-24 ENCOUNTER — ANTI-COAG VISIT (OUTPATIENT)
Dept: PRIMARY CARE CLINIC | Age: 86
End: 2021-02-24
Payer: MEDICARE

## 2021-02-24 ENCOUNTER — PATIENT MESSAGE (OUTPATIENT)
Dept: FAMILY MEDICINE CLINIC | Age: 86
End: 2021-02-24

## 2021-02-24 DIAGNOSIS — I82.422 DEEP VEIN THROMBOSIS (DVT) OF ILIAC VEIN OF LEFT LOWER EXTREMITY, UNSPECIFIED CHRONICITY (HCC): ICD-10-CM

## 2021-02-24 LAB — INR BLD: 1.5

## 2021-02-24 PROCEDURE — 93793 ANTICOAG MGMT PT WARFARIN: CPT | Performed by: FAMILY MEDICINE

## 2021-02-24 NOTE — PROGRESS NOTES
HOME MONITORING REPORT    INR today:   Results for orders placed or performed in visit on 02/24/21   Protime-INR   Result Value Ref Range    INR 1.50        INR Goal: 2.0-3.0    Dosing Plan  As of 2/24/2021    TTR:  76.2 % (8.8 mo)   Full warfarin instructions:  2/24: 5 mg; Otherwise 5 mg every Tue, Sat; 2.5 mg all other days              PLAN: Advised patient/caregiver to increase tonight's dose to 5 mg, then continue current dose and recheck in one week.   Patient/Caregiver voiced understanding

## 2021-02-24 NOTE — TELEPHONE ENCOUNTER
From: Bailey Arreaga  To: Daily Hobbs MD  Sent: 2/24/2021 1:17 PM CST  Subject: Test Results Question    This message is being sent by Ldaonna Messer on behalf of Bailey Arreaga.    INR was 1.6 today at 1:15 pm

## 2021-03-05 LAB — INR BLD: 2.5

## 2021-03-08 ENCOUNTER — ANTI-COAG VISIT (OUTPATIENT)
Dept: PRIMARY CARE CLINIC | Age: 86
End: 2021-03-08
Payer: MEDICARE

## 2021-03-08 DIAGNOSIS — I26.99 OTHER PULMONARY EMBOLISM WITHOUT ACUTE COR PULMONALE, UNSPECIFIED CHRONICITY (HCC): ICD-10-CM

## 2021-03-08 DIAGNOSIS — I82.422 DEEP VEIN THROMBOSIS (DVT) OF ILIAC VEIN OF LEFT LOWER EXTREMITY, UNSPECIFIED CHRONICITY (HCC): ICD-10-CM

## 2021-03-08 PROCEDURE — 93793 ANTICOAG MGMT PT WARFARIN: CPT | Performed by: FAMILY MEDICINE

## 2021-03-08 NOTE — PROGRESS NOTES
HOME MONITORING REPORT    INR today:   Results for orders placed or performed in visit on 03/08/21   Protime-INR   Result Value Ref Range    INR 2.50        INR Goal: 2.0-3.0    Dosing Plan  As of 3/8/2021    TTR:  75.4 % (9.1 mo)   Full warfarin instructions:  5 mg every Tue, Sat; 2.5 mg all other days              PLAN: Patient aware to continue current dose and recheck in one week or as ordered. Result sent via [employer].

## 2021-03-11 ENCOUNTER — OFFICE VISIT (OUTPATIENT)
Dept: FAMILY MEDICINE CLINIC | Age: 86
End: 2021-03-11
Payer: MEDICARE

## 2021-03-11 ENCOUNTER — HOSPITAL ENCOUNTER (OUTPATIENT)
Dept: GENERAL RADIOLOGY | Age: 86
Discharge: HOME | End: 2021-03-11
Payer: MEDICARE

## 2021-03-11 ENCOUNTER — HOSPITAL ENCOUNTER (OUTPATIENT)
Dept: VASCULAR LAB | Age: 86
Discharge: HOME | End: 2021-03-11
Payer: MEDICARE

## 2021-03-11 VITALS
DIASTOLIC BLOOD PRESSURE: 64 MMHG | TEMPERATURE: 97.7 F | HEART RATE: 85 BPM | OXYGEN SATURATION: 98 % | SYSTOLIC BLOOD PRESSURE: 128 MMHG

## 2021-03-11 DIAGNOSIS — R53.83 OTHER FATIGUE: ICD-10-CM

## 2021-03-11 DIAGNOSIS — R06.00 DYSPNEA, UNSPECIFIED TYPE: ICD-10-CM

## 2021-03-11 DIAGNOSIS — K21.9 GASTROESOPHAGEAL REFLUX DISEASE WITHOUT ESOPHAGITIS: ICD-10-CM

## 2021-03-11 DIAGNOSIS — I82.5Z2 CHRONIC DEEP VEIN THROMBOSIS (DVT) OF DISTAL VEIN OF LEFT LOWER EXTREMITY (HCC): ICD-10-CM

## 2021-03-11 DIAGNOSIS — I10 ESSENTIAL (PRIMARY) HYPERTENSION: ICD-10-CM

## 2021-03-11 DIAGNOSIS — G40.909 SEIZURE DISORDER (HCC): Chronic | ICD-10-CM

## 2021-03-11 DIAGNOSIS — I10 ESSENTIAL HYPERTENSION: ICD-10-CM

## 2021-03-11 DIAGNOSIS — Z13.220 LIPID SCREENING: ICD-10-CM

## 2021-03-11 DIAGNOSIS — M79.605 LEG PAIN, LEFT: ICD-10-CM

## 2021-03-11 DIAGNOSIS — Z00.00 ANNUAL PHYSICAL EXAM: Primary | ICD-10-CM

## 2021-03-11 DIAGNOSIS — M79.89 LEG SWELLING: ICD-10-CM

## 2021-03-11 DIAGNOSIS — E03.9 ACQUIRED HYPOTHYROIDISM: ICD-10-CM

## 2021-03-11 DIAGNOSIS — I82.4Z2 DEEP VEIN THROMBOSIS (DVT) OF DISTAL VEIN OF LEFT LOWER EXTREMITY, UNSPECIFIED CHRONICITY (HCC): ICD-10-CM

## 2021-03-11 DIAGNOSIS — I26.99 PULMONARY EMBOLISM WITHOUT ACUTE COR PULMONALE, UNSPECIFIED CHRONICITY, UNSPECIFIED PULMONARY EMBOLISM TYPE (HCC): ICD-10-CM

## 2021-03-11 LAB
ALBUMIN SERPL-MCNC: 3.9 G/DL (ref 3.5–5.2)
ALP BLD-CCNC: 108 U/L (ref 35–104)
ALT SERPL-CCNC: 13 U/L (ref 5–33)
ANION GAP SERPL CALCULATED.3IONS-SCNC: 11 MMOL/L (ref 7–19)
AST SERPL-CCNC: 15 U/L (ref 5–32)
BASOPHILS ABSOLUTE: 0 K/UL (ref 0–0.2)
BASOPHILS RELATIVE PERCENT: 0.3 % (ref 0–1)
BILIRUB SERPL-MCNC: <0.2 MG/DL (ref 0.2–1.2)
BUN BLDV-MCNC: 17 MG/DL (ref 8–23)
CALCIUM SERPL-MCNC: 8.2 MG/DL (ref 8.8–10.2)
CHLORIDE BLD-SCNC: 104 MMOL/L (ref 98–111)
CHOLESTEROL, TOTAL: 189 MG/DL (ref 160–199)
CO2: 30 MMOL/L (ref 22–29)
CREAT SERPL-MCNC: 0.7 MG/DL (ref 0.5–0.9)
EOSINOPHILS ABSOLUTE: 0.1 K/UL (ref 0–0.6)
EOSINOPHILS RELATIVE PERCENT: 1.3 % (ref 0–5)
GFR AFRICAN AMERICAN: >59
GFR NON-AFRICAN AMERICAN: >60
GLUCOSE BLD-MCNC: 137 MG/DL (ref 74–109)
HCT VFR BLD CALC: 47.7 % (ref 37–47)
HDLC SERPL-MCNC: 52 MG/DL (ref 65–121)
HEMOGLOBIN: 15.3 G/DL (ref 12–16)
IMMATURE GRANULOCYTES #: 0 K/UL
LDL CHOLESTEROL CALCULATED: 68 MG/DL
LYMPHOCYTES ABSOLUTE: 2 K/UL (ref 1.1–4.5)
LYMPHOCYTES RELATIVE PERCENT: 29.9 % (ref 20–40)
MCH RBC QN AUTO: 30.7 PG (ref 27–31)
MCHC RBC AUTO-ENTMCNC: 32.1 G/DL (ref 33–37)
MCV RBC AUTO: 95.8 FL (ref 81–99)
MONOCYTES ABSOLUTE: 0.6 K/UL (ref 0–0.9)
MONOCYTES RELATIVE PERCENT: 8.6 % (ref 0–10)
NEUTROPHILS ABSOLUTE: 4.1 K/UL (ref 1.5–7.5)
NEUTROPHILS RELATIVE PERCENT: 59.5 % (ref 50–65)
PDW BLD-RTO: 14.3 % (ref 11.5–14.5)
PHENYTOIN LEVEL: 16.6 UG/ML (ref 10–20)
PLATELET # BLD: 272 K/UL (ref 130–400)
PMV BLD AUTO: 9.9 FL (ref 9.4–12.3)
POTASSIUM SERPL-SCNC: 3.4 MMOL/L (ref 3.5–5)
PRO-BNP: 172 PG/ML (ref 0–1800)
RBC # BLD: 4.98 M/UL (ref 4.2–5.4)
SODIUM BLD-SCNC: 145 MMOL/L (ref 136–145)
TOTAL PROTEIN: 7.3 G/DL (ref 6.6–8.7)
TRIGL SERPL-MCNC: 344 MG/DL (ref 0–149)
TSH SERPL DL<=0.05 MIU/L-ACNC: 1.85 UIU/ML (ref 0.27–4.2)
WBC # BLD: 6.8 K/UL (ref 4.8–10.8)

## 2021-03-11 PROCEDURE — 1036F TOBACCO NON-USER: CPT | Performed by: FAMILY MEDICINE

## 2021-03-11 PROCEDURE — 1123F ACP DISCUSS/DSCN MKR DOCD: CPT | Performed by: FAMILY MEDICINE

## 2021-03-11 PROCEDURE — G8484 FLU IMMUNIZE NO ADMIN: HCPCS | Performed by: FAMILY MEDICINE

## 2021-03-11 PROCEDURE — G8427 DOCREV CUR MEDS BY ELIG CLIN: HCPCS | Performed by: FAMILY MEDICINE

## 2021-03-11 PROCEDURE — 4040F PNEUMOC VAC/ADMIN/RCVD: CPT | Performed by: FAMILY MEDICINE

## 2021-03-11 PROCEDURE — 93971 EXTREMITY STUDY: CPT

## 2021-03-11 PROCEDURE — 1090F PRES/ABSN URINE INCON ASSESS: CPT | Performed by: FAMILY MEDICINE

## 2021-03-11 PROCEDURE — 71046 X-RAY EXAM CHEST 2 VIEWS: CPT

## 2021-03-11 PROCEDURE — 99214 OFFICE O/P EST MOD 30 MIN: CPT | Performed by: FAMILY MEDICINE

## 2021-03-11 PROCEDURE — G8417 CALC BMI ABV UP PARAM F/U: HCPCS | Performed by: FAMILY MEDICINE

## 2021-03-11 PROCEDURE — G0439 PPPS, SUBSEQ VISIT: HCPCS | Performed by: FAMILY MEDICINE

## 2021-03-11 RX ORDER — TOLTERODINE 2 MG/1
2 CAPSULE, EXTENDED RELEASE ORAL DAILY
Qty: 30 CAPSULE | Refills: 5 | Status: SHIPPED | OUTPATIENT
Start: 2021-03-11 | End: 2021-06-20 | Stop reason: ALTCHOICE

## 2021-03-11 ASSESSMENT — PATIENT HEALTH QUESTIONNAIRE - PHQ9
1. LITTLE INTEREST OR PLEASURE IN DOING THINGS: 0
SUM OF ALL RESPONSES TO PHQ QUESTIONS 1-9: 0
2. FEELING DOWN, DEPRESSED OR HOPELESS: 0
SUM OF ALL RESPONSES TO PHQ QUESTIONS 1-9: 0
SUM OF ALL RESPONSES TO PHQ9 QUESTIONS 1 & 2: 0

## 2021-03-11 NOTE — PROGRESS NOTES
visit. Allergies   Allergen Reactions    Morphine      Pt becomes mean (refusing, cursing)         Health Maintenance   Topic Date Due    COVID-19 Vaccine (1) Never done    Pneumococcal 65+ years Vaccine (1 of 1 - PPSV23) Never done    Annual Wellness Visit (AWV)  Never done    DTaP/Tdap/Td vaccine (1 - Tdap) 03/11/2022 (Originally 8/18/1950)    Shingles Vaccine (1 of 2) 03/11/2022 (Originally 8/18/1981)    TSH testing  03/11/2022    Potassium monitoring  03/11/2022    Creatinine monitoring  03/11/2022    Flu vaccine  Completed    Hepatitis A vaccine  Aged Out    Hepatitis B vaccine  Aged Out    Hib vaccine  Aged Out    Meningococcal (ACWY) vaccine  Aged Out       Subjective:      Review of Systems   Constitutional: Negative for chills and fever. HENT: Negative for congestion. Respiratory: Positive for cough and shortness of breath. Negative for chest tightness. Cardiovascular: Negative for chest pain, palpitations and leg swelling. Gastrointestinal: Negative for abdominal pain, anal bleeding, constipation, diarrhea and nausea. Genitourinary: Negative for difficulty urinating. Musculoskeletal: Positive for arthralgias and back pain. Psychiatric/Behavioral: Negative. SeeHPI for visit specific review of symptoms. All others negative      Objective:   /64   Pulse 85   Temp 97.7 °F (36.5 °C)   SpO2 98%   Physical Exam    Physical Exam   Constitutional: She appears well-developed. Does not appear ill. Eyes: Pupils are equal, round, and reactive to light. Conjunctiva and Lids normal.  Neck: Normal range of motion. Neck supple. No masses. Neck Symmetric. Normal tracheal position. No thyroid enlargement  Cardiovascular: Normal rate and regular rhythm. Exam reveals no friction rub. Carotid arteries: no bruit observed. No murmur heard. Respiratory:  Effort normal and breath sounds normal. No respiratory distress. No wheezes. No rales.  No use of accessory muscles or intercostal retractions. Abdominal: Soft. Bowel sounds are normal. exhibits no distension. There is no tenderness. There is no rebound and no guarding. Musculoskeletal: exhibits no edema. Normal gait. Neurological: alert. Psychiatric: normal mood and affect. Her behavior is normal. Normal judgement and insight observed.     Recent Results (from the past 672 hour(s))   Protime-INR    Collection Time: 02/24/21 12:00 AM   Result Value Ref Range    INR 1.50    Protime-INR    Collection Time: 03/05/21 12:00 AM   Result Value Ref Range    INR 2.50    Phenytoin Level, Total    Collection Time: 03/11/21  2:58 PM   Result Value Ref Range    Phenytoin Lvl 16.6 10.0 - 20.0 ug/mL   Brain Natriuretic Peptide    Collection Time: 03/11/21  2:58 PM   Result Value Ref Range    Pro- 0 - 1,800 pg/mL   CBC Auto Differential    Collection Time: 03/11/21  2:58 PM   Result Value Ref Range    WBC 6.8 4.8 - 10.8 K/uL    RBC 4.98 4.20 - 5.40 M/uL    Hemoglobin 15.3 12.0 - 16.0 g/dL    Hematocrit 47.7 (H) 37.0 - 47.0 %    MCV 95.8 81.0 - 99.0 fL    MCH 30.7 27.0 - 31.0 pg    MCHC 32.1 (L) 33.0 - 37.0 g/dL    RDW 14.3 11.5 - 14.5 %    Platelets 078 067 - 891 K/uL    MPV 9.9 9.4 - 12.3 fL    Neutrophils % 59.5 50.0 - 65.0 %    Lymphocytes % 29.9 20.0 - 40.0 %    Monocytes % 8.6 0.0 - 10.0 %    Eosinophils % 1.3 0.0 - 5.0 %    Basophils % 0.3 0.0 - 1.0 %    Neutrophils Absolute 4.1 1.5 - 7.5 K/uL    Immature Granulocytes # 0.0 K/uL    Lymphocytes Absolute 2.0 1.1 - 4.5 K/uL    Monocytes Absolute 0.60 0.00 - 0.90 K/uL    Eosinophils Absolute 0.10 0.00 - 0.60 K/uL    Basophils Absolute 0.00 0.00 - 0.20 K/uL   Lipid Panel    Collection Time: 03/11/21  2:58 PM   Result Value Ref Range    Cholesterol, Total 189 160 - 199 mg/dL    Triglycerides 344 (H) 0 - 149 mg/dL    HDL 52 (L) 65 - 121 mg/dL    LDL Calculated 68 <100 mg/dL   Comprehensive Metabolic Panel    Collection Time: 03/11/21  2:58 PM   Result Value Ref Range    Sodium 145 136 - 145 mmol/L    Potassium 3.4 (L) 3.5 - 5.0 mmol/L    Chloride 104 98 - 111 mmol/L    CO2 30 (H) 22 - 29 mmol/L    Anion Gap 11 7 - 19 mmol/L    Glucose 137 (H) 74 - 109 mg/dL    BUN 17 8 - 23 mg/dL    CREATININE 0.7 0.5 - 0.9 mg/dL    GFR Non-African American >60 >60    GFR African American >59 >59    Calcium 8.2 (L) 8.8 - 10.2 mg/dL    Total Protein 7.3 6.6 - 8.7 g/dL    Albumin 3.9 3.5 - 5.2 g/dL    Total Bilirubin <0.2 0.2 - 1.2 mg/dL    Alkaline Phosphatase 108 (H) 35 - 104 U/L    ALT 13 5 - 33 U/L    AST 15 5 - 32 U/L   TSH without Reflex    Collection Time: 03/11/21  2:58 PM   Result Value Ref Range    TSH 1.850 0.270 - 4.200 uIU/mL               Assessment & Plan: The following diagnoses and conditions are stable with no further orders unless indicated:  1. Annual physical exam  Completed annual wellness today    2. Essential (primary) hypertension  BP Readings from Last 3 Encounters:   03/11/21 128/64   10/15/20 (!) 170/85   10/12/20 136/72     Stable    3. 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 Pulmonary embolism without acute cor pulmonale, unspecified chronicity, unspecified pulmonary embolism type ([facility] [address]  Continue with Coumadin    5. Essential hypertension  BP Readings from Last 3 Encounters:   03/11/21 128/64   10/15/20 (!) 170/85   10/12/20 136/72     Stable    6. Acquired hypothyroidism  Lab Results   Component Value Date    TSH 1.850 03/11/2021    T4FREE 1.1 10/04/2019     Stable  - TSH without Reflex; Future    7. Gastroesophageal reflux disease without esophagitis  Stable    8. Seizure disorder (HCC)  Stable  - Phenytoin Level, Total; Future    9. Dyspnea, unspecified type  BNP normal  Chest x-ray shows no acute changes  Suggested she suffered from chronic bronchitis will place on Brio Ellipta 1 puff once a day  - Brain Natriuretic Peptide; Future  - XR CHEST (2 VW); Future    10. Other fatigue    - Comprehensive Metabolic Panel;  Future  - CBC Auto Differential; Future    11. Lipid screening  Lab Results   Component Value Date    CHOL 189 03/11/2021    CHOL 166 10/04/2019     Lab Results   Component Value Date    TRIG 344 (H) 03/11/2021    TRIG 131 10/04/2019     Lab Results   Component Value Date    HDL 52 (L) 03/11/2021    HDL 58 (L) 10/04/2019     Lab Results   Component Value Date    LDLCALC 68 03/11/2021    LDLCALC 82 10/04/2019     No results found for: LABVLDL, VLDL  No results found for: CHOLHDLRATIO  Stable stable  - Lipid Panel; Future            Return in about 3 months (around 6/11/2021) for Routine follow up - 15 minute visit. Discussed use, benefit, and side effects of prescribed medications. All patient questions answered. Pt voiced understanding. Reviewed health maintenance. Instructedto continue current medications, diet and exercise. Patient agreed with treatmentplan. Follow up as directed. Note dictated using Dragon Dictation software  Sometimes this dictation software makes erroneous transcriptions.

## 2021-03-11 NOTE — PROGRESS NOTES
Medicare Annual Wellness Visit - Subsequent    Name: Alka Alaniz Date: 3/11/2021   MRN: [id number] Sex: Female   Age: 80 y.o. Ethnicity: Non-/Non    : 1931 Race: Barbi Sheth is here for   Chief Complaint   Patient presents with   [facility]        Screenings for behavioral, psychosocial and functional/safety risks, and cognitive dysfunction are all negative except as indicated below. These results, as well as other patient data from the [address] form, are documented in Flowsheets linked to this Encounter. Allergies   Allergen Reactions    Morphine      Pt becomes mean (refusing, cursing)         Prior to Visit Medications    Medication Sig Taking?  Authorizing Provider   amLODIPine (NORVASC) 5 MG tablet Take 1 tablet by mouth daily Yes Parish Pearl MD   levothyroxine (SYNTHROID) 175 MCG tablet Take 1 tablet by mouth Daily Yes Parish Pearl MD   metoprolol succinate (TOPROL XL) 50 MG extended release tablet Take 1 tablet by mouth daily Yes Parish Pearl MD   pantoprazole (PROTONIX) 20 MG tablet Take 1 tablet by mouth daily Yes Parish Pearl MD   phenytoin (DILANTIN) 100 MG ER capsule Take 2 capsules by mouth 2 times daily Yes Parish Pearl MD   sertraline (ZOLOFT) 100 MG tablet Take 1 tablet by mouth daily Yes Parish Pearl MD   warfarin (COUMADIN) 5 MG tablet Take 1 tablet by mouth daily Yes Parish Pearl MD   hydroCHLOROthiazide (MICROZIDE) 12.5 MG capsule Take 1 capsule by mouth every morning  Patient not taking: Reported on 3/11/2021  Parish Pearl MD       Past Medical History:   Diagnosis Date    Arthritis     Back pain     bulging disc    CAD (coronary artery disease)     BMS to RCA    Cerebral artery occlusion with cerebral infarction ([facility] [address]     small strokes,balance issues    Cystitis     Depression     Dizziness     Dysuria     Edema     ankles,mild pedal edema    Falls frequently     H/O blood clots left leg and lungs    Head injury     Headache     Heartburn     [place] (hard of hearing)     Hyperlipidemia     Hypertension     Hypothyroidism     Hypothyroidism     Memory problem     Neuropathy     Shingles     TIA (transient ischemic attack)     Urinary incontinence     UTI (urinary tract infection)        Past Surgical History:   Procedure Laterality Date    APPENDECTOMY  1947    CHOLECYSTECTOMY      EYE SURGERY  2009    bilateral cataract     HYSTERECTOMY, TOTAL ABDOMINAL      JOINT REPLACEMENT      Bilateral Knee    KNEE ARTHROPLASTY Right     LOBECTOMY  10/2015    sx--bilateral    PTCA  2002    with stent placement/bms to rca       Family History   Problem Relation Age of Onset    Diabetes Mother     Hypertension Mother    Buren Neer Rheum Arthritis Mother     Stroke Mother     Tuberculosis Father        CareTeam (Including outside providers/suppliers regularly involved in providing care):   Patient Care Team:  Nathan Verma MD as PCP - General (Family Medicine)  Nathan Verma MD as PCP - [facility] Empaneled Provider  VIN Lott as Advanced Practice Nurse (Neurology)  Romy Ortega MD as Consulting Physician (Vascular Surgery)    Wt Readings from Last 3 Encounters:   10/15/20 195 lb (88.5 kg)   09/28/20 199 lb (90.3 kg)   09/16/20 199 lb (90.3 kg)     Vitals:    03/11/21 1349   BP: 128/64   Pulse: 85   Temp: 97.7 °F (36.5 °C)   SpO2: 98%           The following problems were reviewed today and where indicated follow up appointments were made and/or referrals ordered. Risk Factor Screenings with Interventions     Fall Risk:  Timed Up and Go Test > 12 seconds?  (Complete if either Fall Risk answers are Yes): (!) yes  2 or more falls in past year?: no  Fall with injury in past year?: (!) yes  Fall Risk Interventions:    · Provided home safety tips handout  ·     Depression:  PHQ-2 Score: 0  Depression Interventions:  · negative     Anxiety:     Anxiety Interventions:  · Not indicated     Cognitive:     Cognitive Impairment Interventions:  · Not indicated     Substance Abuse:  Social History     Socioeconomic History    Marital status:      Spouse name: Not on file    Number of children: Not on file    Years of education: Not on file    Highest education level: Not on file   Occupational History    Not on file   Social Needs    Financial resource strain: Not on file    Food insecurity     Worry: Not on file     Inability: Not on file    Transportation needs     Medical: Not on file     Non-medical: Not on file   Tobacco Use    Smoking status: Never Smoker    Smokeless tobacco: Never Used   Substance and Sexual Activity    Alcohol use: Never     Frequency: Never    Drug use: Never    Sexual activity: Not on file   Lifestyle    Physical activity     Days per week: Not on file     Minutes per session: Not on file    Stress: Not on file   Relationships    Social connections     Talks on phone: Not on file     Gets together: Not on file     Attends Voodoo service: Not on file     Active member of club or organization: Not on file     Attends meetings of clubs or organizations: Not on file     Relationship status: Not on file    Intimate partner violence     Fear of current or ex partner: Not on file     Emotionally abused: Not on file     Physically abused: Not on file     Forced sexual activity: Not on file   Other Topics Concern    Not on file   Social History Narrative    Not on file     Audit Questionnaire: Screen for Alcohol Misuse  How often do you have a drink containing alcohol?: Never  Substance Abuse Interventions:  · Not indicated     Health Risk Assessment:     General  In general, how would you say your health is?: Excellent  In the past 7 days, have you experienced any of the following?  New or Increased Pain, New or Increased Fatigue, Loneliness, Social Isolation, Stress or Anger?: None of These  Do you get the social and emotional support that you need?: Yes  Do you have a Living Will?: Yes  General Health Risk Interventions:  · Not indicated     Health Habits/Nutrition  Do you exercise for at least 20 minutes 2-3 times per week?: (!) No  Have you lost any weight without trying in the past 3 months?: No  Do you eat only one meal per day?: No  Have you seen the dentist within the past year?: (!) No  There is no height or weight on file to calculate BMI. Health Habits/Nutrition Interventions:  · Not indicated     Hearing/Vision  Do you or your family notice any trouble with your hearing that hasn't been managed with hearing aids?: (!) Yes  Do you have difficulty driving, watching TV, or doing any of your daily activities because of your eyesight?: (!) Yes  Have you had an eye exam within the past year?: (!) No  Hearing/Vision Interventions:  · Offered referral to audiology. She will consider and call me back. · Recommend she see an eye specialist    Safety  Do you have working smoke detectors?: Yes  Have all throw rugs been removed or fastened?: Yes  Do you have non-slip mats or surfaces in all bathtubs/showers?: Yes  Do all of your stairways have a railing or banister?: (!) No  Are your doorways, halls and stairs free of clutter?: Yes  Do you always fasten your seatbelt when you are in a car?: Yes  Safety Interventions:  · Home safety tips provided    ADLs  In the past 7 days, did you need help from others to perform any of the following everyday activities?  Eating, dressing, grooming, bathing, toileting, or walking/balance?: (!) Walking/Balance  ADL Interventions:  · Provided home safety tips handout  ·     Personalized Preventive Plan   Current Health Maintenance Status  Immunization History   Administered Date(s) Administered    Influenza, Quadv, adjuvanted, 65 yrs +, IM, PF (Fluad) 09/16/2020    Influenza, Triv, inactivated, subunit, adjuvanted, IM (Fluad 65 yrs and older) 09/27/2019        Health Maintenance   Topic Date Due    COVID-19 Vaccine (1 of 2) Never done    Pneumococcal 65+ years Vaccine (1 of 1 - PPSV23) Never done   [employer] Visit (AWV)  Never done    TSH testing  10/04/2020    DTaP/Tdap/Td vaccine (1 - Tdap) 03/11/2022 (Originally 8/18/1950)    Shingles Vaccine (1 of 2) 03/11/2022 (Originally 8/18/1981)    Potassium monitoring  10/15/2021    Creatinine monitoring  10/15/2021    Flu vaccine  Completed    Hepatitis A vaccine  Aged Out    Hepatitis B vaccine  Aged Out    Hib vaccine  Aged Out    Meningococcal (ACWY) vaccine  Aged Out       Recommendations for [employer] [place] Due: see orders.   Recommended screening schedule for the next 5-10 years is provided to the patient in written form: see Patient Instructions/AVS.

## 2021-03-12 ENCOUNTER — VIRTUAL VISIT (OUTPATIENT)
Dept: VASCULAR SURGERY | Age: 86
End: 2021-03-12
Payer: MEDICARE

## 2021-03-12 DIAGNOSIS — M79.89 LEG SWELLING: ICD-10-CM

## 2021-03-12 DIAGNOSIS — M79.605 LEG PAIN, LEFT: Primary | ICD-10-CM

## 2021-03-12 PROCEDURE — 99442 PR PHYS/QHP TELEPHONE EVALUATION 11-20 MIN: CPT | Performed by: NURSE PRACTITIONER

## 2021-03-12 NOTE — PROGRESS NOTES
Guy Gary is a 80 y.o. female evaluated via telephone on 3/12/2021. Guy Gary (:  1931) is a 80 y.o. female,Established patient, here for evaluation of the following chief complaint(s):     Consent:  She and/or health care decision maker is aware that that she may receive a bill for this telephone service, depending on her insurance coverage, and has provided verbal consent to proceed: Yes    Patient is located at home  Provider is located at [facility]   Also present during call is niece    She presents for follow-up of known DVT. She has no known history of DVT. Her current treatment includes warfarin. She does not have a family history of hypercoagulability. Risk factors for hypercoagulable state include: none known. She does not have an IVC filter. She has symptoms involving   left leg. Symptoms include pain, swelling Onset was abrupt 1 year ago. These symptoms have been unchanged. There has been 1 episode. .    Differential diagnosis includes but is not limited to CHF, thyroid disease, venous disease, DVT, SVT, peripheral vascular disease.           Guy Gary is a 80 y.o. female with the following history reviewed and recorded in [facility]:  Patient Active Problem List    Diagnosis Date Noted    Leg pain, left 2020    Leg swelling 2020    Deep vein thrombosis (DVT) of left lower extremity ([facility] [address] 2020    Pulmonary embolism without acute cor pulmonale ([facility] [address] 2020    Seizure disorder ([facility] [address] 10/02/2019    Compression fx, thoracic spine, closed, initial encounter ([facility] [address] 2019    History of cardioembolic cerebrovascular accident (CVA) 2019    Essential hypertension 2019    Acquired hypothyroidism 2019     Current Outpatient Medications   Medication Sig Dispense Refill    tolterodine (DETROL LA) 2 MG extended release capsule Take 1 capsule by mouth daily 30 capsule 5    amLODIPine (NORVASC) 5 MG tablet Take 1 tablet by mouth daily 90 tablet 3    levothyroxine (SYNTHROID) 175 MCG tablet Take 1 tablet by mouth Daily 90 tablet 3    metoprolol succinate (TOPROL XL) 50 MG extended release tablet Take 1 tablet by mouth daily 90 tablet 3    pantoprazole (PROTONIX) 20 MG tablet Take 1 tablet by mouth daily 90 tablet 3    phenytoin (DILANTIN) 100 MG ER capsule Take 2 capsules by mouth 2 times daily 180 capsule 3    sertraline (ZOLOFT) 100 MG tablet Take 1 tablet by mouth daily 90 tablet 3    warfarin (COUMADIN) 5 MG tablet Take 1 tablet by mouth daily 90 tablet 3     No current facility-administered medications for this visit.       Allergies: Morphine  Past Medical History:   Diagnosis Date    Arthritis     Back pain     bulging disc    CAD (coronary artery disease)     BMS to RCA    Cerebral artery occlusion with cerebral infarction (HCC)     small strokes,balance issues    Cystitis     Depression     Dizziness     Dysuria     Edema     ankles,mild pedal edema    Falls frequently     H/O blood clots     left leg and lungs    Head injury     Headache     Heartburn     [place] (hard of hearing)     Hyperlipidemia     Hypertension     Hypothyroidism     Hypothyroidism     Memory problem     Neuropathy     Shingles     TIA (transient ischemic attack)     Urinary incontinence     UTI (urinary tract infection)      Past Surgical History:   Procedure Laterality Date    APPENDECTOMY  1947    CHOLECYSTECTOMY      EYE SURGERY  2009    bilateral cataract     HYSTERECTOMY, TOTAL ABDOMINAL      JOINT REPLACEMENT      Bilateral Knee    KNEE ARTHROPLASTY Right     LOBECTOMY  10/2015    sx--bilateral    PTCA  2002    with stent placement/bms to rca     Family History   Problem Relation Age of Onset    Diabetes Mother     Hypertension Mother    [facility] Rheum Arthritis Mother     Stroke Mother     Tuberculosis Father      Social History     Tobacco Use    Smoking status: Never Smoker    Smokeless tobacco: Never Used   Substance Use Topics    Alcohol use: Never     Frequency: Never       Patient-Reported Vitals 2/19/2021   Patient-Reported Weight 195#   Patient-Reported Height 5 7   Patient-Reported Systolic 901   Patient-Reported Diastolic 97   Patient-Reported Pulse 82   Patient-Reported Temperature 97.9   Patient-Reported SpO2 98       Review of Systems      Eyes  no sudden vision change or amaurosis. Respiratory  no significant shortness of breath, wheezing, or stridor. No cough,  Cardiovascular  no chest pain, syncope, or significant dizziness. has significant leg swelling.  has not had claudication. Skin   has not had new wound. Neurologic   No speech difficulty or lateralizing weakness. Psychiatric  no severe anxiety or nervousness. No confusion. All other review of systems are negative. PHYSICAL EXAMINATION:    [ INSTRUCTIONS:  \"[x]\" Indicates a positive item  \"[]\" Indicates a negative item  -- DELETE ALL ITEMS NOT EXAMINED]    [x] Alert  [x] Oriented to person/place/time    [x] No apparent distress  [] Toxic appearing  [x] Normal Mood  [] Anxious appearing    [] Depressed appearing  [] Confused appearing      [] Poor short term memory  [] Poor long term memory   Memory appears to be intact       Venous scan -   The exam is technically limited due to edema; therefore the left femoral,    posterior tibial, and peroneal veins were not well visualized.    There is no evidence of deep venous thrombosis (DVT) in the left lower    extremity.   Goyo De Jesusck is no evidence of superficial thrombophlebitis of the left lower    extremity. Individual images were reviewed. Results were reviewed with the patient. Assessment    1. Leg pain, left    2. Leg swelling          Plan    1. Leg pain, left  -     VL Extremity Venous Left; Future  2. Leg swelling  -     VL Extremity Venous Left;  Future    Support hose 20-30 mm hg  Recommend support hose fritz  Because previous clot was in femoral vein and cant be seen on this study and she has severe swelling, we feel it is best for her to continue her coumadin for at least another 3 months. Strongly encouraged start/continue statin therapy  Recommended no smoking    Documentation:  I communicated with the patient and/or health care decision maker about dvt. Details of this discussion including any medical advice provided: as above      I affirm this is a Patient Initiated Episode with an Established Patient who has not had a related appointment within my department in the past 7 days or scheduled within the next 24 hours. Note: not billable if this call serves to triage the patient into an appointment for the relevant concern      Torey Guillen         On this date 3/12/2021 I have spent 11-20 minutes reviewing previous notes, test results and face to face with the patient discussing the diagnosis and importance of compliance with the treatment plan as well as documenting on the day of the visit. Mayra Tapia is a 80 y.o. female being evaluated by a Virtual Visit (video visit) encounter to address concerns as mentioned above. A caregiver was present when appropriate. Due to this being a TeleHealth encounter (During [facility] public health emergency), evaluation of the following organ systems was limited: Vitals/Constitutional/EENT/Resp/CV/GI//MS/Neuro/Skin/Heme-Lymph-Imm. Pursuant to the emergency declaration under the [address] [place] [address], [address] [place] [address] authority and the [employer] and [employer] Act, this Virtual Visit was conducted with patient's (and/or legal guardian's) consent, to reduce the patient's risk of exposure to COVID-19 and provide necessary medical care. The patient (and/or legal guardian) has also been advised to contact this office for worsening conditions or problems, and seek emergency medical treatment and/or call 911 if deemed necessary.      Patient identification was verified at the start

## 2021-03-13 ASSESSMENT — ENCOUNTER SYMPTOMS
ANAL BLEEDING: 0
CHEST TIGHTNESS: 0
ABDOMINAL PAIN: 0
BACK PAIN: 1
DIARRHEA: 0
COUGH: 1
NAUSEA: 0
SHORTNESS OF BREATH: 1
CONSTIPATION: 0

## 2021-03-22 ENCOUNTER — ANTI-COAG VISIT (OUTPATIENT)
Dept: FAMILY MEDICINE CLINIC | Age: 86
End: 2021-03-22

## 2021-03-22 ENCOUNTER — ANTI-COAG VISIT (OUTPATIENT)
Dept: PRIMARY CARE CLINIC | Age: 86
End: 2021-03-22
Payer: MEDICARE

## 2021-03-22 DIAGNOSIS — I82.402 DEEP VEIN THROMBOSIS (DVT) OF LEFT LOWER EXTREMITY, UNSPECIFIED CHRONICITY, UNSPECIFIED VEIN (HCC): ICD-10-CM

## 2021-03-22 DIAGNOSIS — I26.99 OTHER PULMONARY EMBOLISM WITHOUT ACUTE COR PULMONALE, UNSPECIFIED CHRONICITY (HCC): ICD-10-CM

## 2021-03-22 LAB
INR BLD: 2.3
INR BLD: 2.3

## 2021-03-22 PROCEDURE — 93793 ANTICOAG MGMT PT WARFARIN: CPT | Performed by: FAMILY MEDICINE

## 2021-03-22 NOTE — PROGRESS NOTES
This message is being sent by Carlos Stevens on behalf of [facility] FOR COGNITIVE DISORDERS.     INR is 2.3 today 03/22/2021. HOME MONITORING REPORT    INR today:   Results for orders placed or performed in visit on 03/22/21   Protime-INR   Result Value Ref Range    INR 2.30        INR Goal: 2.0-3.0    Dosing Plan  As of 3/22/2021    TTR:  76.8 % (9.7 mo)   Full warfarin instructions:  5 mg every Tue, Sat; 2.5 mg all other days              PLAN: Patient aware to continue current dose and recheck in one week or as ordered.

## 2021-03-23 ENCOUNTER — OFFICE VISIT (OUTPATIENT)
Dept: FAMILY MEDICINE CLINIC | Age: 86
End: 2021-03-23
Payer: MEDICARE

## 2021-03-23 VITALS
BODY MASS INDEX: 30.61 KG/M2 | RESPIRATION RATE: 16 BRPM | TEMPERATURE: 97 F | HEIGHT: 67 IN | OXYGEN SATURATION: 95 % | DIASTOLIC BLOOD PRESSURE: 80 MMHG | HEART RATE: 70 BPM | SYSTOLIC BLOOD PRESSURE: 126 MMHG | WEIGHT: 195 LBS

## 2021-03-23 DIAGNOSIS — R82.90 FOUL SMELLING URINE: Primary | ICD-10-CM

## 2021-03-23 DIAGNOSIS — J44.9 CHRONIC OBSTRUCTIVE PULMONARY DISEASE, UNSPECIFIED COPD TYPE (HCC): ICD-10-CM

## 2021-03-23 DIAGNOSIS — R35.0 INCREASED URINARY FREQUENCY: ICD-10-CM

## 2021-03-23 DIAGNOSIS — N30.00 ACUTE CYSTITIS WITHOUT HEMATURIA: ICD-10-CM

## 2021-03-23 DIAGNOSIS — R82.90 FOUL SMELLING URINE: ICD-10-CM

## 2021-03-23 DIAGNOSIS — I10 ESSENTIAL HYPERTENSION: Chronic | ICD-10-CM

## 2021-03-23 LAB
APPEARANCE FLUID: ABNORMAL
BILIRUBIN, POC: ABNORMAL
BLOOD URINE, POC: ABNORMAL
CLARITY, POC: ABNORMAL
COLOR, POC: ABNORMAL
GLUCOSE URINE, POC: ABNORMAL
KETONES, POC: ABNORMAL
LEUKOCYTE EST, POC: ABNORMAL
NITRITE, POC: ABNORMAL
PH, POC: 6
PROTEIN, POC: ABNORMAL
SPECIFIC GRAVITY, POC: 1.01
UROBILINOGEN, POC: 0.2

## 2021-03-23 PROCEDURE — 3023F SPIROM DOC REV: CPT | Performed by: NURSE PRACTITIONER

## 2021-03-23 PROCEDURE — 1090F PRES/ABSN URINE INCON ASSESS: CPT | Performed by: NURSE PRACTITIONER

## 2021-03-23 PROCEDURE — 4040F PNEUMOC VAC/ADMIN/RCVD: CPT | Performed by: NURSE PRACTITIONER

## 2021-03-23 PROCEDURE — G8484 FLU IMMUNIZE NO ADMIN: HCPCS | Performed by: NURSE PRACTITIONER

## 2021-03-23 PROCEDURE — G8427 DOCREV CUR MEDS BY ELIG CLIN: HCPCS | Performed by: NURSE PRACTITIONER

## 2021-03-23 PROCEDURE — 99213 OFFICE O/P EST LOW 20 MIN: CPT | Performed by: NURSE PRACTITIONER

## 2021-03-23 PROCEDURE — 1123F ACP DISCUSS/DSCN MKR DOCD: CPT | Performed by: NURSE PRACTITIONER

## 2021-03-23 PROCEDURE — G8417 CALC BMI ABV UP PARAM F/U: HCPCS | Performed by: NURSE PRACTITIONER

## 2021-03-23 PROCEDURE — G8926 SPIRO NO PERF OR DOC: HCPCS | Performed by: NURSE PRACTITIONER

## 2021-03-23 PROCEDURE — 1036F TOBACCO NON-USER: CPT | Performed by: NURSE PRACTITIONER

## 2021-03-23 PROCEDURE — 81002 URINALYSIS NONAUTO W/O SCOPE: CPT | Performed by: NURSE PRACTITIONER

## 2021-03-23 RX ORDER — NITROFURANTOIN 25; 75 MG/1; MG/1
100 CAPSULE ORAL 2 TIMES DAILY
Qty: 14 CAPSULE | Refills: 0 | Status: SHIPPED | OUTPATIENT
Start: 2021-03-23 | End: 2021-03-30

## 2021-03-23 ASSESSMENT — ENCOUNTER SYMPTOMS
DIARRHEA: 0
COUGH: 1
SHORTNESS OF BREATH: 0
CHEST TIGHTNESS: 0
WHEEZING: 0
NAUSEA: 0
SORE THROAT: 0
ABDOMINAL PAIN: 0

## 2021-03-23 NOTE — PROGRESS NOTES
Manuel Malik is a 80 y.o. female who presents today for  Chief Complaint   Patient presents with    Urinary Tract Infection       HPI:  She has had foul smelling urine for several days. Also increased urinary frequency. No fever or chills. No dysuria. No n/v.     BP has been stable on current meds. She started breo a couple of weeks ago for copd. She does feel like the cough has improved slightly. No wheeze or sob. She is taking mucinex at night. Review of Systems   Constitutional: Negative for chills and fever. HENT: Negative for congestion, ear pain and sore throat. Respiratory: Positive for cough. Negative for chest tightness, shortness of breath and wheezing. Cardiovascular: Negative for chest pain and palpitations. Gastrointestinal: Negative for abdominal pain, diarrhea and nausea. Genitourinary: Positive for frequency. Musculoskeletal: Negative for arthralgias and myalgias. Skin: Negative for rash.        Past Medical History:   Diagnosis Date    Arthritis     Back pain     bulging disc    CAD (coronary artery disease)     BMS to RCA    Cerebral artery occlusion with cerebral infarction (HCC)     small strokes,balance issues    Cystitis     Depression     Dizziness     Dysuria     Edema     ankles,mild pedal edema    Falls frequently     H/O blood clots     left leg and lungs    Head injury     Headache     Heartburn     [place] (hard of hearing)     Hyperlipidemia     Hypertension     Hypothyroidism     Hypothyroidism     Memory problem     Neuropathy     Shingles     TIA (transient ischemic attack)     Urinary incontinence     UTI (urinary tract infection)        Current Outpatient Medications   Medication Sig Dispense Refill    nitrofurantoin, macrocrystal-monohydrate, (MACROBID) 100 MG capsule Take 1 capsule by mouth 2 times daily for 7 days 14 capsule 0    Fluticasone furoate-vilanterol (BREO ELLIPTA) 200-25 MCG/INH AEPB inhaler Inhale 1 puff into

## 2021-03-25 LAB
ORGANISM: ABNORMAL
ORGANISM: ABNORMAL
URINE CULTURE, ROUTINE: ABNORMAL

## 2021-04-08 ENCOUNTER — APPOINTMENT (OUTPATIENT)
Dept: VACCINE CLINIC | Facility: HOSPITAL | Age: 86
End: 2021-04-08

## 2021-04-10 ENCOUNTER — APPOINTMENT (OUTPATIENT)
Dept: GENERAL RADIOLOGY | Age: 86
DRG: 690 | End: 2021-04-10
Payer: MEDICARE

## 2021-04-10 ENCOUNTER — HOSPITAL ENCOUNTER (INPATIENT)
Age: 86
LOS: 3 days | Discharge: NURSING, REHAB OR LONG-TERM CARE | DRG: 690 | End: 2021-04-13
Attending: EMERGENCY MEDICINE | Admitting: FAMILY MEDICINE
Payer: MEDICARE

## 2021-04-10 DIAGNOSIS — Z86.711 HISTORY OF PULMONARY EMBOLUS (PE): ICD-10-CM

## 2021-04-10 DIAGNOSIS — R26.2 DIFFICULTY IN WALKING: ICD-10-CM

## 2021-04-10 DIAGNOSIS — R11.2 NON-INTRACTABLE VOMITING WITH NAUSEA, UNSPECIFIED VOMITING TYPE: ICD-10-CM

## 2021-04-10 DIAGNOSIS — Z79.01 ON CONTINUOUS ORAL ANTICOAGULATION: ICD-10-CM

## 2021-04-10 DIAGNOSIS — R53.1 GENERAL WEAKNESS: Primary | ICD-10-CM

## 2021-04-10 DIAGNOSIS — N30.00 ACUTE CYSTITIS WITHOUT HEMATURIA: ICD-10-CM

## 2021-04-10 DIAGNOSIS — J96.11 CHRONIC RESPIRATORY FAILURE WITH HYPOXIA (HCC): ICD-10-CM

## 2021-04-10 DIAGNOSIS — Z87.440 HISTORY OF RECURRENT UTIS: ICD-10-CM

## 2021-04-10 PROBLEM — N39.0 COMPLICATED URINARY TRACT INFECTION: Status: ACTIVE | Noted: 2021-04-10

## 2021-04-10 PROBLEM — E83.51 HYPOCALCEMIA: Status: ACTIVE | Noted: 2021-04-10

## 2021-04-10 PROBLEM — J20.9 ACUTE BRONCHITIS: Status: ACTIVE | Noted: 2021-04-10

## 2021-04-10 LAB
ALBUMIN SERPL-MCNC: 3.8 G/DL (ref 3.5–5.2)
ALP BLD-CCNC: 101 U/L (ref 35–104)
ALT SERPL-CCNC: 12 U/L (ref 5–33)
ANION GAP SERPL CALCULATED.3IONS-SCNC: 9 MMOL/L (ref 7–19)
APTT: 36.3 SEC (ref 26–36.2)
AST SERPL-CCNC: 15 U/L (ref 5–32)
BACTERIA: ABNORMAL /HPF
BASOPHILS ABSOLUTE: 0 K/UL (ref 0–0.2)
BASOPHILS RELATIVE PERCENT: 0.2 % (ref 0–1)
BILIRUB SERPL-MCNC: 0.5 MG/DL (ref 0.2–1.2)
BILIRUBIN URINE: NEGATIVE
BLOOD, URINE: ABNORMAL
BUN BLDV-MCNC: 9 MG/DL (ref 8–23)
CALCIUM SERPL-MCNC: 8.4 MG/DL (ref 8.8–10.2)
CHLORIDE BLD-SCNC: 101 MMOL/L (ref 98–111)
CLARITY: ABNORMAL
CO2: 29 MMOL/L (ref 22–29)
COLOR: YELLOW
CREAT SERPL-MCNC: 0.8 MG/DL (ref 0.5–0.9)
CRYSTALS, UA: ABNORMAL /HPF
EOSINOPHILS ABSOLUTE: 0 K/UL (ref 0–0.6)
EOSINOPHILS RELATIVE PERCENT: 0.2 % (ref 0–5)
EPITHELIAL CELLS, UA: 0 /HPF (ref 0–5)
GFR AFRICAN AMERICAN: >59
GFR NON-AFRICAN AMERICAN: >60
GLUCOSE BLD-MCNC: 147 MG/DL (ref 74–109)
GLUCOSE URINE: NEGATIVE MG/DL
HCT VFR BLD CALC: 46.6 % (ref 37–47)
HEMOGLOBIN: 15.1 G/DL (ref 12–16)
HYALINE CASTS: 4 /HPF (ref 0–8)
IMMATURE GRANULOCYTES #: 0.1 K/UL
INR BLD: 2.37 (ref 0.88–1.18)
KETONES, URINE: NEGATIVE MG/DL
LACTIC ACID: 1.1 MMOL/L (ref 0.5–1.9)
LEUKOCYTE ESTERASE, URINE: ABNORMAL
LYMPHOCYTES ABSOLUTE: 1.6 K/UL (ref 1.1–4.5)
LYMPHOCYTES RELATIVE PERCENT: 12.6 % (ref 20–40)
MCH RBC QN AUTO: 31.2 PG (ref 27–31)
MCHC RBC AUTO-ENTMCNC: 32.4 G/DL (ref 33–37)
MCV RBC AUTO: 96.3 FL (ref 81–99)
MONOCYTES ABSOLUTE: 0.8 K/UL (ref 0–0.9)
MONOCYTES RELATIVE PERCENT: 6.5 % (ref 0–10)
NEUTROPHILS ABSOLUTE: 9.9 K/UL (ref 1.5–7.5)
NEUTROPHILS RELATIVE PERCENT: 80 % (ref 50–65)
NITRITE, URINE: NEGATIVE
PDW BLD-RTO: 14.1 % (ref 11.5–14.5)
PH UA: 8 (ref 5–8)
PLATELET # BLD: 258 K/UL (ref 130–400)
PMV BLD AUTO: 9.6 FL (ref 9.4–12.3)
POTASSIUM REFLEX MAGNESIUM: 4.2 MMOL/L (ref 3.5–5)
PROTEIN UA: 30 MG/DL
PROTHROMBIN TIME: 26.4 SEC (ref 12–14.6)
RBC # BLD: 4.84 M/UL (ref 4.2–5.4)
RBC UA: 3 /HPF (ref 0–4)
SARS-COV-2, NAAT: NOT DETECTED
SODIUM BLD-SCNC: 139 MMOL/L (ref 136–145)
SPECIFIC GRAVITY UA: 1.01 (ref 1–1.03)
TOTAL PROTEIN: 7.4 G/DL (ref 6.6–8.7)
UROBILINOGEN, URINE: 1 E.U./DL
WBC # BLD: 12.4 K/UL (ref 4.8–10.8)
WBC UA: 339 /HPF (ref 0–5)

## 2021-04-10 PROCEDURE — 36415 COLL VENOUS BLD VENIPUNCTURE: CPT

## 2021-04-10 PROCEDURE — 2580000003 HC RX 258: Performed by: FAMILY MEDICINE

## 2021-04-10 PROCEDURE — 87635 SARS-COV-2 COVID-19 AMP PRB: CPT

## 2021-04-10 PROCEDURE — 99283 EMERGENCY DEPT VISIT LOW MDM: CPT

## 2021-04-10 PROCEDURE — 85730 THROMBOPLASTIN TIME PARTIAL: CPT

## 2021-04-10 PROCEDURE — 87186 SC STD MICRODIL/AGAR DIL: CPT

## 2021-04-10 PROCEDURE — 96375 TX/PRO/DX INJ NEW DRUG ADDON: CPT

## 2021-04-10 PROCEDURE — 2700000000 HC OXYGEN THERAPY PER DAY

## 2021-04-10 PROCEDURE — 80053 COMPREHEN METABOLIC PANEL: CPT

## 2021-04-10 PROCEDURE — 2580000003 HC RX 258: Performed by: EMERGENCY MEDICINE

## 2021-04-10 PROCEDURE — 6360000002 HC RX W HCPCS: Performed by: EMERGENCY MEDICINE

## 2021-04-10 PROCEDURE — 85025 COMPLETE CBC W/AUTO DIFF WBC: CPT

## 2021-04-10 PROCEDURE — 96366 THER/PROPH/DIAG IV INF ADDON: CPT

## 2021-04-10 PROCEDURE — 85610 PROTHROMBIN TIME: CPT

## 2021-04-10 PROCEDURE — 83605 ASSAY OF LACTIC ACID: CPT

## 2021-04-10 PROCEDURE — 6360000002 HC RX W HCPCS: Performed by: FAMILY MEDICINE

## 2021-04-10 PROCEDURE — 93005 ELECTROCARDIOGRAM TRACING: CPT | Performed by: EMERGENCY MEDICINE

## 2021-04-10 PROCEDURE — 94640 AIRWAY INHALATION TREATMENT: CPT

## 2021-04-10 PROCEDURE — 2500000003 HC RX 250 WO HCPCS: Performed by: FAMILY MEDICINE

## 2021-04-10 PROCEDURE — 96365 THER/PROPH/DIAG IV INF INIT: CPT

## 2021-04-10 PROCEDURE — 87040 BLOOD CULTURE FOR BACTERIA: CPT

## 2021-04-10 PROCEDURE — 94669 MECHANICAL CHEST WALL OSCILL: CPT

## 2021-04-10 PROCEDURE — 87086 URINE CULTURE/COLONY COUNT: CPT

## 2021-04-10 PROCEDURE — 74022 RADEX COMPL AQT ABD SERIES: CPT

## 2021-04-10 PROCEDURE — 6370000000 HC RX 637 (ALT 250 FOR IP): Performed by: FAMILY MEDICINE

## 2021-04-10 PROCEDURE — 81001 URINALYSIS AUTO W/SCOPE: CPT

## 2021-04-10 PROCEDURE — 1210000000 HC MED SURG R&B

## 2021-04-10 RX ORDER — PHENYTOIN SODIUM 100 MG/1
200 CAPSULE, EXTENDED RELEASE ORAL 2 TIMES DAILY
Status: DISCONTINUED | OUTPATIENT
Start: 2021-04-10 | End: 2021-04-13 | Stop reason: HOSPADM

## 2021-04-10 RX ORDER — METOPROLOL SUCCINATE 50 MG/1
50 TABLET, EXTENDED RELEASE ORAL DAILY
Status: DISCONTINUED | OUTPATIENT
Start: 2021-04-10 | End: 2021-04-13 | Stop reason: HOSPADM

## 2021-04-10 RX ORDER — GUAIFENESIN 600 MG/1
600 TABLET, EXTENDED RELEASE ORAL 2 TIMES DAILY
Status: DISCONTINUED | OUTPATIENT
Start: 2021-04-10 | End: 2021-04-13 | Stop reason: HOSPADM

## 2021-04-10 RX ORDER — TROSPIUM CHLORIDE 20 MG/1
20 TABLET, FILM COATED ORAL
Status: DISCONTINUED | OUTPATIENT
Start: 2021-04-10 | End: 2021-04-13 | Stop reason: HOSPADM

## 2021-04-10 RX ORDER — ACETAMINOPHEN 650 MG/1
650 SUPPOSITORY RECTAL EVERY 6 HOURS PRN
Status: DISCONTINUED | OUTPATIENT
Start: 2021-04-10 | End: 2021-04-13 | Stop reason: HOSPADM

## 2021-04-10 RX ORDER — SODIUM CHLORIDE 0.9 % (FLUSH) 0.9 %
5-40 SYRINGE (ML) INJECTION PRN
Status: DISCONTINUED | OUTPATIENT
Start: 2021-04-10 | End: 2021-04-13 | Stop reason: HOSPADM

## 2021-04-10 RX ORDER — WARFARIN SODIUM 5 MG/1
5 TABLET ORAL DAILY
Status: DISCONTINUED | OUTPATIENT
Start: 2021-04-10 | End: 2021-04-10

## 2021-04-10 RX ORDER — ARFORMOTEROL TARTRATE 15 UG/2ML
15 SOLUTION RESPIRATORY (INHALATION) 2 TIMES DAILY
Status: DISCONTINUED | OUTPATIENT
Start: 2021-04-10 | End: 2021-04-13 | Stop reason: HOSPADM

## 2021-04-10 RX ORDER — SODIUM CHLORIDE 9 MG/ML
25 INJECTION, SOLUTION INTRAVENOUS PRN
Status: DISCONTINUED | OUTPATIENT
Start: 2021-04-10 | End: 2021-04-13 | Stop reason: HOSPADM

## 2021-04-10 RX ORDER — WARFARIN SODIUM 5 MG/1
5 TABLET ORAL
Status: COMPLETED | OUTPATIENT
Start: 2021-04-10 | End: 2021-04-10

## 2021-04-10 RX ORDER — BUDESONIDE AND FORMOTEROL FUMARATE DIHYDRATE 160; 4.5 UG/1; UG/1
2 AEROSOL RESPIRATORY (INHALATION) 2 TIMES DAILY
Status: DISCONTINUED | OUTPATIENT
Start: 2021-04-10 | End: 2021-04-10

## 2021-04-10 RX ORDER — SODIUM CHLORIDE 0.9 % (FLUSH) 0.9 %
5-40 SYRINGE (ML) INJECTION EVERY 12 HOURS SCHEDULED
Status: DISCONTINUED | OUTPATIENT
Start: 2021-04-10 | End: 2021-04-13 | Stop reason: HOSPADM

## 2021-04-10 RX ORDER — PANTOPRAZOLE SODIUM 20 MG/1
20 TABLET, DELAYED RELEASE ORAL DAILY
Status: DISCONTINUED | OUTPATIENT
Start: 2021-04-10 | End: 2021-04-13 | Stop reason: HOSPADM

## 2021-04-10 RX ORDER — BUDESONIDE 0.5 MG/2ML
0.5 INHALANT ORAL 2 TIMES DAILY
Status: DISCONTINUED | OUTPATIENT
Start: 2021-04-10 | End: 2021-04-13 | Stop reason: HOSPADM

## 2021-04-10 RX ORDER — SERTRALINE HYDROCHLORIDE 100 MG/1
100 TABLET, FILM COATED ORAL DAILY
Status: DISCONTINUED | OUTPATIENT
Start: 2021-04-10 | End: 2021-04-13 | Stop reason: HOSPADM

## 2021-04-10 RX ORDER — AMLODIPINE BESYLATE 5 MG/1
5 TABLET ORAL DAILY
Status: DISCONTINUED | OUTPATIENT
Start: 2021-04-10 | End: 2021-04-13 | Stop reason: HOSPADM

## 2021-04-10 RX ORDER — ACETAMINOPHEN 325 MG/1
650 TABLET ORAL EVERY 6 HOURS PRN
Status: DISCONTINUED | OUTPATIENT
Start: 2021-04-10 | End: 2021-04-13 | Stop reason: HOSPADM

## 2021-04-10 RX ADMIN — DOXYCYCLINE 100 MG: 100 INJECTION, POWDER, LYOPHILIZED, FOR SOLUTION INTRAVENOUS at 17:41

## 2021-04-10 RX ADMIN — LEVOTHYROXINE SODIUM 175 MCG: 100 TABLET ORAL at 17:41

## 2021-04-10 RX ADMIN — BUDESONIDE 500 MCG: 0.5 SUSPENSION RESPIRATORY (INHALATION) at 18:14

## 2021-04-10 RX ADMIN — SERTRALINE HYDROCHLORIDE 100 MG: 100 TABLET ORAL at 17:41

## 2021-04-10 RX ADMIN — CEFTRIAXONE 1000 MG: 1 INJECTION, POWDER, FOR SOLUTION INTRAMUSCULAR; INTRAVENOUS at 15:38

## 2021-04-10 RX ADMIN — PANTOPRAZOLE SODIUM 20 MG: 20 TABLET, DELAYED RELEASE ORAL at 17:41

## 2021-04-10 RX ADMIN — ARFORMOTEROL TARTRATE 15 MCG: 15 SOLUTION RESPIRATORY (INHALATION) at 18:14

## 2021-04-10 RX ADMIN — TROSPIUM CHLORIDE 20 MG: 20 TABLET, FILM COATED ORAL at 17:41

## 2021-04-10 RX ADMIN — AMLODIPINE BESYLATE 5 MG: 5 TABLET ORAL at 17:41

## 2021-04-10 RX ADMIN — METOPROLOL SUCCINATE 50 MG: 50 TABLET, EXTENDED RELEASE ORAL at 17:41

## 2021-04-10 RX ADMIN — GUAIFENESIN 600 MG: 600 TABLET, EXTENDED RELEASE ORAL at 20:40

## 2021-04-10 RX ADMIN — PHENYTOIN SODIUM 200 MG: 100 CAPSULE ORAL at 20:40

## 2021-04-10 RX ADMIN — WARFARIN SODIUM 5 MG: 5 TABLET ORAL at 18:22

## 2021-04-10 ASSESSMENT — ENCOUNTER SYMPTOMS
ABDOMINAL PAIN: 0
RHINORRHEA: 0
SHORTNESS OF BREATH: 0
COUGH: 1
EYE PAIN: 0
VOICE CHANGE: 0
DIARRHEA: 0
VOMITING: 1
EYE REDNESS: 0

## 2021-04-10 NOTE — ED PROVIDER NOTES
[facility] EMERGENCY DEPT  EMERGENCY DEPARTMENT ENCOUNTER      Pt Name: Breana Mcleod  MRN: [id number]  [place] 8/18/1931  Date of evaluation: 4/10/2021  Provider: Kalbe Tanner MD    CHIEF COMPLAINT       Chief Complaint   Patient presents with    Fall     c/o fall at home with generalized weakness and malaise. HISTORY OF PRESENT ILLNESS   (Location/Symptom, Timing/Onset,Context/Setting, Quality, Duration, Modifying Factors, Severity)  Note limiting factors. Breana Mcleod is a 80 y.o. female who presents to the emergency department with complaint of generalized malaise and weakness. Patient denies any other specific symptoms other than stating she just does not feel good. States that started this morning. EMS noted that they found the patient in the floor when they arrived at her home and had to assist her up off of the floor, but patient denies falling. She does state she has been coughing more than usual but is unsure how long it has been going on. In route here in the ambulance, patient vomited a couple of times. She does state that is very unusual for her. Denies any continued nausea and denies any associated abdominal pain. States she was told she may have had a fever but she is not sure if she did or not. Patient has a history of COPD as well as previous DVT/PE and is on anticoagulation as well as chronic nasal cannula oxygen. Denies any recent acute worsening shortness of breath    HPI    NursingNotes were reviewed. REVIEW OF SYSTEMS    (2-9 systems for level 4, 10 or more for level 5)     Review of Systems   Constitutional: Positive for fatigue. HENT: Negative for congestion, rhinorrhea and voice change. Eyes: Negative for pain and redness. Respiratory: Positive for cough. Negative for shortness of breath. Cardiovascular: Negative for chest pain. Gastrointestinal: Positive for vomiting. Negative for abdominal pain and diarrhea. Endocrine: Negative.     Genitourinary: Negative. Musculoskeletal: Negative for arthralgias and gait problem. Skin: Negative for rash and wound. Neurological: Positive for weakness. Negative for headaches. Hematological: Negative. Psychiatric/Behavioral: Negative. All other systems reviewed and are negative. A complete review of systems was performed and is negative except as noted above in the HPI.        PAST MEDICAL HISTORY     Past Medical History:   Diagnosis Date    Arthritis     Back pain     bulging disc    CAD (coronary artery disease)     BMS to RCA    Cerebral artery occlusion with cerebral infarction ([facility] [address]     small strokes,balance issues    Cystitis     Depression     Dizziness     Dysuria     Edema     ankles,mild pedal edema    Falls frequently     H/O blood clots     left leg and lungs    Head injury     Headache     Heartburn     [place] (hard of hearing)     Hyperlipidemia     Hypertension     Hypothyroidism     Hypothyroidism     Memory problem     Neuropathy     Shingles     TIA (transient ischemic attack)     Urinary incontinence     UTI (urinary tract infection)          SURGICAL HISTORY       Past Surgical History:   Procedure Laterality Date    APPENDECTOMY  1947    CHOLECYSTECTOMY      EYE SURGERY  2009    bilateral cataract     HYSTERECTOMY, TOTAL ABDOMINAL      JOINT REPLACEMENT      Bilateral Knee    KNEE ARTHROPLASTY Right     LOBECTOMY  10/2015    sx--bilateral    PTCA  2002    with stent placement/bms to rca         CURRENT MEDICATIONS       Previous Medications    AMLODIPINE (NORVASC) 5 MG TABLET    Take 1 tablet by mouth daily    FLUTICASONE FUROATE-VILANTEROL (BREO ELLIPTA) 200-25 MCG/INH AEPB INHALER    Inhale 1 puff into the lungs daily    LEVOTHYROXINE (SYNTHROID) 175 MCG TABLET    Take 1 tablet by mouth Daily    METOPROLOL SUCCINATE (TOPROL XL) 50 MG EXTENDED RELEASE TABLET    Take 1 tablet by mouth daily    PANTOPRAZOLE (PROTONIX) 20 MG TABLET    Take 1 tablet by mouth daily    PHENYTOIN (DILANTIN) 100 MG ER CAPSULE    Take 2 capsules by mouth 2 times daily    SERTRALINE (ZOLOFT) 100 MG TABLET    Take 1 tablet by mouth daily    TOLTERODINE (DETROL LA) 2 MG EXTENDED RELEASE CAPSULE    Take 1 capsule by mouth daily    WARFARIN (COUMADIN) 5 MG TABLET    Take 1 tablet by mouth daily       ALLERGIES     Morphine    FAMILY HISTORY       Family History   Problem Relation Age of Onset    Diabetes Mother     Hypertension Mother    [facility] Rheum Arthritis Mother     Stroke Mother     Tuberculosis Father           SOCIAL HISTORY       Social History     Socioeconomic History    Marital status:       Spouse name: None    Number of children: None    Years of education: None    Highest education level: None   Occupational History    None   Social Needs    Financial resource strain: None    Food insecurity     Worry: None     Inability: None    Transportation needs     Medical: None     Non-medical: None   Tobacco Use    Smoking status: Never Smoker    Smokeless tobacco: Never Used   Substance and Sexual Activity    Alcohol use: Never     Frequency: Never    Drug use: Never    Sexual activity: None   Lifestyle    Physical activity     Days per week: None     Minutes per session: None    Stress: None   Relationships    Social connections     Talks on phone: None     Gets together: None     Attends Mandaeism service: None     Active member of club or organization: None     Attends meetings of clubs or organizations: None     Relationship status: None    Intimate partner violence     Fear of current or ex partner: None     Emotionally abused: None     Physically abused: None     Forced sexual activity: None   Other Topics Concern    None   Social History Narrative    None       SCREENINGS             PHYSICAL EXAM    (up to 7 for level 4, 8 or more for level 5)     ED Triage Vitals [04/10/21 1232]   BP Temp Temp Source Pulse Resp SpO2 Height Weight   (!) 142/67 99.2 °F (37.3 °C) Temporal 95 20 90 % -- 195 lb (88.5 kg)       Physical Exam  Vitals signs and nursing note reviewed. Constitutional:       General: She is not in acute distress. Appearance: Normal appearance. She is well-developed. She is not diaphoretic. HENT:      Head: Normocephalic and atraumatic. Mouth/Throat:      Pharynx: No oropharyngeal exudate. Eyes:      General: No scleral icterus. Pupils: Pupils are equal, round, and reactive to light. Neck:      Musculoskeletal: Normal range of motion. Trachea: No tracheal deviation. Cardiovascular:      Rate and Rhythm: Normal rate. Pulses: Normal pulses. Heart sounds: Normal heart sounds. Pulmonary:      Effort: Pulmonary effort is normal.      Breath sounds: Normal breath sounds. No stridor. No wheezing or rhonchi. Abdominal:      General: There is no distension. Palpations: Abdomen is soft. Abdomen is not rigid. Tenderness: There is no abdominal tenderness. There is no guarding. Hernia: No hernia is present. Musculoskeletal:         General: No deformity. Skin:     General: Skin is warm and dry. Findings: No rash. Neurological:      Mental Status: She is alert and oriented to person, place, and time. Cranial Nerves: No cranial nerve deficit. Coordination: Coordination normal.   Psychiatric:         Behavior: Behavior normal.         DIAGNOSTIC RESULTS     EKG: All EKG's are interpreted by the Emergency Department Physician who either signs or Co-signs this chart in the absence of a cardiologist.        RADIOLOGY:   Non-plain film images such as CT, Ultrasound and MRI are read by the radiologist. Maribel Conn images are visualized and preliminarily interpreted by the emergency physician with the below findings:        Interpretation per the Radiologist below, if available at the time of this note:    XR ACUTE ABD SERIES CHEST 1 VW   Final Result   1. Non-obstructive bowel gas pattern.    2.  Low further management. Treatment and stabilization as necessary were provided in the emergency department prior to transfer of care to the medicine service. CONSULTS:  None    PROCEDURES:  Unless otherwise notedbelow, none     Procedures      FINAL IMPRESSION     1. General weakness    2. Difficulty in walking    3. Acute cystitis without hematuria    4. History of recurrent UTIs    5. History of pulmonary embolus (PE)    6. On continuous oral anticoagulation    7. Chronic respiratory failure with hypoxia (HCC)    8. Non-intractable vomiting with nausea, unspecified vomiting type          DISPOSITION/PLAN   DISPOSITION Admitted 04/10/2021 03:28:55 PM      PATIENT REFERRED TO:  No follow-up provider specified.     DISCHARGE MEDICATIONS:  New Prescriptions    No medications on file          (Please note that portions of this note were completed with a voice recognition program.  Efforts were made to edit the dictations butoccasionally words are mis-transcribed.)    Roxann Monge MD (electronically signed)  AttendingEmergency Physician          Roxann Monge., MD  04/10/21 3887

## 2021-04-10 NOTE — PROGRESS NOTES
4 Eyes Skin Assessment    Batool Escobar is being assessed upon: Transfer to New Unit    I agree that Constance Claros, along with Fabiana Cedeño (either 2 RN's or 1 LPN and 1 RN) have performed a thorough Head to Toe Skin Assessment on the patient. ALL assessment sites listed below have been assessed. Areas assessed by both nurses:     [x]   Head, Face, and Ears   [x]   Shoulders, Back, and Chest  [x]   Arms, Elbows, and Hands   [x]   Coccyx, Sacrum, and Ischium  [x]   Legs, Feet, and Heels    Does the Patient have Skin Breakdown?  No    Peter Prevention initiated: Yes  Wound Care Orders initiated: No    WOC nurse consulted for Pressure Injury (Stage 3,4, Unstageable, DTI, NWPT, and Complex wounds) and New or Established Ostomies: No        Primary Nurse eSignature: Deisy Miller RN on 4/10/2021 at 6:40 PM      Co-Signer eSignature: Electronically signed by Ruddy Carlton RN on 4/11/21 at 7:15 AM CDT

## 2021-04-10 NOTE — PROGRESS NOTES
Clinical Pharmacy Note    Koki Gastelum is a 80 y.o. female for whom pharmacy has been asked to manage warfarin therapy. Reason for Admission: weakness with fever    Consulting Physician: Dr. Timoteo Vanegas  Warfarin dose prior to admission: 5 mg on TuSa; 2.5 mg all other days  Warfarin indication: DVT hx  Target INR range: 2-3   Outpatient warfarin provider: Dr. Maryana Valdez    Past Medical History:   Diagnosis Date    Arthritis     Back pain     bulging disc    CAD (coronary artery disease)     BMS to RCA    Cerebral artery occlusion with cerebral infarction ([facility] [address]     small strokes,balance issues    Cystitis     Depression     Dizziness     Dysuria     Edema     ankles,mild pedal edema    Falls frequently     H/O blood clots     left leg and lungs    Head injury     Headache     Heartburn     [place] (hard of hearing)     Hyperlipidemia     Hypertension     Hypothyroidism     Hypothyroidism     Memory problem     Neuropathy     Shingles     TIA (transient ischemic attack)     Urinary incontinence     UTI (urinary tract infection)                 Recent Labs     04/10/21  1324   INR 2.37*     Recent Labs     04/10/21  1324   HGB 15.1   HCT 46.6          Current warfarin drug-drug interactions: Phenytoin, Ceftriaxone, Doxycycline, Levothyroxine, Sertraline  Non Home Medication drug-drug interaction: Ceftriaxone, Doxycycline    Date INR Warfarin Dose   04/10/21 2.37 5 mg                                   Continue home dose of Coumadin at 5 mg x 1 dose tonight    Daily PT/INR until stable within therapeutic range. Thank you for the consult.      Electronically signed by Tasia Flowers, [address] [place] [address] on 4/10/2021 at 4:54 PM

## 2021-04-10 NOTE — H&P
Hospital Medicine  History and Physical    Patient:  Sj Ryan  MRN: [id number]    CHIEF COMPLAINT:  weakness    History Obtained From: patient, family, chart    PCP: Zelda Zendejas MD    HISTORY OF PRESENT ILLNESS:  80year old white female brought to the emergency department with weakness for a few days, much worse today to the point that she could not continue walking and was helped to the floor by family members without fall. No injuries. She has had fever to 100.8 F today with recent cough. Had some nausea with vomiting this morning. Denies diarrhea or sick contacts. Has been on guaifenesin for a few weeks without much relief to cough, which is generally unproductive. Denies dysuria or urinary frequency. REVIEW OF SYSTEMS:  14 systems reviewed and negative except as noted above.     Past Medical History:      Diagnosis Date    Arthritis     Back pain     bulging disc    CAD (coronary artery disease)     BMS to RCA    Cerebral artery occlusion with cerebral infarction (HCC)     small strokes,balance issues    Cystitis     Depression     Dizziness     Dysuria     Edema     ankles,mild pedal edema    Falls frequently     H/O blood clots     left leg and lungs    Head injury     Headache     Heartburn     [place] (hard of hearing)     Hyperlipidemia     Hypertension     Hypothyroidism     Hypothyroidism     Memory problem     Neuropathy     Shingles     TIA (transient ischemic attack)     Urinary incontinence     UTI (urinary tract infection)        Past Surgical History:      Procedure Laterality Date    APPENDECTOMY  1947    CHOLECYSTECTOMY      EYE SURGERY  2009    bilateral cataract     HYSTERECTOMY, TOTAL ABDOMINAL      JOINT REPLACEMENT      Bilateral Knee    KNEE ARTHROPLASTY Right     LOBECTOMY  10/2015    sx--bilateral    PTCA  2002    with stent placement/bms to rca       Medications Prior to Admission:    Prior to Admission medications    Medication Sig Start Date End Date Taking? Authorizing Provider   Fluticasone furoate-vilanterol (BREO ELLIPTA) 200-25 MCG/INH AEPB inhaler Inhale 1 puff into the lungs daily 3/12/21   Lupe Wooten MD   tolterodine (DETROL LA) 2 MG extended release capsule Take 1 capsule by mouth daily 3/11/21   Lupe Wooten MD   amLODIPine (NORVASC) 5 MG tablet Take 1 tablet by mouth daily 10/12/20   Lupe Wooten MD   levothyroxine (SYNTHROID) 175 MCG tablet Take 1 tablet by mouth Daily 10/12/20   Lupe Wooten MD   metoprolol succinate (TOPROL XL) 50 MG extended release tablet Take 1 tablet by mouth daily 10/12/20   Lupe Wooten MD   pantoprazole (PROTONIX) 20 MG tablet Take 1 tablet by mouth daily 10/12/20   Lupe Wooten MD   phenytoin (DILANTIN) 100 MG ER capsule Take 2 capsules by mouth 2 times daily 10/12/20   Lupe Wooten MD   sertraline (ZOLOFT) 100 MG tablet Take 1 tablet by mouth daily 10/12/20   Lupe Wooten MD   warfarin (COUMADIN) 5 MG tablet Take 1 tablet by mouth daily 10/12/20   Lupe Wooten MD       Allergies:  Morphine    Social History:   TOBACCO:   reports that she has never smoked. She has never used smokeless tobacco.  ETOH:   reports no history of alcohol use.    Significant secondhand smoke exposure from daughter    Family History:       Problem Relation Age of Onset    Diabetes Mother     Hypertension Mother    [place] Rheum Arthritis Mother     Stroke Mother     Tuberculosis Father            Physical Exam:    Vitals: BP (!) 145/81   Pulse 92   Temp 99.2 °F (37.3 °C) (Temporal)   Resp 18   Wt 195 lb (88.5 kg)   SpO2 94%   BMI 30.54 kg/m²   24HR INTAKE/OUTPUT:  No intake or output data in the 24 hours ending 04/10/21 1621  General appearance: alert and cooperative with exam  HEENT: atraumatic, eyes with clear conjunctiva and normal lids, pupils and irises normal, external ears and nose are normal, lips normal. Neck without masses, lympadenopathy, bruit, thyroid normal  Lungs: no increased

## 2021-04-11 LAB
ANION GAP SERPL CALCULATED.3IONS-SCNC: 11 MMOL/L (ref 7–19)
BASOPHILS ABSOLUTE: 0 K/UL (ref 0–0.2)
BASOPHILS RELATIVE PERCENT: 0.2 % (ref 0–1)
BUN BLDV-MCNC: 13 MG/DL (ref 8–23)
CALCIUM SERPL-MCNC: 8.1 MG/DL (ref 8.8–10.2)
CHLORIDE BLD-SCNC: 100 MMOL/L (ref 98–111)
CO2: 26 MMOL/L (ref 22–29)
CREAT SERPL-MCNC: 0.6 MG/DL (ref 0.5–0.9)
EOSINOPHILS ABSOLUTE: 0 K/UL (ref 0–0.6)
EOSINOPHILS RELATIVE PERCENT: 0.2 % (ref 0–5)
GFR AFRICAN AMERICAN: >59
GFR NON-AFRICAN AMERICAN: >60
GLUCOSE BLD-MCNC: 134 MG/DL (ref 74–109)
HCT VFR BLD CALC: 43.5 % (ref 37–47)
HEMOGLOBIN: 14.3 G/DL (ref 12–16)
IMMATURE GRANULOCYTES #: 0.1 K/UL
INR BLD: 2.49 (ref 0.88–1.18)
LYMPHOCYTES ABSOLUTE: 2.6 K/UL (ref 1.1–4.5)
LYMPHOCYTES RELATIVE PERCENT: 20.4 % (ref 20–40)
MCH RBC QN AUTO: 31.2 PG (ref 27–31)
MCHC RBC AUTO-ENTMCNC: 32.9 G/DL (ref 33–37)
MCV RBC AUTO: 94.8 FL (ref 81–99)
MONOCYTES ABSOLUTE: 0.9 K/UL (ref 0–0.9)
MONOCYTES RELATIVE PERCENT: 7.3 % (ref 0–10)
NEUTROPHILS ABSOLUTE: 9 K/UL (ref 1.5–7.5)
NEUTROPHILS RELATIVE PERCENT: 71.4 % (ref 50–65)
PDW BLD-RTO: 14 % (ref 11.5–14.5)
PLATELET # BLD: 257 K/UL (ref 130–400)
PMV BLD AUTO: 9.6 FL (ref 9.4–12.3)
POTASSIUM REFLEX MAGNESIUM: 3.9 MMOL/L (ref 3.5–5)
PROTHROMBIN TIME: 27.5 SEC (ref 12–14.6)
RBC # BLD: 4.59 M/UL (ref 4.2–5.4)
SODIUM BLD-SCNC: 137 MMOL/L (ref 136–145)
WBC # BLD: 12.7 K/UL (ref 4.8–10.8)

## 2021-04-11 PROCEDURE — 6360000002 HC RX W HCPCS: Performed by: FAMILY MEDICINE

## 2021-04-11 PROCEDURE — 80048 BASIC METABOLIC PNL TOTAL CA: CPT

## 2021-04-11 PROCEDURE — 2500000003 HC RX 250 WO HCPCS: Performed by: FAMILY MEDICINE

## 2021-04-11 PROCEDURE — 2580000003 HC RX 258: Performed by: FAMILY MEDICINE

## 2021-04-11 PROCEDURE — 6370000000 HC RX 637 (ALT 250 FOR IP): Performed by: FAMILY MEDICINE

## 2021-04-11 PROCEDURE — 2700000000 HC OXYGEN THERAPY PER DAY

## 2021-04-11 PROCEDURE — 96366 THER/PROPH/DIAG IV INF ADDON: CPT

## 2021-04-11 PROCEDURE — 94640 AIRWAY INHALATION TREATMENT: CPT

## 2021-04-11 PROCEDURE — 97162 PT EVAL MOD COMPLEX 30 MIN: CPT

## 2021-04-11 PROCEDURE — 94669 MECHANICAL CHEST WALL OSCILL: CPT

## 2021-04-11 PROCEDURE — 36415 COLL VENOUS BLD VENIPUNCTURE: CPT

## 2021-04-11 PROCEDURE — 85025 COMPLETE CBC W/AUTO DIFF WBC: CPT

## 2021-04-11 PROCEDURE — 1210000000 HC MED SURG R&B

## 2021-04-11 PROCEDURE — 97110 THERAPEUTIC EXERCISES: CPT

## 2021-04-11 PROCEDURE — 96376 TX/PRO/DX INJ SAME DRUG ADON: CPT

## 2021-04-11 PROCEDURE — 85610 PROTHROMBIN TIME: CPT

## 2021-04-11 PROCEDURE — 97530 THERAPEUTIC ACTIVITIES: CPT

## 2021-04-11 RX ORDER — WARFARIN SODIUM 2.5 MG/1
2.5 TABLET ORAL
Status: COMPLETED | OUTPATIENT
Start: 2021-04-11 | End: 2021-04-11

## 2021-04-11 RX ADMIN — AMLODIPINE BESYLATE 5 MG: 5 TABLET ORAL at 08:11

## 2021-04-11 RX ADMIN — PANTOPRAZOLE SODIUM 20 MG: 20 TABLET, DELAYED RELEASE ORAL at 08:11

## 2021-04-11 RX ADMIN — GUAIFENESIN 600 MG: 600 TABLET, EXTENDED RELEASE ORAL at 20:45

## 2021-04-11 RX ADMIN — BUDESONIDE 500 MCG: 0.5 SUSPENSION RESPIRATORY (INHALATION) at 06:31

## 2021-04-11 RX ADMIN — TROSPIUM CHLORIDE 20 MG: 20 TABLET, FILM COATED ORAL at 05:53

## 2021-04-11 RX ADMIN — TROSPIUM CHLORIDE 20 MG: 20 TABLET, FILM COATED ORAL at 16:02

## 2021-04-11 RX ADMIN — GUAIFENESIN 600 MG: 600 TABLET, EXTENDED RELEASE ORAL at 08:11

## 2021-04-11 RX ADMIN — DOXYCYCLINE 100 MG: 100 INJECTION, POWDER, LYOPHILIZED, FOR SOLUTION INTRAVENOUS at 05:53

## 2021-04-11 RX ADMIN — ARFORMOTEROL TARTRATE 15 MCG: 15 SOLUTION RESPIRATORY (INHALATION) at 19:16

## 2021-04-11 RX ADMIN — ARFORMOTEROL TARTRATE 15 MCG: 15 SOLUTION RESPIRATORY (INHALATION) at 06:30

## 2021-04-11 RX ADMIN — PHENYTOIN SODIUM 200 MG: 100 CAPSULE ORAL at 20:45

## 2021-04-11 RX ADMIN — DOXYCYCLINE 100 MG: 100 INJECTION, POWDER, LYOPHILIZED, FOR SOLUTION INTRAVENOUS at 18:18

## 2021-04-11 RX ADMIN — PHENYTOIN SODIUM 200 MG: 100 CAPSULE ORAL at 08:11

## 2021-04-11 RX ADMIN — LEVOTHYROXINE SODIUM 175 MCG: 100 TABLET ORAL at 05:53

## 2021-04-11 RX ADMIN — CEFTRIAXONE 1000 MG: 1 INJECTION, POWDER, FOR SOLUTION INTRAMUSCULAR; INTRAVENOUS at 16:02

## 2021-04-11 RX ADMIN — METOPROLOL SUCCINATE 50 MG: 50 TABLET, EXTENDED RELEASE ORAL at 08:11

## 2021-04-11 RX ADMIN — SERTRALINE HYDROCHLORIDE 100 MG: 100 TABLET ORAL at 08:11

## 2021-04-11 RX ADMIN — BUDESONIDE 500 MCG: 0.5 SUSPENSION RESPIRATORY (INHALATION) at 19:16

## 2021-04-11 RX ADMIN — WARFARIN SODIUM 2.5 MG: 2.5 TABLET ORAL at 18:18

## 2021-04-11 NOTE — CARE COORDINATION
Date / Time of Evaluation: 4/11/2021 3:38 PM  Assessment Completed by: Shanelle Tirado    Patient Admission Status: Inpatient [101]    [facility]    [phone] (home)   Telephone Information:   Mobile [phone]       (Best Practice:  Have patient / caregiver verify above address and phone number by stating out loud their current address and reachable phone number.)  Is above information correct? Address correct, pt doesn't know phone number      Current PCP:  Rodney Christie MD  PCP verified? yes    Initial Assessment Completed at bedside with:  yes    Emergency Contacts:  Extended Emergency Contact Information  Primary Emergency Contact: [place] [address] Phone: [phone]  Relation: Niece/Nephew    Advance Directives: Code Status:  Full Code    Financial:  Payor: Patience Garrett / Plan: Devendra Gardiner / Product Type: *No Product type* /     Pre-Cert required for SNF:  yes    Have [address] [facility] [facility] [address]:  no    Pharmacy:   [facility], [address] [place] [address] [address] [place] [address]  Phone: [phone] Fax: [phone]      Potential assistance purchasing medications? no    ADLS:  Support System:   lives with multiple family members.  24/7 support     Current Home Environment: Lives with family  Steps:  no    Plans to RETURN to current housing: Plan to dc to SNF, then return home  Barriers to RETURNING to current housing:  no    Currently ACTIVE with [address]:  no  [address]:  no    DME Provider:  no    Has a pulse oximetry unit at home: no    Had [address] [facility] prior to admission:  no  [address]:  no  Informed of need to bring portable home O2 tank to hospital on day of DISCHARGE:  no  Name of person committed to bringing portable tank at discharge:  no    Active with HD/PD prior to admission: no  Nephrologist:  no  HD Center:  no    Transition Plan:       Transportation PLAN for Discharge:  Niece    Barriers to discharge:  none      Additional CM/SW Notes: SW met with pt at bedside re: possible SNF placement. Pt stated she lives with family and has 24/7 support, however, she thinks SNF is best at this time. Stated she has been to one in the past. [facility] [place] remember name. Pt asked this SW to call her niece for name of facility/to see what her thoughts were. SW placed call to niece, Filemon Rubi, who agrees that SNF placement is best. Filemon Rubi stated pt has been to [facility] in the past and liked it. SW returned to pt room and informed her of this. Pt would like referral sent. Only referral she would like sent at this time. Sj Weathers and/or her family were provided with choice of provider.         Carmen   Care Management Department  Ph:  [phone]  Fax: [phone]

## 2021-04-11 NOTE — PROGRESS NOTES
Physical Therapy    Facility/Department: [facility] ONCOLOGY UNIT  Initial Assessment    NAME: Tyrell Titus  : 1931  MRN: [id number]    Date of Service: 2021    Discharge Recommendations:  Continue to assess pending progress, 24 hour supervision or assist, Patient would benefit from continued therapy after discharge        Assessment   Body structures, Functions, Activity limitations: Decreased functional mobility ; Decreased ROM; Decreased strength;Decreased safe awareness;Decreased endurance;Decreased sensation;Decreased posture;Decreased balance;Decreased coordination  Assessment: Pt. will benefit from cont. PT due to weakness and deconditioned state at this time. Pt.'s family would have a difficult time taking care of her at home for more than bed level activities. Pt. a fall risk and should use emeterio steady for transfers with 2A at this time, as pt. had a L sided lean in SS. Anticipate she will need some form of rehab in a facility prior to returning home. Pt. needs extended time to complete task and simple instructions. Treatment Diagnosis: debility  Prognosis: Fair  Decision Making: Medium Complexity  PT Education: Goals;PT Role;Plan of Care; Adaptive Device Training;General Safety;Transfer Training;Equipment; Functional Mobility Training  Patient Education: use of call light for staff A  Barriers to Learning: confusion/cognition  REQUIRES PT FOLLOW UP: Yes  Activity Tolerance  Activity Tolerance: Patient limited by fatigue;Patient limited by endurance       Patient Diagnosis(es): The primary encounter diagnosis was General weakness. Diagnoses of Difficulty in walking, Acute cystitis without hematuria, History of recurrent UTIs, History of pulmonary embolus (PE), On continuous oral anticoagulation, Chronic respiratory failure with hypoxia ([address] and Non-intractable vomiting with nausea, unspecified vomiting type were also pertinent to this visit.      has a past medical history of Arthritis, Back pain, CAD (coronary artery disease), Cerebral artery occlusion with cerebral infarction ([facility] [address] Cystitis, Depression, Dizziness, Dysuria, Edema, Falls frequently, H/O blood clots, Head injury, Headache, Heartburn, [place] (hard of hearing), Hyperlipidemia, Hypertension, Hypothyroidism, Hypothyroidism, Memory problem, Neuropathy, Shingles, TIA (transient ischemic attack), Urinary incontinence, and UTI (urinary tract infection). has a past surgical history that includes joint replacement; Cholecystectomy; Hysterectomy, total abdominal; Knee Arthroplasty (Right); lobectomy (10/2015); Appendectomy (1947); Percutaneous Transluminal Coronary Angio (2002); and Eye surgery (2009). Restrictions  Restrictions/Precautions  Restrictions/Precautions: Fall Risk  Required Braces or Orthoses?: No  Vision/Hearing        Subjective  General  Chart Reviewed: Yes  Patient assessed for rehabilitation services?: Yes  Response To Previous Treatment: Not applicable  Family / Caregiver Present: No  Referring Practitioner: Kelly Jacinto DO  Referral Date : 04/11/21  Diagnosis: complicated UTI  Follows Commands: Impaired  Other (Comment): needs some repetition  General Comment  Comments: RNGeraldine, requesting PT to assist getting pt OOBTC 1st time. Subjective  Subjective: Pt. states she was taking a nap, but us willing to get up. Pain Screening  Patient Currently in Pain: No  Vital Signs  Patient Currently in Pain: No  Pre Treatment Pain Screening  Intervention List: Patient able to continue with treatment    Orientation  Orientation  Overall Orientation Status: Impaired  Orientation Level: Oriented to person;Oriented to place; Disoriented to time;Disoriented to situation  Social/Functional History  Social/Functional History  Lives With: Other (comment)(with niece and her family)  Type of Home: House  Home Layout: One level  Home Equipment: Rolling walker  Receives Help From: Family  ADL Assistance: Needs assistance(has help getting in and out of removed and gown placed on pt. Ambulation  Ambulation?: No     Balance  Posture: Poor  Sitting - Static: Fair;+  Sitting - Dynamic: Fair;-  Standing - Static: Poor  Standing - Dynamic: Poor;-        Plan   Plan  Times per week: 5-7  Times per day: Daily  Plan weeks: 2  Current Treatment Recommendations: Strengthening, ROM, Balance Training, Functional Mobility Training, Transfer Training, Gait Training, Endurance Training, Safety Education & Training, Positioning, Equipment Evaluation, Education, & procurement, Patient/Caregiver Education & Training  Plan Comment: cont. PT per POC.   Safety Devices  Type of devices: Gait belt, Patient at risk for falls, Nurse notified, Call light within reach, Chair alarm in place, Left in chair(reclined, pillow under BLEs)    G-Code       OutComes Score                                                  AM-PAC Score             Goals  Short term goals  Time Frame for Short term goals: 2 wks  Short term goal 1: supine to sit indep  Short term goal 2: sit to stand indep  Short term goal 3: amb. 100' with RW SBA  Short term goal 4: bed to chair SBA  Patient Goals   Patient goals : get better       Therapy Time   Individual Concurrent Group Co-treatment   Time In           Time Out           Minutes                   Arielle Tolbert PT    Electronically signed by Arielle Tolbert PT on 4/11/2021 at 2:57 PM

## 2021-04-11 NOTE — PROGRESS NOTES
Hospitalist Progress Note  4/11/2021 12:35 PM  Subjective:   Admit Date: 4/10/2021  PCP: Leonid Barroso MD    Chief Complaint: weakness    Subjective: Patient states she is feeling a little better, but remains very weak. Per nursing, had no strength to assist with turning herself. Afebrile overnight without new complaints. Agrees to get out of bed as able. PT evaluation pending. History is otherwise unchanged. Cumulative Hospital History:   4-10: Brought to ED with progressive weakness for a few days, much worse x1 days. Patient was walking within the home when she became too weak to continue. She was helped to the floor by family members and could not stand. Febrile to 100.8 F, some cough unrelieved by guaifenesin. Denies urinary symptoms but obvious infection on urinalysis. Admitted to med/surg on ceftriaxone with PT/OT evaluations pending. 4-11: Afebrile, states she feels better but still very weak per nursing. Up to chair with assistance today. To work with PT and OT. Suspect she will need rehab when stable. ROS: 14 point review of systems is negative except as specifically addressed above. DIET LOW SODIUM 2 GM;     Intake/Output Summary (Last 24 hours) at 4/11/2021 1235  Last data filed at 4/11/2021 0655  Gross per 24 hour   Intake 220 ml   Output 100 ml   Net 120 ml     Medications:   sodium chloride       Current Facility-Administered Medications   Medication Dose Route Frequency Provider Last Rate Last Admin    warfarin (COUMADIN) tablet 2.5 mg  2.5 mg Oral Once Marilyn Okay, DO        amLODIPine (NORVASC) tablet 5 mg  5 mg Oral Daily Genaro Pinto, DO   5 mg at 04/11/21 0811    levothyroxine (SYNTHROID) tablet 175 mcg  175 mcg Oral Daily Genaro Pinto, DO   175 mcg at 04/11/21 0510    metoprolol succinate (TOPROL XL) extended release tablet 50 mg  50 mg Oral Daily Genaro Pinto, DO   50 mg at 04/11/21 0811    pantoprazole (PROTONIX) tablet 20 mg  20 mg Oral Daily Genaro Pinto, DO   20 mg at 04/11/21 1267 ALKPHOS 101     ABGs:No results for input(s): PH, PO2, PCO2, HCO3, BE, O2SAT in the last 72 hours. Troponin T: No results for input(s): TROPONINI in the last 72 hours. INR:   Recent Labs     04/10/21  1324 04/11/21  0303   INR 2.37* 2.49*     Lactic Acid:   Recent Labs     04/10/21  1540   LACTA 1.1       Objective:   Vitals: BP (!) 144/74   Pulse 77   Temp 97.3 °F (36.3 °C)   Resp 18   Wt 195 lb (88.5 kg)   SpO2 93%   BMI 30.54 kg/m²   24HR INTAKE/OUTPUT:      Intake/Output Summary (Last 24 hours) at 4/11/2021 1235  Last data filed at 4/11/2021 6705  Gross per 24 hour   Intake 220 ml   Output 100 ml   Net 120 ml     General appearance: alert and cooperative with exam  HEENT: atraumatic, eyes with clear conjunctiva and normal lids, pupils and irises normal, external ears and nose are normal, lips normal  Neck without masses, lympadenopathy, bruit, thyroid normal  Lungs: no increased work of breathing, diminished breath sounds bilaterally  Heart: regular rate and rhythm, S1, S2 normal, no murmur, click, rub or gallop  Abdomen: soft, non-tender; bowel sounds normal; no masses,  no organomegaly and obese  Extremities: extremities normal, atraumatic, no cyanosis or edema  Neurologic: no focal neurologic deficits, normal sensation, alert and oriented, affect and mood appropriate  Skin: no rashes, nodules    Assessment and Plan:   Principal Problem:    Complicated urinary tract infection  Active Problems:    History of cardioembolic cerebrovascular accident (CVA)    Essential hypertension    Acquired hypothyroidism    Seizure disorder (HCC)    Generalized weakness    Hypocalcemia    Acute bronchitis  Resolved Problems:    * No resolved hospital problems. *      Day #2 ceftriaxone and doxycycline empiric therapy for complicated urinary tract infection and acute bronchitis. Cultures pending to guide therapy.     Percussion vest, IS, guaifenesin for cough.  Improved today but still quite bothersome with wet upper respiratory sounds. PT and OT evaluations pending. Up to chair today.     Advance Directive: Full Code    DVT prophylaxis: warfarin    Discharge planning: DO Tj Dawson Hospitalist

## 2021-04-12 LAB
ANION GAP SERPL CALCULATED.3IONS-SCNC: 12 MMOL/L (ref 7–19)
BASOPHILS ABSOLUTE: 0 K/UL (ref 0–0.2)
BASOPHILS RELATIVE PERCENT: 0.2 % (ref 0–1)
BUN BLDV-MCNC: 18 MG/DL (ref 8–23)
CALCIUM SERPL-MCNC: 7.6 MG/DL (ref 8.8–10.2)
CHLORIDE BLD-SCNC: 104 MMOL/L (ref 98–111)
CO2: 24 MMOL/L (ref 22–29)
CREAT SERPL-MCNC: 0.6 MG/DL (ref 0.5–0.9)
EKG P AXIS: 63 DEGREES
EKG P-R INTERVAL: 172 MS
EKG Q-T INTERVAL: 388 MS
EKG QRS DURATION: 102 MS
EKG QTC CALCULATION (BAZETT): 431 MS
EKG T AXIS: 59 DEGREES
EOSINOPHILS ABSOLUTE: 0.1 K/UL (ref 0–0.6)
EOSINOPHILS RELATIVE PERCENT: 1.3 % (ref 0–5)
GFR AFRICAN AMERICAN: >59
GFR NON-AFRICAN AMERICAN: >60
GLUCOSE BLD-MCNC: 125 MG/DL (ref 74–109)
HCT VFR BLD CALC: 41.6 % (ref 37–47)
HEMOGLOBIN: 13.6 G/DL (ref 12–16)
IMMATURE GRANULOCYTES #: 0.1 K/UL
INR BLD: 2.94 (ref 0.88–1.18)
LYMPHOCYTES ABSOLUTE: 2.1 K/UL (ref 1.1–4.5)
LYMPHOCYTES RELATIVE PERCENT: 24.7 % (ref 20–40)
MCH RBC QN AUTO: 31.1 PG (ref 27–31)
MCHC RBC AUTO-ENTMCNC: 32.7 G/DL (ref 33–37)
MCV RBC AUTO: 95 FL (ref 81–99)
MONOCYTES ABSOLUTE: 0.7 K/UL (ref 0–0.9)
MONOCYTES RELATIVE PERCENT: 8.3 % (ref 0–10)
NEUTROPHILS ABSOLUTE: 5.6 K/UL (ref 1.5–7.5)
NEUTROPHILS RELATIVE PERCENT: 64.9 % (ref 50–65)
ORGANISM: ABNORMAL
PDW BLD-RTO: 14 % (ref 11.5–14.5)
PLATELET # BLD: 250 K/UL (ref 130–400)
PMV BLD AUTO: 9.8 FL (ref 9.4–12.3)
POTASSIUM REFLEX MAGNESIUM: 3.6 MMOL/L (ref 3.5–5)
PROTHROMBIN TIME: 31.5 SEC (ref 12–14.6)
RBC # BLD: 4.38 M/UL (ref 4.2–5.4)
SODIUM BLD-SCNC: 140 MMOL/L (ref 136–145)
URINE CULTURE, ROUTINE: ABNORMAL
URINE CULTURE, ROUTINE: ABNORMAL
WBC # BLD: 8.6 K/UL (ref 4.8–10.8)

## 2021-04-12 PROCEDURE — 93010 ELECTROCARDIOGRAM REPORT: CPT | Performed by: INTERNAL MEDICINE

## 2021-04-12 PROCEDURE — 6370000000 HC RX 637 (ALT 250 FOR IP): Performed by: FAMILY MEDICINE

## 2021-04-12 PROCEDURE — 6360000002 HC RX W HCPCS: Performed by: FAMILY MEDICINE

## 2021-04-12 PROCEDURE — 96376 TX/PRO/DX INJ SAME DRUG ADON: CPT

## 2021-04-12 PROCEDURE — 85610 PROTHROMBIN TIME: CPT

## 2021-04-12 PROCEDURE — 97165 OT EVAL LOW COMPLEX 30 MIN: CPT

## 2021-04-12 PROCEDURE — 2580000003 HC RX 258: Performed by: FAMILY MEDICINE

## 2021-04-12 PROCEDURE — 97535 SELF CARE MNGMENT TRAINING: CPT

## 2021-04-12 PROCEDURE — 94669 MECHANICAL CHEST WALL OSCILL: CPT

## 2021-04-12 PROCEDURE — 85025 COMPLETE CBC W/AUTO DIFF WBC: CPT

## 2021-04-12 PROCEDURE — 97530 THERAPEUTIC ACTIVITIES: CPT

## 2021-04-12 PROCEDURE — 36415 COLL VENOUS BLD VENIPUNCTURE: CPT

## 2021-04-12 PROCEDURE — 96366 THER/PROPH/DIAG IV INF ADDON: CPT

## 2021-04-12 PROCEDURE — 2500000003 HC RX 250 WO HCPCS: Performed by: FAMILY MEDICINE

## 2021-04-12 PROCEDURE — 94640 AIRWAY INHALATION TREATMENT: CPT

## 2021-04-12 PROCEDURE — 97116 GAIT TRAINING THERAPY: CPT

## 2021-04-12 PROCEDURE — 2700000000 HC OXYGEN THERAPY PER DAY

## 2021-04-12 PROCEDURE — 1210000000 HC MED SURG R&B

## 2021-04-12 PROCEDURE — 80048 BASIC METABOLIC PNL TOTAL CA: CPT

## 2021-04-12 RX ORDER — WARFARIN SODIUM 1 MG/1
1 TABLET ORAL
Status: COMPLETED | OUTPATIENT
Start: 2021-04-12 | End: 2021-04-12

## 2021-04-12 RX ADMIN — DOXYCYCLINE 100 MG: 100 INJECTION, POWDER, LYOPHILIZED, FOR SOLUTION INTRAVENOUS at 18:21

## 2021-04-12 RX ADMIN — GUAIFENESIN 600 MG: 600 TABLET, EXTENDED RELEASE ORAL at 20:08

## 2021-04-12 RX ADMIN — LEVOTHYROXINE SODIUM 175 MCG: 100 TABLET ORAL at 05:20

## 2021-04-12 RX ADMIN — PHENYTOIN SODIUM 200 MG: 100 CAPSULE ORAL at 20:08

## 2021-04-12 RX ADMIN — ARFORMOTEROL TARTRATE 15 MCG: 15 SOLUTION RESPIRATORY (INHALATION) at 18:43

## 2021-04-12 RX ADMIN — PANTOPRAZOLE SODIUM 20 MG: 20 TABLET, DELAYED RELEASE ORAL at 09:47

## 2021-04-12 RX ADMIN — TROSPIUM CHLORIDE 20 MG: 20 TABLET, FILM COATED ORAL at 18:21

## 2021-04-12 RX ADMIN — WARFARIN SODIUM 1 MG: 1 TABLET ORAL at 18:21

## 2021-04-12 RX ADMIN — CEFTRIAXONE 1000 MG: 1 INJECTION, POWDER, FOR SOLUTION INTRAMUSCULAR; INTRAVENOUS at 18:21

## 2021-04-12 RX ADMIN — SODIUM CHLORIDE, PRESERVATIVE FREE 10 ML: 5 INJECTION INTRAVENOUS at 09:47

## 2021-04-12 RX ADMIN — SODIUM CHLORIDE, PRESERVATIVE FREE 10 ML: 5 INJECTION INTRAVENOUS at 20:09

## 2021-04-12 RX ADMIN — ARFORMOTEROL TARTRATE 15 MCG: 15 SOLUTION RESPIRATORY (INHALATION) at 06:29

## 2021-04-12 RX ADMIN — TROSPIUM CHLORIDE 20 MG: 20 TABLET, FILM COATED ORAL at 05:20

## 2021-04-12 RX ADMIN — BUDESONIDE 500 MCG: 0.5 SUSPENSION RESPIRATORY (INHALATION) at 06:29

## 2021-04-12 RX ADMIN — METOPROLOL SUCCINATE 50 MG: 50 TABLET, EXTENDED RELEASE ORAL at 09:47

## 2021-04-12 RX ADMIN — GUAIFENESIN 600 MG: 600 TABLET, EXTENDED RELEASE ORAL at 09:47

## 2021-04-12 RX ADMIN — SERTRALINE HYDROCHLORIDE 100 MG: 100 TABLET ORAL at 09:47

## 2021-04-12 RX ADMIN — PHENYTOIN SODIUM 200 MG: 100 CAPSULE ORAL at 09:47

## 2021-04-12 RX ADMIN — BUDESONIDE 500 MCG: 0.5 SUSPENSION RESPIRATORY (INHALATION) at 18:43

## 2021-04-12 RX ADMIN — DOXYCYCLINE 100 MG: 100 INJECTION, POWDER, LYOPHILIZED, FOR SOLUTION INTRAVENOUS at 05:20

## 2021-04-12 RX ADMIN — AMLODIPINE BESYLATE 5 MG: 5 TABLET ORAL at 09:47

## 2021-04-12 NOTE — PROGRESS NOTES
Hospitalist Progress Note  4/12/2021 4:13 PM  Subjective:   Admit Date: 4/10/2021  PCP: Sherri Mendenhall MD    Chief Complaint: weakness    Subjective: Patient is without new complaints. Accepted to SNF but precertification is pending. History is otherwise unchanged. Cumulative Hospital History:   4-10: Brought to ED with progressive weakness for a few days, much worse x1 days. Patient was walking within the home when she became too weak to continue. She was helped to the floor by family members and could not stand. Febrile to 100.8 F, some cough unrelieved by guaifenesin. Denies urinary symptoms but obvious infection on urinalysis. Admitted to med/surg on ceftriaxone with PT/OT evaluations pending. 4-11: Afebrile, states she feels better but still very weak per nursing. Up to chair with assistance today. To work with PT and OT. Suspect she will need rehab when stable. 4-12: Urine culture growing Klebsiella pneumoniae. Accepted to SNF, precert pending. Expected to be ready tomorrow. ROS: 14 point review of systems is negative except as specifically addressed above. DIET LOW SODIUM 2 GM;     Intake/Output Summary (Last 24 hours) at 4/12/2021 1613  Last data filed at 4/12/2021 1234  Gross per 24 hour   Intake 1000 ml   Output --   Net 1000 ml     Medications:   sodium chloride       Current Facility-Administered Medications   Medication Dose Route Frequency Provider Last Rate Last Admin    warfarin (COUMADIN) tablet 1 mg  1 mg Oral Once [facility] Pinto, DO        amLODIPine (NORVASC) tablet 5 mg  5 mg Oral Daily [place] Pinto, DO   5 mg at 04/12/21 0947    levothyroxine (SYNTHROID) tablet 175 mcg  175 mcg Oral Daily [place] Pinto, DO   175 mcg at 04/12/21 0520    metoprolol succinate (TOPROL XL) extended release tablet 50 mg  50 mg Oral Daily [place] Pinto, DO   50 mg at 04/12/21 0947    pantoprazole (PROTONIX) tablet 20 mg  20 mg Oral Daily Genaro Pinto, DO   20 mg at 04/12/21 0947    phenytoin (DILANTIN) ER capsule 200 mg  200 mg Oral BID Rebecca Numbers, DO   200 mg at 04/12/21 0947    sertraline (ZOLOFT) tablet 100 mg  100 mg Oral Daily [facility], DO   100 mg at 04/12/21 0947    trospium (SANCTURA) tablet 20 mg  20 mg Oral BID AC [facility], DO   20 mg at 04/12/21 0520    sodium chloride flush 0.9 % injection 5-40 mL  5-40 mL Intravenous 2 times per day Rebecca Numbers, DO   10 mL at 04/12/21 0947    sodium chloride flush 0.9 % injection 5-40 mL  5-40 mL Intravenous PRN Gemma Krabbe Pinto, DO        0.9 % sodium chloride infusion  25 mL Intravenous PRN Rebecca Numbers, DO        acetaminophen (TYLENOL) tablet 650 mg  650 mg Oral Q6H PRN Rebecca Numbers, DO        Or    acetaminophen (TYLENOL) suppository 650 mg  650 mg Rectal Q6H PRN Rebecca Numbers, DO        guaiFENesin [facility]) extended release tablet 600 mg  600 mg Oral BID [facility], DO   600 mg at 04/12/21 0947    cefTRIAXone (ROCEPHIN) 1,000 mg in sterile water 10 mL IV syringe  1,000 mg Intravenous Q24H [facility], DO   1,000 mg at 04/11/21 1602    doxycycline (VIBRAMYCIN) 100 mg in dextrose 5 % 100 mL IVPB  100 mg Intravenous Q12H [facility], DO   Stopped at 04/12/21 0629    budesonide (PULMICORT) nebulizer suspension 500 mcg  0.5 mg Nebulization BID [facility], DO   500 mcg at 04/12/21 9527    Arformoterol Tartrate (BROVANA) nebulizer solution 15 mcg  15 mcg Nebulization BID [facility], DO   15 mcg at 04/12/21 5036    warfarin (COUMADIN) daily dosing (placeholder)   Other RX Placeholder Gemma Krabbe Pinto, DO            Labs:     Recent Labs     04/10/21  1324 04/11/21  0303 04/12/21  0233   WBC 12.4* 12.7* 8.6   RBC 4.84 4.59 4.38   HGB 15.1 14.3 13.6   HCT 46.6 43.5 41.6   MCV 96.3 94.8 95.0   MCH 31.2* 31.2* 31.1*   MCHC 32.4* 32.9* 32.7*    257 250     Recent Labs     04/10/21  1324 04/11/21  0303 04/12/21  0233    137 140   K 4.2 3.9 3.6   ANIONGAP 9 11 12    100 104   CO2 29 26 24   BUN 9 13 18   CREATININE 0.8 0.6 0.6   GLUCOSE 147* 134* 125*   CALCIUM 8.4* 8.1* 7.6*     No results for input(s): MG, PHOS in the last 72 hours. Recent Labs     04/10/21  1324   AST 15   ALT 12   BILITOT 0.5   ALKPHOS 101     ABGs:No results for input(s): PH, PO2, PCO2, HCO3, BE, O2SAT in the last 72 hours. Troponin T: No results for input(s): TROPONINI in the last 72 hours. INR:   Recent Labs     04/10/21  1324 04/11/21  0303 04/12/21  0233   INR 2.37* 2.49* 2.94*     Lactic Acid:   Recent Labs     04/10/21  1540   LACTA 1.1       Objective:   Vitals: /69   Pulse 60   Temp 98.3 °F (36.8 °C) (Temporal)   Resp 17   Wt 200 lb (90.7 kg)   SpO2 96%   BMI 31.32 kg/m²   24HR INTAKE/OUTPUT:      Intake/Output Summary (Last 24 hours) at 4/12/2021 1613  Last data filed at 4/12/2021 1234  Gross per 24 hour   Intake 1000 ml   Output --   Net 1000 ml     General appearance: alert and cooperative with exam  HEENT: atraumatic, eyes with clear conjunctiva and normal lids, pupils and irises normal, external ears and nose are normal, lips normal  Neck without masses, lympadenopathy, bruit, thyroid normal  Lungs: no increased work of breathing, diminished breath sounds bilaterally  Heart: regular rate and rhythm, S1, S2 normal, no murmur, click, rub or gallop  Abdomen: soft, non-tender; bowel sounds normal; no masses,  no organomegaly and obese  Extremities: extremities normal, atraumatic, no cyanosis or edema  Neurologic: no focal neurologic deficits, normal sensation, alert and oriented, affect and mood appropriate  Skin: no rashes, nodules    Assessment and Plan:   Principal Problem:    Complicated urinary tract infection  Active Problems:    History of cardioembolic cerebrovascular accident (CVA)    Essential hypertension    Acquired hypothyroidism    Seizure disorder (HCC)    Generalized weakness    Hypocalcemia    Acute bronchitis  Resolved Problems:    * No resolved hospital problems.  *      Day #3 ceftriaxone and doxycycline culture-appropriate therapy for complicated urinary tract infection and empiric

## 2021-04-12 NOTE — PROGRESS NOTES
assessed for rehabilitation services?: Yes  Family / Caregiver Present: No  Diagnosis: UTI  Patient Currently in Pain: No  Vital Signs  Temp: 98.3 °F (36.8 °C)  Temp Source: Temporal  Pulse: 60  Heart Rate Source: Monitor  Resp: 18  BP: 112/69  BP Location: Left Arm  Patient Currently in Pain: No  Oxygen Therapy  SpO2: 97 %  O2 Device: Nasal cannula  Social/Functional History  Social/Functional History  Lives With: Other (comment)(Lives with her niece and her family)  Type of Home: House  Home Layout: One level  Home Equipment: Rolling walker  Receives Help From: Family  ADL Assistance: Independent  Homemaking Assistance: Needs assistance  Ambulation Assistance: Independent  Transfer Assistance: Independent(needs help with tub tfers)  Active : No  Additional Comments: pt did not always answer questions when asked, so may be unreliable historian       Objective   Vision: Within Functional Limits  Hearing: Within functional limits    Orientation  Overall Orientation Status: Impaired  Orientation Level: Oriented to person;Disoriented to situation;Disoriented to place; Disoriented to time        ADL  Feeding: Independent  Grooming: Supervision  UE Bathing: Supervision  LE Bathing: Moderate assistance  UE Dressing: Supervision  LE Dressing: Moderate assistance  Toileting: Moderate assistance           Transfers  Stand Step Transfers: Minimal assistance  Sit to stand: Minimal assistance     Cognition  Overall Cognitive Status: Exceptions  Arousal/Alertness: Appropriate responses to stimuli  Following Commands: Follows one step commands with repetition; Follows one step commands with increased time  Attention Span: Attends with cues to redirect; Difficulty attending to directions  Memory: Decreased recall of recent events  Safety Judgement: Decreased awareness of need for assistance;Decreased awareness of need for safety  Problem Solving: Assistance required to generate solutions;Assistance required to implement

## 2021-04-12 NOTE — CARE COORDINATION
[facility] pending precert (initiated 8/78).    [facility] (formerly [address] [place] [address])   [phone] signed by Beau Smalls on 4/12/2021 at 10:27 AM

## 2021-04-12 NOTE — PROGRESS NOTES
Clinical Pharmacy Note    Warfarin consult follow-up    Recent Labs     04/12/21  0233   INR 2.94*     Recent Labs     04/10/21  1324 04/11/21  0303 04/12/21  0233   HGB 15.1 14.3 13.6   HCT 46.6 43.5 41.6    257 250       Significant Drug-Drug Interactions:  New warfarin drug-drug interactions: none  Discontinued drug-drug interactions: none    Date INR Warfarin Dose   04/10/21 2.37 5 mg    04/11/21 2.49  2.5 mg     04/12/21 2.94  1 mg                                      Notes: Will give warfarin 1 mg once this evening. Daily PT/INR until stable within therapeutic range.      Electronically signed by FRANCIA Wilburn [facility] on 4/12/2021 at 11:10 AM

## 2021-04-12 NOTE — PROGRESS NOTES
Physical Therapy  Name: Tom Paulino  MRN:  [id number]  Date of service:  4/12/2021 04/12/21 1537   Subjective   Subjective Attempt: Pt BTB with nursing assist, resting at this time.          Electronically signed by Curtis Gonzalez PTA on 4/12/2021 at 3:38 PM

## 2021-04-12 NOTE — PROGRESS NOTES
Physical Therapy  Name: Sammie Santos  MRN:  [id number]  Date of service:  4/12/2021 04/12/21 1137   Restrictions/Precautions   Restrictions/Precautions Fall Risk   Required Braces or Orthoses? No   General   Chart Reviewed Yes   Subjective   Subjective Pt in bed, states she would like to try to get up to the bathroom and then sit up in recliner. Pain Screening   Patient Currently in Pain No   Intervention List Patient able to continue with treatment   Bed Mobility   Supine to Sit Contact guard assistance   Transfers   Sit to Stand Contact guard assistance;Minimal Assistance  (CGA from bed, Latisha from toilet)   Stand to sit Contact guard assistance;Minimal Assistance   Comment CGA for standing balance to don new brief after toileting   Ambulation   Ambulation? Yes   Ambulation 1   Surface level tile   Device Rolling Walker   Assistance Contact guard assistance;Minimal assistance   Quality of Gait decreased safety awareness with rwx, fwd flexed posture   Gait Deviations Slow Jana;Decreased step length;Decreased step height   Distance 10', 25'   Comments Amb from bed into BR, then to recliner. Vc's to keep rwx close to body, vc's to turn fully before sitting   Short term goals   Time Frame for Short term goals 2 wks   Short term goal 1 supine to sit indep   Short term goal 2 sit to stand indep   Short term goal 3 amb. 100' with RW SBA   Short term goal 4 bed to chair SBA   Conditions Requiring Skilled Therapeutic Intervention   Body structures, Functions, Activity limitations Decreased functional mobility ; Decreased ROM; Decreased strength;Decreased safe awareness;Decreased endurance;Decreased sensation;Decreased posture;Decreased balance;Decreased coordination   Assessment Pt showing much improvement in overall mobility compared with previous tx, able to stand from bedside with CGA and amb into BR but requires frequent vc's for safety awareness with rwx and to turn fully before sitting on toilet.  Pt amb back to recliner with CGA-Latisha at times, positioned for comfort in recliner with all needs in reach. Activity Tolerance   Activity Tolerance Patient Tolerated treatment well   Safety Devices   Type of devices Call light within reach; Chair alarm in place;Gait belt;Left in chair         Electronically signed by Elvia Joseph PTA on 4/12/2021 at 11:42 AM

## 2021-04-13 VITALS
WEIGHT: 200 LBS | RESPIRATION RATE: 20 BRPM | OXYGEN SATURATION: 91 % | SYSTOLIC BLOOD PRESSURE: 121 MMHG | DIASTOLIC BLOOD PRESSURE: 73 MMHG | HEART RATE: 88 BPM | TEMPERATURE: 97.6 F | BODY MASS INDEX: 31.32 KG/M2

## 2021-04-13 LAB
ANION GAP SERPL CALCULATED.3IONS-SCNC: 10 MMOL/L (ref 7–19)
BASOPHILS ABSOLUTE: 0 K/UL (ref 0–0.2)
BASOPHILS RELATIVE PERCENT: 0.2 % (ref 0–1)
BUN BLDV-MCNC: 20 MG/DL (ref 8–23)
CALCIUM SERPL-MCNC: 8 MG/DL (ref 8.8–10.2)
CHLORIDE BLD-SCNC: 105 MMOL/L (ref 98–111)
CO2: 25 MMOL/L (ref 22–29)
CREAT SERPL-MCNC: 0.7 MG/DL (ref 0.5–0.9)
EOSINOPHILS ABSOLUTE: 0.1 K/UL (ref 0–0.6)
EOSINOPHILS RELATIVE PERCENT: 1.4 % (ref 0–5)
GFR AFRICAN AMERICAN: >59
GFR NON-AFRICAN AMERICAN: >60
GLUCOSE BLD-MCNC: 104 MG/DL (ref 74–109)
HCT VFR BLD CALC: 44 % (ref 37–47)
HEMOGLOBIN: 14.2 G/DL (ref 12–16)
IMMATURE GRANULOCYTES #: 0.1 K/UL
INR BLD: 2.6 (ref 0.88–1.18)
LYMPHOCYTES ABSOLUTE: 2.3 K/UL (ref 1.1–4.5)
LYMPHOCYTES RELATIVE PERCENT: 27.7 % (ref 20–40)
MCH RBC QN AUTO: 31.1 PG (ref 27–31)
MCHC RBC AUTO-ENTMCNC: 32.3 G/DL (ref 33–37)
MCV RBC AUTO: 96.5 FL (ref 81–99)
MONOCYTES ABSOLUTE: 0.6 K/UL (ref 0–0.9)
MONOCYTES RELATIVE PERCENT: 7.3 % (ref 0–10)
NEUTROPHILS ABSOLUTE: 5.2 K/UL (ref 1.5–7.5)
NEUTROPHILS RELATIVE PERCENT: 62.7 % (ref 50–65)
PDW BLD-RTO: 14.2 % (ref 11.5–14.5)
PLATELET # BLD: 271 K/UL (ref 130–400)
PMV BLD AUTO: 10 FL (ref 9.4–12.3)
POTASSIUM REFLEX MAGNESIUM: 3.9 MMOL/L (ref 3.5–5)
PROTHROMBIN TIME: 28.5 SEC (ref 12–14.6)
RBC # BLD: 4.56 M/UL (ref 4.2–5.4)
SARS-COV-2, NAAT: NOT DETECTED
SODIUM BLD-SCNC: 140 MMOL/L (ref 136–145)
WBC # BLD: 8.3 K/UL (ref 4.8–10.8)

## 2021-04-13 PROCEDURE — 96366 THER/PROPH/DIAG IV INF ADDON: CPT

## 2021-04-13 PROCEDURE — 87635 SARS-COV-2 COVID-19 AMP PRB: CPT

## 2021-04-13 PROCEDURE — 94669 MECHANICAL CHEST WALL OSCILL: CPT

## 2021-04-13 PROCEDURE — 2500000003 HC RX 250 WO HCPCS: Performed by: FAMILY MEDICINE

## 2021-04-13 PROCEDURE — 6360000002 HC RX W HCPCS: Performed by: FAMILY MEDICINE

## 2021-04-13 PROCEDURE — 2580000003 HC RX 258: Performed by: FAMILY MEDICINE

## 2021-04-13 PROCEDURE — 85610 PROTHROMBIN TIME: CPT

## 2021-04-13 PROCEDURE — 36415 COLL VENOUS BLD VENIPUNCTURE: CPT

## 2021-04-13 PROCEDURE — 80048 BASIC METABOLIC PNL TOTAL CA: CPT

## 2021-04-13 PROCEDURE — 6370000000 HC RX 637 (ALT 250 FOR IP): Performed by: FAMILY MEDICINE

## 2021-04-13 PROCEDURE — 85025 COMPLETE CBC W/AUTO DIFF WBC: CPT

## 2021-04-13 PROCEDURE — 94640 AIRWAY INHALATION TREATMENT: CPT

## 2021-04-13 RX ORDER — WARFARIN SODIUM 2.5 MG/1
2.5 TABLET ORAL
Status: DISCONTINUED | OUTPATIENT
Start: 2021-04-13 | End: 2021-04-13 | Stop reason: HOSPADM

## 2021-04-13 RX ORDER — GUAIFENESIN 600 MG/1
600 TABLET, EXTENDED RELEASE ORAL 2 TIMES DAILY
Qty: 60 TABLET | Refills: 0 | Status: SHIPPED | OUTPATIENT
Start: 2021-04-13

## 2021-04-13 RX ORDER — PANTOPRAZOLE SODIUM 20 MG/1
20 TABLET, DELAYED RELEASE ORAL DAILY
Qty: 30 TABLET | Refills: 0 | Status: SHIPPED | OUTPATIENT
Start: 2021-04-14 | End: 2021-12-23

## 2021-04-13 RX ORDER — CEFDINIR 300 MG/1
300 CAPSULE ORAL 2 TIMES DAILY
Qty: 10 CAPSULE | Refills: 0 | Status: SHIPPED | OUTPATIENT
Start: 2021-04-13 | End: 2021-04-18

## 2021-04-13 RX ORDER — DOXYCYCLINE HYCLATE 100 MG
100 TABLET ORAL 2 TIMES DAILY
Qty: 10 TABLET | Refills: 0 | Status: SHIPPED | OUTPATIENT
Start: 2021-04-13 | End: 2021-04-18

## 2021-04-13 RX ADMIN — GUAIFENESIN 600 MG: 600 TABLET, EXTENDED RELEASE ORAL at 08:35

## 2021-04-13 RX ADMIN — AMLODIPINE BESYLATE 5 MG: 5 TABLET ORAL at 08:35

## 2021-04-13 RX ADMIN — METOPROLOL SUCCINATE 50 MG: 50 TABLET, EXTENDED RELEASE ORAL at 08:35

## 2021-04-13 RX ADMIN — PHENYTOIN SODIUM 200 MG: 100 CAPSULE ORAL at 08:35

## 2021-04-13 RX ADMIN — DOXYCYCLINE 100 MG: 100 INJECTION, POWDER, LYOPHILIZED, FOR SOLUTION INTRAVENOUS at 05:35

## 2021-04-13 RX ADMIN — PANTOPRAZOLE SODIUM 20 MG: 20 TABLET, DELAYED RELEASE ORAL at 08:35

## 2021-04-13 RX ADMIN — BUDESONIDE 500 MCG: 0.5 SUSPENSION RESPIRATORY (INHALATION) at 07:52

## 2021-04-13 RX ADMIN — SODIUM CHLORIDE, PRESERVATIVE FREE 5 ML: 5 INJECTION INTRAVENOUS at 08:40

## 2021-04-13 RX ADMIN — TROSPIUM CHLORIDE 20 MG: 20 TABLET, FILM COATED ORAL at 05:35

## 2021-04-13 RX ADMIN — ARFORMOTEROL TARTRATE 15 MCG: 15 SOLUTION RESPIRATORY (INHALATION) at 07:52

## 2021-04-13 RX ADMIN — SERTRALINE HYDROCHLORIDE 100 MG: 100 TABLET ORAL at 08:35

## 2021-04-13 RX ADMIN — LEVOTHYROXINE SODIUM 175 MCG: 100 TABLET ORAL at 05:35

## 2021-04-13 NOTE — DISCHARGE SUMMARY
Mark Dickerson  :  1931  MRN:  [id number]    Admit date:  4/10/2021  Discharge date:      Admitting Physician:  Myrna Combs DO    Advance Directive: Full Code    Consults: none    Primary Care Physician:  Romaine Avendano MD    Discharge Diagnoses:  Principal Problem:    Complicated urinary tract infection  Active Problems:    History of cardioembolic cerebrovascular accident (CVA)    Essential hypertension    Acquired hypothyroidism    Seizure disorder ([address]    Generalized weakness    Hypocalcemia    Acute bronchitis  Resolved Problems:    * No resolved hospital problems. *      Significant Diagnostic Studies:   Xr Acute Abd Series Chest 1 Vw    Result Date: 4/10/2021  XR ACUTE ABD SERIES CHEST 1 VW COMPARISON: Chest x-ray dated 2021 INDICATION: Generalized malaise, cough DISCUSSION: LINES/TUBES: None. BOWEL:  The bowel gas pattern shows no significant bowel dilatation. OTHER: No abnormal abdominal calcifications, mass effect, organomegaly or pneumoperitoneum. BONES:  Unremarkable. CHEST: Low lung volumes. Chronic interstitial lung changes. Borderline size of the cardiomediastinal silhouette. 1.  Non-obstructive bowel gas pattern. 2.  Low lung volumes and chronic interstitial lung changes. Otherwise, no acute abnormalities. Signed by Dr Gabi Bunch on 4/10/2021 1:58 PM      Pertinent Labs:   CBC:   Recent Labs     21  0303 21  0233 21  0325   WBC 12.7* 8.6 8.3   HGB 14.3 13.6 14.2    250 271     BMP:    Recent Labs     21  0303 21  0233 21  0325    140 140   K 3.9 3.6 3.9    104 105   CO2 26 24 25   BUN 13 18 20   CREATININE 0.6 0.6 0.7   GLUCOSE 134* 125* 104     INR:   Recent Labs     21  0303 21  0233 21  0325   INR 2.49* 2.94* 2.60*     ABGs:No results for input(s): PH, PO2, PCO2, HCO3, BE, O2SAT in the last 72 hours. Lactic Acid:  Recent Labs     04/10/21  1540   LACTA 1.1       Procedures: None performed.     Hospital Course:   4-10: Brought to ED with progressive weakness for a few days, much worse x1 days. Patient was walking within the home when she became too weak to continue. She was helped to the floor by family members and could not stand. Febrile to 100.8 F, some cough unrelieved by guaifenesin. Denies urinary symptoms but obvious infection on urinalysis. Admitted to med/surg on ceftriaxone with PT/OT evaluations pending. 4-11: Afebrile, states she feels better but still very weak per nursing. Up to chair with assistance today. To work with PT and OT. Suspect she will need rehab when stable. 4-12: Urine culture growing Klebsiella pneumoniae. Accepted to SNF, precert pending. Expected to be ready tomorrow. 8-44: Precertification completed. Discharged to SNF on cefdinir and doxycycline for 5 days. Bronchitis has clinically resolved, patient appears well.     Physical Exam:  Vitals: /63   Pulse 83   Temp 98.1 °F (36.7 °C) (Temporal)   Resp 18   Wt 200 lb (90.7 kg)   SpO2 92%   BMI 31.32 kg/m²   24HR INTAKE/OUTPUT:      Intake/Output Summary (Last 24 hours) at 4/13/2021 1159  Last data filed at 4/13/2021 0858  Gross per 24 hour   Intake 460 ml   Output --   Net 460 ml     General appearance: alert and cooperative with exam  HEENT: atraumatic, eyes with clear conjunctiva and normal lids, pupils and irises normal, external ears and nose are normal, lips normal. Neck without masses, lympadenopathy, bruit, thyroid normal  Lungs: no increased work of breathing, clear to auscultation bilaterally without rales, rhonchi or wheezes  Heart: regular rate and rhythm, S1, S2 normal, no murmur, click, rub or gallop  Abdomen: soft, non-tender; bowel sounds normal; no masses,  no organomegaly and obese  Extremities: edema trace bilaterally, generalized tenderness so Meliton's equivocal  Neurologic: no focal neurologic deficits, normal sensation, alert and oriented, affect and mood appropriate  Skin: no jaundice, rashes, or nodules    Discharge Medications:       Krista Allred \"GENE\"   Home Medication Instructions SHERRY:[id number]    Printed on:04/13/21 9954   Medication Information                      amLODIPine (NORVASC) 5 MG tablet  Take 1 tablet by mouth daily             cefdinir (OMNICEF) 300 MG capsule  Take 1 capsule by mouth 2 times daily for 5 days             doxycycline hyclate (VIBRA-TABS) 100 MG tablet  Take 1 tablet by mouth 2 times daily for 5 days             Fluticasone furoate-vilanterol (BREO ELLIPTA) 200-25 MCG/INH AEPB inhaler  Inhale 1 puff into the lungs daily             guaiFENesin (MUCINEX) 600 MG extended release tablet  Take 1 tablet by mouth 2 times daily             levothyroxine (SYNTHROID) 175 MCG tablet  Take 1 tablet by mouth Daily             metoprolol succinate (TOPROL XL) 50 MG extended release tablet  Take 1 tablet by mouth daily             pantoprazole (PROTONIX) 20 MG tablet  Take 1 tablet by mouth daily             phenytoin (DILANTIN) 100 MG ER capsule  Take 2 capsules by mouth 2 times daily             sertraline (ZOLOFT) 100 MG tablet  Take 1 tablet by mouth daily             tolterodine (DETROL LA) 2 MG extended release capsule  Take 1 capsule by mouth daily             warfarin (COUMADIN) 5 MG tablet  Take 1 tablet by mouth daily                 Discharge Instructions: Follow up with Jeri Melendez MD in 7 days. Take medications as directed. Resume activity as tolerated. Diet: DIET LOW SODIUM 2 GM;     Disposition: Patient is medically stable and will be discharged Skilled nursing facility. Time spent on discharge 25 minutes.     Signed:  Hernan Garcia DO

## 2021-04-13 NOTE — DISCHARGE INSTR - COC
Continuity of Care Form    Patient Name: Gary Arizmendi   :  1931  MRN:  [id number]    Admit date:  4/10/2021  Discharge date:  2021    Code Status Order: Full Code   Advance Directives:     Admitting Physician:  Beth Posadas DO  PCP: David Yeager MD    Discharging Nurse: [facility] Unit/Room#: 1291/611-86  Discharging Unit Phone Number: [phone]    Emergency Contact:   Extended Emergency Contact Information  [facility]  Phone: [phone]  Relation: Niece/Nephew    Past Surgical History:  Past Surgical History:   Procedure Laterality Date    APPENDECTOMY      CHOLECYSTECTOMY      EYE SURGERY      bilateral cataract     HYSTERECTOMY, TOTAL ABDOMINAL      JOINT REPLACEMENT      Bilateral Knee    KNEE ARTHROPLASTY Right     LOBECTOMY  10/2015    sx--bilateral    PTCA      with stent placement/bms to rca       Immunization History:   Immunization History   Administered Date(s) Administered    Influenza, Quadv, adjuvanted, 65 yrs +, IM, PF (Fluad) 2020    Influenza, Triv, inactivated, subunit, adjuvanted, IM (Fluad 65 yrs and older) 2019       Active Problems:  Patient Active Problem List   Diagnosis Code    Compression fx, thoracic spine, closed[facility] S22.000A    History of cardioembolic cerebrovascular accident (CVA) Z86.73    Essential hypertension I10    Acquired hypothyroidism E03.9    Seizure disorder ([facility] G40.909    Other pulmonary embolism without acute cor pulmonale (HCC) I26.99    Deep vein thrombosis (DVT) of left lower extremity (HCC) I82.402    Leg pain, left M79.605    Leg swelling B42.07    Complicated urinary tract infection N39.0    Generalized weakness R53.1    Hypocalcemia E83.51    Acute bronchitis J20.9       Isolation/Infection:   Isolation          No Isolation        Patient Infection Status     Infection Onset Added Last Indicated Last Indicated By Review Planned patient):  Dentures upper    RN SIGNATURE:  Electronically signed by Lynette Dillon LPN on 0/88/14 at 7:95 PM CDT    CASE MANAGEMENT/SOCIAL WORK SECTION    Inpatient Status Date: ***    Readmission Risk Assessment Score:  Readmission Risk              Risk of Unplanned Readmission:        14           Discharging to Facility/ Agency   · Name:   · Address:  · Phone:  · Fax:    Dialysis Facility (if applicable)   · Name:  · Address:  · Dialysis Schedule:  · Phone:  · Fax:    / signature: {Esignature:[id number]}    PHYSICIAN SECTION    Prognosis: {Prognosis:7732298721}    Condition at Discharge: [address] [place] [address] Patient Condition:463964599}    Rehab Potential (if transferring to Rehab): {Prognosis:4298172248}    Recommended Labs or Other Treatments After Discharge: ***    Physician Certification: I certify the above information and transfer of Glenn Hurt  is necessary for the continuing treatment of the diagnosis listed and that she requires {Admit to Appropriate Level of Care:92939} for {GREATER/LESS:875243165} 30 days.      Update Admission H&P: {CHP DME Changes in [facility]N:161124785}    PHYSICIAN SIGNATURE:  {Esignature:457737300}

## 2021-04-13 NOTE — PROGRESS NOTES
Clinical Pharmacy Note    Warfarin consult follow-up    Recent Labs     04/13/21  0325   INR 2.60*     Recent Labs     04/11/21  0303 04/12/21  0233 04/13/21  0325   HGB 14.3 13.6 14.2   HCT 43.5 41.6 44.0    250 271       Significant Drug-Drug Interactions:  New warfarin drug-drug interactions: none  Discontinued drug-drug interactions: none    Date INR Warfarin Dose   04/10/21 2.37 5 mg    04/11/21 2.49  2.5 mg     04/12/21 2.94  1 mg     04/13/21 2.60  2.5 mg                              Notes: Will give warfarin 2.5 mg once this evening. Daily PT/INR until stable within therapeutic range.      Electronically signed by FRANCIA Wilburn [facility] on 4/13/2021 at 1:53 PM

## 2021-04-13 NOTE — PROGRESS NOTES
Report called to Dann Padilla at [facility]. Ambulance called at this time.     Electronically signed by Monico Stringer LPN on 3/45/9175 at 3:61 PM

## 2021-04-15 ENCOUNTER — TELEPHONE (OUTPATIENT)
Dept: INTERNAL MEDICINE | Age: 86
End: 2021-04-15

## 2021-04-15 LAB
BLOOD CULTURE, ROUTINE: NORMAL
CULTURE, BLOOD 2: NORMAL

## 2021-04-27 ENCOUNTER — TELEPHONE (OUTPATIENT)
Dept: INTERNAL MEDICINE | Age: 86
End: 2021-04-27

## 2021-04-27 NOTE — TELEPHONE ENCOUNTER
[address] [facility] [phone]      Per Le Query, nursing staff at [facility], patient is doing well. Patient continues to participate in therapy. No known discharge plans at this time.

## 2021-05-06 ENCOUNTER — APPOINTMENT (OUTPATIENT)
Dept: VACCINE CLINIC | Facility: HOSPITAL | Age: 86
End: 2021-05-06

## 2021-05-06 ENCOUNTER — TELEPHONE (OUTPATIENT)
Dept: INTERNAL MEDICINE | Age: 86
End: 2021-05-06

## 2021-05-06 NOTE — TELEPHONE ENCOUNTER
[address] [facility] [phone]      Per FERN, nursing staff at [facility], patient is doing well. Patient continues to participate in therapy. No known discharge plans at this time.

## 2021-05-10 ENCOUNTER — TELEPHONE (OUTPATIENT)
Dept: INTERNAL MEDICINE CLINIC | Age: 86
End: 2021-05-10

## 2021-05-10 NOTE — TELEPHONE ENCOUNTER
Would you want them to check PT/INR on admit visit  ?    Pt has her won machine I think but they can make sure she Is independent with using that again once back in home

## 2021-05-11 ENCOUNTER — APPOINTMENT (OUTPATIENT)
Dept: VACCINE CLINIC | Facility: HOSPITAL | Age: 86
End: 2021-05-11

## 2021-05-11 ENCOUNTER — TELEPHONE (OUTPATIENT)
Dept: INTERNAL MEDICINE | Age: 86
End: 2021-05-11

## 2021-05-11 ENCOUNTER — ANTI-COAG VISIT (OUTPATIENT)
Dept: PRIMARY CARE CLINIC | Age: 86
End: 2021-05-11
Payer: MEDICARE

## 2021-05-11 ENCOUNTER — TELEPHONE (OUTPATIENT)
Dept: INTERNAL MEDICINE CLINIC | Age: 86
End: 2021-05-11

## 2021-05-11 DIAGNOSIS — Z51.81 MONITORING FOR LONG-TERM ANTICOAGULANT USE: ICD-10-CM

## 2021-05-11 DIAGNOSIS — Z79.01 MONITORING FOR LONG-TERM ANTICOAGULANT USE: ICD-10-CM

## 2021-05-11 DIAGNOSIS — I26.99 OTHER PULMONARY EMBOLISM WITHOUT ACUTE COR PULMONALE, UNSPECIFIED CHRONICITY (HCC): ICD-10-CM

## 2021-05-11 DIAGNOSIS — I82.5Y2 CHRONIC DEEP VEIN THROMBOSIS (DVT) OF PROXIMAL VEIN OF LEFT LOWER EXTREMITY (HCC): ICD-10-CM

## 2021-05-11 LAB — INTERNATIONAL NORMALIZATION RATIO, POC: 1.7

## 2021-05-11 PROCEDURE — 93793 ANTICOAG MGMT PT WARFARIN: CPT | Performed by: FAMILY MEDICINE

## 2021-05-11 PROCEDURE — 85610 PROTHROMBIN TIME: CPT | Performed by: FAMILY MEDICINE

## 2021-05-11 NOTE — TELEPHONE ENCOUNTER
[address] [address] nurse reporting pt PT/INR today is 20.9 / 1.7    Per fingerstick     Patients daughter states she takes   Warfarin 2.5mg M,W,F,Sat, Sun  Warfarin 5mg Tues, Thurs      Please advise on dose and when they need to recheck

## 2021-05-11 NOTE — TELEPHONE ENCOUNTER
Anti-coag encounter completed. She should increase her dose tomorrow to 5 mg (one time only) then recheck early next week. Thank you.

## 2021-05-12 ENCOUNTER — TELEPHONE (OUTPATIENT)
Dept: INTERNAL MEDICINE CLINIC | Age: 86
End: 2021-05-12

## 2021-05-12 ENCOUNTER — TELEPHONE (OUTPATIENT)
Dept: INTERNAL MEDICINE | Age: 86
End: 2021-05-12

## 2021-05-12 NOTE — TELEPHONE ENCOUNTER
[address] PT eval completed and PT Recommendation: plan PT visits  2 X wk for 3 wks  for strengthening,  gait, balance, pt education, HEP    Please advise If approved

## 2021-05-12 NOTE — TELEPHONE ENCOUNTER
[facility] Trans[facility] Initial Follow Up Call    Outreach made within 2 business days of discharge: Yes    Patient: Sammie Santos           Patient : 1931        MRN: [id number]    Reason for Admission: Admitted 04/10/21 for UTI  Discharge Date: 21 from [place] then admitted to [facility] for short term rehab. Discharged 05/10/21  Discharge Diagnoses:  Principal Problem:    Complicated urinary tract infection  Active Problems:    History of cardioembolic cerebrovascular accident (CVA)    Essential hypertension    Acquired hypothyroidism    Seizure disorder (HCC)    Generalized weakness    Hypocalcemia    Acute bronchitis                           Spoke with: attempted to reach patient, no answer. I was unable to get patient moved to see Dr. Nadya Oliva as I was unable to speak with the patient to reschedule. Mailbox was full.  I am unable to leave a message with intent of my call.      Discharge department/facility: [address] [place] [address] and Rehab     Scheduled appointment with PCP within 7-14 days    Follow Up  Future Appointments   Date Time Provider Laura Page   2021  1:15 PM VIN Jj [facility]   6/15/2021  8:00 AM [facility] VASC LAB ROOM 2 [facility]   6/15/2021  1:45 PM MD ADE [facility]ell [facility]   2021 10:00 AM VIN [facility]ios [facility] Spec [facility]   2021 10:15 AM VIN Goins [address] [place] [address]

## 2021-05-17 ENCOUNTER — ANTI-COAG VISIT (OUTPATIENT)
Dept: PRIMARY CARE CLINIC | Age: 86
End: 2021-05-17
Payer: MEDICARE

## 2021-05-17 DIAGNOSIS — Z79.01 MONITORING FOR LONG-TERM ANTICOAGULANT USE: ICD-10-CM

## 2021-05-17 DIAGNOSIS — Z51.81 MONITORING FOR LONG-TERM ANTICOAGULANT USE: ICD-10-CM

## 2021-05-17 DIAGNOSIS — I82.5Y2 CHRONIC DEEP VEIN THROMBOSIS (DVT) OF PROXIMAL VEIN OF LEFT LOWER EXTREMITY (HCC): Primary | ICD-10-CM

## 2021-05-17 DIAGNOSIS — I27.82 OTHER CHRONIC PULMONARY EMBOLISM WITHOUT ACUTE COR PULMONALE (HCC): ICD-10-CM

## 2021-05-17 LAB — INR BLD: 2.1

## 2021-05-17 PROCEDURE — 93793 ANTICOAG MGMT PT WARFARIN: CPT | Performed by: FAMILY MEDICINE

## 2021-05-17 NOTE — PROGRESS NOTES
HOME MONITORING REPORT    INR today:   Results for orders placed or performed in visit on 05/17/21   Protime-INR   Result Value Ref Range    INR 2.10        INR Goal: 2.0-3.0    Dosing Plan  As of 5/17/2021    TTR:  77.4 % (10.5 mo)   Full warfarin instructions:  5 mg every Tue, Sat; 2.5 mg all other days              PLAN: Patient aware to continue current dose and recheck in one week or as ordered.

## 2021-05-20 ENCOUNTER — ANTI-COAG VISIT (OUTPATIENT)
Dept: PRIMARY CARE CLINIC | Age: 86
End: 2021-05-20

## 2021-05-20 ENCOUNTER — OFFICE VISIT (OUTPATIENT)
Dept: FAMILY MEDICINE CLINIC | Age: 86
End: 2021-05-20
Payer: MEDICARE

## 2021-05-20 VITALS
SYSTOLIC BLOOD PRESSURE: 130 MMHG | OXYGEN SATURATION: 93 % | TEMPERATURE: 99.7 F | WEIGHT: 203.8 LBS | HEART RATE: 78 BPM | DIASTOLIC BLOOD PRESSURE: 78 MMHG | BODY MASS INDEX: 31.99 KG/M2 | HEIGHT: 67 IN

## 2021-05-20 DIAGNOSIS — N39.0 COMPLICATED URINARY TRACT INFECTION: Primary | ICD-10-CM

## 2021-05-20 DIAGNOSIS — R53.1 GENERALIZED WEAKNESS: ICD-10-CM

## 2021-05-20 LAB — INR BLD: 1.3

## 2021-05-20 PROCEDURE — 99495 TRANSJ CARE MGMT MOD F2F 14D: CPT | Performed by: CLINICAL NURSE SPECIALIST

## 2021-05-20 NOTE — PROGRESS NOTES
HOME MONITORING REPORT    INR today:   Results for orders placed or performed in visit on 05/20/21   Protime-INR   Result Value Ref Range    INR 1.30        INR Goal: 2.0-3.0    Dosing Plan  As of 5/20/2021    TTR:  76.7 % (10.6 mo)   Full warfarin instructions:  5/21: 5 mg; Otherwise 5 mg every Tue, Thu; 2.5 mg all other days              PLAN: Advised patient/caregiver to increase her dose tomorrow to 5 mg, then  continue current dose and recheck on 5/25/21.   Patient/Caregiver voiced understanding

## 2021-05-20 NOTE — PROGRESS NOTES
Hypertension Mother     Rheum Arthritis Mother     Stroke Mother     Tuberculosis Father      Social History     Tobacco Use    Smoking status: Never Smoker    Smokeless tobacco: Never Used   Substance Use Topics    Alcohol use: Never     Current Outpatient Medications   Medication Sig Dispense Refill    guaiFENesin (MUCINEX) 600 MG extended release tablet Take 1 tablet by mouth 2 times daily 60 tablet 0    pantoprazole (PROTONIX) 20 MG tablet Take 1 tablet by mouth daily 30 tablet 0    Fluticasone furoate-vilanterol (BREO ELLIPTA) 200-25 MCG/INH AEPB inhaler Inhale 1 puff into the lungs daily 30 each 5    tolterodine (DETROL LA) 2 MG extended release capsule Take 1 capsule by mouth daily 30 capsule 5    amLODIPine (NORVASC) 5 MG tablet Take 1 tablet by mouth daily 90 tablet 3    levothyroxine (SYNTHROID) 175 MCG tablet Take 1 tablet by mouth Daily 90 tablet 3    metoprolol succinate (TOPROL XL) 50 MG extended release tablet Take 1 tablet by mouth daily 90 tablet 3    phenytoin (DILANTIN) 100 MG ER capsule Take 2 capsules by mouth 2 times daily 180 capsule 3    sertraline (ZOLOFT) 100 MG tablet Take 1 tablet by mouth daily 90 tablet 3    warfarin (COUMADIN) 5 MG tablet Take 1 tablet by mouth daily (Patient taking differently: Take 5 mg by mouth daily 2.5 mg everyday but Tuesdays and Thursdays. 5mg on Tuesday Thursday.) 90 tablet 3     No current facility-administered medications for this visit. Allergies   Allergen Reactions    Morphine      Pt becomes mean (refusing, cursing)         Review of Systems   Constitutional: Negative for activity change, appetite change, chills, diaphoresis, fatigue and fever. HENT: Negative for congestion, ear pain, facial swelling, hearing loss, postnasal drip, sinus pressure, sore throat and trouble swallowing. Eyes: Negative for pain, discharge, redness and visual disturbance.    Respiratory: Negative for cough, chest tightness, shortness of breath and wheezing. Cardiovascular: Negative for chest pain, palpitations and leg swelling. Gastrointestinal: Negative for abdominal pain, constipation and diarrhea. Genitourinary: Negative for difficulty urinating, dysuria, flank pain, frequency, hematuria and urgency. Musculoskeletal: Negative for arthralgias, back pain, joint swelling and neck pain. Skin: Negative for color change and rash. Neurological: Negative for dizziness, syncope, weakness, light-headedness and headaches. Psychiatric/Behavioral: Negative for confusion and dysphoric mood. The patient is not nervous/anxious. OBJECTIVE:  /78   Pulse 78   Temp 99.7 °F (37.6 °C) (Temporal)   Ht 5' 7\" (1.702 m)   Wt 203 lb 12.8 oz (92.4 kg)   SpO2 93%   BMI 31.92 kg/m²    Physical Exam  Vitals reviewed. Constitutional:       General: She is not in acute distress. Appearance: She is well-developed. She is not ill-appearing or toxic-appearing. HENT:      Head: Normocephalic and atraumatic. Eyes:      General:         Right eye: No discharge. Left eye: No discharge. Conjunctiva/sclera: Conjunctivae normal.      Pupils: Pupils are equal, round, and reactive to light. Neck:      Thyroid: No thyromegaly. Trachea: No tracheal deviation. Cardiovascular:      Rate and Rhythm: Normal rate and regular rhythm. Heart sounds: No murmur heard. Pulmonary:      Effort: Pulmonary effort is normal. No respiratory distress. Breath sounds: Normal breath sounds. No wheezing or rales. Comments: Coarse  Genitourinary:     Vagina: Normal.   Musculoskeletal:         General: No deformity. Normal range of motion. Cervical back: Normal range of motion and neck supple. Right lower leg: No edema. Left lower leg: No edema. Lymphadenopathy:      Cervical: No cervical adenopathy. Skin:     General: Skin is warm and dry. Findings: No erythema or rash.    Neurological:      Mental Status: She is

## 2021-05-21 ASSESSMENT — ENCOUNTER SYMPTOMS
EYE PAIN: 0
EYE DISCHARGE: 0
CONSTIPATION: 0
SINUS PRESSURE: 0
CHEST TIGHTNESS: 0
DIARRHEA: 0
ABDOMINAL PAIN: 0
COUGH: 0
FACIAL SWELLING: 0
EYE REDNESS: 0
BACK PAIN: 0
TROUBLE SWALLOWING: 0
COLOR CHANGE: 0
WHEEZING: 0
SHORTNESS OF BREATH: 0
SORE THROAT: 0

## 2021-05-29 ENCOUNTER — APPOINTMENT (OUTPATIENT)
Dept: GENERAL RADIOLOGY | Age: 86
End: 2021-05-29
Payer: MEDICARE

## 2021-05-29 ENCOUNTER — HOSPITAL ENCOUNTER (EMERGENCY)
Age: 86
Discharge: HOME | End: 2021-05-29
Payer: MEDICARE

## 2021-05-29 VITALS
OXYGEN SATURATION: 92 % | HEIGHT: 68 IN | RESPIRATION RATE: 18 BRPM | BODY MASS INDEX: 30.92 KG/M2 | DIASTOLIC BLOOD PRESSURE: 84 MMHG | TEMPERATURE: 98.4 F | HEART RATE: 105 BPM | WEIGHT: 204 LBS | SYSTOLIC BLOOD PRESSURE: 142 MMHG

## 2021-05-29 DIAGNOSIS — R31.9 URINARY TRACT INFECTION WITH HEMATURIA, SITE UNSPECIFIED: Primary | ICD-10-CM

## 2021-05-29 DIAGNOSIS — N39.0 URINARY TRACT INFECTION WITH HEMATURIA, SITE UNSPECIFIED: Primary | ICD-10-CM

## 2021-05-29 DIAGNOSIS — R53.1 GENERALIZED WEAKNESS: ICD-10-CM

## 2021-05-29 LAB
ALBUMIN SERPL-MCNC: 3.5 G/DL (ref 3.5–5.2)
ALP BLD-CCNC: 93 U/L (ref 35–104)
ALT SERPL-CCNC: 14 U/L (ref 5–33)
ANION GAP SERPL CALCULATED.3IONS-SCNC: 9 MMOL/L (ref 7–19)
AST SERPL-CCNC: 13 U/L (ref 5–32)
BACTERIA: ABNORMAL /HPF
BASOPHILS ABSOLUTE: 0 K/UL (ref 0–0.2)
BASOPHILS RELATIVE PERCENT: 0.3 % (ref 0–1)
BILIRUB SERPL-MCNC: 0.5 MG/DL (ref 0.2–1.2)
BILIRUBIN URINE: NEGATIVE
BLOOD, URINE: ABNORMAL
BUN BLDV-MCNC: 13 MG/DL (ref 8–23)
CALCIUM SERPL-MCNC: 8.2 MG/DL (ref 8.8–10.2)
CHLORIDE BLD-SCNC: 103 MMOL/L (ref 98–111)
CLARITY: ABNORMAL
CO2: 29 MMOL/L (ref 22–29)
COLOR: YELLOW
CREAT SERPL-MCNC: 0.6 MG/DL (ref 0.5–0.9)
CRYSTALS, UA: ABNORMAL /HPF
EOSINOPHILS ABSOLUTE: 0 K/UL (ref 0–0.6)
EOSINOPHILS RELATIVE PERCENT: 0.3 % (ref 0–5)
EPITHELIAL CELLS, UA: 1 /HPF (ref 0–5)
GFR AFRICAN AMERICAN: >59
GFR NON-AFRICAN AMERICAN: >60
GLUCOSE BLD-MCNC: 137 MG/DL (ref 74–109)
GLUCOSE URINE: NEGATIVE MG/DL
HCT VFR BLD CALC: 44 % (ref 37–47)
HEMOGLOBIN: 14.7 G/DL (ref 12–16)
HYALINE CASTS: 1 /HPF (ref 0–8)
IMMATURE GRANULOCYTES #: 0 K/UL
INR BLD: 1.48 (ref 0.88–1.18)
KETONES, URINE: NEGATIVE MG/DL
LEUKOCYTE ESTERASE, URINE: ABNORMAL
LYMPHOCYTES ABSOLUTE: 1.5 K/UL (ref 1.1–4.5)
LYMPHOCYTES RELATIVE PERCENT: 16 % (ref 20–40)
MAGNESIUM: 1.8 MG/DL (ref 1.6–2.4)
MCH RBC QN AUTO: 31.8 PG (ref 27–31)
MCHC RBC AUTO-ENTMCNC: 33.4 G/DL (ref 33–37)
MCV RBC AUTO: 95.2 FL (ref 81–99)
MONOCYTES ABSOLUTE: 0.8 K/UL (ref 0–0.9)
MONOCYTES RELATIVE PERCENT: 8.7 % (ref 0–10)
NEUTROPHILS ABSOLUTE: 7 K/UL (ref 1.5–7.5)
NEUTROPHILS RELATIVE PERCENT: 74.3 % (ref 50–65)
NITRITE, URINE: NEGATIVE
PDW BLD-RTO: 14.5 % (ref 11.5–14.5)
PH UA: 7 (ref 5–8)
PHENYTOIN LEVEL: 16.2 UG/ML (ref 10–20)
PLATELET # BLD: 254 K/UL (ref 130–400)
PMV BLD AUTO: 9.9 FL (ref 9.4–12.3)
POTASSIUM SERPL-SCNC: 3.4 MMOL/L (ref 3.5–5)
PROTEIN UA: 30 MG/DL
PROTHROMBIN TIME: 18 SEC (ref 12–14.6)
RBC # BLD: 4.62 M/UL (ref 4.2–5.4)
RBC UA: 5 /HPF (ref 0–4)
REASON FOR REJECTION: NORMAL
REJECTED TEST: NORMAL
SODIUM BLD-SCNC: 141 MMOL/L (ref 136–145)
SPECIFIC GRAVITY UA: 1.01 (ref 1–1.03)
TOTAL PROTEIN: 7.1 G/DL (ref 6.6–8.7)
TROPONIN: <0.01 NG/ML (ref 0–0.03)
UROBILINOGEN, URINE: 1 E.U./DL
WBC # BLD: 9.4 K/UL (ref 4.8–10.8)
WBC UA: 652 /HPF (ref 0–5)

## 2021-05-29 PROCEDURE — 36415 COLL VENOUS BLD VENIPUNCTURE: CPT

## 2021-05-29 PROCEDURE — 83735 ASSAY OF MAGNESIUM: CPT

## 2021-05-29 PROCEDURE — 87186 SC STD MICRODIL/AGAR DIL: CPT

## 2021-05-29 PROCEDURE — 6360000002 HC RX W HCPCS: Performed by: NURSE PRACTITIONER

## 2021-05-29 PROCEDURE — 2580000003 HC RX 258: Performed by: NURSE PRACTITIONER

## 2021-05-29 PROCEDURE — 99282 EMERGENCY DEPT VISIT SF MDM: CPT

## 2021-05-29 PROCEDURE — 85025 COMPLETE CBC W/AUTO DIFF WBC: CPT

## 2021-05-29 PROCEDURE — 87086 URINE CULTURE/COLONY COUNT: CPT

## 2021-05-29 PROCEDURE — 80185 ASSAY OF PHENYTOIN TOTAL: CPT

## 2021-05-29 PROCEDURE — 84484 ASSAY OF TROPONIN QUANT: CPT

## 2021-05-29 PROCEDURE — 71045 X-RAY EXAM CHEST 1 VIEW: CPT

## 2021-05-29 PROCEDURE — 85610 PROTHROMBIN TIME: CPT

## 2021-05-29 PROCEDURE — 81001 URINALYSIS AUTO W/SCOPE: CPT

## 2021-05-29 PROCEDURE — 96365 THER/PROPH/DIAG IV INF INIT: CPT

## 2021-05-29 PROCEDURE — 93005 ELECTROCARDIOGRAM TRACING: CPT | Performed by: NURSE PRACTITIONER

## 2021-05-29 PROCEDURE — 80053 COMPREHEN METABOLIC PANEL: CPT

## 2021-05-29 RX ORDER — CEFDINIR 300 MG/1
300 CAPSULE ORAL 2 TIMES DAILY
Qty: 20 CAPSULE | Refills: 0 | Status: SHIPPED | OUTPATIENT
Start: 2021-05-29 | End: 2021-06-07 | Stop reason: ALTCHOICE

## 2021-05-29 RX ORDER — 0.9 % SODIUM CHLORIDE 0.9 %
500 INTRAVENOUS SOLUTION INTRAVENOUS ONCE
Status: COMPLETED | OUTPATIENT
Start: 2021-05-29 | End: 2021-05-29

## 2021-05-29 RX ADMIN — CEFTRIAXONE 1000 MG: 1 INJECTION, POWDER, FOR SOLUTION INTRAMUSCULAR; INTRAVENOUS at 15:39

## 2021-05-29 RX ADMIN — SODIUM CHLORIDE 500 ML: 9 INJECTION, SOLUTION INTRAVENOUS at 11:46

## 2021-05-29 ASSESSMENT — ENCOUNTER SYMPTOMS
VOMITING: 0
DIARRHEA: 0
COUGH: 1

## 2021-05-29 NOTE — ED PROVIDER NOTES
infarction ([facility]     small strokes,balance issues    Cystitis     Depression     Dizziness     Dysuria     Edema     ankles,mild pedal edema    Falls frequently     H/O blood clots     left leg and lungs    Head injury     Headache     Heartburn     [place] (hard of hearing)     Hyperlipidemia     Hypertension     Hypothyroidism     Hypothyroidism     Memory problem     Neuropathy     Shingles     TIA (transient ischemic attack)     Urinary incontinence     UTI (urinary tract infection)          SURGICAL HISTORY       Past Surgical History:   Procedure Laterality Date    APPENDECTOMY  1947    CHOLECYSTECTOMY      EYE SURGERY  2009    bilateral cataract     HYSTERECTOMY, TOTAL ABDOMINAL      JOINT REPLACEMENT      Bilateral Knee    KNEE ARTHROPLASTY Right     LOBECTOMY  10/2015    sx--bilateral    PTCA  2002    with stent placement/bms to rca         CURRENT MEDICATIONS       Discharge Medication List as of 5/29/2021  3:16 PM      CONTINUE these medications which have NOT CHANGED    Details   guaiFENesin (MUCINEX) 600 MG extended release tablet Take 1 tablet by mouth 2 times daily, Disp-60 tablet, R-0Normal      pantoprazole (PROTONIX) 20 MG tablet Take 1 tablet by mouth daily, Disp-30 tablet, R-0Normal      Fluticasone furoate-vilanterol (BREO ELLIPTA) 200-25 MCG/INH AEPB inhaler Inhale 1 puff into the lungs daily, Disp-30 each, R-5Normal      tolterodine (DETROL LA) 2 MG extended release capsule Take 1 capsule by mouth daily, Disp-30 capsule, R-5Normal      amLODIPine (NORVASC) 5 MG tablet Take 1 tablet by mouth daily, Disp-90 tablet,R-3Normal      levothyroxine (SYNTHROID) 175 MCG tablet Take 1 tablet by mouth Daily, Disp-90 tablet,R-3Normal      metoprolol succinate (TOPROL XL) 50 MG extended release tablet Take 1 tablet by mouth daily, Disp-90 tablet,R-3Normal      phenytoin (DILANTIN) 100 MG ER capsule Take 2 capsules by mouth 2 times daily, Disp-180 capsule,R-3Normal      sertraline by ED Physician - none    LABS:  Labs Reviewed   URINE RT REFLEX TO CULTURE - Abnormal; Notable for the following components:       Result Value    Clarity, UA CLOUDY (*)     Blood, Urine TRACE (*)     Protein, UA 30 (*)     Leukocyte Esterase, Urine LARGE (*)     All other components within normal limits   CBC WITH AUTO DIFFERENTIAL - Abnormal; Notable for the following components:    MCH 31.8 (*)     Neutrophils % 74.3 (*)     Lymphocytes % 16.0 (*)     All other components within normal limits   PROTIME-INR - Abnormal; Notable for the following components:    Protime 18.0 (*)     INR 1.48 (*)     All other components within normal limits   COMPREHENSIVE METABOLIC PANEL - Abnormal; Notable for the following components:    Potassium 3.4 (*)     Glucose 137 (*)     Calcium 8.2 (*)     All other components within normal limits   MICROSCOPIC URINALYSIS - Abnormal; Notable for the following components:    Bacteria, UA 3+ (*)     Crystals, UA NEG (*)     WBC,  (*)     RBC, UA 5 (*)     All other components within normal limits   CULTURE, URINE   SPECIMEN REJECTION   TROPONIN   MAGNESIUM   PHENYTOIN LEVEL, TOTAL       All other labs were within normal range or not returned as of this dictation. RE-ASSESSMENT     Pt and daughter want to try outpatient treatment. Return precautions over holiday weekend discussed. EMERGENCY DEPARTMENT COURSE and DIFFERENTIALDIAGNOSIS/MDM:   Vitals:    Vitals:    05/29/21 1102 05/29/21 1104   BP: (!) 142/84    Pulse: 105    Resp: 18    Temp: 98.4 °F (36.9 °C)    SpO2: (!) 89% 92%   Weight: 204 lb (92.5 kg)    Height: 5' 8\" (1.727 m)        MDM      CONSULTS:  None    PROCEDURES:  Unless otherwise notedbelow, none     Procedures    FINAL IMPRESSION     1. Urinary tract infection with hematuria, site unspecified    2.  Generalized weakness          DISPOSITION/PLAN   DISPOSITION Decision To Discharge 05/29/2021 03:16:40 PM      PATIENT REFERRED TO:  Karoline Duncan MD  [address] [facility]  [phone]    Schedule an appointment as soon as possible for a visit in 3 days        DISCHARGE MEDICATIONS:       Discharge Medication List as of 5/29/2021  3:16 PM           Medication List      START taking these medications    cefdinir 300 MG capsule  Commonly known as: OMNICEF  Take 1 capsule by mouth 2 times daily for 10 days        ASK your doctor about these medications    amLODIPine 5 MG tablet  Commonly known as: NORVASC  Take 1 tablet by mouth daily     Breo Ellipta 200-25 MCG/INH Aepb inhaler  Generic drug: Fluticasone furoate-vilanterol  Inhale 1 puff into the lungs daily     guaiFENesin 600 MG extended release tablet  Commonly known as: MUCINEX  Take 1 tablet by mouth 2 times daily     levothyroxine 175 MCG tablet  Commonly known as: SYNTHROID  Take 1 tablet by mouth Daily     metoprolol succinate 50 MG extended release tablet  Commonly known as: TOPROL XL  Take 1 tablet by mouth daily     pantoprazole 20 MG tablet  Commonly known as: Protonix  Take 1 tablet by mouth daily     phenytoin 100 MG ER capsule  Commonly known as: DILANTIN  Take 2 capsules by mouth 2 times daily     sertraline 100 MG tablet  Commonly known as: ZOLOFT  Take 1 tablet by mouth daily     tolterodine 2 MG extended release capsule  Commonly known as: DETROL LA  Take 1 capsule by mouth daily     warfarin 5 MG tablet  Commonly known as: COUMADIN  Take as directed. If you are unsure how to take this medication, talk to your nurse or doctor. Original instructions:  Take 1 tablet by mouth daily           Where to Get Your Medications      You can get these medications from any pharmacy    Bring a paper prescription for each of these medications  · cefdinir 300 MG capsule         (Pleasenote that portions of this note were completed with a voice recognition program.  Efforts were made to edit the dictations but occasionally words are mis-transcribed.)              Shayne Whyte

## 2021-05-31 LAB
ORGANISM: ABNORMAL
URINE CULTURE, ROUTINE: ABNORMAL
URINE CULTURE, ROUTINE: ABNORMAL

## 2021-06-01 LAB
EKG P AXIS: 33 DEGREES
EKG P-R INTERVAL: 152 MS
EKG Q-T INTERVAL: 382 MS
EKG QRS DURATION: 98 MS
EKG QTC CALCULATION (BAZETT): 434 MS
EKG T AXIS: 50 DEGREES

## 2021-06-01 PROCEDURE — 93010 ELECTROCARDIOGRAM REPORT: CPT | Performed by: INTERNAL MEDICINE

## 2021-06-02 ENCOUNTER — ANTI-COAG VISIT (OUTPATIENT)
Dept: PRIMARY CARE CLINIC | Age: 86
End: 2021-06-02
Payer: MEDICARE

## 2021-06-02 DIAGNOSIS — I27.82 OTHER CHRONIC PULMONARY EMBOLISM WITHOUT ACUTE COR PULMONALE (HCC): ICD-10-CM

## 2021-06-02 DIAGNOSIS — I82.4Y2 DEEP VEIN THROMBOSIS (DVT) OF PROXIMAL VEIN OF LEFT LOWER EXTREMITY, UNSPECIFIED CHRONICITY (HCC): Primary | ICD-10-CM

## 2021-06-02 DIAGNOSIS — Z79.01 LONG TERM (CURRENT) USE OF ANTICOAGULANTS: ICD-10-CM

## 2021-06-02 LAB — INR BLD: 1.8

## 2021-06-02 PROCEDURE — 93793 ANTICOAG MGMT PT WARFARIN: CPT | Performed by: FAMILY MEDICINE

## 2021-06-02 NOTE — PROGRESS NOTES
HOME MONITORING REPORT    INR today:   Results for orders placed or performed in visit on 06/02/21   Protime-INR   Result Value Ref Range    INR 1.80        INR Goal: 2.0-3.0    Dosing Plan  As of 6/2/2021    TTR:  73.7 % (11 mo)   Full warfarin instructions:  6/2: 5 mg; Otherwise 5 mg every Tue, Thu; 2.5 mg all other days              PLAN: Advised patient/caregiver to increase tonight's dose to 5 mg, then continue current dose and recheck in one week.   Patient/Caregiver voiced understanding

## 2021-06-03 ENCOUNTER — OFFICE VISIT (OUTPATIENT)
Dept: FAMILY MEDICINE CLINIC | Age: 86
End: 2021-06-03
Payer: MEDICARE

## 2021-06-03 ENCOUNTER — TELEPHONE (OUTPATIENT)
Dept: FAMILY MEDICINE CLINIC | Age: 86
End: 2021-06-03

## 2021-06-03 VITALS
TEMPERATURE: 97.3 F | SYSTOLIC BLOOD PRESSURE: 128 MMHG | DIASTOLIC BLOOD PRESSURE: 78 MMHG | OXYGEN SATURATION: 97 % | HEART RATE: 81 BPM

## 2021-06-03 DIAGNOSIS — S22.000A COMPRESSION FX, THORACIC SPINE, CLOSED, INITIAL ENCOUNTER (HCC): ICD-10-CM

## 2021-06-03 DIAGNOSIS — N39.0 URINARY TRACT INFECTION WITH HEMATURIA, SITE UNSPECIFIED: ICD-10-CM

## 2021-06-03 DIAGNOSIS — F41.8 DEPRESSION WITH ANXIETY: ICD-10-CM

## 2021-06-03 DIAGNOSIS — N39.0 URINARY TRACT INFECTION WITH HEMATURIA, SITE UNSPECIFIED: Primary | ICD-10-CM

## 2021-06-03 DIAGNOSIS — G40.909 SEIZURE DISORDER (HCC): ICD-10-CM

## 2021-06-03 DIAGNOSIS — R31.9 URINARY TRACT INFECTION WITH HEMATURIA, SITE UNSPECIFIED: Primary | ICD-10-CM

## 2021-06-03 DIAGNOSIS — I10 ESSENTIAL (PRIMARY) HYPERTENSION: ICD-10-CM

## 2021-06-03 DIAGNOSIS — K21.9 GASTROESOPHAGEAL REFLUX DISEASE WITHOUT ESOPHAGITIS: ICD-10-CM

## 2021-06-03 DIAGNOSIS — R31.9 URINARY TRACT INFECTION WITH HEMATURIA, SITE UNSPECIFIED: ICD-10-CM

## 2021-06-03 DIAGNOSIS — I26.99 PULMONARY EMBOLISM WITHOUT ACUTE COR PULMONALE, UNSPECIFIED CHRONICITY, UNSPECIFIED PULMONARY EMBOLISM TYPE (HCC): ICD-10-CM

## 2021-06-03 LAB
ALBUMIN SERPL-MCNC: 3.7 G/DL (ref 3.5–5.2)
ALP BLD-CCNC: 106 U/L (ref 35–104)
ALT SERPL-CCNC: 18 U/L (ref 5–33)
ANION GAP SERPL CALCULATED.3IONS-SCNC: 9 MMOL/L (ref 7–19)
AST SERPL-CCNC: 17 U/L (ref 5–32)
BASOPHILS ABSOLUTE: 0 K/UL (ref 0–0.2)
BASOPHILS RELATIVE PERCENT: 0.4 % (ref 0–1)
BILIRUB SERPL-MCNC: 0.3 MG/DL (ref 0.2–1.2)
BUN BLDV-MCNC: 11 MG/DL (ref 8–23)
CALCIUM SERPL-MCNC: 8.4 MG/DL (ref 8.8–10.2)
CHLORIDE BLD-SCNC: 101 MMOL/L (ref 98–111)
CO2: 33 MMOL/L (ref 22–29)
CREAT SERPL-MCNC: 0.6 MG/DL (ref 0.5–0.9)
EOSINOPHILS ABSOLUTE: 0.1 K/UL (ref 0–0.6)
EOSINOPHILS RELATIVE PERCENT: 1.6 % (ref 0–5)
GFR AFRICAN AMERICAN: >59
GFR NON-AFRICAN AMERICAN: >60
GLUCOSE BLD-MCNC: 119 MG/DL (ref 74–109)
HCT VFR BLD CALC: 44.4 % (ref 37–47)
HEMOGLOBIN: 14.3 G/DL (ref 12–16)
IMMATURE GRANULOCYTES #: 0.1 K/UL
LYMPHOCYTES ABSOLUTE: 1.9 K/UL (ref 1.1–4.5)
LYMPHOCYTES RELATIVE PERCENT: 27.9 % (ref 20–40)
MCH RBC QN AUTO: 31 PG (ref 27–31)
MCHC RBC AUTO-ENTMCNC: 32.2 G/DL (ref 33–37)
MCV RBC AUTO: 96.1 FL (ref 81–99)
MONOCYTES ABSOLUTE: 0.5 K/UL (ref 0–0.9)
MONOCYTES RELATIVE PERCENT: 7.4 % (ref 0–10)
NEUTROPHILS ABSOLUTE: 4.2 K/UL (ref 1.5–7.5)
NEUTROPHILS RELATIVE PERCENT: 61.8 % (ref 50–65)
PDW BLD-RTO: 14.2 % (ref 11.5–14.5)
PLATELET # BLD: 324 K/UL (ref 130–400)
PMV BLD AUTO: 9.8 FL (ref 9.4–12.3)
POTASSIUM SERPL-SCNC: 3.7 MMOL/L (ref 3.5–5)
RBC # BLD: 4.62 M/UL (ref 4.2–5.4)
SEDIMENTATION RATE, ERYTHROCYTE: 40 MM/HR (ref 0–25)
SODIUM BLD-SCNC: 143 MMOL/L (ref 136–145)
TOTAL PROTEIN: 7 G/DL (ref 6.6–8.7)
WBC # BLD: 6.7 K/UL (ref 4.8–10.8)

## 2021-06-03 PROCEDURE — 1090F PRES/ABSN URINE INCON ASSESS: CPT | Performed by: FAMILY MEDICINE

## 2021-06-03 PROCEDURE — 99214 OFFICE O/P EST MOD 30 MIN: CPT | Performed by: FAMILY MEDICINE

## 2021-06-03 PROCEDURE — G8427 DOCREV CUR MEDS BY ELIG CLIN: HCPCS | Performed by: FAMILY MEDICINE

## 2021-06-03 PROCEDURE — G8417 CALC BMI ABV UP PARAM F/U: HCPCS | Performed by: FAMILY MEDICINE

## 2021-06-03 PROCEDURE — 1036F TOBACCO NON-USER: CPT | Performed by: FAMILY MEDICINE

## 2021-06-03 PROCEDURE — 4040F PNEUMOC VAC/ADMIN/RCVD: CPT | Performed by: FAMILY MEDICINE

## 2021-06-03 PROCEDURE — 1123F ACP DISCUSS/DSCN MKR DOCD: CPT | Performed by: FAMILY MEDICINE

## 2021-06-03 RX ORDER — NITROFURANTOIN 25; 75 MG/1; MG/1
100 CAPSULE ORAL 2 TIMES DAILY
Qty: 20 CAPSULE | Refills: 0 | Status: SHIPPED | OUTPATIENT
Start: 2021-06-03 | End: 2021-06-13

## 2021-06-03 NOTE — PROGRESS NOTES
Formerly [facility] PHYSICIAN SERVICES  [facility] FAMILY MEDICINE  [address] [address] [facility] [address] [address]  Dept: [phone]  Dept Fax: [phone]: [phone]    Sammie Santos is a 80 y.o. female who presents today for her medical conditions/complaints as noted below. Sammie Santos is here for Urinary Tract Infection        HPI:   CC: Here today to discuss the following:    Family is requesting a hospital bed delivered to the house. They are having issues with transferring her. She is primarily sedentary and resides in a wheelchair. She has had no falls since her last visit. Hypertension  Compliant with medications. No adverse effects from medication. No lightheadedness, palpitations, or chest pain. She remains on Omnicef for urinary tract infection. No dysuria or hematuria today. However, they are concerned about nausea and potential confusion associated with the Juliette Raveling. Hypothyroidism  Symptoms are stable. No temperature intolerance, fatigue, or mood disturbance from baseline. Complaint with current medication. Depression with Anxiety  Compliant with medications. No adverse effects from medication. Depression and anxiety symptoms are stable today. No suicidal or homicidal ideation. Excessive worry, insomnia, and anxiousness are stable. Energy, concentration, and apathy are stable. She remains on chronic anticoagulation secondary to history of DVT and pulmonary embolism. Gastroesophageal Reflux Disease  Symptoms currently under control. Medication adequately controls his symptoms. No hematochezia or melena.        HPI    Past Medical History:   Diagnosis Date    Arthritis     Back pain     bulging disc    CAD (coronary artery disease)     BMS to RCA    Cerebral artery occlusion with cerebral infarction (HCC)     small strokes,balance issues    Cystitis     Depression     Dizziness     Dysuria     Edema     ankles,mild pedal edema    Falls frequently     Neck: Normal range of motion. Neck supple. No masses. Neck Symmetric. Normal tracheal position. No thyroid enlargement  Cardiovascular: Normal rate and regular rhythm. Exam reveals no friction rub. Carotid arteries: no bruit observed. No murmur heard. Respiratory:  Effort normal and breath sounds normal. No respiratory distress. No wheezes. No rales. No use of accessory muscles or intercostal retractions. Neurological: alert. Psychiatric: normal mood and affect. Her behavior is normal. Normal judgement and insight observed.     Recent Results (from the past 672 hour(s))   POCT INR    Collection Time: 05/11/21  3:16 PM   Result Value Ref Range    INR 1.7    Protime-INR    Collection Time: 05/17/21 12:00 AM   Result Value Ref Range    INR 2.10    Protime-INR    Collection Time: 05/20/21 12:00 AM   Result Value Ref Range    INR 1.30    CBC Auto Differential    Collection Time: 05/29/21 11:42 AM   Result Value Ref Range    WBC 9.4 4.8 - 10.8 K/uL    RBC 4.62 4.20 - 5.40 M/uL    Hemoglobin 14.7 12.0 - 16.0 g/dL    Hematocrit 44.0 37.0 - 47.0 %    MCV 95.2 81.0 - 99.0 fL    MCH 31.8 (H) 27.0 - 31.0 pg    MCHC 33.4 33.0 - 37.0 g/dL    RDW 14.5 11.5 - 14.5 %    Platelets 117 942 - 748 K/uL    MPV 9.9 9.4 - 12.3 fL    Neutrophils % 74.3 (H) 50.0 - 65.0 %    Lymphocytes % 16.0 (L) 20.0 - 40.0 %    Monocytes % 8.7 0.0 - 10.0 %    Eosinophils % 0.3 0.0 - 5.0 %    Basophils % 0.3 0.0 - 1.0 %    Neutrophils Absolute 7.0 1.5 - 7.5 K/uL    Immature Granulocytes # 0.0 K/uL    Lymphocytes Absolute 1.5 1.1 - 4.5 K/uL    Monocytes Absolute 0.80 0.00 - 0.90 K/uL    Eosinophils Absolute 0.00 0.00 - 0.60 K/uL    Basophils Absolute 0.00 0.00 - 0.20 K/uL   Protime-INR    Collection Time: 05/29/21 11:42 AM   Result Value Ref Range    Protime 18.0 (H) 12.0 - 14.6 sec    INR 1.48 (H) 0.88 - 1.18   EKG 12 Lead    Collection Time: 05/29/21 11:53 AM   Result Value Ref Range    P-R Interval 152 ms    QRS Duration 98 ms    Q-T Interval 382 ms    QTc Calculation (Bazett) 434 ms    P Axis 33 degrees    T Axis 50 degrees   SPECIMEN REJECTION    Collection Time: 05/29/21 12:09 PM   Result Value Ref Range    Rejected Test cmpx trop     Reason for Rejection see below    Comprehensive Metabolic Panel    Collection Time: 05/29/21 12:16 PM   Result Value Ref Range    Sodium 141 136 - 145 mmol/L    Potassium 3.4 (L) 3.5 - 5.0 mmol/L    Chloride 103 98 - 111 mmol/L    CO2 29 22 - 29 mmol/L    Anion Gap 9 7 - 19 mmol/L    Glucose 137 (H) 74 - 109 mg/dL    BUN 13 8 - 23 mg/dL    CREATININE 0.6 0.5 - 0.9 mg/dL    GFR Non-African American >60 >60    GFR African American >59 >59    Calcium 8.2 (L) 8.8 - 10.2 mg/dL    Total Protein 7.1 6.6 - 8.7 g/dL    Albumin 3.5 3.5 - 5.2 g/dL    Total Bilirubin 0.5 0.2 - 1.2 mg/dL    Alkaline Phosphatase 93 35 - 104 U/L    ALT 14 5 - 33 U/L    AST 13 5 - 32 U/L   Troponin    Collection Time: 05/29/21 12:16 PM   Result Value Ref Range    Troponin <0.01 0.00 - 0.03 ng/mL   Magnesium    Collection Time: 05/29/21 12:16 PM   Result Value Ref Range    Magnesium 1.8 1.6 - 2.4 mg/dL   Phenytoin Level, Total    Collection Time: 05/29/21 12:26 PM   Result Value Ref Range    Phenytoin Lvl 16.2 10.0 - 20.0 ug/mL   Urinalysis Reflex to Culture    Collection Time: 05/29/21  1:07 PM    Specimen: Urine, straight catheter   Result Value Ref Range    Color, UA YELLOW Straw/Yellow    Clarity, UA CLOUDY (A) Clear    Glucose, Ur Negative Negative mg/dL    Bilirubin Urine Negative Negative    Ketones, Urine Negative Negative mg/dL    Specific Gravity, UA 1.013 1.005 - 1.030    Blood, Urine TRACE (A) Negative    pH, UA 7.0 5.0 - 8.0    Protein, UA 30 (A) Negative mg/dL    Urobilinogen, Urine 1.0 <2.0 E.U./dL    Nitrite, Urine Negative Negative    Leukocyte Esterase, Urine LARGE (A) Negative   Microscopic Urinalysis    Collection Time: 05/29/21  1:07 PM   Result Value Ref Range    Bacteria, UA 3+ (A) None Seen /HPF    Crystals, UA NEG (A) None Seen /HPF    Hyaline Casts, UA 1 0 - 8 /HPF    WBC,  (H) 0 - 5 /HPF    RBC, UA 5 (H) 0 - 4 /HPF    Epithelial Cells, UA 1 0 - 5 /HPF   Culture, Urine    Collection Time: 05/29/21  1:07 PM    Specimen: Urine, clean catch   Result Value Ref Range    Urine Culture, Routine >100,000 CFU/ml (A)     Organism Escherichia coli (A)     Urine Culture, Routine Heavy growth        Susceptibility    Escherichia coli - BACTERIAL SUSCEPTIBILITY PANEL BY TOM     ampicillin <=2 Sensitive mcg/mL     aztreonam <=1 Sensitive mcg/mL     ceFAZolin <=4 Sensitive mcg/mL     cefepime <=1 Sensitive mcg/mL     cefTRIAXone <=1 Sensitive mcg/mL     ertapenem <=0.5 Sensitive mcg/mL     gentamicin <=1 Sensitive mcg/mL     levofloxacin <=0.12 Sensitive mcg/mL     meropenem <=0.25 Sensitive mcg/mL     nitrofurantoin <=16 Sensitive mcg/mL     piperacillin-tazobactam <=4 Sensitive mcg/mL     trimethoprim-sulfamethoxazole <=20 Sensitive mcg/mL   Protime-INR    Collection Time: 06/02/21 12:00 AM   Result Value Ref Range    INR 1.80    Sedimentation Rate    Collection Time: 06/03/21  2:03 PM   Result Value Ref Range    Sed Rate 40 (H) 0 - 25 mm/Hr   CBC Auto Differential    Collection Time: 06/03/21  2:03 PM   Result Value Ref Range    WBC 6.7 4.8 - 10.8 K/uL    RBC 4.62 4.20 - 5.40 M/uL    Hemoglobin 14.3 12.0 - 16.0 g/dL    Hematocrit 44.4 37.0 - 47.0 %    MCV 96.1 81.0 - 99.0 fL    MCH 31.0 27.0 - 31.0 pg    MCHC 32.2 (L) 33.0 - 37.0 g/dL    RDW 14.2 11.5 - 14.5 %    Platelets 647 845 - 995 K/uL    MPV 9.8 9.4 - 12.3 fL    Neutrophils % 61.8 50.0 - 65.0 %    Lymphocytes % 27.9 20.0 - 40.0 %    Monocytes % 7.4 0.0 - 10.0 %    Eosinophils % 1.6 0.0 - 5.0 %    Basophils % 0.4 0.0 - 1.0 %    Neutrophils Absolute 4.2 1.5 - 7.5 K/uL    Immature Granulocytes # 0.1 K/uL    Lymphocytes Absolute 1.9 1.1 - 4.5 K/uL    Monocytes Absolute 0.50 0.00 - 0.90 K/uL    Eosinophils Absolute 0.10 0.00 - 0.60 K/uL    Basophils Absolute 0.00 0.00 - 0.20 K/uL Comprehensive Metabolic Panel    Collection Time: 06/03/21  2:03 PM   Result Value Ref Range    Sodium 143 136 - 145 mmol/L    Potassium 3.7 3.5 - 5.0 mmol/L    Chloride 101 98 - 111 mmol/L    CO2 33 (H) 22 - 29 mmol/L    Anion Gap 9 7 - 19 mmol/L    Glucose 119 (H) 74 - 109 mg/dL    BUN 11 8 - 23 mg/dL    CREATININE 0.6 0.5 - 0.9 mg/dL    GFR Non-African American >60 >60    GFR African American >59 >59    Calcium 8.4 (L) 8.8 - 10.2 mg/dL    Total Protein 7.0 6.6 - 8.7 g/dL    Albumin 3.7 3.5 - 5.2 g/dL    Total Bilirubin 0.3 0.2 - 1.2 mg/dL    Alkaline Phosphatase 106 (H) 35 - 104 U/L    ALT 18 5 - 33 U/L    AST 17 5 - 32 U/L               Assessment & Plan: The following diagnoses and conditions are stable with no further orders unless indicated:  1. Urinary tract infection with hematuria, site unspecified  Switch to Macrobid twice daily for 10 days discontinue Omnicef  - Comprehensive Metabolic Panel; Future  - CBC Auto Differential; Future  - Sedimentation Rate; Future    2. Seizure disorder [facility])  She has a history of seizure disorder mobility issues and chronic lower back issues. We will see if we can obtain a hospital bed. - Misc. Devices [facility]. Dispense: 1 Device; Refill: 0    3. Pulmonary embolism without acute cor pulmonale, unspecified chronicity, unspecified pulmonary embolism type (HCC)  Continue with Coumadin and home overall her pain is stable INR checks  - Misc. Devices [facility]. Dispense: 1 Device; Refill: 0    4. Compression fx, thoracic spine, closed, initial encounter ([address]    - Misc. Devices [facility]. Dispense: 1 Device; Refill: 0    5. Depression with anxiety  Stable on Zoloft    6. Essential (primary) hypertension  BP Readings from Last 3 Encounters:   06/03/21 128/78   05/29/21 (!) 142/84   05/20/21 130/78     Stable    7. Gastroesophageal reflux disease without esophagitis  Stable            No follow-ups on file.            Discussed use,

## 2021-06-04 ENCOUNTER — TELEPHONE (OUTPATIENT)
Dept: FAMILY MEDICINE CLINIC | Age: 86
End: 2021-06-04

## 2021-06-04 NOTE — TELEPHONE ENCOUNTER
----- Message from Natacha Devine MD sent at 6/3/2021  3:27 PM CDT -----  Regarding: hospital bed  She needs a hospital bed. I have put the order in \"medication list\" under device.   They want it at [facility].

## 2021-06-15 ENCOUNTER — OFFICE VISIT (OUTPATIENT)
Dept: FAMILY MEDICINE CLINIC | Age: 86
End: 2021-06-15
Payer: MEDICARE

## 2021-06-15 VITALS
TEMPERATURE: 97.5 F | SYSTOLIC BLOOD PRESSURE: 132 MMHG | DIASTOLIC BLOOD PRESSURE: 82 MMHG | OXYGEN SATURATION: 98 % | HEART RATE: 94 BPM

## 2021-06-15 DIAGNOSIS — F41.8 DEPRESSION WITH ANXIETY: Primary | ICD-10-CM

## 2021-06-15 DIAGNOSIS — J30.89 CHRONIC NONSEASONAL ALLERGIC RHINITIS DUE TO POLLEN: ICD-10-CM

## 2021-06-15 DIAGNOSIS — J44.9 CHRONIC OBSTRUCTIVE PULMONARY DISEASE, UNSPECIFIED COPD TYPE (HCC): ICD-10-CM

## 2021-06-15 DIAGNOSIS — E03.9 ACQUIRED HYPOTHYROIDISM: ICD-10-CM

## 2021-06-15 DIAGNOSIS — R29.898 WEAKNESS OF BOTH LOWER EXTREMITIES: ICD-10-CM

## 2021-06-15 DIAGNOSIS — I10 ESSENTIAL (PRIMARY) HYPERTENSION: ICD-10-CM

## 2021-06-15 DIAGNOSIS — K21.9 GASTROESOPHAGEAL REFLUX DISEASE WITHOUT ESOPHAGITIS: ICD-10-CM

## 2021-06-15 PROCEDURE — G8428 CUR MEDS NOT DOCUMENT: HCPCS | Performed by: FAMILY MEDICINE

## 2021-06-15 PROCEDURE — 1036F TOBACCO NON-USER: CPT | Performed by: FAMILY MEDICINE

## 2021-06-15 PROCEDURE — G8417 CALC BMI ABV UP PARAM F/U: HCPCS | Performed by: FAMILY MEDICINE

## 2021-06-15 PROCEDURE — 1090F PRES/ABSN URINE INCON ASSESS: CPT | Performed by: FAMILY MEDICINE

## 2021-06-15 PROCEDURE — 1123F ACP DISCUSS/DSCN MKR DOCD: CPT | Performed by: FAMILY MEDICINE

## 2021-06-15 PROCEDURE — 4040F PNEUMOC VAC/ADMIN/RCVD: CPT | Performed by: FAMILY MEDICINE

## 2021-06-15 PROCEDURE — 3023F SPIROM DOC REV: CPT | Performed by: FAMILY MEDICINE

## 2021-06-15 PROCEDURE — G8926 SPIRO NO PERF OR DOC: HCPCS | Performed by: FAMILY MEDICINE

## 2021-06-15 PROCEDURE — 99214 OFFICE O/P EST MOD 30 MIN: CPT | Performed by: FAMILY MEDICINE

## 2021-06-15 NOTE — PROGRESS NOTES
Former[facility] PHYSICIAN SERVICES  [facility] FAMILY MEDICINE  [address] [address] [address]  Dept: [phone]  Dept Fax: [phone]: [phone]    Jagdish Duque is a 80 y.o. female who presents today for her medical conditions/complaints as noted below. Jagdish Duque is here for 3 Month Follow-Up        HPI:   CC: Here today to discuss the following:    She was here on Cate 3 for emergency department follow-up after having urinary tract infection. She was placed on Macrobid for twice a day at that visit. Letter much letter very helpful does not drink a lot of water during the day. Since her last visit, she has a hospital bed at home which is help with transferring. She had no falls since her last visit. She still concerned about \"legs do not work\". She reports generalized weakness of the bilateral lower extremities. She not complaining of any significant lower back or hip discomfort. She states she is extremely unsteady on her feet and is primarily sedentary. She continues to have issues with overactive bladder. Her insurance would not pay for Detrol     Hypertension  Compliant with medications. No adverse effects from medication. No lightheadedness, palpitations, or chest pain. Allergies  Allergies are currently stable. Hypothyroidism  Symptoms are stable. No temperature intolerance, fatigue, or mood disturbance from baseline. Complaint with current medication. Depression with Anxiety  Compliant with medications. No adverse effects from medication. Depression and anxiety symptoms are stable today. No suicidal or homicidal ideation. Excessive worry, insomnia, and anxiousness are stable. Energy, concentration, and apathy are stable.           HPI    Past Medical History:   Diagnosis Date    Arthritis     Back pain     bulging disc    CAD (coronary artery disease)     BMS to RCA    Cerebral artery occlusion with cerebral infarction ([facility] [address]     small mouth daily 90 tablet 3    phenytoin (DILANTIN) 100 MG ER capsule Take 2 capsules by mouth 2 times daily 180 capsule 3    sertraline (ZOLOFT) 100 MG tablet Take 1 tablet by mouth daily 90 tablet 3    warfarin (COUMADIN) 5 MG tablet Take 1 tablet by mouth daily (Patient taking differently: Take 5 mg by mouth daily 2.5 mg everyday but Tuesdays and Thursdays. 5mg on Tuesday Thursday.) 90 tablet 3     No current facility-administered medications for this visit. Allergies   Allergen Reactions    Morphine      Pt becomes mean (refusing, cursing)         Health Maintenance   Topic Date Due    COVID-19 Vaccine (1) Never done    Pneumococcal 65+ years Vaccine (1 of 1 - PPSV23) Never done    DTaP/Tdap/Td vaccine (1 - Tdap) 03/11/2022 (Originally 8/18/1950)    Shingles Vaccine (1 of 2) 03/11/2022 (Originally 8/18/1981)    TSH testing  03/11/2022    Annual Wellness Visit (AWV)  03/12/2022    Potassium monitoring  06/03/2022    Creatinine monitoring  06/03/2022    Flu vaccine  Completed    Hepatitis A vaccine  Aged Out    Hepatitis B vaccine  Aged Out    Hib vaccine  Aged Out    Meningococcal (ACWY) vaccine  Aged Out       Subjective:      Review of Systems   Constitutional: Negative for chills and fever. HENT: Negative for congestion. Respiratory: Negative for cough, chest tightness and shortness of breath. Cardiovascular: Negative for chest pain, palpitations and leg swelling. Gastrointestinal: Negative for abdominal pain, anal bleeding, constipation, diarrhea and nausea. Genitourinary: Negative for difficulty urinating. Neurological: Positive for weakness. Negative for tremors, syncope and speech difficulty. Psychiatric/Behavioral: Negative. SeeHPI for visit specific review of symptoms. All others negative      Objective:   /82   Pulse 94   Temp 97.5 °F (36.4 °C)   SpO2 98%   Physical Exam  Constitutional:       Appearance: She is well-developed.  She is not ill-appearing. Eyes:      Pupils: Pupils are equal, round, and reactive to light. Cardiovascular:      Rate and Rhythm: Normal rate and regular rhythm. Heart sounds: No murmur heard. No friction rub. Pulmonary:      Effort: Pulmonary effort is normal. No respiratory distress. Breath sounds: Normal breath sounds. No wheezing or rales. Abdominal:      General: Bowel sounds are normal. There is no distension. Palpations: Abdomen is soft. Tenderness: There is no abdominal tenderness. There is no guarding or rebound. Musculoskeletal:      Cervical back: Normal range of motion and neck supple. Neurological:      Mental Status: She is alert.    Psychiatric:         Behavior: Behavior normal.           Recent Results (from the past 672 hour(s))   CBC Auto Differential    Collection Time: 05/29/21 11:42 AM   Result Value Ref Range    WBC 9.4 4.8 - 10.8 K/uL    RBC 4.62 4.20 - 5.40 M/uL    Hemoglobin 14.7 12.0 - 16.0 g/dL    Hematocrit 44.0 37.0 - 47.0 %    MCV 95.2 81.0 - 99.0 fL    MCH 31.8 (H) 27.0 - 31.0 pg    MCHC 33.4 33.0 - 37.0 g/dL    RDW 14.5 11.5 - 14.5 %    Platelets 361 839 - 892 K/uL    MPV 9.9 9.4 - 12.3 fL    Neutrophils % 74.3 (H) 50.0 - 65.0 %    Lymphocytes % 16.0 (L) 20.0 - 40.0 %    Monocytes % 8.7 0.0 - 10.0 %    Eosinophils % 0.3 0.0 - 5.0 %    Basophils % 0.3 0.0 - 1.0 %    Neutrophils Absolute 7.0 1.5 - 7.5 K/uL    Immature Granulocytes # 0.0 K/uL    Lymphocytes Absolute 1.5 1.1 - 4.5 K/uL    Monocytes Absolute 0.80 0.00 - 0.90 K/uL    Eosinophils Absolute 0.00 0.00 - 0.60 K/uL    Basophils Absolute 0.00 0.00 - 0.20 K/uL   Protime-INR    Collection Time: 05/29/21 11:42 AM   Result Value Ref Range    Protime 18.0 (H) 12.0 - 14.6 sec    INR 1.48 (H) 0.88 - 1.18   EKG 12 Lead    Collection Time: 05/29/21 11:53 AM   Result Value Ref Range    P-R Interval 152 ms    QRS Duration 98 ms    Q-T Interval 382 ms    QTc Calculation (Bazett) 434 ms    P Axis 33 degrees    T Axis 50 UA 5 (H) 0 - 4 /HPF    Epithelial Cells, UA 1 0 - 5 /HPF   Culture, Urine    Collection Time: 05/29/21  1:07 PM    Specimen: Urine, clean catch   Result Value Ref Range    Urine Culture, Routine >100,000 CFU/ml (A)     Organism Escherichia coli (A)     Urine Culture, Routine Heavy growth        Susceptibility    Escherichia coli - BACTERIAL SUSCEPTIBILITY PANEL BY TOM     ampicillin <=2 Sensitive mcg/mL     aztreonam <=1 Sensitive mcg/mL     ceFAZolin <=4 Sensitive mcg/mL     cefepime <=1 Sensitive mcg/mL     cefTRIAXone <=1 Sensitive mcg/mL     ertapenem <=0.5 Sensitive mcg/mL     gentamicin <=1 Sensitive mcg/mL     levofloxacin <=0.12 Sensitive mcg/mL     meropenem <=0.25 Sensitive mcg/mL     nitrofurantoin <=16 Sensitive mcg/mL     piperacillin-tazobactam <=4 Sensitive mcg/mL     trimethoprim-sulfamethoxazole <=20 Sensitive mcg/mL   Protime-INR    Collection Time: 06/02/21 12:00 AM   Result Value Ref Range    INR 1.80    Sedimentation Rate    Collection Time: 06/03/21  2:03 PM   Result Value Ref Range    Sed Rate 40 (H) 0 - 25 mm/Hr   CBC Auto Differential    Collection Time: 06/03/21  2:03 PM   Result Value Ref Range    WBC 6.7 4.8 - 10.8 K/uL    RBC 4.62 4.20 - 5.40 M/uL    Hemoglobin 14.3 12.0 - 16.0 g/dL    Hematocrit 44.4 37.0 - 47.0 %    MCV 96.1 81.0 - 99.0 fL    MCH 31.0 27.0 - 31.0 pg    MCHC 32.2 (L) 33.0 - 37.0 g/dL    RDW 14.2 11.5 - 14.5 %    Platelets 449 090 - 623 K/uL    MPV 9.8 9.4 - 12.3 fL    Neutrophils % 61.8 50.0 - 65.0 %    Lymphocytes % 27.9 20.0 - 40.0 %    Monocytes % 7.4 0.0 - 10.0 %    Eosinophils % 1.6 0.0 - 5.0 %    Basophils % 0.4 0.0 - 1.0 %    Neutrophils Absolute 4.2 1.5 - 7.5 K/uL    Immature Granulocytes # 0.1 K/uL    Lymphocytes Absolute 1.9 1.1 - 4.5 K/uL    Monocytes Absolute 0.50 0.00 - 0.90 K/uL    Eosinophils Absolute 0.10 0.00 - 0.60 K/uL    Basophils Absolute 0.00 0.00 - 0.20 K/uL   Comprehensive Metabolic Panel    Collection Time: 06/03/21  2:03 PM   Result Value Ref Range    Sodium 143 136 - 145 mmol/L    Potassium 3.7 3.5 - 5.0 mmol/L    Chloride 101 98 - 111 mmol/L    CO2 33 (H) 22 - 29 mmol/L    Anion Gap 9 7 - 19 mmol/L    Glucose 119 (H) 74 - 109 mg/dL    BUN 11 8 - 23 mg/dL    CREATININE 0.6 0.5 - 0.9 mg/dL    GFR Non-African American >60 >60    GFR African American >59 >59    Calcium 8.4 (L) 8.8 - 10.2 mg/dL    Total Protein 7.0 6.6 - 8.7 g/dL    Albumin 3.7 3.5 - 5.2 g/dL    Total Bilirubin 0.3 0.2 - 1.2 mg/dL    Alkaline Phosphatase 106 (H) 35 - 104 U/L    ALT 18 5 - 33 U/L    AST 17 5 - 32 U/L         Lab Results   Component Value Date    TSH 1.850 03/11/2021           Assessment & Plan: The following diagnoses and conditions are stable with no further orders unless indicated:  1. Depression with anxiety  Continue with sertraline    2. Gastroesophageal reflux disease without esophagitis  Continue Protonix    3. Essential (primary) hypertension  BP Readings from Last 3 Encounters:   06/15/21 132/82   06/03/21 128/78   05/29/21 (!) 142/84     Stable    4. Chronic nonseasonal allergic rhinitis due to pollen  Stable    5. Acquired hypothyroidism  Lab Results   Component Value Date    TSH 1.850 03/11/2021    T4FREE 1.1 10/04/2019     Stable    COPD: Continue with Suha Infante. Discussed signs and symptoms consistent with COPD exacerbation. Regarding the lower extremity weakness, suggested to get a nerve conduction study        Return in about 6 months (around 12/15/2021) for Routine follow up - 15 minute visit. Discussed use, benefit, and side effects of prescribed medications. All patient questions answered. Pt voiced understanding. Reviewed health maintenance. Instructedto continue current medications, diet and exercise. Patient agreed with treatmentplan. Follow up as directed. Note dictated using Dragon Dictation software  Sometimes this dictation software makes erroneous transcriptions.

## 2021-06-20 RX ORDER — OXYBUTYNIN CHLORIDE 5 MG/1
5 TABLET, EXTENDED RELEASE ORAL DAILY
Qty: 30 TABLET | Refills: 3 | Status: SHIPPED | OUTPATIENT
Start: 2021-06-20 | End: 2021-09-27

## 2021-06-20 ASSESSMENT — ENCOUNTER SYMPTOMS
DIARRHEA: 0
CONSTIPATION: 0
CHEST TIGHTNESS: 0
SHORTNESS OF BREATH: 0
ANAL BLEEDING: 0
ABDOMINAL PAIN: 0
NAUSEA: 0
COUGH: 0

## 2021-06-23 ENCOUNTER — ANTI-COAG VISIT (OUTPATIENT)
Dept: PRIMARY CARE CLINIC | Age: 86
End: 2021-06-23
Payer: MEDICARE

## 2021-06-23 DIAGNOSIS — Z79.01 MONITORING FOR LONG-TERM ANTICOAGULANT USE: ICD-10-CM

## 2021-06-23 DIAGNOSIS — I27.82 OTHER CHRONIC PULMONARY EMBOLISM WITHOUT ACUTE COR PULMONALE (HCC): ICD-10-CM

## 2021-06-23 DIAGNOSIS — Z51.81 MONITORING FOR LONG-TERM ANTICOAGULANT USE: ICD-10-CM

## 2021-06-23 DIAGNOSIS — I82.5Y2 CHRONIC DEEP VEIN THROMBOSIS (DVT) OF PROXIMAL VEIN OF LEFT LOWER EXTREMITY (HCC): Primary | ICD-10-CM

## 2021-06-23 LAB — INR BLD: 2.7

## 2021-06-23 PROCEDURE — 93793 ANTICOAG MGMT PT WARFARIN: CPT | Performed by: FAMILY MEDICINE

## 2021-06-23 NOTE — PROGRESS NOTES
HOME MONITORING REPORT    INR today:   Results for orders placed or performed in visit on 06/23/21   Protime-INR   Result Value Ref Range    INR 2.70        INR Goal: 2.0-3.0    Dosing Plan  As of 6/23/2021    TTR:  74.0 % (11.7 mo)   Full warfarin instructions:  5 mg every Tue, Thu; 2.5 mg all other days              PLAN: Patient aware to continue current dose and recheck in one week or as ordered.

## 2021-06-27 ENCOUNTER — PATIENT MESSAGE (OUTPATIENT)
Dept: FAMILY MEDICINE CLINIC | Age: 86
End: 2021-06-27

## 2021-06-28 NOTE — TELEPHONE ENCOUNTER
From: Mark Dickerson  To: Romaine Avendano MD  Sent: 6/27/2021 9:02 AM CDT  Subject: Prescription Question    This message is being sent by Tico Blackburn on behalf of Mark Dickerson.    A new prescription is needed for Phenytoin SOD Ext 100 MG.    Isidoro Peralta

## 2021-06-29 RX ORDER — PHENYTOIN SODIUM 100 MG/1
200 CAPSULE, EXTENDED RELEASE ORAL 2 TIMES DAILY
Qty: 360 CAPSULE | Refills: 3 | Status: ON HOLD | OUTPATIENT
Start: 2021-06-29 | End: 2021-08-19 | Stop reason: SDUPTHER

## 2021-06-30 ENCOUNTER — HOSPITAL ENCOUNTER (OUTPATIENT)
Dept: VASCULAR LAB | Age: 86
Discharge: HOME | End: 2021-06-30
Payer: MEDICARE

## 2021-06-30 DIAGNOSIS — M79.89 LEG SWELLING: ICD-10-CM

## 2021-06-30 DIAGNOSIS — M79.605 LEG PAIN, LEFT: ICD-10-CM

## 2021-06-30 PROCEDURE — 93971 EXTREMITY STUDY: CPT

## 2021-07-08 ENCOUNTER — VIRTUAL VISIT (OUTPATIENT)
Dept: VASCULAR SURGERY | Age: 86
End: 2021-07-08
Payer: MEDICARE

## 2021-07-08 DIAGNOSIS — M79.605 LEG PAIN, LEFT: ICD-10-CM

## 2021-07-08 DIAGNOSIS — M79.89 LEG SWELLING: ICD-10-CM

## 2021-07-08 DIAGNOSIS — Z86.73 HISTORY OF CVA IN ADULTHOOD: Primary | ICD-10-CM

## 2021-07-08 DIAGNOSIS — R53.1 RIGHT SIDED WEAKNESS: ICD-10-CM

## 2021-07-08 PROCEDURE — 99442 PR PHYS/QHP TELEPHONE EVALUATION 11-20 MIN: CPT | Performed by: NURSE PRACTITIONER

## 2021-07-08 NOTE — Clinical Note
Please set up for cvls on wed  This test is first!!!   Then they need to call me before she goes for ct scan  To follow is cta neck ct head

## 2021-07-08 NOTE — PROGRESS NOTES
Salty Barragan is a 80 y.o. female evaluated via telephone on 2021. Salty Barragan (:  1931) is a 80 y.o. female,Established patient, here for evaluation of the following chief complaint(s):     Consent:  She and/or health care decision maker is aware that that she may receive a bill for this telephone service, depending on her insurance coverage, and has provided verbal consent to proceed: Yes    Patient is located at home  Provider is located at [facility]   Also present during call is no one    She presents for follow-up of known DVT. She has no known history of DVT. Her current treatment includes warfarin. She does not have a family history of hypercoagulability. Risk factors for hypercoagulable state include: recent immobilization. She does not have an IVC filter. She has symptoms involving   left leg. Symptoms include swelling Onset was abrupt 1 year ago. These symptoms have been unchanged. There has been 1 episode. .  Patient is hardly able to stand. She is not mobile. Daughter repots some days she can use legs, sometimes she cant. Both legs but mostly the right just doesn't want to work. Differential diagnosis includes but is not limited to CHF, thyroid disease, venous disease, DVT, SVT, peripheral vascular disease.           Salty Barragan is a 80 y.o. female with the following history reviewed and recorded in [facility]:  Patient Active Problem List    Diagnosis Date Noted    Complicated urinary tract infection 04/10/2021    Generalized weakness 04/10/2021    Hypocalcemia 04/10/2021    Acute bronchitis 04/10/2021    Leg pain, left 2020    Leg swelling 2020    Deep vein thrombosis (DVT) of left lower extremity ([address] 2020    Other pulmonary embolism without acute cor pulmonale ([address] 2020    Seizure disorder ([address] 10/02/2019    Compression fx, thoracic spine, closed, initial encounter ([address] 2019    History of cardioembolic cerebrovascular accident (CVA) 09/25/2019    Essential hypertension 09/25/2019    Acquired hypothyroidism 09/25/2019     Current Outpatient Medications   Medication Sig Dispense Refill    phenytoin (DILANTIN) 100 MG ER capsule Take 2 capsules by mouth 2 times daily 360 capsule 3    oxybutynin (DITROPAN XL) 5 MG extended release tablet Take 1 tablet by mouth daily For overactive bladder 30 tablet 3    Misc. Devices [facility]. 1 Device 0    guaiFENesin (MUCINEX) 600 MG extended release tablet Take 1 tablet by mouth 2 times daily 60 tablet 0    pantoprazole (PROTONIX) 20 MG tablet Take 1 tablet by mouth daily 30 tablet 0    Fluticasone furoate-vilanterol (BREO ELLIPTA) 200-25 MCG/INH AEPB inhaler Inhale 1 puff into the lungs daily 30 each 5    amLODIPine (NORVASC) 5 MG tablet Take 1 tablet by mouth daily 90 tablet 3    levothyroxine (SYNTHROID) 175 MCG tablet Take 1 tablet by mouth Daily 90 tablet 3    metoprolol succinate (TOPROL XL) 50 MG extended release tablet Take 1 tablet by mouth daily 90 tablet 3    sertraline (ZOLOFT) 100 MG tablet Take 1 tablet by mouth daily 90 tablet 3    warfarin (COUMADIN) 5 MG tablet Take 1 tablet by mouth daily (Patient taking differently: Take 5 mg by mouth daily 2.5 mg everyday but Tuesdays and Thursdays. 5mg on Tuesday Thursday.) 90 tablet 3     No current facility-administered medications for this visit.      Allergies: Morphine  Past Medical History:   Diagnosis Date    Arthritis     Back pain     bulging disc    CAD (coronary artery disease)     BMS to RCA    Cerebral artery occlusion with cerebral infarction (HCC)     small strokes,balance issues    Cystitis     Depression     Dizziness     Dysuria     Edema     ankles,mild pedal edema    Falls frequently     H/O blood clots     left leg and lungs    Head injury     Headache     Heartburn     [place] (hard of hearing)     Hyperlipidemia     Hypertension     Hypothyroidism     Hypothyroidism     Memory problem  Neuropathy     Shingles     TIA (transient ischemic attack)     Urinary incontinence     UTI (urinary tract infection)      Past Surgical History:   Procedure Laterality Date    APPENDECTOMY  1947    CHOLECYSTECTOMY      EYE SURGERY  2009    bilateral cataract     HYSTERECTOMY, TOTAL ABDOMINAL      JOINT REPLACEMENT      Bilateral Knee    KNEE ARTHROPLASTY Right     LOBECTOMY  10/2015    sx--bilateral    PTCA  2002    with stent placement/bms to rca     Family History   Problem Relation Age of Onset    Diabetes Mother     Hypertension Mother    Fan Nieves Rheum Arthritis Mother     Stroke Mother     Tuberculosis Father      Social History     Tobacco Use    Smoking status: Never Smoker    Smokeless tobacco: Never Used   Substance Use Topics    Alcohol use: Never       Patient-Reported Vitals 2/19/2021   Patient-Reported Weight 195#   Patient-Reported Height 5 7   Patient-Reported Systolic 768   Patient-Reported Diastolic 97   Patient-Reported Pulse 82   Patient-Reported Temperature 97.9   Patient-Reported SpO2 98       Review of Systems      Eyes  no sudden vision change or amaurosis. Respiratory  no significant shortness of breath, wheezing, or stridor. No cough,  Cardiovascular  no chest pain, syncope, or significant dizziness. No significant leg swelling.  has not had claudication. Skin   has not had new wound. Neurologic   No speech difficulty has lateralizing weakness. Psychiatric  no severe anxiety or nervousness. No confusion. All other review of systems are negative.     PHYSICAL EXAMINATION:    [ INSTRUCTIONS:  \"[x]\" Indicates a positive item  \"[]\" Indicates a negative item  -- DELETE ALL ITEMS NOT EXAMINED]    [x] Alert  [x] Oriented to person/place/time    [x] No apparent distress  [] Toxic appearing  [x] Normal Mood  [] Anxious appearing    [] Depressed appearing  [] Confused appearing      [] Poor short term memory  [] Poor long term memory   Memory appears to be intact Venous scan -   There is evidence of chronic deep vein thrombosis in the left lower    extremity involving the popliteal, veins.    The exam is technically limited due to body habitus ; therefore the left    areas of tibial veins were not well visualized     Individual images were reviewed. Results were reviewed with the patient. Assessment    1. History of CVA in adulthood    2. Right sided weakness    3. Leg pain, left    4. Leg swelling          Plan    1. History of CVA in adulthood  -     VL DUP CAROTID BILATERAL; Future  -     CTA NECK W CONTRAST; Future  -     CT HEAD WO CONTRAST; Future  2. Right sided weakness  -     VL DUP CAROTID BILATERAL; Future  -     CTA NECK W CONTRAST; Future  -     CT HEAD WO CONTRAST; Future  3. Leg pain, left  4. Leg swelling      Needs ct head  Will get cvls, if positive will get cta neck at time of ct  Strongly encouraged start/continue statin therapy  Recommended no smoking  Will continue coumadin for now  Support hose  cvls shows no significiant stenosis  Ct shows -   Changes of aging with no acute intracranial abnormality. 2. Chronic ischemic change and cortical volume loss is noted. Encephalomalacia in the occipital lobe is unchanged     They will follow up with there pcp  We will recheck venous scan in 6 months    Documentation:  I communicated with the patient and/or health care decision maker about cvd, dvt. Details of this discussion including any medical advice provided: as above      I affirm this is a Patient Initiated Episode with a Patient who has not had a related appointment within my department in the past 7 days or scheduled within the next 24 hours.     Patient identification was verified at the start of the visit: Yes    Total Time: minutes: 11-20 minutes    The visit was conducted pursuant to the emergency declaration under the [facility] waiver authority and the [place] [employer] and Response Supplemental Appropriations Act. Patient identification was verified, and a caregiver was present when appropriate. The patient was located in a state where the provider was credentialed to provide care.     Note: not billable if this call serves to triage the patient into an appointment for the relevant concern      VIN Pack

## 2021-07-13 ENCOUNTER — ANTI-COAG VISIT (OUTPATIENT)
Dept: PRIMARY CARE CLINIC | Age: 86
End: 2021-07-13

## 2021-07-13 DIAGNOSIS — Z79.01 LONG TERM (CURRENT) USE OF ANTICOAGULANTS: ICD-10-CM

## 2021-07-13 LAB — INR BLD: 3.2

## 2021-07-13 PROCEDURE — 93793 ANTICOAG MGMT PT WARFARIN: CPT | Performed by: FAMILY MEDICINE

## 2021-07-13 NOTE — PROGRESS NOTES
HOME MONITORING REPORT    INR today:   Results for orders placed or performed in visit on 07/13/21   Protime-INR   Result Value Ref Range    INR 3.20        INR Goal: 2.0-3.0    Dosing Plan  As of 7/13/2021    TTR:  73.2 % (1 y)   Full warfarin instructions:  5 mg every Tue, Thu; 2.5 mg all other days              PLAN: Advised patient/caregiver to continue current dose and recheck in one week. Advised she may have a serving of greens or salad and that should bring it into normal range.  Patient/Caregiver voiced understanding

## 2021-07-14 ENCOUNTER — HOSPITAL ENCOUNTER (OUTPATIENT)
Dept: CT IMAGING | Age: 86
Discharge: HOME | End: 2021-07-14
Payer: MEDICARE

## 2021-07-14 ENCOUNTER — TELEPHONE (OUTPATIENT)
Dept: VASCULAR SURGERY | Age: 86
End: 2021-07-14

## 2021-07-14 ENCOUNTER — HOSPITAL ENCOUNTER (OUTPATIENT)
Dept: VASCULAR LAB | Age: 86
Discharge: HOME | End: 2021-07-14
Payer: MEDICARE

## 2021-07-14 DIAGNOSIS — Z86.73 HISTORY OF CVA IN ADULTHOOD: ICD-10-CM

## 2021-07-14 DIAGNOSIS — R53.1 RIGHT SIDED WEAKNESS: ICD-10-CM

## 2021-07-14 PROCEDURE — 93880 EXTRACRANIAL BILAT STUDY: CPT

## 2021-07-14 PROCEDURE — 70450 CT HEAD/BRAIN W/O DYE: CPT

## 2021-07-14 NOTE — TELEPHONE ENCOUNTER
Attempted to call patient. No answer but did leave a voicemail for patient to call back regarding results.            Please let them know there is nothing to suggest a new stroke.  I would recommend follow up with her pcp for complete workup   Written by VIN Robles on 7/14/2021  3:41 PM CDT

## 2021-07-15 NOTE — TELEPHONE ENCOUNTER
Spoke with patients daughter Dilshad Ramirez to let her know nothing suggests a new stroke and to contact Dr. Artemio Christiansen for a complete workout.  Dilshad Ramirez voiced understanding and agreed

## 2021-07-23 ENCOUNTER — OFFICE VISIT (OUTPATIENT)
Dept: FAMILY MEDICINE CLINIC | Age: 86
End: 2021-07-23

## 2021-07-23 VITALS
TEMPERATURE: 97.2 F | OXYGEN SATURATION: 96 % | SYSTOLIC BLOOD PRESSURE: 130 MMHG | HEART RATE: 81 BPM | DIASTOLIC BLOOD PRESSURE: 76 MMHG

## 2021-07-23 DIAGNOSIS — I82.5Z2 CHRONIC DEEP VEIN THROMBOSIS (DVT) OF DISTAL VEIN OF LEFT LOWER EXTREMITY (HCC): ICD-10-CM

## 2021-07-23 DIAGNOSIS — I73.9 CLAUDICATION (HCC): ICD-10-CM

## 2021-07-23 DIAGNOSIS — R27.0 ATAXIA: ICD-10-CM

## 2021-07-23 DIAGNOSIS — I10 ESSENTIAL (PRIMARY) HYPERTENSION: ICD-10-CM

## 2021-07-23 DIAGNOSIS — F41.8 DEPRESSION WITH ANXIETY: ICD-10-CM

## 2021-07-23 DIAGNOSIS — K21.9 GASTROESOPHAGEAL REFLUX DISEASE WITHOUT ESOPHAGITIS: ICD-10-CM

## 2021-07-23 DIAGNOSIS — R29.898 WEAKNESS OF BOTH LOWER EXTREMITIES: Primary | ICD-10-CM

## 2021-07-23 DIAGNOSIS — Z91.81 AT HIGH RISK FOR FALLS: ICD-10-CM

## 2021-07-23 PROCEDURE — 99214 OFFICE O/P EST MOD 30 MIN: CPT | Performed by: FAMILY MEDICINE

## 2021-07-23 PROCEDURE — 1036F TOBACCO NON-USER: CPT | Performed by: FAMILY MEDICINE

## 2021-07-23 PROCEDURE — 1090F PRES/ABSN URINE INCON ASSESS: CPT | Performed by: FAMILY MEDICINE

## 2021-07-23 PROCEDURE — 4040F PNEUMOC VAC/ADMIN/RCVD: CPT | Performed by: FAMILY MEDICINE

## 2021-07-23 PROCEDURE — G8417 CALC BMI ABV UP PARAM F/U: HCPCS | Performed by: FAMILY MEDICINE

## 2021-07-23 PROCEDURE — G8428 CUR MEDS NOT DOCUMENT: HCPCS | Performed by: FAMILY MEDICINE

## 2021-07-23 PROCEDURE — 1123F ACP DISCUSS/DSCN MKR DOCD: CPT | Performed by: FAMILY MEDICINE

## 2021-07-23 NOTE — PROGRESS NOTES
Hospitalized February 23, 2020 pulmonary embolism without acute cor pulmonale. Also with evidence of DVT of the left lower extremity. Treated with Coumadin and Lovenox. On the basis of positive falls risk screening, assessment and plan is as follows: home safety tips provided. [facility] PHYS[facility]  [facility]  [address] [address]  [address] [address]  Dept: [phone]  Dept Fax: : [phone]    Enrique Rubi is a 80 y.o. female who presents today for her medical conditions/complaints as noted below. Enrique Rubi is here for Follow-up (from vascular)        HPI:   CC: Here today to discuss the following:  She continues to have \"weakness\" in her bilateral lower extremities. She is primarily sedentary. Mobility is provided by a wheelchair. She said no falls since her last visit. She was evaluated by vascular surgery on July 14. CT of the head without contrast and bilateral carotids ultrasound was obtained. There is evidence of chronic ischemic changes but no acute changes. Less than 50% stenosis bilateral carotids.         HPI    Past Medical History:   Diagnosis Date    Arthritis     Back pain     bulging disc    CAD (coronary artery disease)     BMS to RCA    Cerebral artery occlusion with cerebral infarction (HCC)     small strokes,balance issues    Cystitis     Depression     Dizziness     Dysuria     Edema     ankles,mild pedal edema    Falls frequently     H/O blood clots     left leg and lungs    Head injury     Headache     Heartburn     [place] (hard of hearing)     Hyperlipidemia     Hypertension     Hypothyroidism     Hypothyroidism     Memory problem     Neuropathy     Shingles     TIA (transient ischemic attack)     Urinary incontinence     UTI (urinary tract infection)       Past Surgical History:   Procedure Laterality Date    APPENDECTOMY  1947    CHOLECYSTECTOMY      EYE SURGERY  2009    bilateral cataract  HYSTERECTOMY, TOTAL ABDOMINAL      JOINT REPLACEMENT      Bilateral Knee    KNEE ARTHROPLASTY Right     LOBECTOMY  10/2015    sx--bilateral    PTCA  2002    with stent placement/bms to rca       Family History   Problem Relation Age of Onset    Diabetes Mother     Hypertension Mother    Michael Spina Rheum Arthritis Mother     Stroke Mother     Tuberculosis Father        Social History     Tobacco Use    Smoking status: Never Smoker    Smokeless tobacco: Never Used   Substance Use Topics    Alcohol use: Never     Current Outpatient Medications   Medication Sig Dispense Refill    phenytoin (DILANTIN) 100 MG ER capsule Take 2 capsules by mouth 2 times daily 360 capsule 3    oxybutynin (DITROPAN XL) 5 MG extended release tablet Take 1 tablet by mouth daily For overactive bladder 30 tablet 3    Misc. Devices [facility]. 1 Device 0    guaiFENesin (MUCINEX) 600 MG extended release tablet Take 1 tablet by mouth 2 times daily 60 tablet 0    pantoprazole (PROTONIX) 20 MG tablet Take 1 tablet by mouth daily 30 tablet 0    Fluticasone furoate-vilanterol (BREO ELLIPTA) 200-25 MCG/INH AEPB inhaler Inhale 1 puff into the lungs daily 30 each 5    amLODIPine (NORVASC) 5 MG tablet Take 1 tablet by mouth daily 90 tablet 3    levothyroxine (SYNTHROID) 175 MCG tablet Take 1 tablet by mouth Daily 90 tablet 3    metoprolol succinate (TOPROL XL) 50 MG extended release tablet Take 1 tablet by mouth daily 90 tablet 3    sertraline (ZOLOFT) 100 MG tablet Take 1 tablet by mouth daily 90 tablet 3    warfarin (COUMADIN) 5 MG tablet Take 1 tablet by mouth daily (Patient taking differently: Take 5 mg by mouth daily 2.5 mg everyday but Tuesdays and Thursdays. 5mg on Tuesday Thursday.) 90 tablet 3     No current facility-administered medications for this visit.      Allergies   Allergen Reactions    Morphine      Pt becomes mean (refusing, cursing)         Health Maintenance   Topic Date Due    COVID-19 Vaccine (1) Never done  Pneumococcal 65+ years Vaccine (1 of 1 - PPSV23) Never done    DTaP/Tdap/Td vaccine (1 - Tdap) 03/11/2022 (Originally 8/18/1950)    Shingles Vaccine (1 of 2) 03/11/2022 (Originally 8/18/1981)    Flu vaccine (1) 09/01/2021    TSH testing  03/11/2022    Annual Wellness Visit (AWV)  03/12/2022    Potassium monitoring  06/03/2022    Creatinine monitoring  06/03/2022    Hepatitis A vaccine  Aged Out    Hepatitis B vaccine  Aged Out    Hib vaccine  Aged Out    Meningococcal (ACWY) vaccine  Aged Out       Subjective:      Review of Systems   Constitutional: Positive for fatigue. Negative for chills and fever. HENT: Negative for congestion. Respiratory: Negative for cough, chest tightness and shortness of breath. Cardiovascular: Negative for chest pain, palpitations and leg swelling. Gastrointestinal: Negative for abdominal pain, anal bleeding, constipation, diarrhea and nausea. Genitourinary: Negative for difficulty urinating. Neurological: Positive for weakness. Psychiatric/Behavioral: Negative. SeeHPI for visit specific review of symptoms. All others negative      Objective:   /76   Pulse 81   Temp 97.2 °F (36.2 °C)   SpO2 96%   Physical Exam  Physical Exam   Constitutional: She appears well-developed. Does not appear ill. Neck: Normal range of motion. Neck supple. No masses. Neck Symmetric. Normal tracheal position. No thyroid enlargement  Cardiovascular: Normal rate and regular rhythm. Exam reveals no friction rub. Carotid arteries: no bruit observed. No murmur heard. Respiratory:  Effort normal and breath sounds normal. No respiratory distress. No wheezes. No rales. No use of accessory muscles or intercostal retractions. Neurological: alert. Psychiatric: normal mood and affect. Her behavior is normal. Normal judgement and insight observed.       Recent Results (from the past 672 hour(s))   Protime-INR    Collection Time: 07/13/21 12:00 AM   Result Value Ref Range    INR 3.20                Assessment & Plan: The following diagnoses and conditions are stable with no further orders unless indicated:  1. Weakness of both lower extremities  Suggest we obtain a nerve conduction study of the lower extremities along with an MRI of the brain  - Nerve Conduction Test with EMG; Future  - MRI BRAIN WO CONTRAST; Future  - [address], [facility], Neurology, [place]    2. At high risk for falls  Home safety tips provided    3. Claudication ([address]  Arterial study bilateral lower extremities  - VL LOWER EXTREMITY ARTERIAL SEGMENTAL PRESSURES W PPG; Future    4. Ataxia  As above  - MRI BRAIN WO CONTRAST; Future    Overall her mood is stable. She is satisfied with sertraline. Reflux symptoms are stable. BP Readings from Last 3 Encounters:   07/23/21 130/76   06/15/21 132/82   06/03/21 128/78     Blood pressure stable as well    Hospitalized February 23, 2020 pulmonary embolism without acute cor pulmonale. Also with evidence of DVT of the left lower extremity. Treated with Coumadin       No follow-ups on file. Discussed use, benefit, and side effects of prescribed medications. All patient questions answered. Pt voiced understanding. Reviewed health maintenance. Instructedto continue current medications, diet and exercise. Patient agreed with treatmentplan. Follow up as directed. Note dictated using Dragon Dictation software  Sometimes this dictation software makes erroneous transcriptions.

## 2021-07-28 ASSESSMENT — ENCOUNTER SYMPTOMS
ABDOMINAL PAIN: 0
ANAL BLEEDING: 0
DIARRHEA: 0
SHORTNESS OF BREATH: 0
NAUSEA: 0
COUGH: 0
CONSTIPATION: 0
CHEST TIGHTNESS: 0

## 2021-07-29 ENCOUNTER — ANTI-COAG VISIT (OUTPATIENT)
Dept: PRIMARY CARE CLINIC | Age: 86
End: 2021-07-29

## 2021-07-29 DIAGNOSIS — I27.82 OTHER CHRONIC PULMONARY EMBOLISM WITHOUT ACUTE COR PULMONALE (HCC): ICD-10-CM

## 2021-07-29 DIAGNOSIS — I82.452 DEEP VENOUS THROMBOSIS (DVT) OF LEFT PERONEAL VEIN, UNSPECIFIED CHRONICITY (HCC): Primary | ICD-10-CM

## 2021-07-29 DIAGNOSIS — Z79.01 MONITORING FOR LONG-TERM ANTICOAGULANT USE: ICD-10-CM

## 2021-07-29 DIAGNOSIS — Z51.81 MONITORING FOR LONG-TERM ANTICOAGULANT USE: ICD-10-CM

## 2021-07-29 LAB — INR BLD: 2.7

## 2021-07-29 NOTE — PROGRESS NOTES
HOME MONITORING REPORT    INR today:   Results for orders placed or performed in visit on 07/29/21   Protime-INR   Result Value Ref Range    INR 2.70        INR Goal: 2.0-3.0    Dosing Plan  As of 7/29/2021    TTR:  72.7 % (1.1 y)   Full warfarin instructions:  5 mg every Tue, Thu; 2.5 mg all other days              PLAN: Patient aware to continue current dose and recheck in one week or as ordered.

## 2021-08-03 ENCOUNTER — HOSPITAL ENCOUNTER (OUTPATIENT)
Dept: MRI IMAGING | Age: 86
Discharge: HOME | End: 2021-08-03
Payer: MEDICARE

## 2021-08-03 DIAGNOSIS — R27.0 ATAXIA: ICD-10-CM

## 2021-08-03 DIAGNOSIS — R29.898 WEAKNESS OF BOTH LOWER EXTREMITIES: ICD-10-CM

## 2021-08-03 PROCEDURE — 70551 MRI BRAIN STEM W/O DYE: CPT

## 2021-08-10 ENCOUNTER — APPOINTMENT (OUTPATIENT)
Dept: GENERAL RADIOLOGY | Age: 86
DRG: 066 | End: 2021-08-10
Payer: MEDICARE

## 2021-08-10 ENCOUNTER — HOSPITAL ENCOUNTER (INPATIENT)
Age: 86
LOS: 9 days | Discharge: NURSING, REHAB OR LONG-TERM CARE | DRG: 066 | End: 2021-08-19
Attending: EMERGENCY MEDICINE
Payer: MEDICARE

## 2021-08-10 ENCOUNTER — APPOINTMENT (OUTPATIENT)
Dept: CT IMAGING | Age: 86
DRG: 066 | End: 2021-08-10
Payer: MEDICARE

## 2021-08-10 DIAGNOSIS — I62.9 INTRACRANIAL HEMORRHAGE (HCC): ICD-10-CM

## 2021-08-10 DIAGNOSIS — N39.0 URINARY TRACT INFECTION WITHOUT HEMATURIA, SITE UNSPECIFIED: ICD-10-CM

## 2021-08-10 DIAGNOSIS — R78.89 ELEVATED PHENYTOIN LEVEL: ICD-10-CM

## 2021-08-10 DIAGNOSIS — R41.82 ALTERED MENTAL STATUS, UNSPECIFIED ALTERED MENTAL STATUS TYPE: Primary | ICD-10-CM

## 2021-08-10 PROBLEM — I61.9 LEFT-SIDED NONTRAUMATIC INTRACEREBRAL HEMORRHAGE (HCC): Status: ACTIVE | Noted: 2021-08-10

## 2021-08-10 LAB
ALBUMIN SERPL-MCNC: 3.5 G/DL (ref 3.5–5.2)
ALP BLD-CCNC: 118 U/L (ref 35–104)
ALT SERPL-CCNC: <5 U/L (ref 5–33)
ANION GAP SERPL CALCULATED.3IONS-SCNC: 9 MMOL/L (ref 7–19)
AST SERPL-CCNC: 18 U/L (ref 5–32)
BACTERIA: ABNORMAL /HPF
BASE EXCESS ARTERIAL: 3.3 MMOL/L (ref -2–2)
BASOPHILS ABSOLUTE: 0 K/UL (ref 0–0.2)
BASOPHILS RELATIVE PERCENT: 0.6 % (ref 0–1)
BILIRUB SERPL-MCNC: 0.4 MG/DL (ref 0.2–1.2)
BILIRUBIN URINE: NEGATIVE
BLOOD BANK DISPENSE STATUS: NORMAL
BLOOD BANK PRODUCT CODE: NORMAL
BLOOD, URINE: ABNORMAL
BPU ID: NORMAL
BUN BLDV-MCNC: 19 MG/DL (ref 8–23)
CALCIUM SERPL-MCNC: 8.6 MG/DL (ref 8.8–10.2)
CARBOXYHEMOGLOBIN ARTERIAL: 2.4 % (ref 0–5)
CHLORIDE BLD-SCNC: 103 MMOL/L (ref 98–111)
CHOLESTEROL, TOTAL: 186 MG/DL (ref 160–199)
CLARITY: ABNORMAL
CO2: 30 MMOL/L (ref 22–29)
COLOR: ABNORMAL
CREAT SERPL-MCNC: 0.7 MG/DL (ref 0.5–0.9)
DESCRIPTION BLOOD BANK: NORMAL
EOSINOPHILS ABSOLUTE: 0.2 K/UL (ref 0–0.6)
EOSINOPHILS RELATIVE PERCENT: 2.2 % (ref 0–5)
EPITHELIAL CELLS, UA: ABNORMAL /HPF
GFR AFRICAN AMERICAN: >59
GFR NON-AFRICAN AMERICAN: >60
GLUCOSE BLD-MCNC: 114 MG/DL (ref 70–99)
GLUCOSE BLD-MCNC: 129 MG/DL (ref 74–109)
GLUCOSE URINE: NEGATIVE MG/DL
HBA1C MFR BLD: 5.7 % (ref 4–6)
HCO3 ARTERIAL: 28.5 MMOL/L (ref 22–26)
HCT VFR BLD CALC: 48.6 % (ref 37–47)
HDLC SERPL-MCNC: 46 MG/DL (ref 65–121)
HEMOGLOBIN, ART, EXTENDED: 16.1 G/DL (ref 12–16)
HEMOGLOBIN: 16 G/DL (ref 12–16)
IMMATURE GRANULOCYTES #: 0 K/UL
INR BLD: 3.79 (ref 0.88–1.18)
KETONES, URINE: ABNORMAL MG/DL
LDL CHOLESTEROL CALCULATED: 101 MG/DL
LEUKOCYTE ESTERASE, URINE: ABNORMAL
LYMPHOCYTES ABSOLUTE: 2.1 K/UL (ref 1.1–4.5)
LYMPHOCYTES RELATIVE PERCENT: 31.5 % (ref 20–40)
MCH RBC QN AUTO: 31.7 PG (ref 27–31)
MCHC RBC AUTO-ENTMCNC: 32.9 G/DL (ref 33–37)
MCV RBC AUTO: 96.4 FL (ref 81–99)
METHEMOGLOBIN ARTERIAL: 1.2 %
MONOCYTES ABSOLUTE: 0.6 K/UL (ref 0–0.9)
MONOCYTES RELATIVE PERCENT: 9.3 % (ref 0–10)
NEUTROPHILS ABSOLUTE: 3.8 K/UL (ref 1.5–7.5)
NEUTROPHILS RELATIVE PERCENT: 56.1 % (ref 50–65)
NITRITE, URINE: NEGATIVE
O2 CONTENT ARTERIAL: 20.1 ML/DL
O2 SAT, ARTERIAL: 88.9 %
O2 THERAPY: ABNORMAL
PCO2 ARTERIAL: 44 MMHG (ref 35–45)
PDW BLD-RTO: 13.8 % (ref 11.5–14.5)
PERFORMED ON: ABNORMAL
PH ARTERIAL: 7.42 (ref 7.35–7.45)
PH UA: 5.5 (ref 5–8)
PHENYTOIN LEVEL: 26.6 UG/ML (ref 10–20)
PLATELET # BLD: 272 K/UL (ref 130–400)
PMV BLD AUTO: 9.8 FL (ref 9.4–12.3)
PO2 ARTERIAL: 53 MMHG (ref 80–100)
POTASSIUM REFLEX MAGNESIUM: 3.6 MMOL/L (ref 3.5–5)
POTASSIUM, WHOLE BLOOD: 3.3
PROTEIN UA: 30 MG/DL
PROTHROMBIN TIME: 36.5 SEC (ref 12–14.6)
RBC # BLD: 5.04 M/UL (ref 4.2–5.4)
RBC UA: ABNORMAL /HPF (ref 0–2)
SARS-COV-2, NAAT: NOT DETECTED
SODIUM BLD-SCNC: 142 MMOL/L (ref 136–145)
SPECIFIC GRAVITY UA: 1.02 (ref 1–1.03)
TOTAL PROTEIN: 7.1 G/DL (ref 6.6–8.7)
TRIGL SERPL-MCNC: 194 MG/DL (ref 0–149)
TROPONIN: <0.01 NG/ML (ref 0–0.03)
TSH REFLEX FT4: 1.12 UIU/ML (ref 0.35–5.5)
UROBILINOGEN, URINE: 1 E.U./DL
WBC # BLD: 6.8 K/UL (ref 4.8–10.8)
WBC UA: ABNORMAL /HPF (ref 0–5)
YEAST: PRESENT /HPF

## 2021-08-10 PROCEDURE — 93005 ELECTROCARDIOGRAM TRACING: CPT

## 2021-08-10 PROCEDURE — 80185 ASSAY OF PHENYTOIN TOTAL: CPT

## 2021-08-10 PROCEDURE — 51702 INSERT TEMP BLADDER CATH: CPT

## 2021-08-10 PROCEDURE — 99283 EMERGENCY DEPT VISIT LOW MDM: CPT

## 2021-08-10 PROCEDURE — 83036 HEMOGLOBIN GLYCOSYLATED A1C: CPT

## 2021-08-10 PROCEDURE — 2580000003 HC RX 258: Performed by: EMERGENCY MEDICINE

## 2021-08-10 PROCEDURE — 80061 LIPID PANEL: CPT

## 2021-08-10 PROCEDURE — 71045 X-RAY EXAM CHEST 1 VIEW: CPT

## 2021-08-10 PROCEDURE — 84443 ASSAY THYROID STIM HORMONE: CPT

## 2021-08-10 PROCEDURE — 80053 COMPREHEN METABOLIC PANEL: CPT

## 2021-08-10 PROCEDURE — 84132 ASSAY OF SERUM POTASSIUM: CPT

## 2021-08-10 PROCEDURE — P9017 PLASMA 1 DONOR FRZ W/IN 8 HR: HCPCS

## 2021-08-10 PROCEDURE — 36415 COLL VENOUS BLD VENIPUNCTURE: CPT

## 2021-08-10 PROCEDURE — 87635 SARS-COV-2 COVID-19 AMP PRB: CPT

## 2021-08-10 PROCEDURE — 36600 WITHDRAWAL OF ARTERIAL BLOOD: CPT

## 2021-08-10 PROCEDURE — 85610 PROTHROMBIN TIME: CPT

## 2021-08-10 PROCEDURE — 70450 CT HEAD/BRAIN W/O DYE: CPT

## 2021-08-10 PROCEDURE — 87086 URINE CULTURE/COLONY COUNT: CPT

## 2021-08-10 PROCEDURE — 6360000002 HC RX W HCPCS: Performed by: EMERGENCY MEDICINE

## 2021-08-10 PROCEDURE — 84484 ASSAY OF TROPONIN QUANT: CPT

## 2021-08-10 PROCEDURE — 81001 URINALYSIS AUTO W/SCOPE: CPT

## 2021-08-10 PROCEDURE — 2140000000 HC CCU INTERMEDIATE R&B

## 2021-08-10 PROCEDURE — 82947 ASSAY GLUCOSE BLOOD QUANT: CPT

## 2021-08-10 PROCEDURE — 85025 COMPLETE CBC W/AUTO DIFF WBC: CPT

## 2021-08-10 PROCEDURE — 82803 BLOOD GASES ANY COMBINATION: CPT

## 2021-08-10 RX ORDER — PANTOPRAZOLE SODIUM 20 MG/1
20 TABLET, DELAYED RELEASE ORAL DAILY
Status: DISCONTINUED | OUTPATIENT
Start: 2021-08-11 | End: 2021-08-19 | Stop reason: HOSPADM

## 2021-08-10 RX ORDER — LABETALOL HYDROCHLORIDE 5 MG/ML
10 INJECTION, SOLUTION INTRAVENOUS EVERY 10 MIN PRN
Status: DISCONTINUED | OUTPATIENT
Start: 2021-08-10 | End: 2021-08-19 | Stop reason: HOSPADM

## 2021-08-10 RX ORDER — BUDESONIDE 0.5 MG/2ML
0.5 INHALANT ORAL EVERY 12 HOURS
Status: DISCONTINUED | OUTPATIENT
Start: 2021-08-10 | End: 2021-08-19 | Stop reason: HOSPADM

## 2021-08-10 RX ORDER — OXYBUTYNIN CHLORIDE 5 MG/1
5 TABLET, EXTENDED RELEASE ORAL DAILY
Status: DISCONTINUED | OUTPATIENT
Start: 2021-08-11 | End: 2021-08-19 | Stop reason: HOSPADM

## 2021-08-10 RX ORDER — ALBUTEROL SULFATE 2.5 MG/3ML
2.5 SOLUTION RESPIRATORY (INHALATION)
Status: DISCONTINUED | OUTPATIENT
Start: 2021-08-10 | End: 2021-08-19 | Stop reason: HOSPADM

## 2021-08-10 RX ORDER — ARFORMOTEROL TARTRATE 15 UG/2ML
15 SOLUTION RESPIRATORY (INHALATION) 2 TIMES DAILY
Status: DISCONTINUED | OUTPATIENT
Start: 2021-08-10 | End: 2021-08-19 | Stop reason: HOSPADM

## 2021-08-10 RX ORDER — SODIUM CHLORIDE 9 MG/ML
INJECTION, SOLUTION INTRAVENOUS PRN
Status: DISCONTINUED | OUTPATIENT
Start: 2021-08-10 | End: 2021-08-15 | Stop reason: SDUPTHER

## 2021-08-10 RX ORDER — PHENYTOIN SODIUM 100 MG/1
200 CAPSULE, EXTENDED RELEASE ORAL 2 TIMES DAILY
Status: DISCONTINUED | OUTPATIENT
Start: 2021-08-10 | End: 2021-08-14

## 2021-08-10 RX ORDER — GUAIFENESIN 600 MG/1
600 TABLET, EXTENDED RELEASE ORAL 2 TIMES DAILY
Status: DISCONTINUED | OUTPATIENT
Start: 2021-08-10 | End: 2021-08-19 | Stop reason: HOSPADM

## 2021-08-10 RX ORDER — SERTRALINE HYDROCHLORIDE 100 MG/1
100 TABLET, FILM COATED ORAL DAILY
Status: DISCONTINUED | OUTPATIENT
Start: 2021-08-11 | End: 2021-08-19 | Stop reason: HOSPADM

## 2021-08-10 RX ORDER — ATORVASTATIN CALCIUM 40 MG/1
80 TABLET, FILM COATED ORAL NIGHTLY
Status: DISCONTINUED | OUTPATIENT
Start: 2021-08-10 | End: 2021-08-19 | Stop reason: HOSPADM

## 2021-08-10 RX ADMIN — PHYTONADIONE 10 MG: 10 INJECTION, EMULSION INTRAMUSCULAR; INTRAVENOUS; SUBCUTANEOUS at 21:04

## 2021-08-10 NOTE — ED NOTES
Family reports PT typically has some garbled speech and disorientation.       [facility], [facility]  08/10/21 2282

## 2021-08-10 NOTE — ED NOTES
EMS PT family called for increased AMS, PT has some confusion at baseline but had increased AMS beginning at 1630 today.  PT has prior hx of stroke with residual right-sided weakness     Luana Coyne RN  08/10/21 [id number]

## 2021-08-11 ENCOUNTER — APPOINTMENT (OUTPATIENT)
Dept: CT IMAGING | Age: 86
DRG: 066 | End: 2021-08-11
Payer: MEDICARE

## 2021-08-11 LAB
ABO/RH: NORMAL
ANTIBODY SCREEN: NORMAL
BLOOD BANK DISPENSE STATUS: NORMAL
BLOOD BANK PRODUCT CODE: NORMAL
BPU ID: NORMAL
DESCRIPTION BLOOD BANK: NORMAL
INR BLD: 1.2 (ref 0.88–1.18)
PROTHROMBIN TIME: 15.4 SEC (ref 12–14.6)

## 2021-08-11 PROCEDURE — 86850 RBC ANTIBODY SCREEN: CPT

## 2021-08-11 PROCEDURE — 70450 CT HEAD/BRAIN W/O DYE: CPT

## 2021-08-11 PROCEDURE — 85610 PROTHROMBIN TIME: CPT

## 2021-08-11 PROCEDURE — 86900 BLOOD TYPING SEROLOGIC ABO: CPT

## 2021-08-11 PROCEDURE — 97161 PT EVAL LOW COMPLEX 20 MIN: CPT

## 2021-08-11 PROCEDURE — 36415 COLL VENOUS BLD VENIPUNCTURE: CPT

## 2021-08-11 PROCEDURE — 6360000002 HC RX W HCPCS: Performed by: HOSPITALIST

## 2021-08-11 PROCEDURE — 99221 1ST HOSP IP/OBS SF/LOW 40: CPT | Performed by: NEUROLOGICAL SURGERY

## 2021-08-11 PROCEDURE — 94761 N-INVAS EAR/PLS OXIMETRY MLT: CPT

## 2021-08-11 PROCEDURE — 6370000000 HC RX 637 (ALT 250 FOR IP): Performed by: HOSPITALIST

## 2021-08-11 PROCEDURE — 97165 OT EVAL LOW COMPLEX 30 MIN: CPT

## 2021-08-11 PROCEDURE — 36430 TRANSFUSION BLD/BLD COMPNT: CPT

## 2021-08-11 PROCEDURE — 2140000000 HC CCU INTERMEDIATE R&B

## 2021-08-11 PROCEDURE — 2580000003 HC RX 258: Performed by: HOSPITALIST

## 2021-08-11 PROCEDURE — 94640 AIRWAY INHALATION TREATMENT: CPT

## 2021-08-11 PROCEDURE — 92522 EVALUATE SPEECH PRODUCTION: CPT

## 2021-08-11 PROCEDURE — 86901 BLOOD TYPING SEROLOGIC RH(D): CPT

## 2021-08-11 PROCEDURE — 97535 SELF CARE MNGMENT TRAINING: CPT

## 2021-08-11 PROCEDURE — 92610 EVALUATE SWALLOWING FUNCTION: CPT

## 2021-08-11 PROCEDURE — 97530 THERAPEUTIC ACTIVITIES: CPT

## 2021-08-11 PROCEDURE — P9059 PLASMA, FRZ BETWEEN 8-24HOUR: HCPCS

## 2021-08-11 RX ORDER — SODIUM CHLORIDE 9 MG/ML
25 INJECTION, SOLUTION INTRAVENOUS PRN
Status: DISCONTINUED | OUTPATIENT
Start: 2021-08-11 | End: 2021-08-19 | Stop reason: HOSPADM

## 2021-08-11 RX ORDER — SODIUM CHLORIDE 0.9 % (FLUSH) 0.9 %
5-40 SYRINGE (ML) INJECTION EVERY 12 HOURS SCHEDULED
Status: DISCONTINUED | OUTPATIENT
Start: 2021-08-11 | End: 2021-08-19 | Stop reason: HOSPADM

## 2021-08-11 RX ORDER — POLYETHYLENE GLYCOL 3350 17 G/17G
17 POWDER, FOR SOLUTION ORAL DAILY PRN
Status: DISCONTINUED | OUTPATIENT
Start: 2021-08-11 | End: 2021-08-19 | Stop reason: HOSPADM

## 2021-08-11 RX ORDER — AMLODIPINE BESYLATE 10 MG/1
10 TABLET ORAL DAILY
Status: DISCONTINUED | OUTPATIENT
Start: 2021-08-11 | End: 2021-08-14

## 2021-08-11 RX ORDER — ACETAMINOPHEN 325 MG/1
650 TABLET ORAL EVERY 4 HOURS PRN
Status: DISCONTINUED | OUTPATIENT
Start: 2021-08-11 | End: 2021-08-19 | Stop reason: HOSPADM

## 2021-08-11 RX ORDER — ONDANSETRON 2 MG/ML
4 INJECTION INTRAMUSCULAR; INTRAVENOUS EVERY 6 HOURS PRN
Status: DISCONTINUED | OUTPATIENT
Start: 2021-08-11 | End: 2021-08-19 | Stop reason: HOSPADM

## 2021-08-11 RX ORDER — MECOBALAMIN 5000 MCG
5 TABLET,DISINTEGRATING ORAL NIGHTLY PRN
Status: DISCONTINUED | OUTPATIENT
Start: 2021-08-11 | End: 2021-08-19 | Stop reason: HOSPADM

## 2021-08-11 RX ORDER — LEVETIRACETAM 5 MG/ML
500 INJECTION INTRAVASCULAR EVERY 12 HOURS
Status: DISCONTINUED | OUTPATIENT
Start: 2021-08-11 | End: 2021-08-13

## 2021-08-11 RX ORDER — CALCIUM CARBONATE 200(500)MG
500 TABLET,CHEWABLE ORAL 3 TIMES DAILY PRN
Status: DISCONTINUED | OUTPATIENT
Start: 2021-08-11 | End: 2021-08-19 | Stop reason: HOSPADM

## 2021-08-11 RX ORDER — LEVETIRACETAM 10 MG/ML
1000 INJECTION INTRAVASCULAR ONCE
Status: COMPLETED | OUTPATIENT
Start: 2021-08-11 | End: 2021-08-11

## 2021-08-11 RX ORDER — SODIUM CHLORIDE 0.9 % (FLUSH) 0.9 %
5-40 SYRINGE (ML) INJECTION PRN
Status: DISCONTINUED | OUTPATIENT
Start: 2021-08-11 | End: 2021-08-19 | Stop reason: HOSPADM

## 2021-08-11 RX ORDER — ONDANSETRON 4 MG/1
4 TABLET, ORALLY DISINTEGRATING ORAL EVERY 8 HOURS PRN
Status: DISCONTINUED | OUTPATIENT
Start: 2021-08-11 | End: 2021-08-19 | Stop reason: HOSPADM

## 2021-08-11 RX ADMIN — AMLODIPINE BESYLATE 10 MG: 10 TABLET ORAL at 09:52

## 2021-08-11 RX ADMIN — SODIUM CHLORIDE, PRESERVATIVE FREE 10 ML: 5 INJECTION INTRAVENOUS at 20:14

## 2021-08-11 RX ADMIN — SODIUM CHLORIDE, PRESERVATIVE FREE 1000 MG: 5 INJECTION INTRAVENOUS at 20:13

## 2021-08-11 RX ADMIN — BUDESONIDE 500 MCG: 0.5 SUSPENSION RESPIRATORY (INHALATION) at 06:26

## 2021-08-11 RX ADMIN — LEVETIRACETAM 1000 MG: 10 INJECTION INTRAVENOUS at 03:43

## 2021-08-11 RX ADMIN — BUDESONIDE 500 MCG: 0.5 SUSPENSION RESPIRATORY (INHALATION) at 19:42

## 2021-08-11 RX ADMIN — SODIUM CHLORIDE, PRESERVATIVE FREE 10 ML: 5 INJECTION INTRAVENOUS at 10:06

## 2021-08-11 RX ADMIN — SERTRALINE HYDROCHLORIDE 100 MG: 100 TABLET ORAL at 09:53

## 2021-08-11 RX ADMIN — PHENYTOIN SODIUM 200 MG: 100 CAPSULE ORAL at 09:52

## 2021-08-11 RX ADMIN — ARFORMOTEROL TARTRATE 15 MCG: 15 SOLUTION RESPIRATORY (INHALATION) at 06:26

## 2021-08-11 RX ADMIN — LEVETIRACETAM 500 MG: 5 INJECTION INTRAVENOUS at 15:34

## 2021-08-11 RX ADMIN — ARFORMOTEROL TARTRATE 15 MCG: 15 SOLUTION RESPIRATORY (INHALATION) at 19:42

## 2021-08-11 RX ADMIN — SODIUM CHLORIDE, PRESERVATIVE FREE 1000 MG: 5 INJECTION INTRAVENOUS at 01:57

## 2021-08-11 NOTE — PROGRESS NOTES
Speech Language Pathology  Facility/Department: [address] CARE   CLINICAL BEDSIDE SWALLOW EVALUATION  SPEECH PRODUCTION EVALUATION     NAME: Marianne Sargent  : 1931  MRN: [id number]    ADMISSION DATE: 8/10/2021  ADMITTING DIAGNOSIS: has Compression fx, thoracic spine, closed, initial encounter ([address] History of cardioembolic cerebrovascular accident (CVA); Essential hypertension; Acquired hypothyroidism; Seizure disorder ([address] Other pulmonary embolism without acute cor pulmonale ([address] Deep vein thrombosis (DVT) of left lower extremity ([address] Leg pain, left; Leg swelling; Complicated urinary tract infection; Generalized weakness; Hypocalcemia; Acute bronchitis; Left-sided nontraumatic intracerebral hemorrhage ([address] Left Thalamic; AMS (altered mental status); and UTI (urinary tract infection) on their problem list.    Date of Eval: 2021  Evaluating Therapist: ITZEL Arboleda    Current Diet level:  NPO    Reason for Referral  Marianne Sargent was referred for a bedside swallow evaluation to assess the efficiency of her swallow function, identify signs and symptoms of aspiration and make recommendations regarding safe dietary consistencies, effective compensatory strategies, and safe eating environment. Impression  Assessed patient's swallowing function. Patient exhibited decreased oral prep of more solid consistencies, inconsistently fast oral transit and suspected swallow delay with even thickened liquid consistencies, and sluggish, moderately decreased laryngeal elevation for swallow airway protection. Even so, no outward S/S penetration/aspiration was noted with any ice chip trial, puree consistency trial, moderately thick/honey thick liquid trial, or mildly thick/nectar thick liquid trial presented during evaluation this date. At this time, would trial puree consistency with moderately thick/honey thick liquids. TOTAL FEED. Recommend meds crushed in pudding/applesauce.  If patient receives mouth care prior to intake, okay for ice chips IN BETWEEN MEALS for comfort. Will continue to follow. Thank you for this consult. Treatment Plan  Requires SLP Intervention: Yes     Recommended Diet and Intervention  Diet Solids Recommendation: Puree  Liquid Consistency Recommendation: Moderately thick (honey thick)   Recommended Form of Meds: Meds crushed in puree as able  Therapeutic Interventions: Patient/Family education;Diet tolerance monitoring; Therapeutic PO trials with SLP     Compensatory Swallowing Strategies  Compensatory Swallowing Strategies: Upright as possible for all oral intake;TOTAL FEED;Small bites/sips;Eat/Feed slowly; Alternate solids and liquids; Remain upright for 30-45 minutes after meals     Treatment/Goals  Timeframe for Short-term Goals: 1x/day for 3 days   Goal 1: Patient will tolerate puree consistency and moderately thick/honey thick liquids with min S/S penetration/aspiration during PO intake. Goal 2: Patient staff will follow swallow safety recommendations to decrease risk of penetration/aspiration during PO intake. Goal 3: Re-assessment of swallow function for potential diet upgrade. Goal 4: Monitor speech production. General  Chart Reviewed: Yes  Behavior/Cognition: Lethargic;Cooperative  O2 Device: None (Room air)  Communication Observation: (Assessed patient's speech production. Patient exhibited decreased volume of speech and slow, decreased lingual movements during verbalizations. SLP ranked functional intelligibility of speech for unfamiliar listeners at 50-60% in utterances with background noise present.)  Follows Directions: Simple   Patient Positioning: Upright in bed  Consistencies Administered: Ice chips;Dysphagia Pureed (Dysphagia I); Honey - cup;Nectar - cup     Oral Motor Examination   Labial ROM: (Decreased, bilaterally, during labial retraction trials and labial protrusion trials.)  Labial Strength: (Decreased during labial compression trials.)  Labial Coordination: (Slowed movements were noted.)  Lingual ROM: (Decreased during lingual extension trials with no full point achieved; decreased during lingual elevation trials without use of accessory jaw movement; decreased movements noted bilaterally.)  Lingual Symmetry: (Deviation was noted, to the right, during lingual extension trials.)  Lingual Strength: (Decreased)  Lingual Coordination: (Slowed movements were noted.)     Assessed patient's swallowing function with the following observations noted:     Oral Phase  Mastication: Ice chips (Patient exhibited decreased vertical jaw movement at the front of the mouth during oral prep of ice chip trials presented by SLP.)  Suspected Premature Bolus Loss: Puree;Nectar - cup (Patient exhibited slow oral transit of puree consistency trials presented by SLP. Patient exhibited inconsistently fast oral transit of nectar thick liquid trials presented via cup by SLP.)  Oral Phase - Comment: Oral transit of ice chip trials primarily measured 1-2 seconds in length. No puree consistency residue was noted post swallows. Oral transit of honey thick liquid trials, presented via cup by SLP, primarily measured 1-2 seconds in length. Pharyngeal Phase  Suspected Swallow Delay: Puree;Nectar - cup (Suspect secondary to oral transit times.)  Laryngeal Elevation: (Patient exhibited sluggish, moderately decreased laryngeal elevation for swallow airway protection.)  Pharyngeal Phase - Comment: No outward S/S penetration/aspiration was noted with any ice chip trial, puree consistency trial, moderately thick/honey thick liquid trial, or mildly thick/nectar thick liquid trial presented during assessment this date. At this time, would trial puree consistency with moderately thick/honey thick liquids. TOTAL FEED. Recommend meds crushed in pudding/applesauce. If patient receives mouth care prior to intake, okay for ice chips IN BETWEEN MEALS for comfort.  Will continue to follow.     Electronically signed by ITZEL Reyes on 8/11/2021 at 12:31 PM

## 2021-08-11 NOTE — PROGRESS NOTES
Physical Therapy    Facility/Department: [facility] PROGRESSIVE CARE  Initial Assessment    NAME: Kimi Chaves  : 1931  MRN: [id number]    Date of Service: 2021    Discharge Recommendations:  Continue to assess pending progress, Patient would benefit from continued therapy after discharge, 24 hour supervision or assist, Home with Home health PT        Assessment   Body structures, Functions, Activity limitations: Decreased functional mobility ; Decreased ADL status; Decreased ROM; Decreased strength;Decreased balance;Decreased posture  Assessment: Pt REQUIRES TOTAL ASSIST FOR ALL ASPECTS OF MOBILITY. UNABLE TO MOVE ANY EXTREMITIES AGAINST GRAVITY. WOULD NEED TO BE A MECHANICAL LIFT FOR OOB. FAMILY PLANS FOR Pt TO RETURN HOME. DISCUSSED BENEFITS OF HAVING A LIFT AT HOME AS WELL AS HOME HEALTH. PT Education: PT Role;Plan of Care;Family Education  REQUIRES PT FOLLOW UP: Yes  Activity Tolerance  Activity Tolerance: Patient limited by cognitive status (WEAKNESS)       Patient Diagnosis(es): The primary encounter diagnosis was Altered mental status, unspecified altered mental status type. Diagnoses of Intracranial hemorrhage ([address] Urinary tract infection without hematuria, site unspecified, and Elevated phenytoin level were also pertinent to this visit. has a past medical history of Arthritis, Back pain, CAD (coronary artery disease), Cerebral artery occlusion with cerebral infarction ([address] Cystitis, Depression, Dizziness, Dysuria, Edema, Falls frequently, H/O blood clots, Head injury, Headache, Heartburn, [place] (hard of hearing), Hyperlipidemia, Hypertension, Hypothyroidism, Hypothyroidism, Memory problem, Neuropathy, Shingles, TIA (transient ischemic attack), Urinary incontinence, and UTI (urinary tract infection). has a past surgical history that includes joint replacement; Cholecystectomy; Hysterectomy, total abdominal; Knee Arthroplasty (Right); lobectomy (10/2015); Appendectomy (1947);  Percutaneous Transluminal Coronary Angio (2002); and Eye surgery (2009). Restrictions  Restrictions/Precautions  Restrictions/Precautions: Fall Risk  Vision/Hearing        Subjective  General  Patient assessed for rehabilitation services?: Yes  Diagnosis: AMS, CVA  Subjective  Subjective: Pt MINIMALLY VERBAL, WILLING TO PARTICIPATE BUT DID BECOME SLIGHTLY IRRITATED WITH THERAPISTS AT ONE POINT  Pain Screening  Patient Currently in Pain: No  Vital Signs  Patient Currently in Pain: No       Orientation     Social/Functional History  Social/Functional History  Lives With: Family (niece and another)  Bathroom Equipment: 3-in-1 commode  Home Equipment: Hospital bed, [employer]  ADL Assistance: Needs assistance (dependent for dressing/bathing/toileting. Until a few days ago, was able to feed herself)  Ambulation Assistance:  (Has not ambulated in one month, prior to that could ambulate with RW with CGA)  Transfer Assistance: Needs assistance (male family member picks her up to transfer to wheelchair or [facility])  Cognition   Cognition  Cognition Comment: Awakens for therapy and can speak intelligibly for short phrases. Poor visual attention to task. Significantly decreased initiation so requires multiple prompts and repetition .     Objective     Observation/Palpation  Posture: Poor    PROM RLE (degrees)  RLE PROM: WFL  PROM LLE (degrees)  LLE PROM: WFL  Strength RLE  Comment: GROSSLY < 2/5  Strength LLE  Comment: GROSSLY < 2/5  Strength Other  Other: DIFFICULT TO ASSESS DUE TO WEAKNESS AND COGNITION        Bed mobility  Supine to Sit: Maximum assistance  Sit to Supine: Maximum assistance;2 Person assistance  Scooting: Dependent/Total  Comment: ONLY ABLE TO SIT EOB WITH MAX ASSIST, BRIEF FEW SECONDS OF HOLDING UPRIGHT BALANCED POSITION, LEANS RIGHT/FWD WITHOUT ASSIST  Transfers  Sit to Stand:  (ATTEMPTED FROM ELEVATED SURFACE, UNABLE TO INITIATE WEIGHT SHIFT FWD)        Balance  Sitting - Dynamic: Poor        Plan   Plan  Times

## 2021-08-11 NOTE — H&P
Patient Information:  Patient: Monica Padilla  MRN: [id number]   Acct: [de-identified]  YOB: 1931  Admit Date: 8/10/2021      Primary Care Physician: Greg Hogan MD  Advance Directive: Full Code        SUBJECTIVE:    Chief Complaint   Patient presents with    Altered Mental Status     EP Sign Out:  ICH   Left thalamic  AMS  UTI  Dr Iesha Campa of [facility] Sx to see in AM    HPI:  Mrs. Sherri Moy \"Gene\"  is a pleasantly confused  [place] lady of 80 years. She has improved in comparison to last night but remains unable to engage meaningfully in history collection. She can not recall any symptoms from earlier, however assures us that nothing hurts when asked in detail. She can not recall what happened or how she came to be here. She has as per the ED team garbled speech and disorientation at baseline as per her family. The family reports that she has a month of leaning to the right and difficulty with ambulation. They had stated to the EP that they brought her in this evening for AMS as she has been increasingly confused since half pas ten. Review of Systems:   Review of Systems   Reason unable to perform ROS: unable to gather ROS as per patient general medical consdition.      Following subsections as per chart review    Past Medical History:   Diagnosis Date    Arthritis     Back pain     bulging disc    CAD (coronary artery disease)     BMS to RCA    Cerebral artery occlusion with cerebral infarction (HCC)     small strokes,balance issues    Cystitis     Depression     Dizziness     Dysuria     Edema     ankles,mild pedal edema    Falls frequently     H/O blood clots     left leg and lungs    Head injury     Headache     Heartburn     [place] (hard of hearing)     Hyperlipidemia     Hypertension     Hypothyroidism     Hypothyroidism     Memory problem     Neuropathy     Shingles     TIA (transient ischemic attack)     Urinary incontinence     UTI (urinary tract infection) Past Psychiatric History:  As per above    Past Surgical History:   Procedure Laterality Date    APPENDECTOMY  1947    CHOLECYSTECTOMY      EYE SURGERY  2009    bilateral cataract     HYSTERECTOMY, TOTAL ABDOMINAL      JOINT REPLACEMENT      Bilateral Knee    KNEE ARTHROPLASTY Right     LOBECTOMY  10/2015    sx--bilateral    PTCA  2002    with stent placement/bms to rca     Social History     Tobacco History     Smoking Status  Never Smoker    Smokeless Tobacco Use  Never Used          Alcohol History     Alcohol Use Status  Never          Drug Use     Drug Use Status  Never          Sexual Activity     Sexually Active  Not Asked           Family History   Problem Relation Age of Onset    Diabetes Mother     Hypertension Mother    [facility] Rheum Arthritis Mother     Stroke Mother     Tuberculosis Father      Allergies   Allergen Reactions    Morphine      Pt becomes mean (refusing, cursing)       Home Medications:  Prior to Admission medications    Medication Sig Start Date End Date Taking? Authorizing Provider   phenytoin (DILANTIN) 100 MG ER capsule Take 2 capsules by mouth 2 times daily 6/29/21   Shan Mccarthy MD   oxybutynin (DITROPAN XL) 5 MG extended release tablet Take 1 tablet by mouth daily For overactive bladder 6/20/21   Shan Mccarthy MD   [facility].  6/3/21   Shan Mccarthy MD   guaiFENesin [facility]) 600 MG extended release tablet Take 1 tablet by mouth 2 times daily 4/13/21   Chavez Pinto, DO   pantoprazole (PROTONIX) 20 MG tablet Take 1 tablet by mouth daily 4/14/21   Mervin Arroyo, DO   Fluticasone furoate-vilanterol (BREO ELLIPTA) 200-25 MCG/INH AEPB inhaler Inhale 1 puff into the lungs daily 3/12/21   Shan Mccarthy MD   amLODIPine (NORVASC) 5 MG tablet Take 1 tablet by mouth daily 10/12/20   Shan Mccarthy MD   levothyroxine (SYNTHROID) 175 MCG tablet Take 1 tablet by mouth Daily 10/12/20   Shan Mccarthy MD   metoprolol succinate (TOPROL XL) 50 MG extended release tablet Take 1 tablet by mouth daily 10/12/20   Williams Paiz MD   sertraline (ZOLOFT) 100 MG tablet Take 1 tablet by mouth daily 10/12/20   Williams Paiz MD   warfarin (COUMADIN) 5 MG tablet Take 1 tablet by mouth daily  Patient taking differently: Take 5 mg by mouth daily 2.5 mg everyday but Tuesdays and Thursdays. 5mg on Tuesday Thursday. 10/12/20   Williams Paiz MD         OBJECTIVE:    Vitals:    08/11/21 0727   BP: (!) 165/99   Pulse: 73   Resp: 20   Temp: 96.2 °F (35.7 °C)   SpO2: 91%     BP (!) 165/99   Pulse 73   Temp 96.2 °F (35.7 °C) (Temporal)   Resp 20   Wt 200 lb (90.7 kg)   SpO2 91%   BMI 30.41 kg/m²       Intake/Output Summary (Last 24 hours) at 8/11/2021 0910  Last data filed at 8/11/2021 0728  Gross per 24 hour   Intake 1153.55 ml   Output 1150 ml   Net 3.55 ml     Physical Exam  Vitals reviewed. Constitutional:       General: She is not in acute distress. Appearance: Normal appearance. She is normal weight. She is ill-appearing. She is not toxic-appearing. Comments: Appears stated age   HENT:      Head: Normocephalic and atraumatic. Nose: No congestion or rhinorrhea. Eyes:      General:         Right eye: No discharge. Left eye: No discharge. Cardiovascular:      Rate and Rhythm: Normal rate and regular rhythm. Heart sounds: No murmur heard. No friction rub. No gallop. Pulmonary:      Effort: No respiratory distress. Breath sounds: No stridor. No wheezing, rhonchi or rales. Chest:      Chest wall: No tenderness. Abdominal:      General: Bowel sounds are normal.      Tenderness: There is no abdominal tenderness. There is no guarding or rebound. Musculoskeletal:      Comments: No wasting of muscle stores, has increased fat stores   Skin:     General: Skin is warm. Comments: nondiaphoretic   Neurological:      Cranial Nerves: No dysarthria. Motor: No tremor or seizure activity.        LABORATORY DATA:    CBC: Recent Labs     08/10/21  1907   WBC 6.8   HGB 16.0   HCT 48.6*        BMP:   Recent Labs     08/10/21  1907 08/10/21  2001     --    K 3.6 3.3     --    CO2 30*  --    BUN 19  --    CREATININE 0.7  --    CALCIUM 8.6*  --      Hepatic Profile:   Recent Labs     08/10/21  1907   AST 18   ALT <5*   BILITOT 0.4   ALKPHOS 118*     Coag Panel:   Recent Labs     08/10/21  1907 08/11/21  0707   INR 3.79* 1.20*   PROTIME 36.5* 15.4*     Cardiac Enzymes:   Recent Labs     08/10/21  1907   TROPONINI <0.01     Lipid Panel: pending    A1C:   Recent Labs     08/10/21  1907   LABA1C 5.7     TSH: Invalid input(s): LABTS    ABG:   Recent Labs     08/10/21  2001   PHART 7.420   FIF1ODD 44.0   PO2ART 53.0*   FHP7WFL 28.5*   BEART 3.3*   HGBAE 16.1*   N6UITMQU 88.9*   CARBOXHGBART 2.4     Urinalysis:   Lab Results   Component Value Date    NITRU Negative 08/10/2021    WBCUA 31-50 08/10/2021    BACTERIA 4+ 08/10/2021    RBCUA 6-10 08/10/2021    BLOODU LARGE 08/10/2021    SPECGRAV 1.023 08/10/2021    GLUCOSEU Negative 08/10/2021       IMAGING:  CT HEAD WO CONTRAST  Result Date: 8/10/2021  1. . There is a new focus of intra-axial hemorrhage within the left thalamus measuring 8 mm in size. I spoke with Dr. Mar Larson in the emergency room at 7:45 PM concerning the finding and to call the code stroke report. 2. Diffuse atrophy with small vessel ischemic disease. Remote left PCA territory infarct. 3. Bilateral temporal thinning is demonstrated. Signed by Dr Charmaine Dumont  Result Date: 8/10/2021  1. . Expiratory chest with basilar atelectasis. No evidence of lobar pneumonia or effusion.  Signed by Dr Uri Hernandez:    Left Thalamic ICH Non-Truamatic  UTI  AMS  admit to [facility] ICU under hospitalist team  Neuro Sx on case to see in AM  PT/OT/SpeechTherapy as per protocol   NPO until/unless passes RN swallow screen, then Cardiac Diet with diabetic modifier for now   HbA1c, TSH, Lipid Panel all added on  Fall Precuations  Bed Alarm  Statin as per protocol  Labetalol PRN for now, will allow permissive HTN  No NSAID  No ASA  No Blood Thinners  Rocephin 1g IV Q24h  CBC with Diff and BMP with Mag reflex daily  UA with Reflex Cx sent by ED, UCx is pending  Keppra 500mg IV Q12h    Chronic Medical Problems:   levothyroxine  175 mcg Oral Daily    oxybutynin  5 mg Oral Daily    phenytoin  200 mg Oral BID    pantoprazole  20 mg Oral Daily    guaiFENesin  600 mg Oral BID    sertraline  100 mg Oral Daily    Arformoterol Tartrate  15 mcg Nebulization BID    budesonide  0.5 mg Nebulization Q12H    atorvastatin  80 mg Oral Nightly     Supoportive and Prophylactic Txx:  DVT Prophylaxis: SCDs  GI (PUD) Ppx: not indicated  PT consult for evalutation and Txx as indicated: as per above  ADULT DIET; Dysphagia - Pureed; Moderately Thick (Honey)  sodium chloride flush, sodium chloride, acetaminophen, ondansetron **OR** ondansetron, calcium carbonate, polyethylene glycol, melatonin, sodium chloride, albuterol, labetalol, sodium chloride      Care time of >35 minutes  Pt seen/examined and admitted to inpatient status. Inpatient status is used for patients with an expected LOS extending past two midnights due to medical therapy and or critical care needs, otherwise patients are placed to OBServation status. Signed:  Electronically signed by Ruthann Gordon MD on 8/11/21 at 9:23 AM CDT.

## 2021-08-11 NOTE — PROGRESS NOTES
Occupational Therapy   Occupational Therapy Initial Assessment  Date: 2021   Patient Name: Goran Bustillo  MRN: [id number]     : 1931    Date of Service: 2021    Discharge Recommendations:  24 hour supervision or assist  OT Equipment Recommendations  Other: Geovany lift and pads, one with commode cut out    Assessment   Assessment: Evaluation completed and tx initiated. The patient has been, and continues to be signficantly impaired for all ADL and mobility tasks and is max A to dependent overall. Family states plans for discharge home when medically ready. Treatment Diagnosis: left side intracerebral hemorrhage  REQUIRES OT FOLLOW UP: Yes  Activity Tolerance  Activity Tolerance: Patient Tolerated treatment well  Safety Devices  Safety Devices in place: Yes  Type of devices: Call light within reach; Bed alarm in place           Patient Diagnosis(es): The primary encounter diagnosis was Altered mental status, unspecified altered mental status type. Diagnoses of Intracranial hemorrhage ([address] Urinary tract infection without hematuria, site unspecified, and Elevated phenytoin level were also pertinent to this visit. has a past medical history of Arthritis, Back pain, CAD (coronary artery disease), Cerebral artery occlusion with cerebral infarction ([address] Cystitis, Depression, Dizziness, Dysuria, Edema, Falls frequently, H/O blood clots, Head injury, Headache, Heartburn, [place] (hard of hearing), Hyperlipidemia, Hypertension, Hypothyroidism, Hypothyroidism, Memory problem, Neuropathy, Shingles, TIA (transient ischemic attack), Urinary incontinence, and UTI (urinary tract infection). has a past surgical history that includes joint replacement; Cholecystectomy; Hysterectomy, total abdominal; Knee Arthroplasty (Right); lobectomy (10/2015); Appendectomy (); Percutaneous Transluminal Coronary Angio (); and Eye surgery ().     Treatment Diagnosis: left side intracerebral hemorrhage      Restrictions  Restrictions/Precautions  Restrictions/Precautions: Fall Risk    Subjective   General  Chart Reviewed: Yes  Patient assessed for rehabilitation services?: Yes  Family / Caregiver Present: Yes  Patient Currently in Pain: No  Vital Signs  Patient Currently in Pain: No  Oxygen Therapy  SpO2: 90 %  Social/Functional History  Social/Functional History  Lives With: Family (niece and another)  Bathroom Equipment: 3-in-1 commode  Home Equipment: Hospital bed, [employer]  ADL Assistance: Needs assistance (dependent for dressing/bathing/toileting. Until a few days ago, was able to feed herself)  Ambulation Assistance:  (Has not ambulated in one month, prior to that could ambulate with RW with CGA)  Transfer Assistance: Needs assistance ([facility]       Objective        Orientation  Orientation Level: Oriented to person;Disoriented to place; Disoriented to time;Disoriented to situation     Balance  Sitting Balance: Moderate assistance  Standing Balance  Activity: Attempted to stand from significantly raised bed seat height. Unable to initiated any portion of the stand with tactile prompts  Toilet Transfers  Toilet Transfers Comments: Recommend Maxi Move  ADL  Feeding: Maximum assistance;Dependent/Total  Grooming: Maximum assistance;Dependent/Total  UE Bathing: Maximum assistance;Dependent/Total  LE Bathing: Maximum assistance;Dependent/Total  UE Dressing: Maximum assistance;Dependent/Total  LE Dressing: Maximum assistance;Dependent/Total  Toileting: Maximum assistance;Dependent/Total        Bed mobility  Supine to Sit: Maximum assistance  Sit to Supine: Maximum assistance;2 Person assistance  Transfers  Stand Step Transfers: Dependent/Total (due to unable to stand)  Transfer Comments: Recommend Maxi Move     Cognition  Cognition Comment: Awakens for therapy and can speak intelligibly for short phrases. Poor visual attention to task. Significantly decreased initiation so requires multiple prompts and repetition .                  LUE PROM (degrees)  LUE PROM: WFL  LUE AROM (degrees)  LUE General AROM: requires AAROM  RUE PROM (degrees)  RUE PROM: WFL  RUE AROM (degrees)  RUE General AROM: requires AAROM                    Tx initiated:  Initiated DME training, therapeutic positioning (15 mins)  Plan   Plan  Times per week: 3-5    G-Code     OutComes Score                                                  AM-PAC Score             Goals  Short term goals  Short term goal 1: Patient will be able to feed self 25 % of meal  Short term goal 2: Patient and family will verbalize DME recommendations  Short term goal 3: Patient and family will be independent with therapeutic activity and postioning recommandations       Therapy Time   Individual Concurrent Group Co-treatment   Time In           Time Out           Minutes                   Hiren Stevens OT Electronically signed by Hiren Stevens OT on 8/11/2021 at 3:07 PM

## 2021-08-11 NOTE — PROGRESS NOTES
Progress Note  Date:2021       Room:0712/712-02  Patient Name:Geri Sheth     Date of Birth:[birth year]     Age:89 y.o. Subjective    Subjective: First encounter with patient makeda/621.    80-year-old lady who is a poor historian with a past medical history of CVA with residual slurred speech, generalized weakness, coronary artery disease, hypertension, depression, hyperlipidemia, hypothyroidism, TIAs, dizziness and falls presented today hospital 8/10 with concerns of altered mental status. Work-up in the ED included CT head which is evident of intracranial hemorrhage, patient was given FFP as well as vitamin K for reversal of INR. Was admitted for further monitoring as well as neurosurgery evaluation. Patient was evaluated by neurosurgery, repeat CT head was obtained which showed stable left thalamic hemorrhage. Patient was seen this morning in her room, no acute overnight event noted in chart. Unable to converse during assessment of follow with conversation. Review of Systems: Unable to fully obtain from patient. Objective         Vitals Last 24 Hours:  TEMPERATURE:  Temp  Av.4 °F (36.3 °C)  Min: 96.2 °F (35.7 °C)  Max: 98.3 °F (36.8 °C)  RESPIRATIONS RANGE: Resp  Av.8  Min: 15  Max: 20  PULSE OXIMETRY RANGE: SpO2  Av.3 %  Min: 90 %  Max: 96 %  PULSE RANGE: Pulse  Av.3  Min: 60  Max: 87  BLOOD PRESSURE RANGE: Systolic (31KCO), XMC:867 , Min:127 , VCO:669   ; Diastolic (50SLP), ESR:18, Min:66, Max:99    I/O (24Hr): Intake/Output Summary (Last 24 hours) at 2021 1530  Last data filed at 2021 1120  Gross per 24 hour   Intake 1153.55 ml   Output 1350 ml   Net -196.45 ml         Physical Examination:  General: Well-developed, no acute distress lying comfortably in bed.   HEENT: Atraumatic normocephalic, range of motion normal   Cardiac: Normal S1-S2   Respiratory: clear To auscultation bilaterally, no rhonchi or rales, no wheezing  Abdomen: Soft, positive bowel sounds in all quadrants, no distention, nontender to palpation  Extremities: no tenderness, trace LE edema, moves all extremities  Psych: flat affect, impaired cognition        Labs/Imaging/Diagnostics    Labs:  CBC:  Recent Labs     08/10/21  1907   WBC 6.8   RBC 5.04   HGB 16.0   HCT 48.6*   MCV 96.4   RDW 13.8        CHEMISTRIES:  Recent Labs     08/10/21  1907 08/10/21  2001     --    K 3.6 3.3     --    CO2 30*  --    BUN 19  --    CREATININE 0.7  --    GLUCOSE 129*  --      PT/INR:  Recent Labs     08/10/21  1907 08/11/21  0707   PROTIME 36.5* 15.4*   INR 3.79* 1.20*     APTT:No results for input(s): APTT in the last 72 hours. LIVER PROFILE:  Recent Labs     08/10/21  1907   AST 18   ALT <5*   BILITOT 0.4   ALKPHOS 118*       Imaging Last 24 Hours:  CT HEAD WO CONTRAST    Result Date: 8/11/2021  EXAMINATION: CT HEAD WO CONTRAST 8/11/2021 3:21 PM HISTORY: CT BRAIN without contrast 8/11/2021 1:25 PM HISTORY: Thalamic hemorrhage COMPARISON: August 10, 2020 DLP: 9 4 mGy cm TECHNIQUE: Serial axial tomographic images of the brain were obtained without the use of intravenous contrast. FINDINGS: The midline structures are nondisplaced. There is mild cerebral and cerebellar volume loss, with an associated increase in the prominence of the ventricles and sulci. The basilar cisterns are normal in size and configuration left occipital cortical encephalomalacia is unchanged. Left thalamic hemorrhage is unchanged. . There is low attenuation in the periventricular white matter, consistent with chronic ischemic change. There are no abnormal extra-axial fluid collections. There is no evidence of tonsillar herniation. The included orbits and their contents are unremarkable. The visualized paranasal sinuses, mastoid air cells and middle ear cavities are clear. The visualized osseous structures and overlying soft tissues of the skull and face are intact. Stable left thalamic hemorrhage.  2. Old encephalomalacia left occipital lobe stable and unchanged Signed by Dr Sruthi Kumar    Result Date: 8/10/2021  EXAMINATION: CT head without contrast 8/10/2021 HISTORY: Code stroke. Altered mental status. FINDINGS: Today's exam is compared to a previous study of 7/14/2021. Multiple contiguous axial views are obtained from the skull base to the vertex per protocol without venous contrast enhancement with reformatted images obtained in the sagittal and coronal projections from the original data set. There is diffuse atrophy of the brain with prominence of the subarachnoid spaces and ventricular enlargement. Small vessel ischemic changes are noted involving the periventricular and higher white matter tracts. There is evidence of a remote left PCA territory infarct. Within the left thalamus there is a new 8 mm focus of hyperdensity consistent with an acute left thalamic hemorrhage. No associated mass effect or shift of the midline. No additional sites of hemorrhage are demonstrated. 1.. There is a new focus of intra-axial hemorrhage within the left thalamus measuring 8 mm in size. I spoke with Dr. Mercedes Henriquez in the emergency room at 7:45 PM concerning the finding and to call the code stroke report. 2. Diffuse atrophy with small vessel ischemic disease. Remote left PCA territory infarct. 3. Bilateral temporal thinning is demonstrated. Signed by Dr Nicolette Priest    Result Date: 8/10/2021  EXAMINATION: Chest one view 8/10/2021 HISTORY: Altered mental status. FINDINGS: Today's exam is compared to previous study dated 5/29/2021. Lungs are hypoventilated with mild basilar atelectasis. There is thickening of the minor fissure on the right. No evidence of acute consolidative pneumonia or effusion. The thoracic aorta is ectatic. 1.. Expiratory chest with basilar atelectasis. No evidence of lobar pneumonia or effusion.  Signed by Dr Rosales Familia    Assessment//Plan Hospital Problems         Last Modified POA    * (Principal) Left-sided nontraumatic intracerebral hemorrhage (HCC), Left Thalamic 8/10/2021 Yes    AMS (altered mental status) 8/10/2021 Yes    UTI (urinary tract infection) 8/10/2021 Yes        Assessment & Plan    Unspecified altered mental status  Left-sided nontraumatic intracerebral hemorrhage  Supratherapeutic INR  Bacteriuria  Elevated phenytoin levels      Chronic issues  Hypothyroidism  Hyperlipidemia  Hypertension  Depression  Cystitis  Chronic back pain  TIA. Plan  Repeat CT head this a.m. with stable intracerebral hemorrhage. Appreciate neurosurgery evaluation  Status post FFP and vitamin K for INR reversal and given clinical setting of [address]  Patient with a history of cystitis as well as noted to have bacteriuria, given negative fever and no evidence of white blood cell with discontinue antibiotics. Continue other chronic medications for underlying chronic issues. Electronically signed by   Kandis Rodrigues MD   Internal Medicine Hospitalist  On 8/11/2021  At 3:49 PM    EMR Dragon/Transcription disclaimer:   Much of this encounter note is an electronic transcription/translation of spoken language to printed text.  The electronic translation of spoken language may permit erroneous, or at times, nonsensical words or phrases to be inadvertently transcribed; although attempts have made to review the note for such errors, some may still exist.

## 2021-08-11 NOTE — PLAN OF CARE
Problem: ACTIVITY INTOLERANCE/IMPAIRED MOBILITY  Goal: Mobility/activity is maintained at optimum level for patient  8/11/2021 1101 by Eva Flores RN  Outcome: Ongoing  8/11/2021 0252 by Sukhwinder Escobar RN  Outcome: Ongoing  8/11/2021 0252 by Sukhwinder Escobar RN  Outcome: Ongoing     Problem: COMMUNICATION IMPAIRMENT  Goal: Ability to express needs and understand communication  8/11/2021 1101 by Eva Flores RN  Outcome: Ongoing  8/11/2021 0252 by Sukhwinder Escobar RN  Outcome: Ongoing  8/11/2021 0252 by Sukhwinder Escobar RN  Outcome: Ongoing     Problem: Skin Integrity:  Goal: Will show no infection signs and symptoms  Description: Will show no infection signs and symptoms  8/11/2021 1101 by Eva Flores RN  Outcome: Ongoing  8/11/2021 0252 by Sukhwinder Escobar RN  Outcome: Ongoing  Goal: Absence of new skin breakdown  Description: Absence of new skin breakdown  8/11/2021 1101 by Eva Flores RN  Outcome: Ongoing  8/11/2021 0252 by Sukhwinder Escobar RN  Outcome: Ongoing     Problem: Falls - Risk of:  Goal: Will remain free from falls  Description: Will remain free from falls  Outcome: Ongoing  Goal: Absence of physical injury  Description: Absence of physical injury  Outcome: Ongoing

## 2021-08-11 NOTE — CONSULTS
mL, 5-40 mL, Intravenous, PRN, Baron Harp MD    0.9 % sodium chloride infusion, 25 mL, Intravenous, PRN, Baron Harp MD    acetaminophen (TYLENOL) tablet 650 mg, 650 mg, Oral, Q4H PRN, Baron Harp MD    ondansetron (ZOFRAN-ODT) disintegrating tablet 4 mg, 4 mg, Oral, Q8H PRN **OR** ondansetron (ZOFRAN) injection 4 mg, 4 mg, Intravenous, Q6H PRN, Baron Harp MD    [COMPLETED] levETIRAcetam (KEPPRA) 1000 mg/100 mL IVPB, 1,000 mg, Intravenous, Once, Stopped at 08/11/21 0440 **FOLLOWED BY** levETIRAcetam (KEPPRA) 500 mg/100 mL IVPB, 500 mg, Intravenous, Q12H, Baron Harp MD    calcium carbonate (TUMS) chewable tablet 500 mg, 500 mg, Oral, TID PRN, Baron Harp MD    polyethylene glycol [facility]) packet 17 g, 17 g, Oral, Daily PRN, Baron Harp MD    melatonin disintegrating tablet 5 mg, 5 mg, Oral, Nightly PRN, Baron Harp MD    0.9 % sodium chloride infusion, , Intravenous, PRN, Víctor Heath MD    cefTRIAXone (ROCEPHIN) 1,000 mg in sodium chloride (PF) 10 mL IV syringe, 1,000 mg, Intravenous, Q24H, Baron Harp MD, 1,000 mg at 08/11/21 0157    levothyroxine (SYNTHROID) tablet 175 mcg, 175 mcg, Oral, Daily, Baron Harp MD    oxybutynin (DITROPAN-XL) extended release tablet 5 mg, 5 mg, Oral, Daily, Baron Harp MD    phenytoin (DILANTIN) ER capsule 200 mg, 200 mg, Oral, BID, Baron Harp MD    pantoprazole (PROTONIX) tablet 20 mg, 20 mg, Oral, Daily, Baron Harp MD    guaiFENesin [facility]) extended release tablet 600 mg, 600 mg, Oral, BID, Baron Harp MD    sertraline (ZOLOFT) tablet 100 mg, 100 mg, Oral, Daily, Baron Harp MD    Arformoterol Tartrate [facility]) nebulizer solution 15 mcg, 15 mcg, Nebulization, BID, Baron Harp MD, 15 mcg at 08/11/21 0626    budesonide (PULMICORT) nebulizer suspension 500 mcg, 0.5 mg, Nebulization, Q12H, Baron Harp MD, 500 mcg at 08/11/21 0626    albuterol (PROVENTIL) nebulizer solution 2.5 mg, 2.5 mg, Nebulization, Q1H PRN, Mercedez Duggan MD    atorvastatin (LIPITOR) tablet 80 mg, 80 mg, Oral, Nightly, Mercedez Duggan MD    labetalol (NORMODYNE;TRANDATE) injection 10 mg, 10 mg, Intravenous, Q10 Min PRN, Mercedez Duggan MD    0.9 % sodium chloride infusion, , Intravenous, PRN, Mercedez Duggan MD  Morphine    Social History  Social History     Tobacco Use   Smoking Status Never Smoker   Smokeless Tobacco Never Used     Social History     Substance and Sexual Activity   Alcohol Use Never         Family History   Problem Relation Age of Onset    Diabetes Mother     Hypertension Mother     Rheum Arthritis Mother     Stroke Mother     Tuberculosis Father          REVIEW OF SYSTEMS:  Constitutional: No fevers No chills  Neck:No stiffness  Respiratory: No shortness of breath  Cardiovascular: No chest pain No palpitations  Gastrointestinal: No abdominal pain    Genitourinary: No Dysuria  Neurological: No headache, no confusion    PHYSICAL EXAM:  Vitals:    08/11/21 0727   BP: (!) 165/99   Pulse: 73   Resp: 20   Temp: 96.2 °F (35.7 °C)   SpO2: 91%       Constitutional: The patient appears well-developed and well-nourished. Eyes - conjunctiva normal.  Conjugate Gaze  Ear, nose, throat - No scars, masses, or lesions over external nose or ears, no atrophy of tongue  Neck-symmetric, no masses noted, no jugular vein distension  Respiration- chest wall appears symmetric, good expansion, normal effort without use of accessory muscles  Musculoskeletal - no significant wasting of muscles noted, no bony deformities  Extremities-no clubbing, cyanosis or edema  Skin - warm, dry, and intact. No rash, erythema, or pallor. Psychiatric - mood, affect, and behavior appear normal.     Neurologic Examination  Awake, Alert and oriented x 1  Speech inappropriate and incomprehensible at times, follows commands  STOLL well, no drift  No deficits to light touch   CN II- XII grossly intact    DATA:  Nursing/pcp notes, imaging,labs and vitals reviewed. PT,OT and/or speech notes reviewed    Lab Results   Component Value Date    WBC 6.8 08/10/2021    HGB 16.0 08/10/2021    HCT 48.6 (H) 08/10/2021    MCV 96.4 08/10/2021     08/10/2021     Lab Results   Component Value Date     08/10/2021    K 3.3 08/10/2021     08/10/2021    CO2 30 (H) 08/10/2021    BUN 19 08/10/2021    CREATININE 0.7 08/10/2021    GLUCOSE 129 (H) 08/10/2021    CALCIUM 8.6 (L) 08/10/2021    PROT 7.1 08/10/2021    LABALBU 3.5 08/10/2021    BILITOT 0.4 08/10/2021    ALKPHOS 118 (H) 08/10/2021    AST 18 08/10/2021    ALT <5 (A) 08/10/2021    LABGLOM >60 08/10/2021    GFRAA >59 08/10/2021     Lab Results   Component Value Date    INR 3.79 (H) 08/10/2021    INR 2.70 07/29/2021    INR 3.20 07/13/2021    PROTIME 36.5 (H) 08/10/2021    PROTIME 18.0 (H) 05/29/2021    PROTIME 28.5 (H) 04/13/2021        EXAMINATION: CT head without contrast 8/10/2021   HISTORY: Code stroke. Altered mental status. FINDINGS: Today's exam is compared to a previous study of 7/14/2021. Multiple contiguous axial views are obtained from the skull base to   the vertex per protocol without venous contrast enhancement with   reformatted images obtained in the sagittal and coronal projections   from the original data set. There is diffuse atrophy of the brain with prominence of the   subarachnoid spaces and ventricular enlargement. Small vessel ischemic   changes are noted involving the periventricular and higher white   matter tracts. There is evidence of a remote left PCA territory   infarct. Within the left thalamus there is a new 8 mm focus of hyperdensity   consistent with an acute left thalamic hemorrhage. No associated mass   effect or shift of the midline. No additional sites of hemorrhage are   demonstrated.       Impression   1.. There is a new focus of intra-axial hemorrhage within the left   thalamus measuring 8 mm in size.  I spoke with Dr. Blossom Echeverria in the   emergency room at 7:45 PM concerning the finding and to call the code   stroke report. 2. Diffuse atrophy with small vessel ischemic disease. Remote left PCA   territory infarct. 3. Bilateral temporal thinning is demonstrated. Signed by Dr Rich Rashid  80year old female with increase in AMS with small left thalamic hemorrhage    RECOMMENDATIONS:    Follow up INR, may need additional vit K or FFP  Repeat CT head today  SBP   No urgent neurosurgical intervention warranted.         Sandeep Galdamez, DO

## 2021-08-11 NOTE — PROGRESS NOTES
8/10/2021  8:02 PM - Karl Fontanez Incoming Lab Results From [facility]    Component Value Ref Range & Units Status Collected Lab   pH, Arterial 7.420  7.350 - 7.450 Final 08/10/2021  8:01 PM [facility]   pCO2, Arterial 44.0  35.0 - 45.0 mmHg Final 08/10/2021  8:01 PM [facility] Lab   pO2, Arterial 53. 0Low   80.0 - 100.0 mmHg Final 08/10/2021  8:01 PM [facility] Lab   HCO3, Arterial 28. 5High   22.0 - 26.0 mmol/L Final 08/10/2021  8:01 PM [facility] Excess, Arterial 3. 3High   -2.0 - 2.0 mmol/L Final 08/10/2021  8:01 PM [facility]   Hemoglobin, Art, Extended 16. 1High   12.0 - 16.0 g/dL Final 08/10/2021  8:01 PM [facility] Lab   O2 Sat, Arterial 88.9Low   >92 % Final 08/10/2021  8:01 PM [facility]   Carboxyhgb, Arterial 2.4  0.0 - 5.0 % Final 08/10/2021  8:01 PM [facility]        0.0-1.5   (Smokers 1.5-5.0)    Methemoglobin, Arterial 1.2  <1.5 % Final 08/10/2021  8:01 PM [facility]   O2 Content, Arterial 20.1  Not Established mL/dL Final 08/10/2021  8:01 PM [facility]   O2 Therapy Unknown   Final 08/10/2021  8:01 PM [facility]   Testing Performed By    Mo Duarte Name Director Address Valid Date Range   [facility] - [address] LAB Faustina Batista  [place],[address]   [address] [place] [address] 08/30/17 0733-Present   Lab and Collection    BLOOD GAS, ARTERIAL - 8/10/2021  Result History    BLOOD GAS, ARTERIAL on 8/10/2021     Room air  RR site choice

## 2021-08-12 DIAGNOSIS — I62.9 INTRACRANIAL HEMORRHAGE (HCC): Primary | ICD-10-CM

## 2021-08-12 LAB
ANION GAP SERPL CALCULATED.3IONS-SCNC: 13 MMOL/L (ref 7–19)
BASE EXCESS ARTERIAL: 4.1 MMOL/L (ref -2–2)
BUN BLDV-MCNC: 15 MG/DL (ref 8–23)
CALCIUM SERPL-MCNC: 8.7 MG/DL (ref 8.8–10.2)
CARBOXYHEMOGLOBIN ARTERIAL: 4.5 % (ref 0–5)
CHLORIDE BLD-SCNC: 100 MMOL/L (ref 98–111)
CO2: 26 MMOL/L (ref 22–29)
CREAT SERPL-MCNC: 0.5 MG/DL (ref 0.5–0.9)
GFR AFRICAN AMERICAN: >59
GFR NON-AFRICAN AMERICAN: >60
GLUCOSE BLD-MCNC: 163 MG/DL (ref 74–109)
HCO3 ARTERIAL: 27.6 MMOL/L (ref 22–26)
HCT VFR BLD CALC: 48.1 % (ref 37–47)
HEMOGLOBIN, ART, EXTENDED: 15.3 G/DL (ref 12–16)
HEMOGLOBIN: 16.4 G/DL (ref 12–16)
MCH RBC QN AUTO: 31.6 PG (ref 27–31)
MCHC RBC AUTO-ENTMCNC: 34.1 G/DL (ref 33–37)
MCV RBC AUTO: 92.7 FL (ref 81–99)
METHEMOGLOBIN ARTERIAL: 0.4 %
O2 CONTENT ARTERIAL: 19.5 ML/DL
O2 SAT, ARTERIAL: 90.7 %
O2 THERAPY: ABNORMAL
PCO2 ARTERIAL: 37 MMHG (ref 35–45)
PDW BLD-RTO: 13.3 % (ref 11.5–14.5)
PH ARTERIAL: 7.48 (ref 7.35–7.45)
PLATELET # BLD: 284 K/UL (ref 130–400)
PMV BLD AUTO: 10 FL (ref 9.4–12.3)
PO2 ARTERIAL: 53 MMHG (ref 80–100)
POTASSIUM SERPL-SCNC: 3.8 MMOL/L (ref 3.5–5)
POTASSIUM, WHOLE BLOOD: 3.6
RBC # BLD: 5.19 M/UL (ref 4.2–5.4)
SODIUM BLD-SCNC: 139 MMOL/L (ref 136–145)
WBC # BLD: 10.3 K/UL (ref 4.8–10.8)

## 2021-08-12 PROCEDURE — 6360000002 HC RX W HCPCS: Performed by: HOSPITALIST

## 2021-08-12 PROCEDURE — 36600 WITHDRAWAL OF ARTERIAL BLOOD: CPT

## 2021-08-12 PROCEDURE — 2580000003 HC RX 258: Performed by: HOSPITALIST

## 2021-08-12 PROCEDURE — 82803 BLOOD GASES ANY COMBINATION: CPT

## 2021-08-12 PROCEDURE — 92526 ORAL FUNCTION THERAPY: CPT

## 2021-08-12 PROCEDURE — 99231 SBSQ HOSP IP/OBS SF/LOW 25: CPT | Performed by: NEUROLOGICAL SURGERY

## 2021-08-12 PROCEDURE — 94761 N-INVAS EAR/PLS OXIMETRY MLT: CPT

## 2021-08-12 PROCEDURE — 2700000000 HC OXYGEN THERAPY PER DAY

## 2021-08-12 PROCEDURE — 84132 ASSAY OF SERUM POTASSIUM: CPT

## 2021-08-12 PROCEDURE — 85027 COMPLETE CBC AUTOMATED: CPT

## 2021-08-12 PROCEDURE — 2140000000 HC CCU INTERMEDIATE R&B

## 2021-08-12 PROCEDURE — 94640 AIRWAY INHALATION TREATMENT: CPT

## 2021-08-12 PROCEDURE — 6370000000 HC RX 637 (ALT 250 FOR IP): Performed by: HOSPITALIST

## 2021-08-12 PROCEDURE — 36415 COLL VENOUS BLD VENIPUNCTURE: CPT

## 2021-08-12 PROCEDURE — 80048 BASIC METABOLIC PNL TOTAL CA: CPT

## 2021-08-12 RX ORDER — CARVEDILOL 25 MG/1
25 TABLET ORAL 2 TIMES DAILY WITH MEALS
Status: DISCONTINUED | OUTPATIENT
Start: 2021-08-12 | End: 2021-08-19 | Stop reason: HOSPADM

## 2021-08-12 RX ADMIN — CARVEDILOL 25 MG: 25 TABLET, FILM COATED ORAL at 09:40

## 2021-08-12 RX ADMIN — SODIUM CHLORIDE, PRESERVATIVE FREE 1000 MG: 5 INJECTION INTRAVENOUS at 22:20

## 2021-08-12 RX ADMIN — LEVETIRACETAM 500 MG: 5 INJECTION INTRAVENOUS at 22:16

## 2021-08-12 RX ADMIN — AMLODIPINE BESYLATE 10 MG: 10 TABLET ORAL at 09:40

## 2021-08-12 RX ADMIN — LEVETIRACETAM 500 MG: 5 INJECTION INTRAVENOUS at 03:28

## 2021-08-12 RX ADMIN — BUDESONIDE 500 MCG: 0.5 SUSPENSION RESPIRATORY (INHALATION) at 18:16

## 2021-08-12 RX ADMIN — BUDESONIDE 500 MCG: 0.5 SUSPENSION RESPIRATORY (INHALATION) at 06:20

## 2021-08-12 RX ADMIN — ARFORMOTEROL TARTRATE 15 MCG: 15 SOLUTION RESPIRATORY (INHALATION) at 18:16

## 2021-08-12 RX ADMIN — ARFORMOTEROL TARTRATE 15 MCG: 15 SOLUTION RESPIRATORY (INHALATION) at 06:20

## 2021-08-12 NOTE — PROGRESS NOTES
Speech Language Pathology  Facility/Department: [facility] 7 PROGRESSIVE CARE  SWALLOW THERAPY     NAME: Eliel Guerin  : 1931  MRN: [id number]    ADMISSION DATE: 8/10/2021  ADMITTING DIAGNOSIS: has Compression fx, thoracic spine, closed, initial encounter ([address] History of cardioembolic cerebrovascular accident (CVA); Essential hypertension; Acquired hypothyroidism; Seizure disorder ([address] Other pulmonary embolism without acute cor pulmonale ([address] Deep vein thrombosis (DVT) of left lower extremity ([address] Leg pain, left; Leg swelling; Complicated urinary tract infection; Generalized weakness; Hypocalcemia; Acute bronchitis; Left-sided nontraumatic intracerebral hemorrhage ([address] Left Thalamic; AMS (altered mental status); UTI (urinary tract infection); and Thalamic hemorrhage ([address] on their problem list.    Date of Treat: 2021  Evaluating Therapist: ITZEL Contreras    Current Diet level:  Puree consistency with moderately thick/honey thick liquids    Reason for Referral  Eliel Guerin was referred for a bedside swallow evaluation to assess the efficiency of her swallow function, identify signs and symptoms of aspiration and make recommendations regarding safe dietary consistencies, effective compensatory strategies, and safe eating environment. Impression  Monitored patient's swallowing function. Patient exhibited present reflexive oral transit and swallow initiation with sluggish, moderately decreased laryngeal elevation noted and no outward S/S penetration/aspiration observed with any moderately thick/honey thick liquid presentation administered during treatment session this date. Patient exhibited impaired volitional oral prep and transit as patient held puree consistency presentations in the mouth (presentations were subsequently removed).    At this time, would recommend NPO status. Daily oral care, via staff, to decrease bacteria from the oral cavity.  If patient receives mouth care prior to intake, okay for swabs thin H2O for comfort. Will continue to follow.     Treatment Plan  Requires SLP Intervention: Yes     Recommended Diet and Intervention  Diet Solids Recommendation: NPO  Liquid Consistency Recommendation: NPO  Therapeutic Interventions: Patient/Family education;Oral Care; Therapeutic PO trials with SLP    Treatment/Goals  Timeframe for Short-term Goals: 1x/day for 3 days   Goal 1: Patient NPO. Goal 2: Patient staff will follow swallow safety recommendation. Goal 3: Patient will receive daily oral care, via staff, to decrease bacteria from the oral cavity. Goal 4: Re-assessment of swallow function for potential PO intake. Goal 5: Monitor speech production.     General  Chart Reviewed: Yes  Behavior/Cognition: Lethargic  O2 Device: None (Room air)  Communication Observation: (No verbalizations or vocalizations were noted.)  Follows Directions: None  Patient Positioning: Upright in bed  Consistencies Administered: Honey - spoon;Puree     Monitored patient's swallowing function with the following observations noted:     Oral Phase  Mastication: Puree (Patient exhibited no attempt at oral prep of puree consistency presentations administered by SLP.)  Suspected Premature Bolus Loss: Honey - spoon (Patient exhibited slow oral transit of honey thick liquid presentations administered via spoon by SLP.)  Oral Phase - Comment: Patient exhibited no attempt at oral transit of puree consistency presentations; presentations were subsequently removed from the mouth.     Pharyngeal Phase  Suspected Swallow Delay: Honey - spoon (Suspect secondary to oral transit times.)  Laryngeal Elevation: (Patient exhibited sluggish, moderately decreased laryngeal elevation for swallow airway protection.)  Pharyngeal Phase - Comment: As previously mentioned, patient exhibited present reflexive oral transit and swallow initiation with sluggish, moderately decreased laryngeal elevation noted and no outward S/S penetration/aspiration observed with any moderately thick/honey thick liquid presentation administered during treatment session this date. Patient exhibited impaired volitional oral prep and transit as patient held puree consistency presentations in the mouth (presentations were subsequently removed).    At this time, would recommend NPO status. Daily oral care, via staff, to decrease bacteria from the oral cavity. If patient receives mouth care prior to intake, okay for swabs thin H2O for comfort. Will continue to follow.     Electronically signed by ITZEL Michelle on 8/12/2021 at 12:24 PM

## 2021-08-12 NOTE — PROGRESS NOTES
Physical Therapy    Name: Aida Shore  MRN: [id number]  Date of Service:  8/12/2021 08/12/21 1628   Subjective   Subjective Attempt in PM: Pt asleep laying in bed, niece also present in room. Pt does not wake up despite multiple attempts. Niece reports they are unsure of pt d/c plans: possiblt 8th floor rehab?             Electronically signed by Christofer Hurley PTA on 8/12/2021 at 4:33 PM

## 2021-08-12 NOTE — PROGRESS NOTES
Progress Note  Date:2021       Room:0712/712-02  Patient Name:Geri Sheth     Date of Birth:[birth year]     Age:89 y.o. Subjective    Subjective: First encounter with patient is/621.    60-year-old lady who is a poor historian with a past medical history of CVA with residual slurred speech, generalized weakness, coronary artery disease, hypertension, depression, hyperlipidemia, hypothyroidism, TIAs, dizziness and falls presented today hospital 8/10 with concerns of altered mental status. Work-up in the ED included CT head which is evident of intracranial hemorrhage, patient was given FFP as well as vitamin K for reversal of INR. Was admitted for further monitoring as well as neurosurgery evaluation. Patient was evaluated by neurosurgery, repeat CT head was obtained which showed stable left thalamic hemorrhage. Patient was seen this morning in her room, no acute overnight event noted in chart. Unable to converse during assessment of follow with conversation. Review of Systems: Unable to fully obtain from patient. Objective         Vitals Last 24 Hours:  TEMPERATURE:  Temp  Av.2 °F (36.8 °C)  Min: 97.9 °F (36.6 °C)  Max: 98.4 °F (36.9 °C)  RESPIRATIONS RANGE: Resp  Av  Min: 18  Max: 18  PULSE OXIMETRY RANGE: SpO2  Av.8 %  Min: 90 %  Max: 92 %  PULSE RANGE: Pulse  Av.5  Min: 94  Max: 110  BLOOD PRESSURE RANGE: Systolic (79UFG), KET:433 , Min:117 , DXQ:124   ; Diastolic (82EHN), LKC:69, Min:65, Max:95    I/O (24Hr): Intake/Output Summary (Last 24 hours) at 2021 1607  Last data filed at 2021 1455  Gross per 24 hour   Intake 240 ml   Output 775 ml   Net -535 ml         Physical Examination:  General: Well-developed, no acute distress lying comfortably in bed.   HEENT: Atraumatic normocephalic, range of motion normal   Cardiac: Normal S1-S2   Respiratory: clear To auscultation bilaterally, no rhonchi or rales, no wheezing  Abdomen: Soft, positive bowel sounds in all quadrants, no distention, nontender to palpation  Extremities: no tenderness, trace LE edema, moves all extremities  Psych: flat affect, impaired cognition        Labs/Imaging/Diagnostics    Labs:  CBC:  Recent Labs     08/10/21  1907 08/12/21  0220   WBC 6.8 10.3   RBC 5.04 5.19   HGB 16.0 16.4*   HCT 48.6* 48.1*   MCV 96.4 92.7   RDW 13.8 13.3    284     CHEMISTRIES:  Recent Labs     08/10/21  1907 08/10/21  2001 08/12/21  0220 08/12/21  1238     --  139  --    K 3.6 3.3 3.8 3.6     --  100  --    CO2 30*  --  26  --    BUN 19  --  15  --    CREATININE 0.7  --  0.5  --    GLUCOSE 129*  --  163*  --      PT/INR:  Recent Labs     08/10/21  1907 08/11/21  0707   PROTIME 36.5* 15.4*   INR 3.79* 1.20*     APTT:No results for input(s): APTT in the last 72 hours. LIVER PROFILE:  Recent Labs     08/10/21  1907   AST 18   ALT <5*   BILITOT 0.4   ALKPHOS 118*       Imaging Last 24 Hours:  CT HEAD WO CONTRAST    Result Date: 8/11/2021  EXAMINATION: CT HEAD WO CONTRAST 8/11/2021 3:21 PM HISTORY: CT BRAIN without contrast 8/11/2021 1:25 PM HISTORY: Thalamic hemorrhage COMPARISON: August 10, 2020 DLP: 9 4 mGy cm TECHNIQUE: Serial axial tomographic images of the brain were obtained without the use of intravenous contrast. FINDINGS: The midline structures are nondisplaced. There is mild cerebral and cerebellar volume loss, with an associated increase in the prominence of the ventricles and sulci. The basilar cisterns are normal in size and configuration left occipital cortical encephalomalacia is unchanged. Left thalamic hemorrhage is unchanged. . There is low attenuation in the periventricular white matter, consistent with chronic ischemic change. There are no abnormal extra-axial fluid collections. There is no evidence of tonsillar herniation. The included orbits and their contents are unremarkable. The visualized paranasal sinuses, mastoid air cells and middle ear cavities are clear.  The visualized osseous structures and overlying soft tissues of the skull and face are intact. Stable left thalamic hemorrhage. 2. Old encephalomalacia left occipital lobe stable and unchanged Signed by Dr Nilsa Correa    Result Date: 8/10/2021  EXAMINATION: CT head without contrast 8/10/2021 HISTORY: Code stroke. Altered mental status. FINDINGS: Today's exam is compared to a previous study of 7/14/2021. Multiple contiguous axial views are obtained from the skull base to the vertex per protocol without venous contrast enhancement with reformatted images obtained in the sagittal and coronal projections from the original data set. There is diffuse atrophy of the brain with prominence of the subarachnoid spaces and ventricular enlargement. Small vessel ischemic changes are noted involving the periventricular and higher white matter tracts. There is evidence of a remote left PCA territory infarct. Within the left thalamus there is a new 8 mm focus of hyperdensity consistent with an acute left thalamic hemorrhage. No associated mass effect or shift of the midline. No additional sites of hemorrhage are demonstrated. 1.. There is a new focus of intra-axial hemorrhage within the left thalamus measuring 8 mm in size. I spoke with Dr. Murray Brunner in the emergency room at 7:45 PM concerning the finding and to call the code stroke report. 2. Diffuse atrophy with small vessel ischemic disease. Remote left PCA territory infarct. 3. Bilateral temporal thinning is demonstrated. Signed by Dr Celia Chu    Result Date: 8/10/2021  EXAMINATION: Chest one view 8/10/2021 HISTORY: Altered mental status. FINDINGS: Today's exam is compared to previous study dated 5/29/2021. Lungs are hypoventilated with mild basilar atelectasis. There is thickening of the minor fissure on the right. No evidence of acute consolidative pneumonia or effusion. The thoracic aorta is ectatic.     1.. Expiratory chest with basilar atelectasis. No evidence of lobar pneumonia or effusion. Signed by Dr Chencho Werner    * (Principal) Left-sided nontraumatic intracerebral hemorrhage [facility]), Left Thalamic 8/10/2021 Yes    AMS (altered mental status) 8/10/2021 Yes    UTI (urinary tract infection) 8/10/2021 Yes    Thalamic hemorrhage ([address] 8/12/2021 Yes        Assessment & Plan    Unspecified altered mental status  Left-sided nontraumatic intracerebral hemorrhage  Supratherapeutic INR  Bacteriuria  Elevated phenytoin levels  Inadequate oral intake with concerns of aspirations      Chronic issues  Prior CVA  CAD s/p stent  Hypothyroidism  Hyperlipidemia  Hypertension  Depression  Cystitis  Chronic back pain  TIA. Plan  Repeat CT head 8/11/21. with stable intracerebral hemorrhage. To follow up with NS outpt  Status post FFP and vitamin K for INR reversal and given clinical setting of [address]  Patient with a history of cystitis as well as noted to have bacteriuria, given negative fever and no evidence of white blood cell. Continue with antibiotics. Hold Phenytoin levels  NPO till mentation improves      If no changes in clinical status, will need goals of care rediscussion; discussed with Niece. Continue other chronic medications for underlying chronic issues. Electronically signed by   Akanksha Martinez MD   Internal Medicine Hospitalist  On 8/12/2021  At 4:07 PM    EMR Dragon/Transcription disclaimer:   Much of this encounter note is an electronic transcription/translation of spoken language to printed text.  The electronic translation of spoken language may permit erroneous, or at times, nonsensical words or phrases to be inadvertently transcribed; although attempts have made to review the note for such errors, some may still exist.

## 2021-08-12 NOTE — PROGRESS NOTES
Attempted to admin pts am meds. Pt unable to safely take meds, almost aspirated. Holding meds, will notify Dr Thaddeus Fernandez.  Electronically signed by Khushbu Long RN on 8/12/2021 at 9:56 AM

## 2021-08-12 NOTE — PROGRESS NOTES
8/12/2021 12:48 PM - Huseyin, Kyin Incoming Lab Results From [facility] Value Ref Range & Units Status Collected Lab   pH, Arterial 7.480High   7.350 - 7.450 Final 08/12/2021 12:38 PM [facility] Lab   pCO2, Arterial 37.0  35.0 - 45.0 mmHg Final 08/12/2021 12:38 PM [facility] Lab   pO2, Arterial 53. 0Low   80.0 - 100.0 mmHg Final 08/12/2021 12:38 PM [address] [place] [address] Lab   HCO3, Arterial 27. 6High   22.0 - 26.0 mmol/L Final 08/12/2021 12:38 PM [facility] Excess, Arterial 4.1High   -2.0 - 2.0 mmol/L Final 08/12/2021 12:38 PM [address] [place] [address] Lab   Hemoglobin, Art, Extended 15.3  12.0 - 16.0 g/dL Final 08/12/2021 12:38 PM [address] [place] [address] Lab   O2 Sat, Arterial 90.7  >92 % Final 08/12/2021 12:38 PM [facility] Lab   Carboxyhgb, Arterial 4.5  0.0 - 5.0 % Final 08/12/2021 12:38 PM [facility] Lab        0.0-1.5   (Smokers 1.5-5.0)    Methemoglobin, Arterial 0.4  <1.5 % Final 08/12/2021 12:38 PM [address] [place] [address] Lab   O2 Content, Arterial 19.5  Not Established mL/dL Final 08/12/2021 12:38 PM [address] [place] [address] Lab   O2 Therapy Unknown   Final 08/12/2021 12:38 PM [address] [place] [address] Lab   Testing Performed By    Mo Duarte Name Director Address Valid Date Range   [facility] - [address] LAB Khushbu Duarte  [address]   [address] [address] 08/30/17 0733-Present     Left Brachial, 91 % on RA

## 2021-08-12 NOTE — PLAN OF CARE
Problem: ACTIVITY INTOLERANCE/IMPAIRED MOBILITY  Goal: Mobility/activity is maintained at optimum level for patient  Outcome: Ongoing     Problem: COMMUNICATION IMPAIRMENT  Goal: Ability to express needs and understand communication  Outcome: Ongoing     Problem: Skin Integrity:  Goal: Will show no infection signs and symptoms  Description: Will show no infection signs and symptoms  Outcome: Ongoing  Goal: Absence of new skin breakdown  Description: Absence of new skin breakdown  Outcome: Ongoing     Problem: Falls - Risk of:  Goal: Will remain free from falls  Description: Will remain free from falls  Outcome: Ongoing  Goal: Absence of physical injury  Description: Absence of physical injury  Outcome: Ongoing

## 2021-08-12 NOTE — PROGRESS NOTES
[facility]  Progress Note    LAST 24 HOURS: Patient seen and examined. Mrs. Sheth is sitting up in bed this morning. She states she has no pain. CHIEF COMPLAINT:  AMS    HISTORY OF PRESENT ILLNESS:      The patient is a 80 y.o. female with hx of previous left thalamic stroke, some baseline confusion who presents with increase in confusion. At baseline, patient is disoriented and has some dysarthria. CT head in ED revealed a small left thalamic hemorrhage, likely within the area of previous stroke for which I was asked to evaluate. Of note, she takes coumadin and her INR was noted to be 3.7 in the ED. She was given FFP and Vitamin K. Currently, she is awake but disoriented. Her speech is mostly inappropriate and somewhat incomprehensible. She will follow commands.         Past Medical History:   Diagnosis Date    Arthritis     Back pain     bulging disc    CAD (coronary artery disease)     BMS to RCA    Cerebral artery occlusion with cerebral infarction (HCC)     small strokes,balance issues    Cystitis     Depression     Dizziness     Dysuria     Edema     ankles,mild pedal edema    Falls frequently     H/O blood clots     left leg and lungs    Head injury     Headache     Heartburn     [place] (hard of hearing)     Hyperlipidemia     Hypertension     Hypothyroidism     Hypothyroidism     Memory problem     Neuropathy     Shingles     TIA (transient ischemic attack)     Urinary incontinence     UTI (urinary tract infection)        Past Surgical History:   Procedure Laterality Date    APPENDECTOMY  1947    CHOLECYSTECTOMY      EYE SURGERY  2009    bilateral cataract     HYSTERECTOMY, TOTAL ABDOMINAL      JOINT REPLACEMENT      Bilateral Knee    KNEE ARTHROPLASTY Right     LOBECTOMY  10/2015    sx--bilateral    PTCA  2002    with stent placement/bms to rca        Medications    Current Facility-Administered Medications:     sodium chloride flush 0.9 % injection 5-40 mL, 5-40 mL, Intravenous, 2 times per day, Cristina Woodard MD, 10 mL at 08/11/21 2014    sodium chloride flush 0.9 % injection 5-40 mL, 5-40 mL, Intravenous, PRN, Cristina Woodard MD    0.9 % sodium chloride infusion, 25 mL, Intravenous, PRN, Cristina Woodard MD    acetaminophen (TYLENOL) tablet 650 mg, 650 mg, Oral, Q4H PRN, Cristina Woodard MD    ondansetron (ZOFRAN-ODT) disintegrating tablet 4 mg, 4 mg, Oral, Q8H PRN **OR** ondansetron (ZOFRAN) injection 4 mg, 4 mg, Intravenous, Q6H PRN, Cristina Woodard MD    [COMPLETED] levETIRAcetam (KEPPRA) 1000 mg/100 mL IVPB, 1,000 mg, Intravenous, Once, Stopped at 08/11/21 0440 **FOLLOWED BY** levETIRAcetam (KEPPRA) 500 mg/100 mL IVPB, 500 mg, Intravenous, Q12H, Cristina Woodard MD, Stopped at 08/12/21 0343    calcium carbonate (TUMS) chewable tablet 500 mg, 500 mg, Oral, TID PRN, Cristina Woodard MD    polyethylene glycol [facility]) packet 17 g, 17 g, Oral, Daily PRN, Cristina Woodard MD    melatonin disintegrating tablet 5 mg, 5 mg, Oral, Nightly PRN, Cristina Woodard MD    amLODIPine (NORVASC) tablet 10 mg, 10 mg, Oral, Daily, Gi Carranza MD, 10 mg at 08/11/21 1075    0.9 % sodium chloride infusion, , Intravenous, PRN, Rivera Valle MD    cefTRIAXone (ROCEPHIN) 1,000 mg in sodium chloride (PF) 10 mL IV syringe, 1,000 mg, Intravenous, Q24H, Cristina Woodard MD, 1,000 mg at 08/11/21 2013    levothyroxine (SYNTHROID) tablet 175 mcg, 175 mcg, Oral, Daily, Cristina Woodard MD    [facility]) extended release tablet 5 mg, 5 mg, Oral, Daily, Cristina Woodard MD    phenytoin (DILANTIN) ER capsule 200 mg, 200 mg, Oral, BID, Cristina Woodard MD, 200 mg at 08/11/21 7569    pantoprazole (PROTONIX) tablet 20 mg, 20 mg, Oral, Daily, Cristina Woodard MD    guaiFENesin [facility]) extended release tablet 600 mg, 600 mg, Oral, BID, Cristina Woodard MD    sertraline (ZOLOFT) tablet 100 mg, 100 mg, Oral, Daily, Cristina Woodard MD, 100 mg at 08/11/21 0987   Arformoterol Tartrate (BROVANA) nebulizer solution 15 mcg, 15 mcg, Nebulization, BID, Yola Scott MD, 15 mcg at 08/12/21 0620    budesonide (PULMICORT) nebulizer suspension 500 mcg, 0.5 mg, Nebulization, Q12H, Yola Scott MD, 500 mcg at 08/12/21 0620    albuterol (PROVENTIL) nebulizer solution 2.5 mg, 2.5 mg, Nebulization, Q1H PRN, Yola Scott MD    atorvastatin (LIPITOR) tablet 80 mg, 80 mg, Oral, Nightly, Yola Scott MD    labetalol (NORMODYNE;TRANDATE) injection 10 mg, 10 mg, Intravenous, Q10 Min PRN, Yola Scott MD    0.9 % sodium chloride infusion, , Intravenous, PRN, Yola Scott MD  Morphine    Social History  Social History     Tobacco Use   Smoking Status Never Smoker   Smokeless Tobacco Never Used     Social History     Substance and Sexual Activity   Alcohol Use Never         Family History   Problem Relation Age of Onset    Diabetes Mother     Hypertension Mother     Rheum Arthritis Mother     Stroke Mother     Tuberculosis Father          REVIEW OF SYSTEMS:  Constitutional: No fevers No chills  Neck:No stiffness  Respiratory: No shortness of breath  Cardiovascular: No chest pain No palpitations  Gastrointestinal: No abdominal pain    Genitourinary: No Dysuria  Neurological: No headache, no confusion    PHYSICAL EXAM:  Vitals:    08/12/21 0643   BP: (!) 164/95   Pulse: 110   Resp: 18   Temp: 97.9 °F (36.6 °C)   SpO2: 91%       Constitutional: The patient appears well-developed and well-nourished. Eyes - conjunctiva normal.  Conjugate Gaze  Ear, nose, throat - No scars, masses, or lesions over external nose or ears, no atrophy of tongue  Neck-symmetric, no masses noted, no jugular vein distension  Respiration- chest wall appears symmetric, good expansion, normal effort without use of accessory muscles  Musculoskeletal - no significant wasting of muscles noted, no bony deformities  Extremities-no clubbing, cyanosis or edema  Skin - warm, dry, and intact.   No rash, erythema, or pallor. Psychiatric - mood, affect, and behavior appear normal.     Neurologic Examination  Awake, Alert and oriented x 1  Speech inappropriate and incomprehensible at times, follows commands  STOLL well, no drift  No deficits to light touch   CN II- XII grossly intact    DATA:  Nursing/pcp notes, imaging,labs and vitals reviewed. PT,OT and/or speech notes reviewed    Lab Results   Component Value Date    WBC 10.3 08/12/2021    HGB 16.4 (H) 08/12/2021    HCT 48.1 (H) 08/12/2021    MCV 92.7 08/12/2021     08/12/2021     Lab Results   Component Value Date     08/12/2021    K 3.8 08/12/2021     08/12/2021    CO2 26 08/12/2021    BUN 15 08/12/2021    CREATININE 0.5 08/12/2021    GLUCOSE 163 (H) 08/12/2021    CALCIUM 8.7 (L) 08/12/2021    PROT 7.1 08/10/2021    LABALBU 3.5 08/10/2021    BILITOT 0.4 08/10/2021    ALKPHOS 118 (H) 08/10/2021    AST 18 08/10/2021    ALT <5 (A) 08/10/2021    LABGLOM >60 08/12/2021    GFRAA >59 08/12/2021     Lab Results   Component Value Date    INR 1.20 (H) 08/11/2021    INR 3.79 (H) 08/10/2021    INR 2.70 07/29/2021    PROTIME 15.4 (H) 08/11/2021    PROTIME 36.5 (H) 08/10/2021    PROTIME 18.0 (H) 05/29/2021        EXAMINATION: CT head without contrast 8/10/2021   HISTORY: Code stroke. Altered mental status. FINDINGS: Today's exam is compared to a previous study of 7/14/2021. Multiple contiguous axial views are obtained from the skull base to   the vertex per protocol without venous contrast enhancement with   reformatted images obtained in the sagittal and coronal projections   from the original data set. There is diffuse atrophy of the brain with prominence of the   subarachnoid spaces and ventricular enlargement. Small vessel ischemic   changes are noted involving the periventricular and higher white   matter tracts. There is evidence of a remote left PCA territory   infarct.    Within the left thalamus there is a new 8 mm focus of hyperdensity   consistent with an acute left thalamic hemorrhage. No associated mass   effect or shift of the midline. No additional sites of hemorrhage are   demonstrated.       Impression   1.. There is a new focus of intra-axial hemorrhage within the left   thalamus measuring 8 mm in size. I spoke with Dr. Aye Garcia in the   emergency room at 7:45 PM concerning the finding and to call the code   stroke report. 2. Diffuse atrophy with small vessel ischemic disease. Remote left PCA   territory infarct. 3. Bilateral temporal thinning is demonstrated.    Signed by Dr Hugo Cardoza  80year old female with increase in AMS with small left thalamic hemorrhage    RECOMMENDATIONS:    -Repeat CT head was stable, no neurosurgical intervention warranted  -Okay for d/c from our standpoint, follow up 1-2 weeks with repeat CT head         Jaclyn Celis, APRN

## 2021-08-13 ENCOUNTER — APPOINTMENT (OUTPATIENT)
Dept: CT IMAGING | Age: 86
DRG: 066 | End: 2021-08-13
Payer: MEDICARE

## 2021-08-13 LAB
AMMONIA: 20 UMOL/L (ref 11–51)
ANION GAP SERPL CALCULATED.3IONS-SCNC: 13 MMOL/L (ref 7–19)
BUN BLDV-MCNC: 19 MG/DL (ref 8–23)
CALCIUM SERPL-MCNC: 8.6 MG/DL (ref 8.8–10.2)
CHLORIDE BLD-SCNC: 103 MMOL/L (ref 98–111)
CO2: 27 MMOL/L (ref 22–29)
CREAT SERPL-MCNC: 0.6 MG/DL (ref 0.5–0.9)
GFR AFRICAN AMERICAN: >59
GFR NON-AFRICAN AMERICAN: >60
GLUCOSE BLD-MCNC: 116 MG/DL (ref 74–109)
HCT VFR BLD CALC: 45.2 % (ref 37–47)
HEMOGLOBIN: 15 G/DL (ref 12–16)
MCH RBC QN AUTO: 31.8 PG (ref 27–31)
MCHC RBC AUTO-ENTMCNC: 33.2 G/DL (ref 33–37)
MCV RBC AUTO: 95.8 FL (ref 81–99)
ORGANISM: ABNORMAL
PDW BLD-RTO: 13.2 % (ref 11.5–14.5)
PHENYTOIN LEVEL: 13.4 UG/ML (ref 10–20)
PLATELET # BLD: 252 K/UL (ref 130–400)
PMV BLD AUTO: 10.1 FL (ref 9.4–12.3)
POTASSIUM SERPL-SCNC: 3.7 MMOL/L (ref 3.5–5)
RBC # BLD: 4.72 M/UL (ref 4.2–5.4)
SODIUM BLD-SCNC: 143 MMOL/L (ref 136–145)
URINE CULTURE, ROUTINE: ABNORMAL
URINE CULTURE, ROUTINE: ABNORMAL
WBC # BLD: 9.7 K/UL (ref 4.8–10.8)

## 2021-08-13 PROCEDURE — 97530 THERAPEUTIC ACTIVITIES: CPT

## 2021-08-13 PROCEDURE — 70450 CT HEAD/BRAIN W/O DYE: CPT

## 2021-08-13 PROCEDURE — 92526 ORAL FUNCTION THERAPY: CPT

## 2021-08-13 PROCEDURE — 85027 COMPLETE CBC AUTOMATED: CPT

## 2021-08-13 PROCEDURE — 36415 COLL VENOUS BLD VENIPUNCTURE: CPT

## 2021-08-13 PROCEDURE — 2580000003 HC RX 258: Performed by: HOSPITALIST

## 2021-08-13 PROCEDURE — 2700000000 HC OXYGEN THERAPY PER DAY

## 2021-08-13 PROCEDURE — 2140000000 HC CCU INTERMEDIATE R&B

## 2021-08-13 PROCEDURE — 82140 ASSAY OF AMMONIA: CPT

## 2021-08-13 PROCEDURE — 94640 AIRWAY INHALATION TREATMENT: CPT

## 2021-08-13 PROCEDURE — 6360000002 HC RX W HCPCS: Performed by: HOSPITALIST

## 2021-08-13 PROCEDURE — 6370000000 HC RX 637 (ALT 250 FOR IP): Performed by: HOSPITALIST

## 2021-08-13 PROCEDURE — 80048 BASIC METABOLIC PNL TOTAL CA: CPT

## 2021-08-13 PROCEDURE — 80185 ASSAY OF PHENYTOIN TOTAL: CPT

## 2021-08-13 RX ORDER — LEVETIRACETAM 5 MG/ML
500 INJECTION INTRAVASCULAR EVERY 12 HOURS
Status: COMPLETED | OUTPATIENT
Start: 2021-08-13 | End: 2021-08-18

## 2021-08-13 RX ADMIN — ATORVASTATIN CALCIUM 80 MG: 40 TABLET, FILM COATED ORAL at 22:19

## 2021-08-13 RX ADMIN — LEVETIRACETAM 500 MG: 5 INJECTION INTRAVENOUS at 22:19

## 2021-08-13 RX ADMIN — SODIUM CHLORIDE, PRESERVATIVE FREE 1000 MG: 5 INJECTION INTRAVENOUS at 22:18

## 2021-08-13 RX ADMIN — CARVEDILOL 25 MG: 25 TABLET, FILM COATED ORAL at 18:25

## 2021-08-13 RX ADMIN — BUDESONIDE 500 MCG: 0.5 SUSPENSION RESPIRATORY (INHALATION) at 06:19

## 2021-08-13 RX ADMIN — ARFORMOTEROL TARTRATE 15 MCG: 15 SOLUTION RESPIRATORY (INHALATION) at 18:56

## 2021-08-13 RX ADMIN — LEVETIRACETAM 500 MG: 5 INJECTION INTRAVENOUS at 11:39

## 2021-08-13 RX ADMIN — SODIUM CHLORIDE, PRESERVATIVE FREE 10 ML: 5 INJECTION INTRAVENOUS at 22:18

## 2021-08-13 RX ADMIN — SODIUM CHLORIDE, PRESERVATIVE FREE 10 ML: 5 INJECTION INTRAVENOUS at 11:41

## 2021-08-13 RX ADMIN — ARFORMOTEROL TARTRATE 15 MCG: 15 SOLUTION RESPIRATORY (INHALATION) at 06:18

## 2021-08-13 RX ADMIN — BUDESONIDE 500 MCG: 0.5 SUSPENSION RESPIRATORY (INHALATION) at 18:56

## 2021-08-13 ASSESSMENT — PAIN SCALES - WONG BAKER
WONGBAKER_NUMERICALRESPONSE: 0
WONGBAKER_NUMERICALRESPONSE: 4
WONGBAKER_NUMERICALRESPONSE: 4

## 2021-08-13 ASSESSMENT — PAIN DESCRIPTION - DESCRIPTORS
DESCRIPTORS: ACHING;DISCOMFORT
DESCRIPTORS: PATIENT UNABLE TO DESCRIBE

## 2021-08-13 ASSESSMENT — PAIN DESCRIPTION - LOCATION
LOCATION: HEAD
LOCATION: HEAD

## 2021-08-13 ASSESSMENT — PAIN DESCRIPTION - FREQUENCY
FREQUENCY: INTERMITTENT
FREQUENCY: CONTINUOUS

## 2021-08-13 ASSESSMENT — PAIN DESCRIPTION - PROGRESSION
CLINICAL_PROGRESSION: NOT CHANGED
CLINICAL_PROGRESSION: NOT CHANGED

## 2021-08-13 NOTE — PROGRESS NOTES
Patients niece requests to speak to hospitalist. Notified Dr. Hadley Alvarado.       Electronically signed by Jina Vu RN on 8/13/2021 at 4:04 PM

## 2021-08-13 NOTE — PROGRESS NOTES
Progress Note  Date:2021       Room:0712/712-02  Patient Name:Geri Sheth     Date of Birth:[birth year]     Age:89 y.o. Subjective    Subjective: First encounter with patient 21.    66-year-old lady who is a poor historian with a past medical history of CVA with residual slurred speech, generalized weakness, coronary artery disease, hypertension, depression, hyperlipidemia, hypothyroidism, TIAs, dizziness and falls presented today hospital 8/10 with concerns of altered mental status. Work-up in the ED included CT head which is evident of intracranial hemorrhage, patient was given FFP as well as vitamin K for reversal of INR. Was admitted for further monitoring as well as neurosurgery evaluation. Patient was evaluated by neurosurgery, repeat CT head was obtained which showed stable left thalamic hemorrhage. 2021 patient more somnolent, and failed swallow study. Discussed with niece about patient clinical course so far. As well as readdressing CODE STATUS. At this time no evidence of infection contributing to patient presentation however on ceftriaxone for possible UTI.patient carries a diagnosis of cystitis. Electrolytes unremarkable, patient afebrile and white blood cell normal.  TSH also normal.  Glucose levels adequate. CT head was repeated  to reevaluate intracranial bleed which was stable. Ammonia levels normal, and phenytoin levels now back to normal.    This morning patient little bit more alert than yesterday however overall still somewhat somnolent and not at her baseline. Reevaluated by speech and will retry purée. Plan is to monitor patient over the weekend for any form of improvement in overall clinical status and if no changes by Monday plan is to readdress CODE STATUS and course moving forward. Review of Systems: Unable to fully obtain from patient.       Objective         Vitals Last 24 Hours:  TEMPERATURE:  Temp  Av.4 °F (36.3 °C)  Min: 97 °F (36.1 °C) Max: 97.9 °F (36.6 °C)  RESPIRATIONS RANGE: Resp  Av.5  Min: 16  Max: 18  PULSE OXIMETRY RANGE: SpO2  Av.9 %  Min: 88 %  Max: 97 %  PULSE RANGE: Pulse  Av  Min: 85  Max: 99  BLOOD PRESSURE RANGE: Systolic (36PJR), SBE:611 , Min:126 , CRF:537   ; Diastolic (89PZE), MYB:83, Min:69, Max:86    I/O (24Hr): No intake or output data in the 24 hours ending 21 9529      Physical Examination:  General: Somnolent but arousable, well-developed, no acute distress lying comfortably in bed. HEENT: Atraumatic normocephalic, range of motion normal   Cardiac: Normal S1-S2   Respiratory: clear To auscultation bilaterally, no rhonchi or rales, no wheezing  Abdomen: Soft, positive bowel sounds in all quadrants, no distention, nontender to palpation  Extremities: no tenderness, trace LE edema, moves all extremities  Psych: flat affect, impaired cognition        Labs/Imaging/Diagnostics    Labs:  CBC:  Recent Labs     08/10/21  1907 08/12/21  0220 08/13/21  0153   WBC 6.8 10.3 9.7   RBC 5.04 5.19 4.72   HGB 16.0 16.4* 15.0   HCT 48.6* 48.1* 45.2   MCV 96.4 92.7 95.8   RDW 13.8 13.3 13.2    284 252     CHEMISTRIES:  Recent Labs     08/10/21  1907 08/10/21  2001 08/12/21  0220 08/12/21  1238 21  0153     --  139  --  143   K 3.6   < > 3.8 3.6 3.7     --  100  --  103   CO2 30*  --  26  --  27   BUN 19  --  15  --  19   CREATININE 0.7  --  0.5  --  0.6   GLUCOSE 129*  --  163*  --  116*    < > = values in this interval not displayed. PT/INR:  Recent Labs     08/10/21  1907 08/11/21  0707   PROTIME 36.5* 15.4*   INR 3.79* 1.20*     APTT:No results for input(s): APTT in the last 72 hours.   LIVER PROFILE:  Recent Labs     08/10/21  1907   AST 18   ALT <5*   BILITOT 0.4   ALKPHOS 118*       Imaging Last 24 Hours:  CT HEAD WO CONTRAST    Result Date: 2021  EXAMINATION: CT HEAD WO CONTRAST 2021 3:21 PM HISTORY: CT BRAIN without contrast 2021 1:25 PM HISTORY: Thalamic hemorrhage COMPARISON: August 10, 2020 DLP: 9 4 mGy cm TECHNIQUE: Serial axial tomographic images of the brain were obtained without the use of intravenous contrast. FINDINGS: The midline structures are nondisplaced. There is mild cerebral and cerebellar volume loss, with an associated increase in the prominence of the ventricles and sulci. The basilar cisterns are normal in size and configuration left occipital cortical encephalomalacia is unchanged. Left thalamic hemorrhage is unchanged. . There is low attenuation in the periventricular white matter, consistent with chronic ischemic change. There are no abnormal extra-axial fluid collections. There is no evidence of tonsillar herniation. The included orbits and their contents are unremarkable. The visualized paranasal sinuses, mastoid air cells and middle ear cavities are clear. The visualized osseous structures and overlying soft tissues of the skull and face are intact. Stable left thalamic hemorrhage. 2. Old encephalomalacia left occipital lobe stable and unchanged Signed by Dr Adela Mason    Result Date: 8/10/2021  EXAMINATION: CT head without contrast 8/10/2021 HISTORY: Code stroke. Altered mental status. FINDINGS: Today's exam is compared to a previous study of 7/14/2021. Multiple contiguous axial views are obtained from the skull base to the vertex per protocol without venous contrast enhancement with reformatted images obtained in the sagittal and coronal projections from the original data set. There is diffuse atrophy of the brain with prominence of the subarachnoid spaces and ventricular enlargement. Small vessel ischemic changes are noted involving the periventricular and higher white matter tracts. There is evidence of a remote left PCA territory infarct. Within the left thalamus there is a new 8 mm focus of hyperdensity consistent with an acute left thalamic hemorrhage. No associated mass effect or shift of the midline.  No additional sites of hemorrhage are demonstrated. 1.. There is a new focus of intra-axial hemorrhage within the left thalamus measuring 8 mm in size. I spoke with Dr. James Levin in the emergency room at 7:45 PM concerning the finding and to call the code stroke report. 2. Diffuse atrophy with small vessel ischemic disease. Remote left PCA territory infarct. 3. Bilateral temporal thinning is demonstrated. Signed by Dr Latoya Granados    Result Date: 8/10/2021  EXAMINATION: Chest one view 8/10/2021 HISTORY: Altered mental status. FINDINGS: Today's exam is compared to previous study dated 5/29/2021. Lungs are hypoventilated with mild basilar atelectasis. There is thickening of the minor fissure on the right. No evidence of acute consolidative pneumonia or effusion. The thoracic aorta is ectatic. 1.. Expiratory chest with basilar atelectasis. No evidence of lobar pneumonia or effusion. Signed by Dr Chencho Werner    * (Principal) Left-sided nontraumatic intracerebral hemorrhage [facility]), Left Thalamic 8/10/2021 Yes    AMS (altered mental status) 8/10/2021 Yes    UTI (urinary tract infection) 8/10/2021 Yes    Thalamic hemorrhage ([address] 8/12/2021 Yes        Assessment & Plan    Unspecified altered mental status  Left-sided nontraumatic intracerebral hemorrhage, stable CT of the head from 8/13/2021. Supratherapeutic INR  Bacteriuria:  Elevated phenytoin levels: Now within normal limits  Inadequate oral intake with concerns of aspirations      Chronic issues  Prior CVA  CAD s/p stent  Hypothyroidism  Hyperlipidemia  Hypertension  Depression  Cystitis  Chronic back pain  TIA. Plan  Repeat CT head 8/11/21 and 8/13/2021. with stable intracerebral hemorrhage.   To follow up with NS outpt  Status post FFP and vitamin K for INR reversal and given clinical setting of [address]  Patient with a history of cystitis as well as noted to have bacteriuria, however given overall clinical status with it was continuation of antibiotics at this time. Hold Phenytoin for now  Follow speech recommendations for diet      If no significant improvement in clinical status, will need goals of care rediscussion; discussed with Niece on Monday. Continue other chronic medications for underlying chronic issues. Electronically signed by   Marianna Fitzgerald MD   Internal Medicine Hospitalist  On 8/13/2021  At 3:49 PM    EMR Dragon/Transcription disclaimer:   Much of this encounter note is an electronic transcription/translation of spoken language to printed text.  The electronic translation of spoken language may permit erroneous, or at times, nonsensical words or phrases to be inadvertently transcribed; although attempts have made to review the note for such errors, some may still exist.

## 2021-08-13 NOTE — PLAN OF CARE
Problem: ACTIVITY INTOLERANCE/IMPAIRED MOBILITY  Goal: Mobility/activity is maintained at optimum level for patient  8/13/2021 0559 by Denny Schulte RN  Outcome: Ongoing     Problem: COMMUNICATION IMPAIRMENT  Goal: Ability to express needs and understand communication  8/13/2021 0559 by Denny Schulte RN  Outcome: Ongoing     Problem: Skin Integrity:  Goal: Will show no infection signs and symptoms  8/13/2021 0559 by Denny Schulte RN  Outcome: Ongoing  Goal: Absence of new skin breakdown  8/13/2021 0559 by Denny Schulte RN  Outcome: Ongoing     Problem: Falls - Risk of:  Goal: Will remain free from falls  8/13/2021 0559 by Denny Schulte RN  Outcome: Ongoing  Goal: Absence of physical injury  8/13/2021 0559 by Denny Schulte RN  Outcome: Ongoing

## 2021-08-13 NOTE — PROGRESS NOTES
Physical Therapy    Name: Melania Gonzales  MRN: [id number]  Date of Service:  8/13/2021 08/13/21 0943   Subjective   Subjective Attempt: Pt laying in bed, phlebotomist at pt bedside drawing pt blood. Transport comes into room a couple minutes later and took pt for CAT scan.             Electronically signed by Marianna Harris PTA on 8/13/2021 at 1:10 PM

## 2021-08-13 NOTE — PROGRESS NOTES
Speech Language Pathology  Facility/Department: [facility] PROGRESSIVE CARE  SWALLOW THERAPY     NAME: China Oconnor  : 1931  MRN: [id number]    ADMISSION DATE: 8/10/2021  ADMITTING DIAGNOSIS: has Compression fx, thoracic spine, closed, initial encounter ([address] History of cardioembolic cerebrovascular accident (CVA); Essential hypertension; Acquired hypothyroidism; Seizure disorder ([address] Other pulmonary embolism without acute cor pulmonale ([address] Deep vein thrombosis (DVT) of left lower extremity ([address] Leg pain, left; Leg swelling; Complicated urinary tract infection; Generalized weakness; Hypocalcemia; Acute bronchitis; Left-sided nontraumatic intracerebral hemorrhage ([address] Left Thalamic; AMS (altered mental status); UTI (urinary tract infection); and Thalamic hemorrhage ([address] on their problem list.    Date of Treat: 2021  Evaluating Therapist: ITZEL Gamboa    Current Diet level:  NPO    Reason for Referral  China Oconnor was referred for a bedside swallow evaluation to assess the efficiency of her swallow function, identify signs and symptoms of aspiration and make recommendations regarding safe dietary consistencies, effective compensatory strategies, and safe eating environment. Impression  Re-assessed patient's swallowing function. Patient exhibited inconsistent oral holding of foods/drinks prior to initiation of swallows, slow oral transit and suspected swallow delay with even the slowest moving food/drink consistencies possible, and sluggish, mild-moderately decreased laryngeal elevation for swallow airway protection. Even so, just mild delayed coughs were noted with both puree consistency trials and moderately thick/honey thick liquid trials presented during treatment session this date.     At this time, do question fluctuating alertness and swallow functioning. If PO intake is pursued, would once again trial puree consistency with moderately thick/honey thick liquids.  TOTAL FEED WHEN ALERT. Recommend meds crushed in pudding/applesauce. If patient receives mouth care prior to intake, okay for ice chips IN BETWEEN MEALS for comfort. Will continue to follow.     Treatment Plan  Requires SLP Intervention: Yes     Recommended Diet and Intervention  Diet Solids Recommendation: Puree  Liquid Consistency Recommendation: Moderately thick (honey thick)   Recommended Form of Meds: Meds crushed in puree as able  Therapeutic Interventions: Patient/Family education;Diet tolerance monitoring; Therapeutic PO trials with SLP     Compensatory Swallowing Strategies  Compensatory Swallowing Strategies: Upright as possible for all oral intake;TOTAL FEED;Small bites/sips;Eat/Feed slowly; Alternate solids and liquids; Remain upright for 30-45 minutes after meals     Treatment/Goals  Timeframe for Short-term Goals: 1x/day for 3 days   Goal 1: Patient will tolerate puree consistency and moderately thick/honey thick liquids with min S/S penetration/aspiration during PO intake. Goal 2: Patient staff will follow swallow safety recommendations to decrease risk of penetration/aspiration during PO intake. Goal 3: Re-assessment of swallow function for potential diet upgrade. Goal 4: Monitor speech production.     General  Chart Reviewed: Yes  Behavior/Cognition: Alert; Cooperative  O2 Device: None (Room air)  Communication Observation: (Patient exhibited decreased volume of speech and slow, decreased lingual movements during verbalizations.)  Follows Directions: Simple   Patient Positioning: Upright in bed  Consistencies Administered: Dysphagia Pureed (Dysphagia I); Honey - cup    Re-assessed patient's swallowing function with the following observations noted:     Oral Phase  Mastication: Puree (Patient exhibited decreased vertical jaw movement at the front of the mouth during oral prep of puree consistency trials presented by SLP.)  Suspected Premature Bolus Loss: Puree; Honey- cup (Patient exhibited slow oral transit of puree consistency trials and honey thick liquid trials via cup all presented by SLP.)  Oral Phase - Comment: Min puree consistency residue was observed post swallows; residue cleared from the mouth with additional dry swallows. Patient exhibited inconsistent oral holding, with all food/drink consistencies, prior to initiation of oral transit.     Pharyngeal Phase  Suspected Swallow Delay: Puree; Honey - cup (Suspect secondary to oral transit times.)  Laryngeal Elevation: (Patient exhibited sluggish, mild-moderately decreased laryngeal elevation for swallow airway protection.)  Delayed Cough: Puree; Honey - cup   Pharyngeal Phase - Comment: Just mild delayed coughs were noted with both puree consistency trials and moderately thick/honey thick liquid trials presented during treatment session this date.     At this time, do question fluctuating alertness and swallow functioning. If PO intake is pursued, would once again trial puree consistency with moderately thick/honey thick liquids. TOTAL FEED WHEN ALERT. Recommend meds crushed in pudding/applesauce. If patient receives mouth care prior to intake, okay for ice chips IN BETWEEN MEALS for comfort. Will continue to follow.     Electronically signed by ITZEL Gunderson on 8/13/2021 at 2:20 PM

## 2021-08-13 NOTE — PROGRESS NOTES
Physical Therapy    Name: Emily Morrison  MRN: [id number]  Date of Service:  8/13/2021 08/13/21 1115   Restrictions/Precautions   Restrictions/Precautions Fall Risk   General   Patient assessed for rehabilitation services? Yes   Diagnosis AMS, CVA   Subjective   Subjective Pt laying in bed, niece and RN in room. ORELLANA also present. Pt agrees to TF to recliner. Pain Screening   Patient Currently in Pain Yes  (RN notified of pt/niece request for pt pain meds )   Pain Assessment   Pain Assessment Faces   Toscano-Kaufman Pain Rating 4   Pain Location Head   Pain Descriptors Patient unable to describe   Pain Frequency Intermittent   Clinical Progression Not changed   Response to Pain Intervention Patient Satisfied   Oxygen Therapy   O2 Device Nasal cannula   O2 Flow Rate (L/min) 2 L/min   Bed Mobility   Rolling Maximal assistance  (X 2 )   Transfers   Lateral Transfers Dependent/Total  (Use of safia lift )   Short term goals   Time Frame for Short term goals 14 DAYS   Short term goal 1 BED MOB MOD-MAX   Short term goal 2 SITTING BALANCE > POOR   Short term goal 3 SIT TO STAND MAX   Conditions Requiring Skilled Therapeutic Intervention   Body structures, Functions, Activity limitations Decreased functional mobility ; Decreased ADL status; Decreased ROM; Decreased strength;Decreased balance;Decreased posture   Assessment Niece report pt has not been out of bed since she was in hospital. Once TF, pt able to maintain static sitting balance, no leaning to L or R side noted. Pt more alert and communicative once sitting up. Plan to progress pt per POC. REQUIRES PT FOLLOW UP Yes   Discharge Recommendations Continue to assess pending progress; Patient would benefit from continued therapy after discharge;24 hour supervision or assist;Home with Home health PT   Activity Tolerance   Activity Tolerance Patient limited by cognitive status; Patient Tolerated treatment well   Plan   Times per week AT LEAST 4-6   Current Treatment Recommendations Strengthening;ROM;Balance Training;Functional Mobility Training;Transfer Training; Safety Education & Training;Patient/Caregiver Education & Training   Safety Devices   Type of devices Patient at risk for falls; Left in chair;Chair alarm in place;Call light within reach; Sitter present;Nurse notified  (Niece present.  RN aware )   PT Whiteboard Notes   Therapy Whiteboard RE 8/25  AMS, CVA           Electronically signed by Mckenzie Steward PTA on 8/13/2021 at 4:51 PM

## 2021-08-13 NOTE — PROGRESS NOTES
Occupational Therapy     08/13/21 1225   Restrictions/Precautions   Restrictions/Precautions Fall Risk   General   Chart Reviewed Yes   Patient assessed for rehabilitation services? Yes   Response to previous treatment Patient with no complaints from previous session   Family / Caregiver Present Yes   Subjective   Subjective Pt in bed with Daughter present. Pt agreeable to try to sit up this date. Pain Assessment   Patient Currently in Pain Yes   Pain Assessment Faces   Toscano-Kaufman Pain Rating 4   Pain Location Head   Pain Descriptors Aching;Discomfort   Pain Frequency Continuous   Clinical Progression Not changed   Patient's Stated Pain Goal No pain   Response to Pain Intervention Patient Satisfied   Pre Treatment Pain Screening   Scale Used Faces   Intervention List Patient able to continue with treatment;Nurse/physician notified   Oxygen Therapy   SpO2 95 %   O2 Device Nasal cannula   O2 Flow Rate (L/min) 2 L/min   Social/Functional History   Lives With Family   ADL   Feeding Maximum assistance;Dependent/Total   Grooming Maximum assistance;Dependent/Total   UE Bathing Maximum assistance;Dependent/Total   LE Bathing Maximum assistance;Dependent/Total   UE Dressing Maximum assistance;Dependent/Total   LE Dressing Maximum assistance;Dependent/Total   Toileting Maximum assistance;Dependent/Total   Bed mobility   Rolling to Left Maximum assistance;2 Person assistance   Rolling to Right Maximum assistance;2 Person assistance   Assessment   Performance deficits / Impairments Decreased functional mobility ; Decreased ADL status; Decreased ROM; Decreased strength;Decreased endurance;Decreased coordination;Decreased posture   Assessment The patient has been, and continues to be signficantly impaired for all ADL and mobility tasks and is max A to dependent overall. Family states plans for discharge home when medically ready.    Treatment Diagnosis left side intracerebral hemorrhage   REQUIRES OT FOLLOW UP Yes   Treatment Initiated  Tx focused on bed mobility; safia lift transfer from bed to reclining chair; and positioning in recliner for comfort with BLEs elevated due to increased edema and discomfort n BLEs. Discharge Recommendations 24 hour supervision or assist   Activity Tolerance   Activity Tolerance Patient Tolerated treatment well;Patient limited by fatigue;Patient limited by pain   Safety Devices   Safety Devices in place Yes   Type of devices Call light within reach; Chair alarm in place;Nurse notified   Plan   Times per week 3-5   Times per day Daily   Electronically signed by Delcia Goldmann, OTA on 8/13/2021 at 12:32 PM

## 2021-08-14 LAB
ANION GAP SERPL CALCULATED.3IONS-SCNC: 12 MMOL/L (ref 7–19)
BUN BLDV-MCNC: 23 MG/DL (ref 8–23)
CALCIUM SERPL-MCNC: 8.6 MG/DL (ref 8.8–10.2)
CHLORIDE BLD-SCNC: 105 MMOL/L (ref 98–111)
CO2: 26 MMOL/L (ref 22–29)
CREAT SERPL-MCNC: 0.6 MG/DL (ref 0.5–0.9)
EKG P AXIS: 55 DEGREES
EKG P-R INTERVAL: 172 MS
EKG Q-T INTERVAL: 408 MS
EKG QRS DURATION: 100 MS
EKG QTC CALCULATION (BAZETT): 434 MS
EKG T AXIS: 62 DEGREES
GFR AFRICAN AMERICAN: >59
GFR NON-AFRICAN AMERICAN: >60
GLUCOSE BLD-MCNC: 110 MG/DL (ref 74–109)
GLUCOSE BLD-MCNC: 160 MG/DL (ref 70–99)
HCT VFR BLD CALC: 44.9 % (ref 37–47)
HEMOGLOBIN: 14.5 G/DL (ref 12–16)
MCH RBC QN AUTO: 30.9 PG (ref 27–31)
MCHC RBC AUTO-ENTMCNC: 32.3 G/DL (ref 33–37)
MCV RBC AUTO: 95.7 FL (ref 81–99)
PDW BLD-RTO: 13.3 % (ref 11.5–14.5)
PERFORMED ON: ABNORMAL
PLATELET # BLD: 255 K/UL (ref 130–400)
PMV BLD AUTO: 9.9 FL (ref 9.4–12.3)
POTASSIUM SERPL-SCNC: 3.7 MMOL/L (ref 3.5–5)
RBC # BLD: 4.69 M/UL (ref 4.2–5.4)
SODIUM BLD-SCNC: 143 MMOL/L (ref 136–145)
WBC # BLD: 9.2 K/UL (ref 4.8–10.8)

## 2021-08-14 PROCEDURE — 80048 BASIC METABOLIC PNL TOTAL CA: CPT

## 2021-08-14 PROCEDURE — 36415 COLL VENOUS BLD VENIPUNCTURE: CPT

## 2021-08-14 PROCEDURE — 82947 ASSAY GLUCOSE BLOOD QUANT: CPT

## 2021-08-14 PROCEDURE — 6370000000 HC RX 637 (ALT 250 FOR IP): Performed by: HOSPITALIST

## 2021-08-14 PROCEDURE — 2140000000 HC CCU INTERMEDIATE R&B

## 2021-08-14 PROCEDURE — 2580000003 HC RX 258: Performed by: HOSPITALIST

## 2021-08-14 PROCEDURE — 6360000002 HC RX W HCPCS: Performed by: HOSPITALIST

## 2021-08-14 PROCEDURE — 94761 N-INVAS EAR/PLS OXIMETRY MLT: CPT

## 2021-08-14 PROCEDURE — 94640 AIRWAY INHALATION TREATMENT: CPT

## 2021-08-14 PROCEDURE — 85027 COMPLETE CBC AUTOMATED: CPT

## 2021-08-14 PROCEDURE — 2700000000 HC OXYGEN THERAPY PER DAY

## 2021-08-14 RX ORDER — PHENYTOIN SODIUM 100 MG/1
200 CAPSULE, EXTENDED RELEASE ORAL DAILY
Status: DISCONTINUED | OUTPATIENT
Start: 2021-08-14 | End: 2021-08-19 | Stop reason: HOSPADM

## 2021-08-14 RX ADMIN — PHENYTOIN SODIUM 200 MG: 100 CAPSULE ORAL at 09:42

## 2021-08-14 RX ADMIN — ARFORMOTEROL TARTRATE 15 MCG: 15 SOLUTION RESPIRATORY (INHALATION) at 06:18

## 2021-08-14 RX ADMIN — PANTOPRAZOLE SODIUM 20 MG: 20 TABLET, DELAYED RELEASE ORAL at 09:46

## 2021-08-14 RX ADMIN — CARVEDILOL 25 MG: 25 TABLET, FILM COATED ORAL at 18:42

## 2021-08-14 RX ADMIN — BUDESONIDE 500 MCG: 0.5 SUSPENSION RESPIRATORY (INHALATION) at 19:08

## 2021-08-14 RX ADMIN — SODIUM CHLORIDE, PRESERVATIVE FREE 10 ML: 5 INJECTION INTRAVENOUS at 09:47

## 2021-08-14 RX ADMIN — CARVEDILOL 25 MG: 25 TABLET, FILM COATED ORAL at 09:46

## 2021-08-14 RX ADMIN — BUDESONIDE 500 MCG: 0.5 SUSPENSION RESPIRATORY (INHALATION) at 06:18

## 2021-08-14 RX ADMIN — ARFORMOTEROL TARTRATE 15 MCG: 15 SOLUTION RESPIRATORY (INHALATION) at 19:08

## 2021-08-14 RX ADMIN — ATORVASTATIN CALCIUM 80 MG: 40 TABLET, FILM COATED ORAL at 22:30

## 2021-08-14 RX ADMIN — LEVETIRACETAM 500 MG: 5 INJECTION INTRAVENOUS at 12:15

## 2021-08-14 RX ADMIN — LEVOTHYROXINE SODIUM 175 MCG: 125 TABLET ORAL at 06:34

## 2021-08-14 RX ADMIN — SERTRALINE HYDROCHLORIDE 100 MG: 100 TABLET ORAL at 09:46

## 2021-08-14 RX ADMIN — SODIUM CHLORIDE, PRESERVATIVE FREE 10 ML: 5 INJECTION INTRAVENOUS at 22:33

## 2021-08-14 RX ADMIN — LEVETIRACETAM 500 MG: 5 INJECTION INTRAVENOUS at 22:30

## 2021-08-14 ASSESSMENT — PAIN SCALES - WONG BAKER
WONGBAKER_NUMERICALRESPONSE: 0
WONGBAKER_NUMERICALRESPONSE: 0

## 2021-08-14 ASSESSMENT — PAIN SCALES - GENERAL: PAINLEVEL_OUTOF10: 0

## 2021-08-14 NOTE — PROGRESS NOTES
Provided oral care to patient and attempted to remove white build up on tongue. Partially successful. Will try again later. Patient exhibiting very barky/ congested cough.      Electronically signed by Tigist Saini RN on 8/14/2021 at 12:44 PM

## 2021-08-14 NOTE — PROGRESS NOTES
Pt was lethargic at the start of assessment. By the end, she was awake, alert and talking in sentences (not all made sense). Pt was oriented to person only but did follow commands well. R side weak but she did attempt to squeeze my hand and wiggle her toes. Meds were given crushed in applesauce. Pt tolerated well.  Electronically signed by Ki Sanchez RN on 8/14/2021 at 1:57 AM

## 2021-08-14 NOTE — PROGRESS NOTES
Progress Note  Date:2021       Room:0712/712-02  Patient Name:Geri Sheth     Date of Birth:[birth year]     Age:89 y.o. Subjective    Subjective: First encounter with patient 21.    57-year-old lady who is a poor historian with a past medical history of CVA with residual slurred speech, generalized weakness, coronary artery disease, hypertension, depression, hyperlipidemia, hypothyroidism, TIAs, dizziness and falls presented today hospital 8/10 with concerns of altered mental status. Work-up in the ED included CT head which is evident of intracranial hemorrhage, patient was given FFP as well as vitamin K for reversal of INR. Was admitted for further monitoring as well as neurosurgery evaluation. Patient was evaluated by neurosurgery, repeat CT head was obtained which showed stable left thalamic hemorrhage. 2021 patient more somnolent, and failed swallow study. Discussed with niece about patient clinical course so far. As well as readdressing CODE STATUS. At this time no evidence of infection contributing to patient presentation however on ceftriaxone for possible UTI.patient carries a diagnosis of cystitis. Electrolytes unremarkable, patient afebrile and white blood cell normal. TSH also normal.  Glucose levels adequate. CT head was repeated  to reevaluate intracranial bleed which was stable. Ammonia levels normal, and phenytoin levels now back to normal.    This morning patient little bit more alert than yesterday however still has some waxing and waning mentation. Tolerating purée. Plan is to monitor patient over the weekend for any form of improvement in overall clinical status and if no changes by Monday plan is to readdress CODE STATUS and course moving forward. Review of Systems: Unable to fully obtain from patient.       Objective         Vitals Last 24 Hours:  TEMPERATURE:  Temp  Av.9 °F (36.1 °C)  Min: 96.5 °F (35.8 °C)  Max: 97.5 °F (36.4 °C)  RESPIRATIONS RANGE: Resp  Av  Min: 14  Max: 18  PULSE OXIMETRY RANGE: SpO2  Av.1 %  Min: 91 %  Max: 95 %  PULSE RANGE: Pulse  Av.5  Min: 78  Max: 89  BLOOD PRESSURE RANGE: Systolic (30KAB), QCR:753 , Min:101 , TBR:946   ; Diastolic (09HIH), NLT:86, Min:60, Max:86    I/O (24Hr): Intake/Output Summary (Last 24 hours) at 2021 1628  Last data filed at 2021 1242  Gross per 24 hour   Intake 272 ml   Output 175 ml   Net 97 ml         Physical Examination:  General: Alert and conversant, well-developed, no acute distress lying comfortably in bed. HEENT: Atraumatic normocephalic, range of motion normal   Cardiac: Normal S1-S2   Respiratory: clear To auscultation bilaterally, + rhonchi, no wheezing  Abdomen: Soft, positive bowel sounds in all quadrants, no distention, nontender to palpation  Extremities: no tenderness, trace LE edema, moves all extremities  Psych: flat affect, impaired cognition        Labs/Imaging/Diagnostics    Labs:  CBC:  Recent Labs     21  0153 21  0142   WBC 10.3 9.7 9.2   RBC 5.19 4.72 4.69   HGB 16.4* 15.0 14.5   HCT 48.1* 45.2 44.9   MCV 92.7 95.8 95.7   RDW 13.3 13.2 13.3    252 255     CHEMISTRIES:  Recent Labs     21  02221  1238 21  0153 21  0142     --  143 143   K 3.8 3.6 3.7 3.7     --  103 105   CO2 26  --  27 26   BUN 15  --  19 23   CREATININE 0.5  --  0.6 0.6   GLUCOSE 163*  --  116* 110*     PT/INR:  No results for input(s): PROTIME, INR in the last 72 hours. APTT:No results for input(s): APTT in the last 72 hours. LIVER PROFILE:  No results for input(s): AST, ALT, BILIDIR, BILITOT, ALKPHOS in the last 72 hours.     Imaging Last 24 Hours:  CT HEAD WO CONTRAST    Result Date: 2021  EXAMINATION: CT HEAD WO CONTRAST 2021 3:21 PM HISTORY: CT BRAIN without contrast 2021 1:25 PM HISTORY: Thalamic hemorrhage COMPARISON: August 10, 2020 DLP: 9 4 mGy cm TECHNIQUE: Serial axial tomographic images of the brain were obtained without the use of intravenous contrast. FINDINGS: The midline structures are nondisplaced. There is mild cerebral and cerebellar volume loss, with an associated increase in the prominence of the ventricles and sulci. The basilar cisterns are normal in size and configuration left occipital cortical encephalomalacia is unchanged. Left thalamic hemorrhage is unchanged. . There is low attenuation in the periventricular white matter, consistent with chronic ischemic change. There are no abnormal extra-axial fluid collections. There is no evidence of tonsillar herniation. The included orbits and their contents are unremarkable. The visualized paranasal sinuses, mastoid air cells and middle ear cavities are clear. The visualized osseous structures and overlying soft tissues of the skull and face are intact. Stable left thalamic hemorrhage. 2. Old encephalomalacia left occipital lobe stable and unchanged Signed by Dr Michelle Garcia    Result Date: 8/10/2021  EXAMINATION: CT head without contrast 8/10/2021 HISTORY: Code stroke. Altered mental status. FINDINGS: Today's exam is compared to a previous study of 7/14/2021. Multiple contiguous axial views are obtained from the skull base to the vertex per protocol without venous contrast enhancement with reformatted images obtained in the sagittal and coronal projections from the original data set. There is diffuse atrophy of the brain with prominence of the subarachnoid spaces and ventricular enlargement. Small vessel ischemic changes are noted involving the periventricular and higher white matter tracts. There is evidence of a remote left PCA territory infarct. Within the left thalamus there is a new 8 mm focus of hyperdensity consistent with an acute left thalamic hemorrhage. No associated mass effect or shift of the midline. No additional sites of hemorrhage are demonstrated.     1.. There is a new focus of intra-axial hemorrhage within the left thalamus measuring 8 mm in size. I spoke with Dr. Addie Brito in the emergency room at 7:45 PM concerning the finding and to call the code stroke report. 2. Diffuse atrophy with small vessel ischemic disease. Remote left PCA territory infarct. 3. Bilateral temporal thinning is demonstrated. Signed by Dr Marilyn Valerio    Result Date: 8/10/2021  EXAMINATION: Chest one view 8/10/2021 HISTORY: Altered mental status. FINDINGS: Today's exam is compared to previous study dated 5/29/2021. Lungs are hypoventilated with mild basilar atelectasis. There is thickening of the minor fissure on the right. No evidence of acute consolidative pneumonia or effusion. The thoracic aorta is ectatic. 1.. Expiratory chest with basilar atelectasis. No evidence of lobar pneumonia or effusion. Signed by Dr Nancyann Cranker    * (Principal) Left-sided nontraumatic intracerebral hemorrhage [facility]), Left Thalamic 8/10/2021 Yes    AMS (altered mental status) 8/10/2021 Yes    UTI (urinary tract infection) 8/10/2021 Yes    Thalamic hemorrhage ([address] 8/12/2021 Yes        Assessment & Plan    Unspecified altered mental status  Left-sided nontraumatic intracerebral hemorrhage, stable CT of the head from 8/13/2021. Supratherapeutic INR  Bacteriuria:  Elevated phenytoin levels: Now within normal limits  Inadequate oral intake with concerns of aspirations      Chronic issues  Prior CVA  CAD s/p stent  Hypothyroidism  Hyperlipidemia  Hypertension  Depression  Cystitis  Chronic back pain  TIA. Plan  Repeat CT head 8/11/21 and 8/13/2021. with stable intracerebral hemorrhage.   To follow up with NS outpt  Status post FFP and vitamin K for INR reversal and given clinical setting of [address]  Patient with a history of cystitis as well as noted to have bacteriuria, however given overall clinical status with it was continuation of antibiotics at this time. Completed 3 days course. Resumed phenytoin 2 daily from twice daily  Follow speech recommendations for diet      If no significant improvement in clinical status, will need goals of care rediscussion; discussed with Niece on Monday. Continue other chronic medications for underlying chronic issues. Electronically signed by   Nilsa Brown MD   Internal Medicine Hospitalist  On 8/14/2021  At 4:28 PM    EMR Dragon/Transcription disclaimer:   Much of this encounter note is an electronic transcription/translation of spoken language to printed text.  The electronic translation of spoken language may permit erroneous, or at times, nonsensical words or phrases to be inadvertently transcribed; although attempts have made to review the note for such errors, some may still exist.

## 2021-08-14 NOTE — PLAN OF CARE
Problem: ACTIVITY INTOLERANCE/IMPAIRED MOBILITY  Goal: Mobility/activity is maintained at optimum level for patient  Outcome: Ongoing     Problem: COMMUNICATION IMPAIRMENT  Goal: Ability to express needs and understand communication  Outcome: Ongoing     Problem: Skin Integrity:  Goal: Will show no infection signs and symptoms  Outcome: Ongoing  Goal: Absence of new skin breakdown  Outcome: Ongoing     Problem: Falls - Risk of:  Goal: Will remain free from falls  Outcome: Ongoing  Goal: Absence of physical injury  Outcome: Ongoing     Problem: Pain:  Description: Pain management should include both nonpharmacologic and pharmacologic interventions.   Goal: Pain level will decrease  Outcome: Ongoing  Goal: Control of acute pain  Outcome: Ongoing  Goal: Control of chronic pain  Outcome: Ongoing

## 2021-08-15 PROCEDURE — 6360000002 HC RX W HCPCS: Performed by: HOSPITALIST

## 2021-08-15 PROCEDURE — 6370000000 HC RX 637 (ALT 250 FOR IP): Performed by: HOSPITALIST

## 2021-08-15 PROCEDURE — 94640 AIRWAY INHALATION TREATMENT: CPT

## 2021-08-15 PROCEDURE — 2580000003 HC RX 258: Performed by: HOSPITALIST

## 2021-08-15 PROCEDURE — 2700000000 HC OXYGEN THERAPY PER DAY

## 2021-08-15 PROCEDURE — 2140000000 HC CCU INTERMEDIATE R&B

## 2021-08-15 PROCEDURE — 94761 N-INVAS EAR/PLS OXIMETRY MLT: CPT

## 2021-08-15 RX ADMIN — BUDESONIDE 500 MCG: 0.5 SUSPENSION RESPIRATORY (INHALATION) at 19:00

## 2021-08-15 RX ADMIN — BUDESONIDE 500 MCG: 0.5 SUSPENSION RESPIRATORY (INHALATION) at 06:25

## 2021-08-15 RX ADMIN — SODIUM CHLORIDE, PRESERVATIVE FREE 10 ML: 5 INJECTION INTRAVENOUS at 09:48

## 2021-08-15 RX ADMIN — LEVOTHYROXINE SODIUM 175 MCG: 125 TABLET ORAL at 06:51

## 2021-08-15 RX ADMIN — CARVEDILOL 25 MG: 25 TABLET, FILM COATED ORAL at 10:00

## 2021-08-15 RX ADMIN — SERTRALINE HYDROCHLORIDE 100 MG: 100 TABLET ORAL at 10:01

## 2021-08-15 RX ADMIN — ARFORMOTEROL TARTRATE 15 MCG: 15 SOLUTION RESPIRATORY (INHALATION) at 19:00

## 2021-08-15 RX ADMIN — SODIUM CHLORIDE, PRESERVATIVE FREE 10 ML: 5 INJECTION INTRAVENOUS at 21:06

## 2021-08-15 RX ADMIN — ARFORMOTEROL TARTRATE 15 MCG: 15 SOLUTION RESPIRATORY (INHALATION) at 06:26

## 2021-08-15 RX ADMIN — ATORVASTATIN CALCIUM 80 MG: 40 TABLET, FILM COATED ORAL at 21:06

## 2021-08-15 RX ADMIN — CARVEDILOL 25 MG: 25 TABLET, FILM COATED ORAL at 18:11

## 2021-08-15 RX ADMIN — LEVETIRACETAM 500 MG: 5 INJECTION INTRAVENOUS at 09:48

## 2021-08-15 ASSESSMENT — PAIN SCALES - GENERAL
PAINLEVEL_OUTOF10: 0

## 2021-08-15 ASSESSMENT — PAIN SCALES - WONG BAKER: WONGBAKER_NUMERICALRESPONSE: 0

## 2021-08-15 NOTE — PROGRESS NOTES
Patient took po carvedilol and sertraline this am. Refused all other po medication. Have informed Dr Ruby Canavan.    Electronically signed by Corine Iverson RN on 8/15/2021 at 10:43 AM

## 2021-08-15 NOTE — PROGRESS NOTES
Progress Note  Date:8/15/2021       Room:0712/712-02  Patient Name:Geri Sheth     Date of Birth:[birth year]     Age:89 y.o. Subjective    Subjective: First encounter with patient 21.    45-year-old lady who is a poor historian with a past medical history of CVA with residual slurred speech, generalized weakness, coronary artery disease, hypertension, depression, hyperlipidemia, hypothyroidism, TIAs, dizziness and falls presented today hospital 8/10 with concerns of altered mental status. Work-up in the ED included CT head which is evident of intracranial hemorrhage, patient was given FFP as well as vitamin K for reversal of INR. Was admitted for further monitoring as well as neurosurgery evaluation. Patient was evaluated by neurosurgery, repeat CT head was obtained which showed stable left thalamic hemorrhage. 2021 patient more somnolent, and failed swallow study. Discussed with niece about patient clinical course so far. As well as readdressing CODE STATUS. At this time no evidence of infection contributing to patient presentation however on ceftriaxone for possible UTI.patient carries a diagnosis of cystitis. Electrolytes unremarkable, patient afebrile and white blood cell normal. TSH also normal.  Glucose levels adequate. CT head was repeated  to reevaluate intracranial bleed which was stable. Ammonia levels normal, and phenytoin levels now back to normal.    More alert however still has some waxing and waning mentation. Tolerating purée. Keep monitoring patient over the weekend for any form of improvement in overall clinical status and if no changes by Monday plan is to readdress CODE STATUS and course moving forward.       Review of Systems: 12 point negative except as stated above    Objective         Vitals Last 24 Hours:  TEMPERATURE:  Temp  Av.2 °F (36.2 °C)  Min: 97.2 °F (36.2 °C)  Max: 97.3 °F (36.3 °C)  RESPIRATIONS RANGE: Resp  Av.5  Min: 16  Max: 20  PULSE OXIMETRY RANGE: SpO2  Av.9 %  Min: 91 %  Max: 94 %  PULSE RANGE: Pulse  Av.4  Min: 64  Max: 78  BLOOD PRESSURE RANGE: Systolic (34BRS), PUF:336 , Min:110 , DENISE:847   ; Diastolic (03VYH), GHZ:38, Min:54, Max:64    I/O (24Hr): Intake/Output Summary (Last 24 hours) at 8/15/2021 1459  Last data filed at 8/15/2021 1031  Gross per 24 hour   Intake 220 ml   Output 325 ml   Net -105 ml         Physical Examination:  General: Alert and conversant, no acute distress lying comfortably in bed. HEENT: Atraumatic normocephalic, range of motion normal   Cardiac: Normal S1-S2   Respiratory: clear To auscultation bilaterally, + rhonchi, no wheezing  Abdomen: Soft, positive bowel sounds in all quadrants, no distention, nontender to palpation  Extremities: no tenderness, trace LE edema, moves all extremities  Psych: flat affect, impaired cognition        Labs/Imaging/Diagnostics    Labs:  CBC:  Recent Labs     21  0153 21  0142   WBC 9.7 9.2   RBC 4.72 4.69   HGB 15.0 14.5   HCT 45.2 44.9   MCV 95.8 95.7   RDW 13.2 13.3    255     CHEMISTRIES:  Recent Labs     21  0153 21  0142    143   K 3.7 3.7    105   CO2 27 26   BUN 19 23   CREATININE 0.6 0.6   GLUCOSE 116* 110*     PT/INR:  No results for input(s): PROTIME, INR in the last 72 hours. APTT:No results for input(s): APTT in the last 72 hours. LIVER PROFILE:  No results for input(s): AST, ALT, BILIDIR, BILITOT, ALKPHOS in the last 72 hours. Imaging Last 24 Hours:  CT HEAD WO CONTRAST    Result Date: 2021  EXAMINATION: CT HEAD WO CONTRAST 2021 3:21 PM HISTORY: CT BRAIN without contrast 2021 1:25 PM HISTORY: Thalamic hemorrhage COMPARISON: August 10, 2020 DLP: 9 4 mGy cm TECHNIQUE: Serial axial tomographic images of the brain were obtained without the use of intravenous contrast. FINDINGS: The midline structures are nondisplaced.  There is mild cerebral and cerebellar volume loss, with an associated increase in the prominence of the ventricles and sulci. The basilar cisterns are normal in size and configuration left occipital cortical encephalomalacia is unchanged. Left thalamic hemorrhage is unchanged. . There is low attenuation in the periventricular white matter, consistent with chronic ischemic change. There are no abnormal extra-axial fluid collections. There is no evidence of tonsillar herniation. The included orbits and their contents are unremarkable. The visualized paranasal sinuses, mastoid air cells and middle ear cavities are clear. The visualized osseous structures and overlying soft tissues of the skull and face are intact. Stable left thalamic hemorrhage. 2. Old encephalomalacia left occipital lobe stable and unchanged Signed by Dr Willis Larson    Result Date: 8/10/2021  EXAMINATION: CT head without contrast 8/10/2021 HISTORY: Code stroke. Altered mental status. FINDINGS: Today's exam is compared to a previous study of 7/14/2021. Multiple contiguous axial views are obtained from the skull base to the vertex per protocol without venous contrast enhancement with reformatted images obtained in the sagittal and coronal projections from the original data set. There is diffuse atrophy of the brain with prominence of the subarachnoid spaces and ventricular enlargement. Small vessel ischemic changes are noted involving the periventricular and higher white matter tracts. There is evidence of a remote left PCA territory infarct. Within the left thalamus there is a new 8 mm focus of hyperdensity consistent with an acute left thalamic hemorrhage. No associated mass effect or shift of the midline. No additional sites of hemorrhage are demonstrated. 1.. There is a new focus of intra-axial hemorrhage within the left thalamus measuring 8 mm in size. I spoke with Dr. Augustus Martínez in the emergency room at 7:45 PM concerning the finding and to call the code stroke report.  2. Diffuse atrophy with small vessel ischemic disease. Remote left PCA territory infarct. 3. Bilateral temporal thinning is demonstrated. Signed by Dr Mirela Gu    Result Date: 8/10/2021  EXAMINATION: Chest one view 8/10/2021 HISTORY: Altered mental status. FINDINGS: Today's exam is compared to previous study dated 5/29/2021. Lungs are hypoventilated with mild basilar atelectasis. There is thickening of the minor fissure on the right. No evidence of acute consolidative pneumonia or effusion. The thoracic aorta is ectatic. 1.. Expiratory chest with basilar atelectasis. No evidence of lobar pneumonia or effusion. Signed by Dr Nitesh Gonzalez    * (Principal) Left-sided nontraumatic intracerebral hemorrhage [facility]), Left Thalamic 8/10/2021 Yes    AMS (altered mental status) 8/10/2021 Yes    UTI (urinary tract infection) 8/10/2021 Yes    Thalamic hemorrhage ([address] 8/12/2021 Yes        Assessment & Plan    Unspecified altered mental status with waxing and waning mentation  Left-sided nontraumatic intracerebral hemorrhage, stable CT of the head from 8/13/2021. Supratherapeutic INR  Bacteriuria:  Elevated phenytoin levels: Now within normal limits  Inadequate oral intake with concerns of aspirations      Chronic issues  Prior CVA  CAD s/p stent  Hypothyroidism  Hyperlipidemia  Hypertension  Depression  Cystitis  Chronic back pain  TIA. Plan  Repeat CT head 8/11/21 and 8/13/2021. with stable intracerebral hemorrhage. To follow up with NS outpt  Status post FFP and vitamin K for INR reversal and given clinical setting of [address]  Patient with a history of cystitis as well as noted to have bacteriuria, however given overall clinical status completed 3 days course.   Reduced phenytoin to daily from twice daily  Follow speech recommendations for diet      If no significant improvement in clinical status, will need goals of care rediscussion; discussed with Niece on Monday. Continue other chronic medications for underlying chronic issues. Electronically signed by   Rainer Osorio MD   Internal Medicine Hospitalist  On 8/15/2021  At 2:59 PM    EMR Dragon/Transcription disclaimer:   Much of this encounter note is an electronic transcription/translation of spoken language to printed text.  The electronic translation of spoken language may permit erroneous, or at times, nonsensical words or phrases to be inadvertently transcribed; although attempts have made to review the note for such errors, some may still exist.

## 2021-08-15 NOTE — PLAN OF CARE
Problem: ACTIVITY INTOLERANCE/IMPAIRED MOBILITY  Goal: Mobility/activity is maintained at optimum level for patient  Outcome: Ongoing     Problem: COMMUNICATION IMPAIRMENT  Goal: Ability to express needs and understand communication  Outcome: Ongoing     Problem: Skin Integrity:  Goal: Will show no infection signs and symptoms  Description: Will show no infection signs and symptoms  Outcome: Ongoing  Goal: Absence of new skin breakdown  Description: Absence of new skin breakdown  Outcome: Ongoing     Problem: Falls - Risk of:  Goal: Will remain free from falls  Description: Will remain free from falls  Outcome: Ongoing  Goal: Absence of physical injury  Description: Absence of physical injury  Outcome: Ongoing     Problem: Pain:  Goal: Pain level will decrease  Description: Pain level will decrease  Outcome: Ongoing  Goal: Control of acute pain  Description: Control of acute pain  Outcome: Ongoing  Goal: Control of chronic pain  Description: Control of chronic pain  Outcome: Ongoing   Electronically signed by Mark Contreras RN on 8/15/2021 at 5:06 AM

## 2021-08-15 NOTE — PROGRESS NOTES
Dr Edel Cooley has been informed of low urine output for past couple shifts. She advised to go ahead and pull franco catheter at bladder scan intermittently as needed.    Electronically signed by John Steiner RN on 8/15/2021 at 6:39 PM

## 2021-08-15 NOTE — PROGRESS NOTES
During med pass tonight, pt was alert and pleasant. She answered questions correctly and took her medication crushed in applesauce without difficulty. Pt then finished the cup of applesauce with assistance.  Electronically signed by Vini Davis RN on 8/14/2021 at 11:44 PM

## 2021-08-16 LAB
ANION GAP SERPL CALCULATED.3IONS-SCNC: 9 MMOL/L (ref 7–19)
BUN BLDV-MCNC: 17 MG/DL (ref 8–23)
CALCIUM SERPL-MCNC: 7.9 MG/DL (ref 8.8–10.2)
CHLORIDE BLD-SCNC: 108 MMOL/L (ref 98–111)
CO2: 25 MMOL/L (ref 22–29)
CREAT SERPL-MCNC: 0.5 MG/DL (ref 0.5–0.9)
GFR AFRICAN AMERICAN: >59
GFR NON-AFRICAN AMERICAN: >60
GLUCOSE BLD-MCNC: 132 MG/DL (ref 74–109)
HCT VFR BLD CALC: 41.6 % (ref 37–47)
HEMOGLOBIN: 13.7 G/DL (ref 12–16)
MCH RBC QN AUTO: 32 PG (ref 27–31)
MCHC RBC AUTO-ENTMCNC: 32.9 G/DL (ref 33–37)
MCV RBC AUTO: 97.2 FL (ref 81–99)
PDW BLD-RTO: 13.3 % (ref 11.5–14.5)
PLATELET # BLD: 232 K/UL (ref 130–400)
PMV BLD AUTO: 10.2 FL (ref 9.4–12.3)
POTASSIUM SERPL-SCNC: 3.4 MMOL/L (ref 3.5–5)
RBC # BLD: 4.28 M/UL (ref 4.2–5.4)
SODIUM BLD-SCNC: 142 MMOL/L (ref 136–145)
WBC # BLD: 6 K/UL (ref 4.8–10.8)

## 2021-08-16 PROCEDURE — 6360000002 HC RX W HCPCS: Performed by: HOSPITALIST

## 2021-08-16 PROCEDURE — 6370000000 HC RX 637 (ALT 250 FOR IP): Performed by: HOSPITALIST

## 2021-08-16 PROCEDURE — 94640 AIRWAY INHALATION TREATMENT: CPT

## 2021-08-16 PROCEDURE — 85027 COMPLETE CBC AUTOMATED: CPT

## 2021-08-16 PROCEDURE — 36415 COLL VENOUS BLD VENIPUNCTURE: CPT

## 2021-08-16 PROCEDURE — 2580000003 HC RX 258: Performed by: INTERNAL MEDICINE

## 2021-08-16 PROCEDURE — 94761 N-INVAS EAR/PLS OXIMETRY MLT: CPT

## 2021-08-16 PROCEDURE — 2580000003 HC RX 258: Performed by: HOSPITALIST

## 2021-08-16 PROCEDURE — 97530 THERAPEUTIC ACTIVITIES: CPT

## 2021-08-16 PROCEDURE — 80048 BASIC METABOLIC PNL TOTAL CA: CPT

## 2021-08-16 PROCEDURE — 2140000000 HC CCU INTERMEDIATE R&B

## 2021-08-16 PROCEDURE — 92526 ORAL FUNCTION THERAPY: CPT

## 2021-08-16 RX ORDER — 0.9 % SODIUM CHLORIDE 0.9 %
500 INTRAVENOUS SOLUTION INTRAVENOUS ONCE
Status: COMPLETED | OUTPATIENT
Start: 2021-08-16 | End: 2021-08-16

## 2021-08-16 RX ADMIN — OXYBUTYNIN CHLORIDE 5 MG: 5 TABLET, EXTENDED RELEASE ORAL at 08:27

## 2021-08-16 RX ADMIN — ATORVASTATIN CALCIUM 80 MG: 40 TABLET, FILM COATED ORAL at 21:00

## 2021-08-16 RX ADMIN — CARVEDILOL 25 MG: 25 TABLET, FILM COATED ORAL at 17:50

## 2021-08-16 RX ADMIN — LEVETIRACETAM 500 MG: 5 INJECTION INTRAVENOUS at 00:12

## 2021-08-16 RX ADMIN — PHENYTOIN SODIUM 200 MG: 100 CAPSULE ORAL at 08:27

## 2021-08-16 RX ADMIN — SODIUM CHLORIDE, PRESERVATIVE FREE 10 ML: 5 INJECTION INTRAVENOUS at 08:27

## 2021-08-16 RX ADMIN — PANTOPRAZOLE SODIUM 20 MG: 20 TABLET, DELAYED RELEASE ORAL at 08:27

## 2021-08-16 RX ADMIN — LEVOTHYROXINE SODIUM 175 MCG: 125 TABLET ORAL at 06:26

## 2021-08-16 RX ADMIN — LEVETIRACETAM 500 MG: 5 INJECTION INTRAVENOUS at 23:01

## 2021-08-16 RX ADMIN — LEVETIRACETAM 500 MG: 5 INJECTION INTRAVENOUS at 11:52

## 2021-08-16 RX ADMIN — SERTRALINE HYDROCHLORIDE 100 MG: 100 TABLET ORAL at 08:27

## 2021-08-16 RX ADMIN — SODIUM CHLORIDE 500 ML: 9 INJECTION, SOLUTION INTRAVENOUS at 06:00

## 2021-08-16 RX ADMIN — ARFORMOTEROL TARTRATE 15 MCG: 15 SOLUTION RESPIRATORY (INHALATION) at 06:46

## 2021-08-16 RX ADMIN — GUAIFENESIN 600 MG: 600 TABLET, EXTENDED RELEASE ORAL at 08:27

## 2021-08-16 RX ADMIN — ARFORMOTEROL TARTRATE 15 MCG: 15 SOLUTION RESPIRATORY (INHALATION) at 19:26

## 2021-08-16 RX ADMIN — BUDESONIDE 500 MCG: 0.5 SUSPENSION RESPIRATORY (INHALATION) at 06:46

## 2021-08-16 RX ADMIN — GUAIFENESIN 600 MG: 600 TABLET, EXTENDED RELEASE ORAL at 21:00

## 2021-08-16 RX ADMIN — BUDESONIDE 500 MCG: 0.5 SUSPENSION RESPIRATORY (INHALATION) at 19:26

## 2021-08-16 RX ADMIN — CARVEDILOL 25 MG: 25 TABLET, FILM COATED ORAL at 08:27

## 2021-08-16 ASSESSMENT — PAIN SCALES - GENERAL: PAINLEVEL_OUTOF10: 0

## 2021-08-16 NOTE — CARE COORDINATION
CM met with patient and niece at the bedside. CM discussed patient may not meet overall profile for admission to [address] [place] [address]. shana Rushing, would like CM to have patient \"re-assessed\" for Acute Rehab. Referral for inpatient Acute Rehab completed. Awaiting assessment.    Electronically signed by Faustina Esqueda RN on 8/16/2021 at 3:25 PM

## 2021-08-16 NOTE — CARE COORDINATION
CM advised by NAYA Rose, projected stay for patient in [address] [facility] would be longer than rehab can provide. CM has advised both niece/patient. SNF choices obtained:   [facility] awaiting response.   Electronically signed by Dona Hi RN on 8/16/2021 at 3:30 PM

## 2021-08-16 NOTE — PROGRESS NOTES
Speech Language Pathology  Facility/Department: [facility] 7 PROGRESSIVE CARE   SWALLOW THERAPY     NAME: Esperanza Merida  : 1931  MRN: [id number]    ADMISSION DATE: 8/10/2021  ADMITTING DIAGNOSIS: has Compression fx, thoracic spine, closed, initial encounter ([facility] [address] History of cardioembolic cerebrovascular accident (CVA); Essential hypertension; Acquired hypothyroidism; Seizure disorder ([address] Other pulmonary embolism without acute cor pulmonale ([address] Deep vein thrombosis (DVT) of left lower extremity ([address] Leg pain, left; Leg swelling; Complicated urinary tract infection; Generalized weakness; Hypocalcemia; Acute bronchitis; Left-sided nontraumatic intracerebral hemorrhage ([address] Left Thalamic; AMS (altered mental status); UTI (urinary tract infection); and Thalamic hemorrhage ([address] on their problem list.    Date of Treat: 2021  Evaluating Therapist: ITZEL Gross    Current Diet level:  Puree consistency with moderately thick/honey thick liquids    Reason for Referral  Esperanza Merida was referred for a bedside swallow evaluation to assess the efficiency of her swallow function, identify signs and symptoms of aspiration and make recommendations regarding safe dietary consistencies, effective compensatory strategies, and safe eating environment. Impression  Re-assessed patient's swallowing function. Patient exhibited slow, decreased oral prep and decreased oral transit of more solid consistencies (regular solid consistency residue cleared from the mouth with additional dry swallows). Patient also exhibited sluggish, mild-moderately decreased laryngeal elevation for swallow airway protection. Even so, no outward S/S penetration/aspiration was noted with any puree consistency presentation, regular solid consistency trial, moderately thick/honey thick liquid presentation, or mildly thick/nectar thick liquid trial presented during treatment session this date.  Patient did exhibit S/S penetration/aspiration with thin H2O trials with mild wet vocal quality observed post swallows but no outward coughs/throat clears noted.     At this time, would trial minced and moist consistency with mildly thick/nectar thick liquids. TOTAL FEED. Recommend meds crushed in pudding/applesauce. If patient receives mouth care prior to intake, okay for ice chips IN BETWEEN MEALS for comfort. Will continue to follow.     Treatment Plan  Requires SLP Intervention: Yes     Recommended Diet and Intervention  Diet Solids Recommendation: Minced and moist  Liquid Consistency Recommendation: Mildly thick (nectar thick)   Recommended Form of Meds: Meds crushed in puree as able  Therapeutic Interventions: Patient/Family education;Diet tolerance monitoring; Therapeutic PO trials with SLP     Compensatory Swallowing Strategies  Compensatory Swallowing Strategies: Upright as possible for all oral intake;TOTAL FEED;Small bites/sips;Eat/Feed slowly; Alternate solids and liquids; Remain upright for 30-45 minutes after meals     Treatment/Goals  Timeframe for Short-term Goals: 1x/day for 3 days   Goal 1: Patient will tolerate minced and moist consistency and mildly thick/nectar thick liquids with min S/S penetration/aspiration during PO intake. Goal 2: Patient staff will follow swallow safety recommendations to decrease risk of penetration/aspiration during PO intake. Goal 3: Re-assessment of swallow function for potential diet upgrade. Goal 4: Monitor speech production.     General  Chart Reviewed: Yes  Behavior/Cognition: Alert; Cooperative  O2 Device: None (Room air)  Communication Observation: (Patient exhibited decreased volume of speech and slow, decreased lingual movements during verbalizations.)  Follows Directions: Simple   Patient Positioning: Upright in bed  Consistencies Administered: Dysphagia Pureed (Dysphagia I); Regular solid; Honey - cup;Nectar - cup; Thin - cup     Re-assessed patient's swallowing function with the following observations noted:     Oral Phase  Mastication: Regular solid (Patient exhibited slow vertical jaw movement at the front of the mouth during oral prep of regular solid consistency trials presented by SLP.)  Suspected Premature Bolus Loss: Puree;Regular solid (Patient exhibited slow oral transit of puree consistency presentations and regular solid consistency trials all presented by SLP.)  Decreased Oral Transit: Regular solid (Min-moderate oral cavity residue was noted post swallows; residue was slow but cleared from the mouth with additional dry swallows.)  Oral Phase - Comment: Min puree consistency residue was noted post swallows; residue cleared from the mouth with additional dry swallows. Oral transit of honey thick liquid presentations, nectar thick liquid trials, and thin H2O trials, all presented via cup by SLP, primarily measured 1-2 seconds in length.      Pharyngeal Phase  Suspected Swallow Delay: Puree;Regular solid (Suspect secondary to oral transit times.)  Laryngeal Elevation: (Patient exhibited sluggish, mild-moderately decreased laryngeal elevation for swallow airway protection.)  Wet Vocal Quality: Thin - cup   Pharyngeal Phase - Comment: No outward S/S penetration/aspiration was noted with any puree consistency presentation, regular solid consistency trial, moderately thick/honey thick liquid presentation, or mildly thick/nectar thick liquid trial presented during treatment session this date. Patient did exhibit S/S penetration/aspiration with thin H2O trials with mild wet vocal quality observed post swallows but no outward coughs/throat clears noted.     At this time, would trial minced and moist consistency with mildly thick/nectar thick liquids. TOTAL FEED. Recommend meds crushed in pudding/applesauce. If patient receives mouth care prior to intake, okay for ice chips IN BETWEEN MEALS for comfort. Will continue to follow.     Electronically signed by ITZEL Vázquez on 8/16/2021 at 12:58 PM

## 2021-08-16 NOTE — PLAN OF CARE
Problem: ACTIVITY INTOLERANCE/IMPAIRED MOBILITY  Goal: Mobility/activity is maintained at optimum level for patient  Outcome: Ongoing     Problem: COMMUNICATION IMPAIRMENT  Goal: Ability to express needs and understand communication  Outcome: Ongoing     Problem: Skin Integrity:  Goal: Will show no infection signs and symptoms  Description: Will show no infection signs and symptoms  Outcome: Ongoing  Goal: Absence of new skin breakdown  Description: Absence of new skin breakdown  Outcome: Ongoing     Problem: Falls - Risk of:  Goal: Will remain free from falls  Description: Will remain free from falls  Outcome: Ongoing  Goal: Absence of physical injury  Description: Absence of physical injury  Outcome: Ongoing     Problem: Pain:  Goal: Pain level will decrease  Description: Pain level will decrease  Outcome: Ongoing  Goal: Control of acute pain  Description: Control of acute pain  Outcome: Ongoing  Goal: Control of chronic pain  Description: Control of chronic pain  Outcome: Ongoing   Electronically signed by Jh Street RN on 8/15/2021 at 11:44 PM

## 2021-08-16 NOTE — PROGRESS NOTES
place;Nurse notified   Plan   Times per week 3-5   Times per day Daily   Electronically signed by LUCIA Reyes Mc on 8/16/2021 at 2:46 PM

## 2021-08-16 NOTE — PROGRESS NOTES
Progress Note  Date:2021       Room:0712/712-02  Patient Name:Geri Sheth     Date of Birth:[birth year]     Age:89 y.o. Subjective    Subjective: First encounter with patient 21.    79-year-old lady who is a poor historian with a past medical history of CVA with residual slurred speech, generalized weakness, coronary artery disease, hypertension, depression, hyperlipidemia, hypothyroidism, TIAs, dizziness and falls presented today hospital 8/10 with concerns of altered mental status. Work-up in the ED included CT head which is evident of intracranial hemorrhage, patient was given FFP as well as vitamin K for reversal of INR. Was admitted for further monitoring as well as neurosurgery evaluation. Patient was evaluated by neurosurgery, repeat CT head was obtained which showed stable left thalamic hemorrhage. 2021 patient more somnolent, and failed swallow study. Discussed with niece about patient clinical course so far. As well as readdressing CODE STATUS. At this time no evidence of infection contributing to patient presentation however on ceftriaxone for possible UTI. patient carries a diagnosis of cystitis. Electrolytes unremarkable, patient afebrile and white blood cell normal. TSH also normal.  Glucose levels adequate. CT head was repeated  to reevaluate intracranial bleed which was stable. Ammonia levels normal, and phenytoin levels now back to normal.    More alert however still has some waxing and waning mentation. Still with decreased oral intake, tolerating purée.  Denied for inpt rehab and awaiting SNF placement      Review of Systems: 12 point negative except as stated above    Objective         Vitals Last 24 Hours:  TEMPERATURE:  Temp  Av.5 °F (36.4 °C)  Min: 96.2 °F (35.7 °C)  Max: 98.2 °F (36.8 °C)  RESPIRATIONS RANGE: Resp  Av.8  Min: 17  Max: 18  PULSE OXIMETRY RANGE: SpO2  Av.6 %  Min: 90 %  Max: 96 %  PULSE RANGE: Pulse  Av.3  Min: 67  Max: left occipital cortical encephalomalacia is unchanged. Left thalamic hemorrhage is unchanged. . There is low attenuation in the periventricular white matter, consistent with chronic ischemic change. There are no abnormal extra-axial fluid collections. There is no evidence of tonsillar herniation. The included orbits and their contents are unremarkable. The visualized paranasal sinuses, mastoid air cells and middle ear cavities are clear. The visualized osseous structures and overlying soft tissues of the skull and face are intact. Stable left thalamic hemorrhage. 2. Old encephalomalacia left occipital lobe stable and unchanged Signed by Dr Minor Lebron    Result Date: 8/10/2021  EXAMINATION: CT head without contrast 8/10/2021 HISTORY: Code stroke. Altered mental status. FINDINGS: Today's exam is compared to a previous study of 7/14/2021. Multiple contiguous axial views are obtained from the skull base to the vertex per protocol without venous contrast enhancement with reformatted images obtained in the sagittal and coronal projections from the original data set. There is diffuse atrophy of the brain with prominence of the subarachnoid spaces and ventricular enlargement. Small vessel ischemic changes are noted involving the periventricular and higher white matter tracts. There is evidence of a remote left PCA territory infarct. Within the left thalamus there is a new 8 mm focus of hyperdensity consistent with an acute left thalamic hemorrhage. No associated mass effect or shift of the midline. No additional sites of hemorrhage are demonstrated. 1.. There is a new focus of intra-axial hemorrhage within the left thalamus measuring 8 mm in size. I spoke with Dr. Carolyn Eason in the emergency room at 7:45 PM concerning the finding and to call the code stroke report. 2. Diffuse atrophy with small vessel ischemic disease. Remote left PCA territory infarct.  3. Bilateral temporal thinning is demonstrated. Signed by Dr Santiago Barker    Result Date: 8/10/2021  EXAMINATION: Chest one view 8/10/2021 HISTORY: Altered mental status. FINDINGS: Today's exam is compared to previous study dated 5/29/2021. Lungs are hypoventilated with mild basilar atelectasis. There is thickening of the minor fissure on the right. No evidence of acute consolidative pneumonia or effusion. The thoracic aorta is ectatic. 1.. Expiratory chest with basilar atelectasis. No evidence of lobar pneumonia or effusion. Signed by Dr Oumar Lafleur    * (Principal) Left-sided nontraumatic intracerebral hemorrhage [facility]), Left Thalamic 8/10/2021 Yes    AMS (altered mental status) 8/10/2021 Yes    UTI (urinary tract infection) 8/10/2021 Yes    Thalamic hemorrhage ([address] 8/12/2021 Yes        Assessment & Plan    Unspecified altered mental status with waxing and waning mentation  Left-sided nontraumatic intracerebral hemorrhage, stable CT of the head from 8/13/2021. Supratherapeutic INR  Bacteriuria:  Elevated phenytoin levels: Now within normal limits  Inadequate oral intake with concerns of aspirations      Chronic issues  Prior CVA  CAD s/p stent  Hypothyroidism  Hyperlipidemia  Hypertension  Depression  Cystitis  Chronic back pain  TIA. Plan  Repeat CT head 8/11/21 and 8/13/2021. with stable intracerebral hemorrhage. To follow up with NS outpt  Status post FFP and vitamin K for INR reversal and given clinical setting of [address]  Patient with a history of cystitis as well as noted to have bacteriuria, however given overall clinical status completed 3 days course. Reduced phenytoin to daily from twice daily  Follow speech recommendations for diet      If no significant improvement in clinical status, will need goals of care rediscussion; discussed with Niece on Monday.       Continue other chronic medications for underlying chronic

## 2021-08-16 NOTE — PROGRESS NOTES
Bladder scan repeated at 0430 and showed 148 mL in her bladder. Purewick container still to suction and canister still empty. Pt denies feeling like she needs to void and is refusing anything to drink. Notified hospitalist. Order received for NS bolus.  Electronically signed by Russell Del Rosario RN on 8/16/2021 at 4:51 AM

## 2021-08-16 NOTE — PROGRESS NOTES
Pt's franco was DC'd at 1830 last night. Lin Apley was put in position with suction. Pt as yet to void. Bladder scan done at 0030 and showed 53 mL. Pt has super low intake. She has taken her medication tonight with applesauce, but no other intake.  Electronically signed by Anahi Peralta RN on 8/16/2021 at [facility]

## 2021-08-16 NOTE — PROGRESS NOTES
Physical Therapy  Name: Abdirahman Kendall  MRN:  [id number]  Date of service:  8/16/2021 08/16/21 1444   Restrictions/Precautions   Restrictions/Precautions Fall Risk   Subjective   Subjective Pt agreed to get up to the chair. Family member states pt seems more alert today. Pain Screening   Patient Currently in Pain Denies   Bed Mobility   Rolling Maximal assistance   Transfers   Bed to Chair Dependent/Total   Comment Use of lift   Other Activities   Comment AROM of BUE and BLE, pt able to perform some range active with UE's and LE's   Short term goals   Time Frame for Short term goals 14 DAYS   Short term goal 1 BED MOB MOD-MAX   Short term goal 2 SITTING BALANCE > POOR   Short term goal 3 SIT TO STAND MAX   Conditions Requiring Skilled Therapeutic Intervention   Body structures, Functions, Activity limitations Decreased functional mobility ; Decreased ADL status; Decreased ROM; Decreased strength;Decreased balance;Decreased posture   Assessment Pt seemed more alert once transferred to the chair. Pt able to tolerate transfer with lift but required max assist for rolling to place lift pad. Pt up in chair with call light in reach, chair alarm on, and family member present. Activity Tolerance   Activity Tolerance Patient limited by cognitive status; Patient Tolerated treatment well   Safety Devices   Type of devices Call light within reach; Chair alarm in place; Left in chair         Electronically signed by Daralyn Canavan, PTA on 8/16/2021 at 2:53 PM

## 2021-08-17 PROCEDURE — 97530 THERAPEUTIC ACTIVITIES: CPT

## 2021-08-17 PROCEDURE — 6360000002 HC RX W HCPCS: Performed by: HOSPITALIST

## 2021-08-17 PROCEDURE — 2140000000 HC CCU INTERMEDIATE R&B

## 2021-08-17 PROCEDURE — 6370000000 HC RX 637 (ALT 250 FOR IP): Performed by: HOSPITALIST

## 2021-08-17 PROCEDURE — 94640 AIRWAY INHALATION TREATMENT: CPT

## 2021-08-17 PROCEDURE — 94761 N-INVAS EAR/PLS OXIMETRY MLT: CPT

## 2021-08-17 PROCEDURE — 2580000003 HC RX 258: Performed by: HOSPITALIST

## 2021-08-17 RX ADMIN — SERTRALINE HYDROCHLORIDE 100 MG: 100 TABLET ORAL at 08:17

## 2021-08-17 RX ADMIN — SODIUM CHLORIDE, PRESERVATIVE FREE 10 ML: 5 INJECTION INTRAVENOUS at 08:17

## 2021-08-17 RX ADMIN — ATORVASTATIN CALCIUM 80 MG: 40 TABLET, FILM COATED ORAL at 23:27

## 2021-08-17 RX ADMIN — BUDESONIDE 500 MCG: 0.5 SUSPENSION RESPIRATORY (INHALATION) at 18:39

## 2021-08-17 RX ADMIN — ARFORMOTEROL TARTRATE 15 MCG: 15 SOLUTION RESPIRATORY (INHALATION) at 18:39

## 2021-08-17 RX ADMIN — CARVEDILOL 25 MG: 25 TABLET, FILM COATED ORAL at 08:17

## 2021-08-17 RX ADMIN — LEVETIRACETAM 500 MG: 5 INJECTION INTRAVENOUS at 23:26

## 2021-08-17 RX ADMIN — GUAIFENESIN 600 MG: 600 TABLET, EXTENDED RELEASE ORAL at 23:26

## 2021-08-17 RX ADMIN — CARVEDILOL 25 MG: 25 TABLET, FILM COATED ORAL at 17:18

## 2021-08-17 RX ADMIN — PHENYTOIN SODIUM 200 MG: 100 CAPSULE ORAL at 08:17

## 2021-08-17 RX ADMIN — ARFORMOTEROL TARTRATE 15 MCG: 15 SOLUTION RESPIRATORY (INHALATION) at 07:22

## 2021-08-17 RX ADMIN — OXYBUTYNIN CHLORIDE 5 MG: 5 TABLET, EXTENDED RELEASE ORAL at 08:17

## 2021-08-17 RX ADMIN — BUDESONIDE 500 MCG: 0.5 SUSPENSION RESPIRATORY (INHALATION) at 07:22

## 2021-08-17 RX ADMIN — LEVOTHYROXINE SODIUM 175 MCG: 125 TABLET ORAL at 08:17

## 2021-08-17 RX ADMIN — PANTOPRAZOLE SODIUM 20 MG: 20 TABLET, DELAYED RELEASE ORAL at 08:18

## 2021-08-17 RX ADMIN — LEVETIRACETAM 500 MG: 5 INJECTION INTRAVENOUS at 11:32

## 2021-08-17 RX ADMIN — GUAIFENESIN 600 MG: 600 TABLET, EXTENDED RELEASE ORAL at 08:17

## 2021-08-17 NOTE — CARE COORDINATION
Pt has only received one vaccination at this time and did not receive second dose d/t being admitted to the hospital when second dose was scheduled. [facility] are unable to offer to any pt's that are not fully vaccinated as they currently cannot provide the isolation required once admitted to the facility. Awaiting review and response from [facility].

## 2021-08-17 NOTE — CARE COORDINATION
Pt has outstanding balance at [facility] and must be paid prior to facility being able to initiate precert. SW provided number of pt's niece to admissions in order to discuss outstanding balance.

## 2021-08-17 NOTE — CARE COORDINATION
[address] [facility] states having spoken with pt's niece and agreeable to pay outstanding balance so facility initiating precert today 3/71.     [address] [facility]: [phone]  F: [phone]

## 2021-08-17 NOTE — PROGRESS NOTES
Occupational Therapy  Facility/Department: [facility] PROGRESSIVE CARE  Daily Treatment Note  NAME: Chandra Garcia  : 1931  MRN: [id number]    Date of Service: 2021    Discharge Recommendations:  24 hour supervision or assist       Assessment   Assessment: Pt. 's decreased sitting balance impedes ability to work on standing. Patient Diagnosis(es): The primary encounter diagnosis was Altered mental status, unspecified altered mental status type. Diagnoses of Intracranial hemorrhage ([address] Urinary tract infection without hematuria, site unspecified, and Elevated phenytoin level were also pertinent to this visit. has a past medical history of Arthritis, Back pain, CAD (coronary artery disease), Cerebral artery occlusion with cerebral infarction ([address] Cystitis, Depression, Dizziness, Dysuria, Edema, Falls frequently, H/O blood clots, Head injury, Headache, Heartburn, [place] (hard of hearing), Hyperlipidemia, Hypertension, Hypothyroidism, Hypothyroidism, Memory problem, Neuropathy, Shingles, TIA (transient ischemic attack), Urinary incontinence, and UTI (urinary tract infection). has a past surgical history that includes joint replacement; Cholecystectomy; Hysterectomy, total abdominal; Knee Arthroplasty (Right); lobectomy (10/2015); Appendectomy (1947); Percutaneous Transluminal Coronary Angio (); and Eye surgery (). Restrictions  Restrictions/Precautions  Restrictions/Precautions: Fall Risk  Subjective   General  Chart Reviewed: Yes  Patient assessed for rehabilitation services?: Yes  Response to previous treatment: Patient with no complaints from previous session  Family / Caregiver Present: Yes  Subjective  Subjective: Pt in bed with daughter present. Pt alert this date and agreeable to get up into recliner chair.        Orientation     Objective             Balance  Sitting Balance: Maximum assistance                                                  Type of ROM/Therapeutic Exercise  Comment: Pt. able to lift LUE to 60 deg shld elevation against gravity. L elbow flex and Ext. able to push against resistance.                     Plan   Plan  Times per week: 3-5  Times per day: Daily  G-Code     OutComes Score                                                  AM-PAC Score             Goals  Short term goals  Short term goal 1: Patient will be able to feed self 25 % of meal  Short term goal 2: Patient and family will verbalize DME recommendations  Short term goal 3: Patient and family will be independent with therapeutic activity and postioning recommandations       Therapy Time   Individual Concurrent Group Co-treatment   Time In           Time Out           Minutes                   Gary Leon, OT

## 2021-08-17 NOTE — PROGRESS NOTES
72  BLOOD PRESSURE RANGE: Systolic (42YJF), ZNZ:066 , Min:105 , XGE:163   ; Diastolic (50CVG), LWK:05, Min:62, Max:85    I/O (24Hr): Intake/Output Summary (Last 24 hours) at 8/17/2021 1321  Last data filed at 8/17/2021 0947  Gross per 24 hour   Intake 120 ml   Output 150 ml   Net -30 ml         Physical Examination:  General: Alert and conversant, no acute distress, sitting in recliner  HEENT: Atraumatic normocephalic, range of motion normal   Cardiac: Normal S1-S2   Respiratory: clear To auscultation bilaterally, + rhonchi, no wheezing  Abdomen: Soft, positive bowel sounds in all quadrants, no distention, nontender to palpation  Extremities: no tenderness, trace LE edema, moves all extremities  Psych: flat affect, impaired cognition        Labs/Imaging/Diagnostics    Labs:  CBC:  Recent Labs     08/16/21  0805   WBC 6.0   RBC 4.28   HGB 13.7   HCT 41.6   MCV 97.2   RDW 13.3        CHEMISTRIES:  Recent Labs     08/16/21  0805      K 3.4*      CO2 25   BUN 17   CREATININE 0.5   GLUCOSE 132*     PT/INR:  No results for input(s): PROTIME, INR in the last 72 hours. APTT:No results for input(s): APTT in the last 72 hours. LIVER PROFILE:  No results for input(s): AST, ALT, BILIDIR, BILITOT, ALKPHOS in the last 72 hours. Imaging Last 24 Hours:  CT HEAD WO CONTRAST    Result Date: 8/11/2021  EXAMINATION: CT HEAD WO CONTRAST 8/11/2021 3:21 PM HISTORY: CT BRAIN without contrast 8/11/2021 1:25 PM HISTORY: Thalamic hemorrhage COMPARISON: August 10, 2020 DLP: 9 4 mGy cm TECHNIQUE: Serial axial tomographic images of the brain were obtained without the use of intravenous contrast. FINDINGS: The midline structures are nondisplaced. There is mild cerebral and cerebellar volume loss, with an associated increase in the prominence of the ventricles and sulci. The basilar cisterns are normal in size and configuration left occipital cortical encephalomalacia is unchanged. Left thalamic hemorrhage is unchanged. [facility] There is low attenuation in the periventricular white matter, consistent with chronic ischemic change. There are no abnormal extra-axial fluid collections. There is no evidence of tonsillar herniation. The included orbits and their contents are unremarkable. The visualized paranasal sinuses, mastoid air cells and middle ear cavities are clear. The visualized osseous structures and overlying soft tissues of the skull and face are intact. Stable left thalamic hemorrhage. 2. Old encephalomalacia left occipital lobe stable and unchanged Signed by Dr Willis Larson    Result Date: 8/10/2021  EXAMINATION: CT head without contrast 8/10/2021 HISTORY: Code stroke. Altered mental status. FINDINGS: Today's exam is compared to a previous study of 7/14/2021. Multiple contiguous axial views are obtained from the skull base to the vertex per protocol without venous contrast enhancement with reformatted images obtained in the sagittal and coronal projections from the original data set. There is diffuse atrophy of the brain with prominence of the subarachnoid spaces and ventricular enlargement. Small vessel ischemic changes are noted involving the periventricular and higher white matter tracts. There is evidence of a remote left PCA territory infarct. Within the left thalamus there is a new 8 mm focus of hyperdensity consistent with an acute left thalamic hemorrhage. No associated mass effect or shift of the midline. No additional sites of hemorrhage are demonstrated. 1.. There is a new focus of intra-axial hemorrhage within the left thalamus measuring 8 mm in size. I spoke with Dr. Augustus Martínez in the emergency room at 7:45 PM concerning the finding and to call the code stroke report. 2. Diffuse atrophy with small vessel ischemic disease. Remote left PCA territory infarct. 3. Bilateral temporal thinning is demonstrated.  Signed by Dr Tiarra Johns    XR CHEST PORTABLE    Result Date: 8/10/2021  EXAMINATION: Chest one view 8/10/2021 HISTORY: Altered mental status. FINDINGS: Today's exam is compared to previous study dated 5/29/2021. Lungs are hypoventilated with mild basilar atelectasis. There is thickening of the minor fissure on the right. No evidence of acute consolidative pneumonia or effusion. The thoracic aorta is ectatic. 1.. Expiratory chest with basilar atelectasis. No evidence of lobar pneumonia or effusion. Signed by Dr Neftali Oconnor    * (Principal) Left-sided nontraumatic intracerebral hemorrhage [facility]), Left Thalamic 8/10/2021 Yes    AMS (altered mental status) 8/10/2021 Yes    UTI (urinary tract infection) 8/10/2021 Yes    Thalamic hemorrhage ([address] 8/12/2021 Yes        Assessment & Plan    Unspecified altered mental status with waxing and waning mentation  Left-sided nontraumatic intracerebral hemorrhage, stable CT of the head from 8/13/2021. Supratherapeutic INR  Bacteriuria:  Elevated phenytoin levels: Now within normal limits  Inadequate oral intake with concerns of aspirations      Chronic issues  Prior CVA  CAD s/p stent  Hypothyroidism  Hyperlipidemia  Hypertension  Depression  Cystitis  Chronic back pain  TIA. Plan  Repeat CT head 8/11/21 and 8/13/2021. with stable intracerebral hemorrhage. To follow up with NS outpt  Status post FFP and vitamin K for INR reversal and given clinical setting of [address]  Patient with a history of cystitis as well as noted to have bacteriuria, however given overall clinical status completed 3 days course. Reduced phenytoin to daily from twice daily  Follow speech recommendations for diet        Continue other chronic medications for underlying chronic issues.       Dispo: Awaiting SNF acceptance        Electronically signed by   Tony Power MD   Internal Medicine Hospitalist  On 8/17/2021  At 1:21 PM    EMR Dragon/Transcription disclaimer:   Much of this encounter note is an electronic transcription/translation of spoken language to printed text.  The electronic translation of spoken language may permit erroneous, or at times, nonsensical words or phrases to be inadvertently transcribed; although attempts have made to review the note for such errors, some may still exist.

## 2021-08-17 NOTE — PROGRESS NOTES
Physical Therapy  Name: Enrique Rubi  MRN:  [id number]  Date of service:  8/17/2021 08/17/21 0754   Restrictions/Precautions   Restrictions/Precautions Fall Risk   Subjective   Subjective Pt was alert this morning and agreed to attempt sitting EOB. Pain Screening   Patient Currently in Pain Denies   Bed Mobility   Rolling Maximal assistance   Supine to Sit Maximal assistance;Dependent/Total  (X2)   Sit to Supine Maximal assistance;Dependent/Total  (X2)   Comment Sat EOB x10 minutes max/dep x2    Short term goals   Time Frame for Short term goals 14 DAYS   Short term goal 1 BED MOB MOD-MAX   Short term goal 2 SITTING BALANCE > POOR   Short term goal 3 SIT TO STAND MAX   Conditions Requiring Skilled Therapeutic Intervention   Body structures, Functions, Activity limitations Decreased functional mobility ; Decreased ADL status; Decreased ROM; Decreased strength;Decreased balance;Decreased posture   Assessment Attempted to work on sitting balance with pt on sitting balance EOB. Pt had significant right lateral lean and was only able to maintain balance 1 sec. Pt required max/dep x2 to remain in sitting. Pt back in bed with alarm on and call light in reach. Activity Tolerance   Activity Tolerance Patient limited by cognitive status; Patient limited by endurance   Safety Devices   Type of devices Call light within reach; Left in bed;Bed alarm in place         Electronically signed by Pawan Vanegas PTA on 8/17/2021 at 8:13 AM

## 2021-08-18 LAB
ANION GAP SERPL CALCULATED.3IONS-SCNC: 9 MMOL/L (ref 7–19)
BUN BLDV-MCNC: 10 MG/DL (ref 8–23)
CALCIUM SERPL-MCNC: 8.2 MG/DL (ref 8.8–10.2)
CHLORIDE BLD-SCNC: 108 MMOL/L (ref 98–111)
CO2: 25 MMOL/L (ref 22–29)
CREAT SERPL-MCNC: 0.4 MG/DL (ref 0.5–0.9)
GFR AFRICAN AMERICAN: >59
GFR NON-AFRICAN AMERICAN: >60
GLUCOSE BLD-MCNC: 115 MG/DL (ref 70–99)
GLUCOSE BLD-MCNC: 119 MG/DL (ref 70–99)
GLUCOSE BLD-MCNC: 121 MG/DL (ref 74–109)
HCT VFR BLD CALC: 44.7 % (ref 37–47)
HEMOGLOBIN: 14.6 G/DL (ref 12–16)
MCH RBC QN AUTO: 31.7 PG (ref 27–31)
MCHC RBC AUTO-ENTMCNC: 32.7 G/DL (ref 33–37)
MCV RBC AUTO: 97.2 FL (ref 81–99)
PDW BLD-RTO: 13.3 % (ref 11.5–14.5)
PERFORMED ON: ABNORMAL
PERFORMED ON: ABNORMAL
PLATELET # BLD: 187 K/UL (ref 130–400)
PMV BLD AUTO: 11 FL (ref 9.4–12.3)
POTASSIUM SERPL-SCNC: 3.7 MMOL/L (ref 3.5–5)
RBC # BLD: 4.6 M/UL (ref 4.2–5.4)
SODIUM BLD-SCNC: 142 MMOL/L (ref 136–145)
WBC # BLD: 6.6 K/UL (ref 4.8–10.8)

## 2021-08-18 PROCEDURE — 6360000002 HC RX W HCPCS: Performed by: HOSPITALIST

## 2021-08-18 PROCEDURE — 6370000000 HC RX 637 (ALT 250 FOR IP): Performed by: HOSPITALIST

## 2021-08-18 PROCEDURE — 2140000000 HC CCU INTERMEDIATE R&B

## 2021-08-18 PROCEDURE — 80048 BASIC METABOLIC PNL TOTAL CA: CPT

## 2021-08-18 PROCEDURE — 2580000003 HC RX 258: Performed by: HOSPITALIST

## 2021-08-18 PROCEDURE — 92526 ORAL FUNCTION THERAPY: CPT

## 2021-08-18 PROCEDURE — 94761 N-INVAS EAR/PLS OXIMETRY MLT: CPT

## 2021-08-18 PROCEDURE — 85027 COMPLETE CBC AUTOMATED: CPT

## 2021-08-18 PROCEDURE — 82947 ASSAY GLUCOSE BLOOD QUANT: CPT

## 2021-08-18 PROCEDURE — 94640 AIRWAY INHALATION TREATMENT: CPT

## 2021-08-18 PROCEDURE — 36415 COLL VENOUS BLD VENIPUNCTURE: CPT

## 2021-08-18 RX ADMIN — ARFORMOTEROL TARTRATE 15 MCG: 15 SOLUTION RESPIRATORY (INHALATION) at 18:25

## 2021-08-18 RX ADMIN — BUDESONIDE 500 MCG: 0.5 SUSPENSION RESPIRATORY (INHALATION) at 18:25

## 2021-08-18 RX ADMIN — GUAIFENESIN 600 MG: 600 TABLET, EXTENDED RELEASE ORAL at 09:05

## 2021-08-18 RX ADMIN — CARVEDILOL 25 MG: 25 TABLET, FILM COATED ORAL at 18:16

## 2021-08-18 RX ADMIN — PHENYTOIN SODIUM 200 MG: 100 CAPSULE ORAL at 09:05

## 2021-08-18 RX ADMIN — ARFORMOTEROL TARTRATE 15 MCG: 15 SOLUTION RESPIRATORY (INHALATION) at 06:51

## 2021-08-18 RX ADMIN — ATORVASTATIN CALCIUM 80 MG: 40 TABLET, FILM COATED ORAL at 20:45

## 2021-08-18 RX ADMIN — CARVEDILOL 25 MG: 25 TABLET, FILM COATED ORAL at 09:05

## 2021-08-18 RX ADMIN — SERTRALINE HYDROCHLORIDE 100 MG: 100 TABLET ORAL at 09:05

## 2021-08-18 RX ADMIN — PANTOPRAZOLE SODIUM 20 MG: 20 TABLET, DELAYED RELEASE ORAL at 09:05

## 2021-08-18 RX ADMIN — OXYBUTYNIN CHLORIDE 5 MG: 5 TABLET, EXTENDED RELEASE ORAL at 09:05

## 2021-08-18 RX ADMIN — SODIUM CHLORIDE, PRESERVATIVE FREE 10 ML: 5 INJECTION INTRAVENOUS at 09:06

## 2021-08-18 RX ADMIN — LEVOTHYROXINE SODIUM 175 MCG: 125 TABLET ORAL at 09:05

## 2021-08-18 RX ADMIN — BUDESONIDE 500 MCG: 0.5 SUSPENSION RESPIRATORY (INHALATION) at 06:51

## 2021-08-18 RX ADMIN — LEVETIRACETAM 500 MG: 5 INJECTION INTRAVENOUS at 11:20

## 2021-08-18 ASSESSMENT — PAIN SCALES - GENERAL
PAINLEVEL_OUTOF10: 0

## 2021-08-18 NOTE — PLAN OF CARE
Problem: Nutrition  Goal: Optimal nutrition therapy  Outcome: Ongoing  Note: Nutrition Problem #1: Inadequate oral intake  Intervention: Food and/or Nutrient Delivery: Continue Current Diet, Start Oral Nutrition Supplement  Nutritional Goals: PO 50% or more

## 2021-08-18 NOTE — PROGRESS NOTES
Occupational Therapy  Pt in bed upon arrival for therapy. Pt states \"I don;t want to get up right now. I want to stay right here\". Pt pleasant and more alert this date.  Will try back later Electronically signed by LUCIA Horta on 8/18/2021 at 10:35 AM

## 2021-08-18 NOTE — PROGRESS NOTES
Nutrition Assessment     Type and Reason for Visit: Initial, RD Nutrition Re-Screen/LOS      Nutrition Assessment:  Pt is nutritionally compromised AEB decreased po intake. SLP following, diet consistency advanced today, will follow and monitor tolerance and intake. Malnutrition Assessment:  Malnutrition Status: Insufficient data    Estimated Daily Nutrient Needs:  Energy (kcal): 8589-1174; Weight Used for Energy Requirements:  Admission     Protein (g): 77-96; Weight Used for Protein Requirements:  Ideal        Fluid (ml/day): 1370-0228; Weight Used for Fluid Requirements:  1 ml/kcal      Nutrition Related Findings:        Current Nutrition Therapies:    ADULT DIET; Dysphagia - Soft and Bite Sized; Mildly Thick ([place])  Adult Oral Nutrition Supplement; Fortified Pudding Oral Supplement    Anthropometric Measures:  · Height: 5' 8\" (172.7 cm)  · Current Body Wt: 194 lb (88 kg)   · BMI: 29.5    Nutrition Diagnosis:   · Inadequate oral intake related to cognitive or neurological impairment, swallowing difficulty as evidenced by intake 26-50%      Nutrition Interventions:   Food and/or Nutrient Delivery:  Continue Current Diet, Start Oral Nutrition Supplement  Nutrition Education/Counseling:  No recommendation at this time   Coordination of Nutrition Care:  No recommendation at this time    Goals:  PO 50% or more       Nutrition Monitoring and Evaluation:   Behavioral-Environmental Outcomes:  None Identified   Food/Nutrient Intake Outcomes:  Diet Advancement/Tolerance, Food and Nutrient Intake  Physical Signs/Symptoms Outcomes:  Skin, Weight, Biochemical Data     Discharge Planning:     Too soon to determine     Electronically signed by Radha Fenton, MS, RD, LD on 8/18/21 at 1:54 PM CDT    Contact: [phone]

## 2021-08-18 NOTE — PROGRESS NOTES
Speech Language Pathology  Facility/Department: [facility] PROGRESSIVE CARE  SWALLOW THERAPY     NAME: Eliel Guerin  : 1931  MRN: [id number]    ADMISSION DATE: 8/10/2021  ADMITTING DIAGNOSIS: has Compression fx, thoracic spine, closed, initial encounter ([address] History of cardioembolic cerebrovascular accident (CVA); Essential hypertension; Acquired hypothyroidism; Seizure disorder ([address] Other pulmonary embolism without acute cor pulmonale ([address] Deep vein thrombosis (DVT) of left lower extremity ([address] Leg pain, left; Leg swelling; Complicated urinary tract infection; Generalized weakness; Hypocalcemia; Acute bronchitis; Left-sided nontraumatic intracerebral hemorrhage ([address] Left Thalamic; AMS (altered mental status); UTI (urinary tract infection); and Thalamic hemorrhage ([address] on their problem list.    Date of Treat: 2021  Evaluating Therapist: ITZEL Contreras    Current Diet level:  Minced and moist consistency with mildly thick/nectar thick liquids    Reason for Referral  Eliel Guerin was referred for a bedside swallow evaluation to assess the efficiency of her swallow function, identify signs and symptoms of aspiration and make recommendations regarding safe dietary consistencies, effective compensatory strategies, and safe eating environment. Impression  Re-assessed patient's swallowing function. Patient exhibited decreased oral prep of more solid consistencies as well as sluggish, mild-moderately decreased laryngeal elevation for swallow airway protection. It is noted that inconsistent delayed coughs were observed with every consistency presented during treatment session this date (puree; regular solid;mildly thick/nectar - cup; thin - cup).  However, do not feel that all coughs were related to penetration/aspiration secondary to timing of swallows and presence of throat movement and patient exhibited coughs inconsistently at rest.    At this time, would trial soft and bite sized consistency. Continue mildly thick/nectar thick liquids. Recommend meds in pudding/applesauce. If patient receives mouth care prior to intake, okay for ice chips IN BETWEEN MEALS for comfort. Will continue to follow.     Treatment Plan  Requires SLP Intervention: Yes     Recommended Diet and Intervention  Diet Solids Recommendation: Soft and bite sized  Liquid Consistency Recommendation: Mildly thick (nectar thick)   Recommended Form of Meds: Meds in puree as able  Therapeutic Interventions: Patient/Family education;Diet tolerance monitoring; Therapeutic PO trials with SLP     Compensatory Swallowing Strategies  Compensatory Swallowing Strategies: Upright as possible for all oral intake;Small bites/sips;Eat/Feed slowly; Alternate solids and liquids; Remain upright for 30-45 minutes after meals     Treatment/Goals  Timeframe for Short-term Goals: 1x/day for 3 days   Goal 1: Patient will tolerate soft and bite sized consistency and mildly thick/nectar thick liquids with min S/S penetration/aspiration during PO intake. Goal 2: Patient staff will follow swallow safety recommendations to decrease risk of penetration/aspiration during PO intake. Goal 3: Re-assessment of swallow function for potential diet upgrade. Goal 4: Monitor speech production.     General  Chart Reviewed: Yes  Behavior/Cognition: Alert; Cooperative  O2 Device: None (Room air)  Communication Observation: (Patient exhibited decreased volume of speech and slow, decreased lingual movements during verbalizations.)  Follows Directions: Simple   Patient Positioning: Upright in bed  Consistencies Administered: Dysphagia Pureed (Dysphagia I); Regular solid; Nectar - cup; Thin - cup     Re-assessed patient's swallowing function with the following observations noted:     Oral Phase  Mastication: Regular solid (Patient exhibited adequate vertical jaw movement at the front of the mouth during oral prep of regular solid consistency trials presented by SLP.)  Suspected Premature Bolus Loss: Regular solid (Patient exhibited slow oral transit of regular solid consistency trials.)  Oral Phase - Comment: Oral transit of puree consistency presentations, administered by SLP, primarily measured 1-2 seconds in length and min oral cavity residue was noted post swallows; residue cleared from the mouth with additional dry swallows. Min regular solid consistency residue was observed post swallows; residue cleared from the mouth with additional dry swallows. Oral transit of nectar thick liquid presentations and thin H2O trials, all presented via cup by SLP, primarily measured 1-2 seconds in length.      Pharyngeal Phase  Suspected Swallow Delay: Regular solid (Suspect secondary to oral transit times.)  Laryngeal Elevation: (Patient exhibited sluggish, mild-moderately decreased laryngeal elevation for swallow airway protection.)  Delayed Cough: All   Pharyngeal Phase - Comment: It is noted that inconsistent delayed coughs were observed with every consistency presented during treatment session this date (puree; regular solid;mildly thick/nectar - cup; thin - cup). However, do not feel that all coughs were related to penetration/aspiration secondary to timing of swallows and presence of throat movement and patient exhibited coughs inconsistently at rest.    At this time, would trial soft and bite sized consistency. Continue mildly thick/nectar thick liquids. Recommend meds in pudding/applesauce. If patient receives mouth care prior to intake, okay for ice chips IN BETWEEN MEALS for comfort. Will continue to follow.     Electronically signed by ITZEL Stroud on 8/18/2021 at 1:33 PM

## 2021-08-18 NOTE — PROGRESS NOTES
Progress Note  Date:2021       Room:0712/712-02  Patient Name:Geri Sheth     Date of Birth:[birth year]     Age:89 y.o. Subjective    Subjective: First encounter with patient 21.      77-year-old lady who is a poor historian with a past medical history of CVA with residual slurred speech, generalized weakness, coronary artery disease, hypertension, depression, hyperlipidemia, hypothyroidism, TIAs, dizziness and falls presented today hospital 8/10 with concerns of altered mental status. Work-up in the ED included CT head which is evident of intracranial hemorrhage, patient was given FFP as well as vitamin K for reversal of INR. Patient was evaluated by neurosurgery, repeat CT head was obtained which showed stable left thalamic hemorrhage. 2021 patient more somnolent, and failed swallow study. Discussed with niece about patient clinical course so far. As well as readdressing CODE STATUS. At this time no evidence of infection contributing to patient presentation however on ceftriaxone for possible UTI. patient carries a diagnosis of cystitis. Electrolytes unremarkable, patient afebrile and white blood cell normal. TSH also normal.  Glucose levels adequate. CT head was repeated  to reevaluate intracranial bleed which was stable. Ammonia levels normal, and phenytoin levels now back to normal.    More alert and close to her baseline, however still has some waxing and waning mentation. Still with decreased oral intake, tolerating purée.  Denied for inpt rehab and awaiting SNF placement      Review of Systems: 12 point negative except as stated above    Objective         Vitals Last 24 Hours:  TEMPERATURE:  Temp  Av.5 °F (35.8 °C)  Min: 96 °F (35.6 °C)  Max: 97.2 °F (36.2 °C)  RESPIRATIONS RANGE: Resp  Av.3  Min: 14  Max: 20  PULSE OXIMETRY RANGE: SpO2  Av %  Min: 90 %  Max: 92 %  PULSE RANGE: Pulse  Av.7  Min: 69  Max: 75  BLOOD PRESSURE RANGE: Systolic (97ERV), Av , Min:109 , JOZ:887   ; Diastolic (40TGJ), FIX:14, Min:56, Max:64    I/O (24Hr): Intake/Output Summary (Last 24 hours) at 2021 1519  Last data filed at 2021 1035  Gross per 24 hour   Intake 360 ml   Output --   Net 360 ml         Physical Examination:  General: Alert and conversant, no acute distress, sitting in recliner  HEENT: Atraumatic normocephalic, range of motion normal   Cardiac: Normal S1-S2   Respiratory: clear To auscultation bilaterally, + rhonchi, no wheezing  Abdomen: Soft, positive bowel sounds in all quadrants, no distention, nontender to palpation  Extremities: no tenderness, trace LE edema, moves all extremities  Psych: flat affect, impaired cognition        Labs/Imaging/Diagnostics    Labs:  CBC:  Recent Labs     21  0805 21  0858   WBC 6.0 6.6   RBC 4.28 4.60   HGB 13.7 14.6   HCT 41.6 44.7   MCV 97.2 97.2   RDW 13.3 13.3    187     CHEMISTRIES:  Recent Labs     21  0805 21  0858    142   K 3.4* 3.7    108   CO2 25 25   BUN 17 10   CREATININE 0.5 0.4*   GLUCOSE 132* 121*     PT/INR:  No results for input(s): PROTIME, INR in the last 72 hours. APTT:No results for input(s): APTT in the last 72 hours. LIVER PROFILE:  No results for input(s): AST, ALT, BILIDIR, BILITOT, ALKPHOS in the last 72 hours. Imaging Last 24 Hours:  CT HEAD WO CONTRAST    Result Date: 2021  EXAMINATION: CT HEAD WO CONTRAST 2021 3:21 PM HISTORY: CT BRAIN without contrast 2021 1:25 PM HISTORY: Thalamic hemorrhage COMPARISON: August 10, 2020 DLP: 9 4 mGy cm TECHNIQUE: Serial axial tomographic images of the brain were obtained without the use of intravenous contrast. FINDINGS: The midline structures are nondisplaced. There is mild cerebral and cerebellar volume loss, with an associated increase in the prominence of the ventricles and sulci.  The basilar cisterns are normal in size and configuration left occipital cortical encephalomalacia is unchanged. Left thalamic hemorrhage is unchanged. . There is low attenuation in the periventricular white matter, consistent with chronic ischemic change. There are no abnormal extra-axial fluid collections. There is no evidence of tonsillar herniation. The included orbits and their contents are unremarkable. The visualized paranasal sinuses, mastoid air cells and middle ear cavities are clear. The visualized osseous structures and overlying soft tissues of the skull and face are intact. Stable left thalamic hemorrhage. 2. Old encephalomalacia left occipital lobe stable and unchanged Signed by Dr Ambrocio Hoang    Result Date: 8/10/2021  EXAMINATION: CT head without contrast 8/10/2021 HISTORY: Code stroke. Altered mental status. FINDINGS: Today's exam is compared to a previous study of 7/14/2021. Multiple contiguous axial views are obtained from the skull base to the vertex per protocol without venous contrast enhancement with reformatted images obtained in the sagittal and coronal projections from the original data set. There is diffuse atrophy of the brain with prominence of the subarachnoid spaces and ventricular enlargement. Small vessel ischemic changes are noted involving the periventricular and higher white matter tracts. There is evidence of a remote left PCA territory infarct. Within the left thalamus there is a new 8 mm focus of hyperdensity consistent with an acute left thalamic hemorrhage. No associated mass effect or shift of the midline. No additional sites of hemorrhage are demonstrated. 1.. There is a new focus of intra-axial hemorrhage within the left thalamus measuring 8 mm in size. I spoke with Dr. Becky Pisano in the emergency room at 7:45 PM concerning the finding and to call the code stroke report. 2. Diffuse atrophy with small vessel ischemic disease. Remote left PCA territory infarct. 3. Bilateral temporal thinning is demonstrated.  Signed by Dr Sandra Vasquez    XR CHEST PORTABLE    Result Date: 8/10/2021  EXAMINATION: Chest one view 8/10/2021 HISTORY: Altered mental status. FINDINGS: Today's exam is compared to previous study dated 5/29/2021. Lungs are hypoventilated with mild basilar atelectasis. There is thickening of the minor fissure on the right. No evidence of acute consolidative pneumonia or effusion. The thoracic aorta is ectatic. 1.. Expiratory chest with basilar atelectasis. No evidence of lobar pneumonia or effusion. Signed by Dr Avelina Boeck    * (Principal) Left-sided nontraumatic intracerebral hemorrhage [facility]), Left Thalamic 8/10/2021 Yes    AMS (altered mental status) 8/10/2021 Yes    UTI (urinary tract infection) 8/10/2021 Yes    Thalamic hemorrhage ([address] 8/12/2021 Yes        Assessment & Plan    Unspecified altered mental status with waxing and waning mentation  Left-sided nontraumatic intracerebral hemorrhage, stable CT of the head from 8/13/2021. Supratherapeutic INR  Bacteriuria:  Elevated phenytoin levels: Now within normal limits  Inadequate oral intake with concerns of aspirations      Chronic issues  Prior CVA  CAD s/p stent  Hypothyroidism  Hyperlipidemia  Hypertension  Depression  Cystitis  Chronic back pain  TIA. Plan  Repeat CT head 8/11/21 and 8/13/2021. with stable intracerebral hemorrhage. To follow up with NS outpt  Status post FFP and vitamin K for INR reversal and given clinical setting of [address]  Patient with a history of cystitis as well as noted to have bacteriuria, however given overall clinical status completed 3 days course. Reduced phenytoin to daily from twice daily  Follow speech recommendations for diet        Continue other chronic medications for underlying chronic issues.       Dispo: Awaiting precert for SNF       Electronically signed by   Marta Mike MD   Internal Medicine Hospitalist  On 8/18/2021  At 3:19 PM    EMR Dragon/Transcription disclaimer:   Much of this encounter note is an electronic transcription/translation of spoken language to printed text.  The electronic translation of spoken language may permit erroneous, or at times, nonsensical words or phrases to be inadvertently transcribed; although attempts have made to review the note for such errors, some may still exist.

## 2021-08-19 VITALS
HEIGHT: 68 IN | OXYGEN SATURATION: 95 % | WEIGHT: 199.5 LBS | RESPIRATION RATE: 16 BRPM | SYSTOLIC BLOOD PRESSURE: 128 MMHG | BODY MASS INDEX: 30.23 KG/M2 | TEMPERATURE: 96.4 F | DIASTOLIC BLOOD PRESSURE: 56 MMHG | HEART RATE: 72 BPM

## 2021-08-19 LAB
GLUCOSE BLD-MCNC: 129 MG/DL (ref 70–99)
PERFORMED ON: ABNORMAL
SARS-COV-2, NAAT: NOT DETECTED

## 2021-08-19 PROCEDURE — 94761 N-INVAS EAR/PLS OXIMETRY MLT: CPT

## 2021-08-19 PROCEDURE — 2580000003 HC RX 258: Performed by: HOSPITALIST

## 2021-08-19 PROCEDURE — 94640 AIRWAY INHALATION TREATMENT: CPT

## 2021-08-19 PROCEDURE — 87635 SARS-COV-2 COVID-19 AMP PRB: CPT

## 2021-08-19 PROCEDURE — 82947 ASSAY GLUCOSE BLOOD QUANT: CPT

## 2021-08-19 PROCEDURE — 6370000000 HC RX 637 (ALT 250 FOR IP): Performed by: HOSPITALIST

## 2021-08-19 PROCEDURE — 6360000002 HC RX W HCPCS: Performed by: HOSPITALIST

## 2021-08-19 PROCEDURE — 92526 ORAL FUNCTION THERAPY: CPT

## 2021-08-19 PROCEDURE — 2700000000 HC OXYGEN THERAPY PER DAY

## 2021-08-19 RX ORDER — ATORVASTATIN CALCIUM 80 MG/1
80 TABLET, FILM COATED ORAL NIGHTLY
Qty: 30 TABLET | Refills: 3 | Status: SHIPPED | OUTPATIENT
Start: 2021-08-19 | End: 2021-12-30 | Stop reason: SDUPTHER

## 2021-08-19 RX ORDER — PHENYTOIN SODIUM 100 MG/1
200 CAPSULE, EXTENDED RELEASE ORAL DAILY
Qty: 60 CAPSULE | Refills: 0
Start: 2021-08-19 | End: 2021-10-01 | Stop reason: SDUPTHER

## 2021-08-19 RX ORDER — WARFARIN SODIUM 5 MG/1
5 TABLET ORAL DAILY
Qty: 90 TABLET | Refills: 3 | Status: SHIPPED | OUTPATIENT
Start: 2021-08-19 | End: 2021-09-17 | Stop reason: ALTCHOICE

## 2021-08-19 RX ADMIN — CARVEDILOL 25 MG: 25 TABLET, FILM COATED ORAL at 09:29

## 2021-08-19 RX ADMIN — PHENYTOIN SODIUM 200 MG: 100 CAPSULE ORAL at 09:29

## 2021-08-19 RX ADMIN — LEVOTHYROXINE SODIUM 175 MCG: 125 TABLET ORAL at 05:54

## 2021-08-19 RX ADMIN — SODIUM CHLORIDE, PRESERVATIVE FREE 10 ML: 5 INJECTION INTRAVENOUS at 09:29

## 2021-08-19 RX ADMIN — BUDESONIDE 500 MCG: 0.5 SUSPENSION RESPIRATORY (INHALATION) at 06:21

## 2021-08-19 RX ADMIN — GUAIFENESIN 600 MG: 600 TABLET, EXTENDED RELEASE ORAL at 09:28

## 2021-08-19 RX ADMIN — OXYBUTYNIN CHLORIDE 5 MG: 5 TABLET, EXTENDED RELEASE ORAL at 09:29

## 2021-08-19 RX ADMIN — PANTOPRAZOLE SODIUM 20 MG: 20 TABLET, DELAYED RELEASE ORAL at 09:29

## 2021-08-19 RX ADMIN — ARFORMOTEROL TARTRATE 15 MCG: 15 SOLUTION RESPIRATORY (INHALATION) at 06:20

## 2021-08-19 RX ADMIN — SERTRALINE HYDROCHLORIDE 100 MG: 100 TABLET ORAL at 09:29

## 2021-08-19 ASSESSMENT — PAIN SCALES - GENERAL
PAINLEVEL_OUTOF10: 0

## 2021-08-19 NOTE — PROGRESS NOTES
with mildly thick/nectar thick liquids. Recommend meds in pudding/applesauce. If patient receives mouth care prior to intake, okay for ice chips IN BETWEEN MEALS for comfort. Recommended Diet and Intervention  Diet Solids Recommendation: Soft and bite sized  Liquid Consistency Recommendation: Mildly thick (nectar thick)   Recommended Form of Meds: Meds in puree as able  Therapeutic Interventions: Patient/Family education;Diet tolerance monitoring; Therapeutic PO trials with SLP     Compensatory Swallowing Strategies  Compensatory Swallowing Strategies: Upright as possible for all oral intake;Small bites/sips;Eat/Feed slowly; Alternate solids and liquids; Remain upright for 30-45 minutes after meals    General  Chart Reviewed: Yes  Behavior/Cognition: Alert; Cooperative  O2 Device: None (Room air)  Communication Observation: (Patient exhibited decreased volume of speech and slow, decreased lingual movements during verbalizations.)  Follows Directions: Simple   Patient Positioning: Upright in bed  Consistencies Administered: Soft and bite sized; Nectar - cup     Monitored patient's swallowing function with the following observations noted:     Oral Phase  Monitored: Soft and bite sized (Patient exhibited slow oral prep with decreased rotary jaw movement noted during soft and bite sized consistency presentations administered by SLP.)  Suspected Premature Bolus Loss: Soft and bite sized (Oral transit of soft and bite sized consistency varied from 1-3 seconds in length.)  Oral Phase - Comment: Min soft and bite sized consistency residue was noted post swallows; residue cleared from the mouth with additional dry swallows.  Oral transit of nectar thick liquid presentations, administered via cup by SLP, primarily measured 1-2 seconds in length.      Pharyngeal Phase  Suspected Swallow Delay: Soft and bite sized (Suspect secondary to oral transit times.)  Laryngeal Elevation: (Patient exhibited sluggish, moderately decreased laryngeal elevation for swallow airway protection.)  Delayed Cough: All   Pharyngeal Phase - Comment: It is noted that mild delayed coughs were observed with every consistency presented during treatment session this date (soft and bite sized;mildly thick/nectar - cup). However, do not feel that all coughs were related to penetration/aspiration secondary to timing of swallows and presence of throat movement and patient exhibited coughs inconsistently at rest.     At this time, would recommend continuation soft and bite sized consistency with mildly thick/nectar thick liquids. Recommend meds in pudding/applesauce. If patient receives mouth care prior to intake, okay for ice chips IN BETWEEN MEALS for comfort.     Electronically signed by ITZEL Jaquez on 8/19/2021 at 12:49 PM

## 2021-08-19 NOTE — CARE COORDINATION
Precert approved to [facility] and pt can admit to the facility today pending covid results. Transport via EMS    Informed pt's RN Cooper Jaime and attending Dr Andre Record requesting covid results prior to dc.      [facility]: [phone]  F: [phone]

## 2021-08-19 NOTE — PLAN OF CARE
Problem: ACTIVITY INTOLERANCE/IMPAIRED MOBILITY  Goal: Mobility/activity is maintained at optimum level for patient  Outcome: Ongoing     Problem: COMMUNICATION IMPAIRMENT  Goal: Ability to express needs and understand communication  Outcome: Ongoing     Problem: Skin Integrity:  Goal: Will show no infection signs and symptoms  Outcome: Ongoing  Goal: Absence of new skin breakdown  Outcome: Ongoing     Problem: Falls - Risk of:  Goal: Will remain free from falls  Outcome: Ongoing  Goal: Absence of physical injury  Outcome: Ongoing     Problem: Pain:  Goal: Pain level will decrease  Outcome: Ongoing  Goal: Control of acute pain  Outcome: Ongoing  Goal: Control of chronic pain  Outcome: Ongoing     Problem: Nutrition  Goal: Optimal nutrition therapy  Outcome: Ongoing

## 2021-08-19 NOTE — PROGRESS NOTES
Lupe,    #1 hyperthyroidism -we noted that your thyroid blood work results have been in the hyperthyroid or overactive range in December 2020.  We see that based on those readings you have been started on methimazole treatment at an outside facility.  Blood work results for thyroid not available in January and February 2021 however recently done thyroid blood work results in March 2021 showed normal thyroid blood work results.  In fact, the TSH is high normal.  Results copied below.  What does these results mean?  What is underlying cause for hyperthyroidism?    We are considering two underlying mechanisms as a possible underlying cause for hyperthyroidism.  #1 is a Graves' disease.  In order to assess this further we will going to be checking TSI or Graves' antibody.   It was not possible to add this blood work to the blood work already drawn today.  Therefore, please make an appointment to go to the lab to have your blood drawn at your convenience.  Here is the lab number to call and schedule an appointment. Lab scheduling to schedule at any [facility] lab locations: [phone] select option 1  #2 possibility is thyroiditis or inflammation of the thyroid causing the overactive thyroid back in December 2020.  If Graves' antibody is negative and if subsequent thyroid blood work results indicate that your thyroid blood work continues to be within the normal limits then we can make a conclusion that the blood work back in December was secondary to thyroiditis.    The second question we addressed during your visit is if your hair loss and thyroid condition are related.  Your hair loss started before your thyroid abnormality therefore it is hard to say that thyroid is underlying cause for your hair loss.  However, there is a connection between alopecia areata and thyroid because both conditions can be caused by autoimmune disorder.  Both hyper and hypothyroidism can cause hair loss however,  Rachael Ruvalcaba from [facility] took report on pt.  Chelsea Rosario at [facility] notified of need for BLS transfer considering the extent of hair loss and considering the fact that the hair loss precedes the thyroid disorder; it is hard to establish causality at this point in time.    #3 based on the blood work results reduce methimazole to 5 mg daily from your current dose of 10 mg daily.    I will contact you once I have blood work results.  In the meantime if you have any questions please do not hesitate to reach out to me.       Patient seen and examined  She has a history of bowel obstructions in the past  Most recently, had surgery with Dr. Asif Jang for removal of mesh eroding into GE junction requiring extensive lysis of adhesions    On exam: awake, alert and oriented  Breathing comfortably  Abd is soft, distended, no rebound, no guarding    Labs and imaging reviewed    - NGT for decompression  - IV fluids  - Await GI function

## 2021-08-19 NOTE — DISCHARGE SUMMARY
Discharge Summary      Date:8/19/2021        Patient Angela Vazquez     Date of Birth:[birth year]     Age:89 y.o. Admit Date:8/10/2021   Admission Condition:serious   Discharged Condition:fair  Discharge Date: 08/19/21       Discharge Diagnoses   Principal Problem:    Left-sided nontraumatic intracerebral hemorrhage ([address] Left Thalamic  Active Problems:    AMS (altered mental status)    UTI (urinary tract infection)    Thalamic hemorrhage (HCC)  Resolved Problems:    * No resolved hospital problems. [facility] Stay   Narrative of Hospital Course:       80-year-old lady who is a poor historian with a past medical history of CVA with residual slurred speech, generalized weakness, coronary artery disease, hypertension, depression, hyperlipidemia, hypothyroidism, TIAs, dizziness and falls presented to the hospital 8/10 with concerns of altered mental status. Work-up in the ED included CT head which is evident of intracranial hemorrhage, patient was given FFP as well as vitamin K for reversal of INR. Patient was evaluated by neurosurgery, repeat CT head was obtained which showed stable left thalamic hemorrhage. 8/12/2021 patient more somnolent, discussed with shana about patient clinical course, as well as readdressing CODE STATUS. At this time no evidence of infection contributing to patient presentation however on ceftriaxone for possible UTI. patient carries a diagnosis of cystitis. Electrolytes unremarkable, patient afebrile and white blood cell normal. TSH also normal.  Glucose levels adequate. CT head was repeated 8/13 to reevaluate intracranial bleed which was stable. Ammonia levels normal, and phenytoin levels now back to normal. Phenytoin frequency decreased to daily from BID. Patient now at her baseline mentation, seen by therapy team, will need continued rehab prior to discharge home.   To follow-up with neurology outpatient prior to reinitiating anticoagulation.        Physical facility    Provider Follow-Up:   Marce Murillo DO  [facility]     In 1 week  repeat CT Head       Patient Instructions   Diet: cardiac diet trial soft and bite sized consistency. Continue mildly thick/nectar thick liquids. Recommend meds in pudding/applesauce    Activity: activity as tolerated    Discharge Medications         Medication List      START taking these medications    atorvastatin 80 MG tablet  Commonly known as: LIPITOR  Take 1 tablet by mouth nightly        CHANGE how you take these medications    phenytoin 100 MG ER capsule  Commonly known as: DILANTIN  Take 2 capsules by mouth daily  What changed: when to take this     warfarin 5 MG tablet  Commonly known as: COUMADIN  Take as directed. If you are unsure how to take this medication, talk to your nurse or doctor. Original instructions: Take 1 tablet by mouth daily DO NOT RESUME UNTIL RE-EVAL BY NEUROSURGERY OUTPT. What changed: additional instructions        CONTINUE taking these medications    amLODIPine 5 MG tablet  Commonly known as: NORVASC  Take 1 tablet by mouth daily     Breo Ellipta 200-25 MCG/INH Aepb inhaler  Generic drug: Fluticasone furoate-vilanterol  Inhale 1 puff into the lungs daily     guaiFENesin 600 MG extended release tablet  Commonly known as: MUCINEX  Take 1 tablet by mouth 2 times daily     levothyroxine 175 MCG tablet  Commonly known as: SYNTHROID  Take 1 tablet by mouth Daily     metoprolol succinate 50 MG extended release tablet  Commonly known as: TOPROL XL  Take 1 tablet by mouth daily     [address]      oxybutynin 5 MG extended release tablet  Commonly known as: Ditropan XL  Take 1 tablet by mouth daily For overactive bladder     pantoprazole 20 MG tablet  Commonly known as: Protonix  Take 1 tablet by mouth daily     sertraline 100 MG tablet  Commonly known as: ZOLOFT  Take 1 tablet by mouth daily           Where to Get Your Medications      These

## 2021-08-19 NOTE — PROGRESS NOTES
Attempted to call report to [facility].  Spoke to [facility], who advised the nurse taking this patient was not able to take report at this time and she would have her call me back

## 2021-08-20 ENCOUNTER — TELEPHONE (OUTPATIENT)
Dept: INTERNAL MEDICINE | Age: 86
End: 2021-08-20

## 2021-08-20 NOTE — TELEPHONE ENCOUNTER
SKILLED NURSING FACILITY:   INITIAL CALL POST-HOSPITAL DISCHARGE    SNF: [facility]     PHONE NUMBER: [phone]     THERAPY: PT/OT/ST    ANTICIPATED LENGTH OF STAY: unknown    Mrs. Sheth was admitted to [facility] for short term rehab for left sided weakness. She seems to be adjusting well per [facility]. She is alert and oriented. Physical and occupational therapy will work with her on strengthening. Estimated length of stay is unknown.

## 2021-08-24 ENCOUNTER — LAB REQUISITION (OUTPATIENT)
Dept: LAB | Facility: HOSPITAL | Age: 86
End: 2021-08-24

## 2021-08-24 DIAGNOSIS — Z00.00 ENCOUNTER FOR GENERAL ADULT MEDICAL EXAMINATION WITHOUT ABNORMAL FINDINGS: ICD-10-CM

## 2021-08-24 LAB
ALBUMIN SERPL-MCNC: 3.5 G/DL (ref 3.5–5.2)
ALBUMIN/GLOB SERPL: 1 G/DL
ALP SERPL-CCNC: 89 U/L (ref 39–117)
ALT SERPL W P-5'-P-CCNC: 46 U/L (ref 1–33)
ANION GAP SERPL CALCULATED.3IONS-SCNC: 13 MMOL/L (ref 5–15)
AST SERPL-CCNC: 33 U/L (ref 1–32)
BASOPHILS # BLD AUTO: 0.03 10*3/MM3 (ref 0–0.2)
BASOPHILS NFR BLD AUTO: 0.3 % (ref 0–1.5)
BILIRUB SERPL-MCNC: 0.7 MG/DL (ref 0–1.2)
BUN SERPL-MCNC: 13 MG/DL (ref 8–23)
BUN/CREAT SERPL: 23.2 (ref 7–25)
CALCIUM SPEC-SCNC: 8.2 MG/DL (ref 8.2–9.6)
CHLORIDE SERPL-SCNC: 104 MMOL/L (ref 98–107)
CO2 SERPL-SCNC: 24 MMOL/L (ref 22–29)
CREAT SERPL-MCNC: 0.56 MG/DL (ref 0.57–1)
DEPRECATED RDW RBC AUTO: 46.7 FL (ref 37–54)
EOSINOPHIL # BLD AUTO: 0.07 10*3/MM3 (ref 0–0.4)
EOSINOPHIL NFR BLD AUTO: 0.6 % (ref 0.3–6.2)
ERYTHROCYTE [DISTWIDTH] IN BLOOD BY AUTOMATED COUNT: 13.7 % (ref 12.3–15.4)
GFR SERPL CREATININE-BSD FRML MDRD: 102 ML/MIN/1.73
GLOBULIN UR ELPH-MCNC: 3.4 GM/DL
GLUCOSE SERPL-MCNC: 111 MG/DL (ref 65–99)
HCT VFR BLD AUTO: 45.5 % (ref 34–46.6)
HGB BLD-MCNC: 15.2 G/DL (ref 12–15.9)
IMM GRANULOCYTES # BLD AUTO: 0.07 10*3/MM3 (ref 0–0.05)
IMM GRANULOCYTES NFR BLD AUTO: 0.6 % (ref 0–0.5)
LYMPHOCYTES # BLD AUTO: 1.36 10*3/MM3 (ref 0.7–3.1)
LYMPHOCYTES NFR BLD AUTO: 12.3 % (ref 19.6–45.3)
MCH RBC QN AUTO: 31.4 PG (ref 26.6–33)
MCHC RBC AUTO-ENTMCNC: 33.4 G/DL (ref 31.5–35.7)
MCV RBC AUTO: 94 FL (ref 79–97)
MONOCYTES # BLD AUTO: 0.8 10*3/MM3 (ref 0.1–0.9)
MONOCYTES NFR BLD AUTO: 7.2 % (ref 5–12)
NEUTROPHILS NFR BLD AUTO: 79 % (ref 42.7–76)
NEUTROPHILS NFR BLD AUTO: 8.72 10*3/MM3 (ref 1.7–7)
NRBC BLD AUTO-RTO: 0 /100 WBC (ref 0–0.2)
PLATELET # BLD AUTO: 255 10*3/MM3 (ref 140–450)
PMV BLD AUTO: 10.6 FL (ref 6–12)
POTASSIUM SERPL-SCNC: 4.1 MMOL/L (ref 3.5–5.2)
PROT SERPL-MCNC: 6.9 G/DL (ref 6–8.5)
RBC # BLD AUTO: 4.84 10*6/MM3 (ref 3.77–5.28)
SODIUM SERPL-SCNC: 141 MMOL/L (ref 136–145)
WBC # BLD AUTO: 11.05 10*3/MM3 (ref 3.4–10.8)

## 2021-08-24 PROCEDURE — 80053 COMPREHEN METABOLIC PANEL: CPT | Performed by: INTERNAL MEDICINE

## 2021-08-24 PROCEDURE — 85025 COMPLETE CBC W/AUTO DIFF WBC: CPT | Performed by: INTERNAL MEDICINE

## 2021-08-25 ENCOUNTER — LAB REQUISITION (OUTPATIENT)
Dept: LAB | Facility: HOSPITAL | Age: 86
End: 2021-08-25

## 2021-08-25 DIAGNOSIS — Z00.00 ENCOUNTER FOR GENERAL ADULT MEDICAL EXAMINATION WITHOUT ABNORMAL FINDINGS: ICD-10-CM

## 2021-08-25 LAB
BACTERIA UR QL AUTO: ABNORMAL /HPF
BILIRUB UR QL STRIP: ABNORMAL
CLARITY UR: ABNORMAL
COLOR UR: ABNORMAL
GLUCOSE UR STRIP-MCNC: NEGATIVE MG/DL
HGB UR QL STRIP.AUTO: NEGATIVE
HYALINE CASTS UR QL AUTO: ABNORMAL /LPF
KETONES UR QL STRIP: ABNORMAL
LEUKOCYTE ESTERASE UR QL STRIP.AUTO: ABNORMAL
MUCOUS THREADS URNS QL MICRO: ABNORMAL /HPF
NITRITE UR QL STRIP: POSITIVE
PH UR STRIP.AUTO: 5.5 [PH] (ref 5–8)
PROT UR QL STRIP: ABNORMAL
RBC # UR: ABNORMAL /HPF
REF LAB TEST METHOD: ABNORMAL
SP GR UR STRIP: 1.02 (ref 1–1.03)
SQUAMOUS #/AREA URNS HPF: ABNORMAL /HPF
UROBILINOGEN UR QL STRIP: ABNORMAL
WBC UR QL AUTO: ABNORMAL /HPF

## 2021-08-25 PROCEDURE — 87086 URINE CULTURE/COLONY COUNT: CPT | Performed by: INTERNAL MEDICINE

## 2021-08-25 PROCEDURE — 81001 URINALYSIS AUTO W/SCOPE: CPT | Performed by: INTERNAL MEDICINE

## 2021-08-26 LAB — BACTERIA SPEC AEROBE CULT: ABNORMAL

## 2021-08-27 ENCOUNTER — TELEPHONE (OUTPATIENT)
Dept: INTERNAL MEDICINE | Age: 86
End: 2021-08-27

## 2021-08-27 NOTE — TELEPHONE ENCOUNTER
[facility] [phone]      Per [facility], nursing staff at [facility], patient is doing well. Patient continues to participate in therapy. No known discharge plans at this time.

## 2021-09-03 ENCOUNTER — TELEPHONE (OUTPATIENT)
Dept: INTERNAL MEDICINE | Age: 86
End: 2021-09-03

## 2021-09-03 NOTE — TELEPHONE ENCOUNTER
[facility] [phone]      Per Andreea Pate, nursing staff at [facility], patient is doing well. She will be moving over to the [address] [place] [address] today. Patient continues to participate in therapy. No known discharge plans at this time.

## 2021-09-09 ENCOUNTER — TELEPHONE (OUTPATIENT)
Dept: INTERNAL MEDICINE | Age: 86
End: 2021-09-09

## 2021-09-09 PROBLEM — N39.0 UTI (URINARY TRACT INFECTION): Status: RESOLVED | Noted: 2021-08-10 | Resolved: 2021-09-09

## 2021-09-09 NOTE — TELEPHONE ENCOUNTER
[address] [place] [address] [phone]      Per Alejandra Dee, nursing staff at [facility], patient is doing well. Patient continues to participate in therapy. She is scheduled for a care plan meeting next week to discuss discharge plans.

## 2021-09-15 ENCOUNTER — TELEPHONE (OUTPATIENT)
Dept: INTERNAL MEDICINE | Age: 86
End: 2021-09-15

## 2021-09-15 NOTE — TELEPHONE ENCOUNTER
I spoke with [facility] at [facility]. She was referred to Home health from [facility]. [facility] is currently not able to take outside referrals as they are at their limit for patients. They can, however take referrals from [facility] providers. Can we order the home health for her so she can be seen?

## 2021-09-15 NOTE — TELEPHONE ENCOUNTER
Patient's daughter requesting something more affordable sent in place of the [facility] inhaler. It is $270 per month.      [facility]

## 2021-09-15 NOTE — TELEPHONE ENCOUNTER
[facility] Initial Follow Up Call    Outreach made within 2 business days of discharge: Yes    Patient: Marie Stein   Patient : 1931  MRN: [id number]   Reason for Admission: Admitted 08/10/21 for altered mental status   Discharge Date: 21 from [facility] then admitted to [facility] for short term rehab. Discharged 21  Discharge Diagnosis:  Principal Problem:    Left-sided nontraumatic intracerebral hemorrhage ([facility] [address] Left Thalamic  Active Problems:    AMS (altered mental status)    UTI (urinary tract infection)    Thalamic hemorrhage ([facility]     Spoke with: [facility]    Discharge department/facility: [facility]     TCM Interactive Patient Contact:  Was patient able to fill all prescriptions: Yes  Was patient instructed to bring all medications to the follow-up visit: Yes  Is patient taking all medications as directed in the discharge summary? Yes  Does patient understand their discharge instructions: Yes  Does patient have questions or concerns that need addressed prior to 7-14 day follow up office visit: no    I spoke with patient's daughter. She states her mom is staying with her. She states her mom is completely bed bound. They cannot get her up. She is wearing briefs. They are changing her and keeping her turned. She states she has eaten good since she came home. She ate tacos and pie last night, potato soup this morning, and tolerated both well. They were supposed to be set up with home health but have not heard anything yet. I have advised her I will call [facility] to verify she is on the list. I have messaged Clide Grief about this. She has all of her medications but states the [place] is too expensive to continue. She states it is $270 per month. She is requesting a more affordable alternative to this medication. She is scheduled to follow up with Vini Valladares next week in office. Mrs. Sheth is not able to come in.  I have changed this appointment to a video visit. I have requested a discharge medication list be faxed to our office from [facility] from [facility].      Scheduled appointment with PCP within 7-14 days    Follow Up  Future Appointments   Date Time Provider Laura Page   9/22/2021  1:00 PM VIN Zazueta [facility]   12/16/2021  3:00 PM Julisa Plummer [facility]   1/21/2022  9:30 AM [facility] LAB ROOM 1 [facility] VAS LAB [facility]   1/21/2022 10:30 AM VIN Salter [facility] Spec [place], [place]

## 2021-09-16 DIAGNOSIS — I61.5 LEFT-SIDED NONTRAUMATIC INTRAVENTRICULAR INTRACEREBRAL HEMORRHAGE (HCC): Primary | ICD-10-CM

## 2021-09-16 NOTE — TELEPHONE ENCOUNTER
HH has the referral , thank you   Did you need HH to help her call about those medication/ coverage  ?  Or was the pt going to do that

## 2021-09-16 NOTE — TELEPHONE ENCOUNTER
I have placed the order for home health    Have them contact her insurance to see if they cover any of the following to replace Breo:  Jorge Alberto Whitten

## 2021-09-16 NOTE — TELEPHONE ENCOUNTER
I have asked [facility] nurse to try and assist with this   [facility] admit if planned for tomorrow 9/17

## 2021-09-17 ENCOUNTER — VIRTUAL VISIT (OUTPATIENT)
Dept: FAMILY MEDICINE CLINIC | Age: 86
End: 2021-09-17
Payer: MEDICARE

## 2021-09-17 DIAGNOSIS — I10 ESSENTIAL (PRIMARY) HYPERTENSION: ICD-10-CM

## 2021-09-17 DIAGNOSIS — R27.0 ATAXIA: ICD-10-CM

## 2021-09-17 DIAGNOSIS — K21.9 GASTROESOPHAGEAL REFLUX DISEASE WITHOUT ESOPHAGITIS: ICD-10-CM

## 2021-09-17 DIAGNOSIS — J44.9 CHRONIC OBSTRUCTIVE PULMONARY DISEASE, UNSPECIFIED COPD TYPE (HCC): ICD-10-CM

## 2021-09-17 DIAGNOSIS — E03.9 ACQUIRED HYPOTHYROIDISM: ICD-10-CM

## 2021-09-17 DIAGNOSIS — I61.5 LEFT-SIDED NONTRAUMATIC INTRAVENTRICULAR INTRACEREBRAL HEMORRHAGE (HCC): Primary | ICD-10-CM

## 2021-09-17 DIAGNOSIS — R29.898 WEAKNESS OF BOTH LOWER EXTREMITIES: ICD-10-CM

## 2021-09-17 DIAGNOSIS — R53.83 OTHER FATIGUE: ICD-10-CM

## 2021-09-17 DIAGNOSIS — G40.909 SEIZURE DISORDER (HCC): ICD-10-CM

## 2021-09-17 DIAGNOSIS — I82.5Z2 CHRONIC DEEP VEIN THROMBOSIS (DVT) OF DISTAL VEIN OF LEFT LOWER EXTREMITY (HCC): ICD-10-CM

## 2021-09-17 PROCEDURE — 99496 TRANSJ CARE MGMT HIGH F2F 7D: CPT | Performed by: FAMILY MEDICINE

## 2021-09-17 ASSESSMENT — ENCOUNTER SYMPTOMS
CHEST TIGHTNESS: 0
ANAL BLEEDING: 0
ABDOMINAL PAIN: 0
SHORTNESS OF BREATH: 0
BACK PAIN: 1
COUGH: 0
CONSTIPATION: 0
DIARRHEA: 0
NAUSEA: 0

## 2021-09-17 NOTE — PROGRESS NOTES
Post-Discharge Transitional Care Management Services    TELEHEALTH EVALUATION -- Audio/Visual (During BLWTT-48 public [facility] emergency)      [address]  [address] [address] [place] [address]  Dept: [phone]  Dept Fax: [phone]  Loc: [phone]    Kimi Chaves is a 80 y.o. female who presents today for her medical conditions/complaints asnoted below. Kimi Chaves is here for No chief complaint on file. Admit Date:8/10/2021   Admission Condition:serious            Discharged Condition:fair  Discharge Date: 08/19/21   Principal Problem:    Left-sided nontraumatic intracerebral hemorrhage ([address] Left Thalamic  Active Problems:    AMS (altered mental status)    UTI (urinary tract infection)    Thalamic hemorrhage (HCC)  Resolved Problems:    * No resolved hospital problems. *         Inpatient course: Discharge summary reviewed- see chart for full details. She presented to the emergency department with slurred speech generalized weakness. Work-up in the emergency department included a CT scan of the head which showed intracranial hemorrhage. She was given FFP as well as vitamin K for reversal of her INR. She was evaluated by neurosurgery and repeat CT scan was obtained which showed stable left thalamic hemorrhage. She developed increased somnolence on August 12. Repeat CT scan of her head on August 13 was stable as well. Her ammonia level was normal.  Dilantin was reduced to daily dosing. There is also evidence of a possible urinary tract infection and she was placed on ceftriaxone during her hospitalization. [facility] Risk Score (risk of hospital readmission):Readmission Risk Score: 17    Active Problems:    * No active hospital problems.  *      Care management risk score Rising risk (score 2-5) and Complex Care (Scores >=6): 1     Non face to face  following discharge, date last encounter closed (first attempt may havebeen earlier): 9/15/2021  2:36 PM    Call initiated 2 business days of discharge: Yes      HPI andCOURSE SINCE DISCHARGE   CC:  Here today to discuss thefollowing:    She is on Coumadin for experiencing numerous acute pulmonary emboli in February 2020. She also had an ultrasound which showed an acute DVT of the left lower extremity involving the femoral, popliteal, anterior tibial, peroneal, posterior tibial, gastric devious veins. Follow-up ultrasound on June 30 showed evidence of a chronic deep vein thrombosis in the left lower extremity in the popliteal vein. Her Coumadin was discontinued after her most recent hospitalization secondary to intra-axial hemorrhage within the left thalamus measuring 8 mm. She continues to have associated weakness. Also reporting right-sided weakness. She is primarily sedentary. She is mobile with assistance with a wheelchair. She denies any headache, blurry vision, nausea or vomiting. She was also discharged on [employer] for her history of COPD. He states it was going to cost over $250. Alternative recommended from insurance is Advair. Overall her breathing is stable. She was also started on Lipitor 80 mg daily in the hospital.  She is tolerating well. Depression with Anxiety  Compliant with medications. No adverse effects from medication. Depression and anxiety symptoms are stable today. No suicidal or homicidal ideation. Excessive worry, insomnia, and anxiousness are stable. Energy, concentration, and apathy are stable. Hypothyroidism  Symptoms are stable. No temperature intolerance, fatigue, or mood disturbance from baseline. Complaint with current medication.     Her phenytoin was reduced to 200 mg once a day    BP Readings from Last 3 Encounters:   08/19/21 (!) 128/56   07/23/21 130/76   06/15/21 132/82         HPI    Past Medical History:   Diagnosis Date    Arthritis     Back pain     bulging disc    CAD (coronary artery disease)     BMS to RCA    Cerebral artery occlusion with cerebral infarction ([facility]     small strokes,balance issues    Cystitis     Depression     Dizziness     Dysuria     Edema     ankles,mild pedal edema    Falls frequently     H/O blood clots     left leg and lungs    Head injury     Headache     Heartburn     [place] (hard of hearing)     Hyperlipidemia     Hypertension     Hypothyroidism     Hypothyroidism     Memory problem     Neuropathy     Shingles     TIA (transient ischemic attack)     Urinary incontinence     UTI (urinary tract infection)       Past Surgical History:   Procedure Laterality Date    APPENDECTOMY  1947    CHOLECYSTECTOMY      EYE SURGERY  2009    bilateral cataract     HYSTERECTOMY, TOTAL ABDOMINAL      JOINT REPLACEMENT      Bilateral Knee    KNEE ARTHROPLASTY Right     LOBECTOMY  10/2015    sx--bilateral    PTCA  2002    with stent placement/bms to rca       Family History   Problem Relation Age of Onset    Diabetes Mother     Hypertension Mother    Floydene Ada Rheum Arthritis Mother     Stroke Mother     Tuberculosis Father        Social History     Tobacco Use    Smoking status: Never Smoker    Smokeless tobacco: Never Used   Substance Use Topics    Alcohol use: Never        Medications patient taking as of now reconciled against medications ordered at time of hospital discharge     Current Outpatient Medications   Medication Sig Dispense Refill    fluticasone-salmeterol (ADVAIR HFA) 230-21 MCG/ACT inhaler Inhale 2 puffs into the lungs 2 times daily 3 each 3    phenytoin (DILANTIN) 100 MG ER capsule Take 2 capsules by mouth daily 60 capsule 0    atorvastatin (LIPITOR) 80 MG tablet Take 1 tablet by mouth nightly 30 tablet 3    oxybutynin (DITROPAN XL) 5 MG extended release tablet Take 1 tablet by mouth daily For overactive bladder 30 tablet 3    Misc. Devices [facility].  1 Device 0    guaiFENesin (MUCINEX) 600 MG extended release tablet Take 1 tablet by mouth 2 times daily 60 tablet 0    pantoprazole (PROTONIX) 20 MG tablet Take 1 tablet by mouth daily 30 tablet 0    Fluticasone furoate-vilanterol (BREO ELLIPTA) 200-25 MCG/INH AEPB inhaler Inhale 1 puff into the lungs daily 30 each 5    amLODIPine (NORVASC) 5 MG tablet Take 1 tablet by mouth daily 90 tablet 3    levothyroxine (SYNTHROID) 175 MCG tablet Take 1 tablet by mouth Daily 90 tablet 3    metoprolol succinate (TOPROL XL) 50 MG extended release tablet Take 1 tablet by mouth daily 90 tablet 3    sertraline (ZOLOFT) 100 MG tablet Take 1 tablet by mouth daily 90 tablet 3     No current facility-administered medications for this visit. Allergies   Allergen Reactions    Morphine      Pt becomes mean (refusing, cursing)         Health Maintenance   Topic Date Due    Pneumococcal 65+ years Vaccine (1 of 1 - PPSV23) Never done    Flu vaccine (1) 09/01/2021    COVID-19 Vaccine (2 - Pfizer 2-dose series) 09/08/2021    DTaP/Tdap/Td vaccine (1 - Tdap) 03/11/2022 (Originally 8/18/1950)    Shingles Vaccine (1 of 2) 03/11/2022 (Originally 8/18/1981)    Annual Wellness Visit (AWV)  03/12/2022    Lipid screen  08/10/2022    TSH testing  08/10/2022    Potassium monitoring  08/18/2022    Creatinine monitoring  08/18/2022    Hepatitis A vaccine  Aged Out    Hepatitis B vaccine  Aged Out    Hib vaccine  Aged Out    Meningococcal (ACWY) vaccine  Aged Out       Subjective:      Review of Systems   Constitutional: Positive for fatigue. Negative for chills and fever. HENT: Negative for congestion. Respiratory: Negative for cough, chest tightness and shortness of breath. Cardiovascular: Negative for chest pain, palpitations and leg swelling. Gastrointestinal: Negative for abdominal pain, anal bleeding, constipation, diarrhea and nausea. Genitourinary: Negative for difficulty urinating. Musculoskeletal: Positive for arthralgias and back pain. Neurological: Positive for weakness.  Negative for dizziness, tremors, seizures, syncope, facial asymmetry, speech difficulty, light-headedness, numbness and headaches. Psychiatric/Behavioral: Negative. See HPI for visit specific review of symptoms. All others negative      Objective:   VSS:       [ INSTRUCTIONS:  \"[x]\" Indicates a positive item  \"[]\" Indicates a negative item  -- DELETE ALL ITEMS NOT EXAMINED]  Vital Signs: (As obtained by patient/caregiver or practitioner observation)    Blood pressure-  Heart rate-    Respiratory rate-    Temperature-  Pulse oximetry-     Constitutional: [x] Appears well-developed and well-nourished [] No apparent distress      [] Abnormal-   Mental status  [] Alert and awake  [] Oriented to person/place/time []Able to follow commands      Eyes:  EOM    []  Normal  [] Abnormal-  Sclera  []  Normal  [] Abnormal -         Discharge []  None visible  [] Abnormal -    HENT:   [] Normocephalic, atraumatic. [] Abnormal   [] Mouth/Throat: Mucous membranes are moist.     External Ears [] Normal  [] Abnormal-     Neck: [] No visualized mass     Pulmonary/Chest: [] Respiratory effort normal.  [] No visualized signs of difficulty breathing or respiratory distress        [] Abnormal-      Musculoskeletal:   [] Normal gait with no signs of ataxia         [] Normal range of motion of neck        [] Abnormal-       Neurological:        [] No Facial Asymmetry (Cranial nerve 7 motor function) (limited exam to video visit)          [] No gaze palsy        [] Abnormal-         Skin:        [] No significant exanthematous lesions or discoloration noted on facial skin         [] Abnormal-            Psychiatric:       [] Normal Affect [] No Hallucinations        [] Abnormal-     Other pertinent observable physical exam findings-       No results found for this or any previous visit (from the past 672 hour(s)). Assessment & Plan: The following diagnoses and conditions are stable with no further orders unless indicated:  1.  Left-sided nontraumatic intraventricular intracerebral hemorrhage (HCC)  Continue to hold anticoagulation  She has a CT scan of her head ordered by neurosurgery  We'll await further input from neurosurgery  Discussed signs and symptoms warranting return to emergency department which include slurred speech, worsening weakness, arm drift, difficulty swallowing  We'll confirm CT of head is ordered. 2. Weakness of both lower extremities  This is a chronic issue. 3. Ataxia  Discussed home safety tips  Continue with use of wheelchair    4. Gastroesophageal reflux disease without esophagitis  Stable    5. Essential (primary) hypertension  BP Readings from Last 3 Encounters:   08/19/21 (!) 128/56   07/23/21 130/76   06/15/21 132/82     Stable  - CBC Auto Differential; Future  - Comprehensive Metabolic Panel; Future    6. Chronic deep vein thrombosis (DVT) of distal vein of left lower extremity (HCC)  Continue holding anticoagulation. 7. Acquired hypothyroidism  Lab Results   Component Value Date    TSH 1.850 03/11/2021    T4FREE 1.1 10/04/2019     Stable    8. Chronic obstructive pulmonary disease, unspecified COPD type ([address]  Sent in a prescription for Advair 230: 2 puffs twice daily    9. Seizure disorder (HCC)  Check a Dilantin level  - Phenytoin Level, Total; Future    10. Other fatigue    - T4, Free; Future  - TSH without Reflex; Future      Diagnostic test results reviewed. Medical Decision Making: high complexity    Return in about 2 weeks (around 10/1/2021) for Virtual.          This visit was completed using [employer] Telehealth with audio and visual capabilities:        Pursuant to the emergency declaration under the [facility] waiver authority and the Coronavirus Preparedness and [employer] Act, this Virtual  Visit was conducted, with patient's consent, to reduce the patient's risk of exposure to COVID-19 and provide continuity of care for an established patient. Services were provided through a video synchronous discussion virtually to substitute for in-person clinic visit. An  electronic signature was used to authenticate this note.

## 2021-09-20 ENCOUNTER — TELEPHONE (OUTPATIENT)
Dept: INTERNAL MEDICINE CLINIC | Age: 86
End: 2021-09-20

## 2021-09-20 DIAGNOSIS — L30.4 INTERTRIGO: Primary | ICD-10-CM

## 2021-09-20 RX ORDER — NYSTATIN 100000 U/G
CREAM TOPICAL
Qty: 30 G | Refills: 5 | Status: SHIPPED | OUTPATIENT
Start: 2021-09-20

## 2021-09-20 NOTE — TELEPHONE ENCOUNTER
Agree with nursing plan  Agree with adding speech therapy (do I need to add an order?)  Called in nystatin for the rash  Called in Advair to [facility]

## 2021-09-21 ENCOUNTER — TELEPHONE (OUTPATIENT)
Dept: INTERNAL MEDICINE CLINIC | Age: 86
End: 2021-09-21

## 2021-09-21 NOTE — TELEPHONE ENCOUNTER
[address] [address] OT eval completed and per OT Pt requires increased assistance for all adl and mobility routines  Recommendin.  OT 1x/week x 1 week, then 2x/week x 3 weeks   2. Personal care aid  1x/week x 1 week, then 2x/week x 3 weeks   3.   Also requesting  : Please fax prescription for overhead frame for hospital bed to [facility]    Please advise

## 2021-09-21 NOTE — TELEPHONE ENCOUNTER
[address] [address] PT anju completed and per PT patient presents as completely dependent for all transfers utilizing safia lift, unable to hold herself in sitting on bedside, and requires max assist for bed mobility.   RECOMMENDATION:   skilled PT 2x/wk x 4 wks    Please advise if approved

## 2021-09-22 DIAGNOSIS — R53.1 GENERALIZED WEAKNESS: Primary | ICD-10-CM

## 2021-09-24 ENCOUNTER — TELEPHONE (OUTPATIENT)
Dept: FAMILY MEDICINE CLINIC | Age: 86
End: 2021-09-24

## 2021-09-27 DIAGNOSIS — I61.0 NONTRAUMATIC SUBCORTICAL HEMORRHAGE OF LEFT CEREBRAL HEMISPHERE (HCC): Primary | ICD-10-CM

## 2021-09-27 RX ORDER — OXYBUTYNIN CHLORIDE 5 MG/1
5 TABLET, EXTENDED RELEASE ORAL DAILY
Qty: 90 TABLET | Refills: 1 | Status: SHIPPED | OUTPATIENT
Start: 2021-09-27 | End: 2022-03-24

## 2021-09-30 LAB
ALBUMIN SERPL-MCNC: 3.3 G/DL (ref 3.5–5.2)
ALP BLD-CCNC: 100 U/L (ref 35–104)
ALT SERPL-CCNC: 22 U/L (ref 5–33)
ANION GAP SERPL CALCULATED.3IONS-SCNC: 13 MMOL/L (ref 7–19)
AST SERPL-CCNC: 22 U/L (ref 5–32)
BASOPHILS ABSOLUTE: 0 K/UL (ref 0–0.2)
BASOPHILS RELATIVE PERCENT: 0.5 % (ref 0–1)
BILIRUB SERPL-MCNC: 0.6 MG/DL (ref 0.2–1.2)
BUN BLDV-MCNC: 11 MG/DL (ref 8–23)
CALCIUM SERPL-MCNC: 8.7 MG/DL (ref 8.8–10.2)
CHLORIDE BLD-SCNC: 104 MMOL/L (ref 98–111)
CO2: 22 MMOL/L (ref 22–29)
CREAT SERPL-MCNC: 0.4 MG/DL (ref 0.5–0.9)
EOSINOPHILS ABSOLUTE: 0.2 K/UL (ref 0–0.6)
EOSINOPHILS RELATIVE PERCENT: 2.4 % (ref 0–5)
GFR AFRICAN AMERICAN: >59
GFR NON-AFRICAN AMERICAN: >60
GLUCOSE BLD-MCNC: 91 MG/DL (ref 74–109)
HCT VFR BLD CALC: 44.1 % (ref 37–47)
HEMOGLOBIN: 14.1 G/DL (ref 12–16)
IMMATURE GRANULOCYTES #: 0 K/UL
LYMPHOCYTES ABSOLUTE: 1.9 K/UL (ref 1.1–4.5)
LYMPHOCYTES RELATIVE PERCENT: 24.6 % (ref 20–40)
MCH RBC QN AUTO: 31.2 PG (ref 27–31)
MCHC RBC AUTO-ENTMCNC: 32 G/DL (ref 33–37)
MCV RBC AUTO: 97.6 FL (ref 81–99)
MONOCYTES ABSOLUTE: 0.6 K/UL (ref 0–0.9)
MONOCYTES RELATIVE PERCENT: 7.4 % (ref 0–10)
NEUTROPHILS ABSOLUTE: 5.1 K/UL (ref 1.5–7.5)
NEUTROPHILS RELATIVE PERCENT: 64.6 % (ref 50–65)
PDW BLD-RTO: 14.3 % (ref 11.5–14.5)
PHENYTOIN LEVEL: 1.5 UG/ML (ref 10–20)
PLATELET # BLD: 287 K/UL (ref 130–400)
PMV BLD AUTO: 10.7 FL (ref 9.4–12.3)
POTASSIUM SERPL-SCNC: 4.3 MMOL/L (ref 3.5–5)
RBC # BLD: 4.52 M/UL (ref 4.2–5.4)
SODIUM BLD-SCNC: 139 MMOL/L (ref 136–145)
T4 FREE: 1.7 NG/DL (ref 0.93–1.7)
TOTAL PROTEIN: 7.1 G/DL (ref 6.6–8.7)
TSH SERPL DL<=0.05 MIU/L-ACNC: 1.34 UIU/ML (ref 0.27–4.2)
WBC # BLD: 7.8 K/UL (ref 4.8–10.8)

## 2021-10-01 ENCOUNTER — VIRTUAL VISIT (OUTPATIENT)
Dept: FAMILY MEDICINE CLINIC | Age: 86
End: 2021-10-01
Payer: MEDICARE

## 2021-10-01 ENCOUNTER — TELEPHONE (OUTPATIENT)
Dept: FAMILY MEDICINE CLINIC | Age: 86
End: 2021-10-01

## 2021-10-01 DIAGNOSIS — J44.9 CHRONIC OBSTRUCTIVE PULMONARY DISEASE, UNSPECIFIED COPD TYPE (HCC): ICD-10-CM

## 2021-10-01 DIAGNOSIS — K21.9 GASTROESOPHAGEAL REFLUX DISEASE WITHOUT ESOPHAGITIS: ICD-10-CM

## 2021-10-01 DIAGNOSIS — G40.909 SEIZURE DISORDER (HCC): ICD-10-CM

## 2021-10-01 DIAGNOSIS — R63.0 DECREASED APPETITE: ICD-10-CM

## 2021-10-01 DIAGNOSIS — I10 ESSENTIAL (PRIMARY) HYPERTENSION: Primary | ICD-10-CM

## 2021-10-01 PROCEDURE — 1123F ACP DISCUSS/DSCN MKR DOCD: CPT | Performed by: FAMILY MEDICINE

## 2021-10-01 PROCEDURE — 4040F PNEUMOC VAC/ADMIN/RCVD: CPT | Performed by: FAMILY MEDICINE

## 2021-10-01 PROCEDURE — 1090F PRES/ABSN URINE INCON ASSESS: CPT | Performed by: FAMILY MEDICINE

## 2021-10-01 PROCEDURE — 99214 OFFICE O/P EST MOD 30 MIN: CPT | Performed by: FAMILY MEDICINE

## 2021-10-01 PROCEDURE — G8428 CUR MEDS NOT DOCUMENT: HCPCS | Performed by: FAMILY MEDICINE

## 2021-10-01 RX ORDER — PHENYTOIN SODIUM 300 MG/1
300 CAPSULE, EXTENDED RELEASE ORAL DAILY
Qty: 30 CAPSULE | Refills: 5 | Status: SHIPPED | OUTPATIENT
Start: 2021-10-01 | End: 2021-10-31

## 2021-10-01 RX ORDER — MEGESTROL ACETATE 40 MG/ML
400 SUSPENSION ORAL DAILY
Qty: 480 ML | Refills: 5 | Status: SHIPPED | OUTPATIENT
Start: 2021-10-01 | End: 2022-05-02 | Stop reason: ALTCHOICE

## 2021-10-01 NOTE — TELEPHONE ENCOUNTER
Spoke to [facility] and they said there is not a Manisha there and  Pt was discharged on 9/24/21.

## 2021-10-01 NOTE — TELEPHONE ENCOUNTER
----- Message from Starr Scott sent at 9/30/2021  4:45 PM CDT -----  Subject: Message to Provider    QUESTIONS  Information for Provider? Sasha Tucker from [facility] says that lab   results for Pt will be faxed over to the office. Also Sasha Tucker wanted Pt's   pcp to know that last 3 times she's done Physical Therapy she has thrown. But the pt has also not been taking food with her meds and she takes them   just before the appt. Sasha Tucker number at [facility] is [phone]  ---------------------------------------------------------------------------  --------------  CALL BACK INFO  What is the best way for the office to contact you? Do not leave any   message, patient will call back for answer  Preferred Call Back Phone Number? [phone]  ---------------------------------------------------------------------------  --------------  SCRIPT ANSWERS  Relationship to Patient? Third Party  Representative Name?  Domingo Carrasquillo from [facility]

## 2021-10-01 NOTE — PROGRESS NOTES
10/1/2021    TELEHEALTH EVALUATION -- Audio/Visual (During KLSDO-20 public health emergency)    HPI:    Ryan Carrington (:  1931) has requested an audio/video evaluation for the following concern(s):    Still with poor appetite. Hypertension  Compliant with medications. No adverse effects from medication. No lightheadedness, palpitations, or chest pain. Gastroesophageal Reflux Disease  Symptoms currently under control. Medication adequately controls his symptoms. No hematochezia or melena. COPD  Her insurance still will not pay for inhalers        Review of Systems    Prior to Visit Medications    Medication Sig Taking? Authorizing Provider   phenytoin (PHENYTEK) 300 MG ER capsule Take 1 capsule by mouth daily Yes Nahid Berg MD   megestrol (MEGACE) 40 MG/ML suspension Take 10 mLs by mouth daily Yes Nahid Berg MD   mupirocin (BACTROBAN) 2 % ointment Apply 3 times daily. Yes Nahid Berg MD   oxybutynin (DITROPAN-XL) 5 MG extended release tablet TAKE 1 TABLET BY MOUTH DAILY FOR OVERACTIVE BLADDER  Nahid Berg MD   Misc. Devices MISC 1 each by Does not apply route once as needed (As needed) Yuriy Berg MD   nystatin (MYCOSTATIN) 005317 UNIT/GM cream Apply topically 2 times daily. Nahid Berg MD   fluticasone-salmeterol (ADVAIR HFA) 790-71 MCG/ACT inhaler Inhale 2 puffs into the lungs 2 times daily  Nahid Berg MD   atorvastatin (LIPITOR) 80 MG tablet Take 1 tablet by mouth nightly  Boyd Rodriguez MD   Misc. Devices [facility].   Nahid Berg MD   guaiFENesin (MUCINEX) 600 MG extended release tablet Take 1 tablet by mouth 2 times daily  Lynn Fermin DO   pantoprazole (PROTONIX) 20 MG tablet Take 1 tablet by mouth daily  Julia Pinto, DO   amLODIPine (NORVASC) 5 MG tablet Take 1 tablet by mouth daily  Nahid Berg MD   levothyroxine (SYNTHROID) 175 MCG tablet Take 1 tablet by mouth Daily  Nahid Berg MD   metoprolol succinate (TOPROL XL) 50 MG extended release tablet Take 1 tablet by mouth daily  Jaden Multani MD   sertraline (ZOLOFT) 100 MG tablet Take 1 tablet by mouth daily  Jaden Multani MD       Social History     Tobacco Use    Smoking status: Never Smoker    Smokeless tobacco: Never Used   Vaping Use    Vaping Use: Never used   Substance Use Topics    Alcohol use: Never    Drug use: Never            PHYSICAL EXAMINATION:  [ INSTRUCTIONS:  \"[x]\" Indicates a positive item  \"[]\" Indicates a negative item  -- DELETE ALL ITEMS NOT EXAMINED]  Vital Signs: (As obtained by patient/caregiver or practitioner observation)    Blood pressure-  Heart rate-    Respiratory rate-    Temperature-  Pulse oximetry-     Constitutional: [x] Appears well-developed and well-nourished [x] No apparent distress      [] Abnormal-   Mental status  [x] Alert and awake  [] Oriented to person/place/time [x]Able to follow commands      Eyes:  EOM    [x]  Normal  [] Abnormal-  Sclera  [x]  Normal  [] Abnormal -         Discharge []  None visible  [] Abnormal -    HENT:   [x] Normocephalic, atraumatic.   [] Abnormal   [] Mouth/Throat: Mucous membranes are moist.     External Ears [x] Normal  [] Abnormal-     Neck: [x] No visualized mass     Pulmonary/Chest: [x] Respiratory effort normal.  [x] No visualized signs of difficulty breathing or respiratory distress        [] Abnormal-      Musculoskeletal:   [] Normal gait with no signs of ataxia         [] Normal range of motion of neck        [] Abnormal-       Neurological:        [] No Facial Asymmetry (Cranial nerve 7 motor function) (limited exam to video visit)          [] No gaze palsy        [] Abnormal-         Skin:        [x] No significant exanthematous lesions or discoloration noted on facial skin         [] Abnormal-            Psychiatric:       [x] Normal Affect [x] No Hallucinations        [] Abnormal-     Other pertinent observable physical exam findings-     Recent Results (from the past 672 hour(s))   CBC Auto Differential    Collection Time: 09/30/21 12:19 PM   Result Value Ref Range    WBC 7.8 4.8 - 10.8 K/uL    RBC 4.52 4.20 - 5.40 M/uL    Hemoglobin 14.1 12.0 - 16.0 g/dL    Hematocrit 44.1 37.0 - 47.0 %    MCV 97.6 81.0 - 99.0 fL    MCH 31.2 (H) 27.0 - 31.0 pg    MCHC 32.0 (L) 33.0 - 37.0 g/dL    RDW 14.3 11.5 - 14.5 %    Platelets 318 395 - 381 K/uL    MPV 10.7 9.4 - 12.3 fL    Neutrophils % 64.6 50.0 - 65.0 %    Lymphocytes % 24.6 20.0 - 40.0 %    Monocytes % 7.4 0.0 - 10.0 %    Eosinophils % 2.4 0.0 - 5.0 %    Basophils % 0.5 0.0 - 1.0 %    Neutrophils Absolute 5.1 1.5 - 7.5 K/uL    Immature Granulocytes # 0.0 K/uL    Lymphocytes Absolute 1.9 1.1 - 4.5 K/uL    Monocytes Absolute 0.60 0.00 - 0.90 K/uL    Eosinophils Absolute 0.20 0.00 - 0.60 K/uL    Basophils Absolute 0.00 0.00 - 0.20 K/uL   Comprehensive Metabolic Panel    Collection Time: 09/30/21 12:19 PM   Result Value Ref Range    Sodium 139 136 - 145 mmol/L    Potassium 4.3 3.5 - 5.0 mmol/L    Chloride 104 98 - 111 mmol/L    CO2 22 22 - 29 mmol/L    Anion Gap 13 7 - 19 mmol/L    Glucose 91 74 - 109 mg/dL    BUN 11 8 - 23 mg/dL    CREATININE 0.4 (L) 0.5 - 0.9 mg/dL    GFR Non-African American >60 >60    GFR African American >59 >59    Calcium 8.7 (L) 8.8 - 10.2 mg/dL    Total Protein 7.1 6.6 - 8.7 g/dL    Albumin 3.3 (L) 3.5 - 5.2 g/dL    Total Bilirubin 0.6 0.2 - 1.2 mg/dL    Alkaline Phosphatase 100 35 - 104 U/L    ALT 22 5 - 33 U/L    AST 22 5 - 32 U/L   Phenytoin Level, Total    Collection Time: 09/30/21 12:19 PM   Result Value Ref Range    Phenytoin Lvl 1.5 (L) 10.0 - 20.0 ug/mL   TSH without Reflex    Collection Time: 09/30/21 12:19 PM   Result Value Ref Range    TSH 1.340 0.270 - 4.200 uIU/mL   T4, Free    Collection Time: 09/30/21 12:19 PM   Result Value Ref Range    T4 Free 1.70 0.93 - 1.70 ng/dL         ASSESSMENT/PLAN:        1.  Essential (primary) hypertension  BP Readings from Last 3 Encounters:   08/19/21 (!) 128/56   07/23/21 130/76   06/15/21 132/82     Blood pressure stable    2. Gastroesophageal reflux disease without esophagitis  Stable    3. Seizure disorder (HCC)  Increase Dilantin to 300 mg daily  - phenytoin (PHENYTEK) 300 MG ER capsule; Take 1 capsule by mouth daily  Dispense: 30 capsule; Refill: 5    4. Chronic obstructive pulmonary disease, unspecified COPD type ([facility]  We will see if we can get coverage for her Advair overall respiratory status is stable    5. Decreased appetite  Discussed risks and benefits of this new medication. Discussed potential side effects. She voiced understanding. Encouraged her to eat  - megestrol (MEGACE) 40 MG/ML suspension; Take 10 mLs by mouth daily  Dispense: 480 mL; Refill: 5      Return in about 6 weeks (around 11/12/2021). Pooja Lowe is a 80 y.o. female being evaluated by a Virtual Visit (video visit) encounter to address concerns as mentioned above. A caregiver was present when appropriate. Due to this being a TeleHealth encounter (During [facility]- public health emergency), evaluation of the following organ systems was limited: Vitals/Constitutional/EENT/Resp/CV/GI//MS/Neuro/Skin/Heme-Lymph-Imm. Pursuant to the emergency declaration under the [address] [place] [address] and the [employer] and [employer] Act, this Virtual Visit was conducted with patient's (and/or legal guardian's) consent, to reduce the patient's risk of exposure to COVID-19 and provide necessary medical care. The patient (and/or legal guardian) has also been advised to contact this office for worsening conditions or problems, and seek emergency medical treatment and/or call 911 if deemed necessary.      Patient identification was verified at the start of the visit: Yes    Total time spent on this encounter: Not billed by time    Services were provided through a video synchronous discussion virtually to substitute for in-person clinic visit. Patient and provider were located at their individual homes. --Bryan Paris MD on 10/4/2021 at 6:15 PM    An electronic signature was used to authenticate this note.

## 2021-10-06 ENCOUNTER — HOSPITAL ENCOUNTER (OUTPATIENT)
Dept: CT IMAGING | Age: 86
Discharge: HOME | End: 2021-10-06
Payer: MEDICARE

## 2021-10-06 DIAGNOSIS — I61.0 NONTRAUMATIC SUBCORTICAL HEMORRHAGE OF LEFT CEREBRAL HEMISPHERE (HCC): ICD-10-CM

## 2021-10-06 PROCEDURE — 70450 CT HEAD/BRAIN W/O DYE: CPT

## 2021-10-08 LAB
BILIRUBIN URINE: NEGATIVE
BLOOD, URINE: NEGATIVE
CLARITY: CLEAR
COLOR: YELLOW
GLUCOSE URINE: NEGATIVE MG/DL
KETONES, URINE: NEGATIVE MG/DL
LEUKOCYTE ESTERASE, URINE: NEGATIVE
NITRITE, URINE: NEGATIVE
PH UA: 6.5 (ref 5–8)
PROTEIN UA: NEGATIVE MG/DL
SPECIFIC GRAVITY UA: 1.02 (ref 1–1.03)
UROBILINOGEN, URINE: 1 E.U./DL

## 2021-10-15 ENCOUNTER — TELEPHONE (OUTPATIENT)
Dept: INTERNAL MEDICINE CLINIC | Age: 86
End: 2021-10-15

## 2021-10-15 NOTE — TELEPHONE ENCOUNTER
[address] [address] PT [facility] with pt not able or willing to functionally participate in PT act to meet established goals. OT also reporting pt with limited progress toward established goals; pt is pleasant and cooperative, but fatigues with very minimal exertion/ EOB sitting and is unable to safely participate in transfers with assist of 1.     Nursing is planning dc today as well

## 2021-10-22 ENCOUNTER — TELEPHONE (OUTPATIENT)
Dept: FAMILY MEDICINE CLINIC | Age: 86
End: 2021-10-22

## 2021-10-22 NOTE — TELEPHONE ENCOUNTER
----- Message from Lena Vincent MD sent at 10/4/2021  6:14 PM CDT -----  Regarding: Prior authorization  Any chance we can get prior authorization for inhaler. Her home health nurse called her insurance to confirm Advair was covered. Apparently it was only going to be $16.  When they went to the pharmacy it was over $200.

## 2021-10-26 ENCOUNTER — ANTI-COAG VISIT (OUTPATIENT)
Dept: PRIMARY CARE CLINIC | Age: 86
End: 2021-10-26

## 2021-10-26 PROBLEM — I26.99 OTHER PULMONARY EMBOLISM WITHOUT ACUTE COR PULMONALE (HCC): Status: RESOLVED | Noted: 2020-02-23 | Resolved: 2021-10-26

## 2021-10-26 PROBLEM — I82.402 DEEP VEIN THROMBOSIS (DVT) OF LEFT LOWER EXTREMITY (HCC): Status: RESOLVED | Noted: 2020-02-24 | Resolved: 2021-10-26

## 2021-11-10 ENCOUNTER — HOSPITAL ENCOUNTER (OUTPATIENT)
Dept: NON INVASIVE DIAGNOSTICS | Age: 86
Discharge: HOME | End: 2021-11-10
Payer: MEDICARE

## 2021-11-10 DIAGNOSIS — I73.9 CLAUDICATION (HCC): ICD-10-CM

## 2021-11-10 PROCEDURE — 93923 UPR/LXTR ART STDY 3+ LVLS: CPT

## 2021-11-11 DIAGNOSIS — I73.9 PVD (PERIPHERAL VASCULAR DISEASE) (HCC): Primary | ICD-10-CM

## 2021-12-13 DIAGNOSIS — I10 ESSENTIAL HYPERTENSION: Chronic | ICD-10-CM

## 2021-12-13 RX ORDER — AMLODIPINE BESYLATE 5 MG/1
TABLET ORAL
Qty: 90 TABLET | Refills: 3 | Status: SHIPPED | OUTPATIENT
Start: 2021-12-13 | End: 2022-05-02 | Stop reason: ALTCHOICE

## 2021-12-13 RX ORDER — METOPROLOL SUCCINATE 50 MG/1
TABLET, EXTENDED RELEASE ORAL
Qty: 90 TABLET | Refills: 3 | Status: SHIPPED | OUTPATIENT
Start: 2021-12-13 | End: 2022-05-02 | Stop reason: SDUPTHER

## 2021-12-22 DIAGNOSIS — I10 ESSENTIAL HYPERTENSION: Chronic | ICD-10-CM

## 2021-12-22 DIAGNOSIS — K21.9 GASTRO-ESOPHAGEAL REFLUX DISEASE WITHOUT ESOPHAGITIS: ICD-10-CM

## 2021-12-22 DIAGNOSIS — E03.9 ACQUIRED HYPOTHYROIDISM: Chronic | ICD-10-CM

## 2021-12-23 RX ORDER — LEVOTHYROXINE SODIUM 175 UG/1
TABLET ORAL
Qty: 90 TABLET | Refills: 3 | Status: ON HOLD | OUTPATIENT
Start: 2021-12-23 | End: 2022-05-21 | Stop reason: HOSPADM

## 2021-12-23 RX ORDER — HYDROCHLOROTHIAZIDE 12.5 MG/1
CAPSULE, GELATIN COATED ORAL
Qty: 90 CAPSULE | Refills: 3 | Status: SHIPPED | OUTPATIENT
Start: 2021-12-23 | End: 2022-05-02 | Stop reason: ALTCHOICE

## 2021-12-23 RX ORDER — PANTOPRAZOLE SODIUM 20 MG/1
TABLET, DELAYED RELEASE ORAL
Qty: 90 TABLET | Refills: 3 | Status: SHIPPED | OUTPATIENT
Start: 2021-12-23 | End: 2022-05-13

## 2021-12-23 RX ORDER — SERTRALINE HYDROCHLORIDE 100 MG/1
TABLET, FILM COATED ORAL
Qty: 90 TABLET | Refills: 3 | Status: SHIPPED | OUTPATIENT
Start: 2021-12-23 | End: 2022-05-02 | Stop reason: ALTCHOICE

## 2021-12-30 RX ORDER — ATORVASTATIN CALCIUM 80 MG/1
80 TABLET, FILM COATED ORAL NIGHTLY
Qty: 30 TABLET | Refills: 3 | Status: SHIPPED | OUTPATIENT
Start: 2021-12-30

## 2022-02-02 ENCOUNTER — OFFICE VISIT (OUTPATIENT)
Dept: FAMILY MEDICINE CLINIC | Age: 87
End: 2022-02-02

## 2022-02-02 ENCOUNTER — TELEPHONE (OUTPATIENT)
Dept: INTERNAL MEDICINE CLINIC | Age: 87
End: 2022-02-02

## 2022-02-02 ENCOUNTER — HOSPITAL ENCOUNTER (OUTPATIENT)
Dept: NON INVASIVE DIAGNOSTICS | Age: 87
Discharge: HOME | End: 2022-02-02
Payer: MEDICARE

## 2022-02-02 VITALS
SYSTOLIC BLOOD PRESSURE: 128 MMHG | TEMPERATURE: 97.8 F | WEIGHT: 199 LBS | OXYGEN SATURATION: 94 % | HEART RATE: 90 BPM | RESPIRATION RATE: 18 BRPM | BODY MASS INDEX: 30.26 KG/M2 | DIASTOLIC BLOOD PRESSURE: 82 MMHG

## 2022-02-02 DIAGNOSIS — Z23 NEED FOR VACCINATION: Primary | ICD-10-CM

## 2022-02-02 DIAGNOSIS — M79.605 LEG PAIN, LEFT: ICD-10-CM

## 2022-02-02 DIAGNOSIS — Z00.00 ROUTINE GENERAL MEDICAL EXAMINATION AT A HEALTH CARE FACILITY: ICD-10-CM

## 2022-02-02 DIAGNOSIS — I61.0 NONTRAUMATIC SUBCORTICAL HEMORRHAGE OF LEFT CEREBRAL HEMISPHERE (HCC): ICD-10-CM

## 2022-02-02 DIAGNOSIS — G40.909 SEIZURE DISORDER (HCC): Chronic | ICD-10-CM

## 2022-02-02 DIAGNOSIS — M79.89 LEG SWELLING: ICD-10-CM

## 2022-02-02 DIAGNOSIS — L89.302 PRESSURE INJURY OF BUTTOCK, STAGE 2, UNSPECIFIED LATERALITY (HCC): ICD-10-CM

## 2022-02-02 DIAGNOSIS — I10 ESSENTIAL HYPERTENSION: Chronic | ICD-10-CM

## 2022-02-02 PROCEDURE — G8427 DOCREV CUR MEDS BY ELIG CLIN: HCPCS | Performed by: NURSE PRACTITIONER

## 2022-02-02 PROCEDURE — G0008 ADMIN INFLUENZA VIRUS VAC: HCPCS | Performed by: NURSE PRACTITIONER

## 2022-02-02 PROCEDURE — G8417 CALC BMI ABV UP PARAM F/U: HCPCS | Performed by: NURSE PRACTITIONER

## 2022-02-02 PROCEDURE — G0439 PPPS, SUBSEQ VISIT: HCPCS | Performed by: NURSE PRACTITIONER

## 2022-02-02 PROCEDURE — 1090F PRES/ABSN URINE INCON ASSESS: CPT | Performed by: NURSE PRACTITIONER

## 2022-02-02 PROCEDURE — 4040F PNEUMOC VAC/ADMIN/RCVD: CPT | Performed by: NURSE PRACTITIONER

## 2022-02-02 PROCEDURE — 90694 VACC AIIV4 NO PRSRV 0.5ML IM: CPT | Performed by: NURSE PRACTITIONER

## 2022-02-02 PROCEDURE — G8484 FLU IMMUNIZE NO ADMIN: HCPCS | Performed by: NURSE PRACTITIONER

## 2022-02-02 PROCEDURE — 99214 OFFICE O/P EST MOD 30 MIN: CPT | Performed by: NURSE PRACTITIONER

## 2022-02-02 PROCEDURE — 93971 EXTREMITY STUDY: CPT

## 2022-02-02 PROCEDURE — 1123F ACP DISCUSS/DSCN MKR DOCD: CPT | Performed by: NURSE PRACTITIONER

## 2022-02-02 PROCEDURE — 1036F TOBACCO NON-USER: CPT | Performed by: NURSE PRACTITIONER

## 2022-02-02 RX ORDER — POLYMYXIN B SULFATE AND TRIMETHOPRIM 1; 10000 MG/ML; [USP'U]/ML
2 SOLUTION OPHTHALMIC 3 TIMES DAILY
Qty: 10 ML | Refills: 0 | Status: SHIPPED | OUTPATIENT
Start: 2022-02-02 | End: 2022-02-09

## 2022-02-02 ASSESSMENT — ENCOUNTER SYMPTOMS
WHEEZING: 0
CHEST TIGHTNESS: 0
ABDOMINAL PAIN: 0
COUGH: 0
SORE THROAT: 0
NAUSEA: 0
SHORTNESS OF BREATH: 0
DIARRHEA: 0

## 2022-02-02 ASSESSMENT — PATIENT HEALTH QUESTIONNAIRE - PHQ9
SUM OF ALL RESPONSES TO PHQ QUESTIONS 1-9: 0
SUM OF ALL RESPONSES TO PHQ9 QUESTIONS 1 & 2: 0
2. FEELING DOWN, DEPRESSED OR HOPELESS: 0
1. LITTLE INTEREST OR PLEASURE IN DOING THINGS: 0
SUM OF ALL RESPONSES TO PHQ QUESTIONS 1-9: 0

## 2022-02-02 ASSESSMENT — LIFESTYLE VARIABLES: HOW OFTEN DO YOU HAVE A DRINK CONTAINING ALCOHOL: 0

## 2022-02-02 NOTE — PROGRESS NOTES
Gabriela Abbott is a 80 y.o. female who presents today for  Chief Complaint   Patient presents with    Medicare AWV       HPI:  MAW completed. See additional note. No mammogram or colonoscopy due to age. Flu shot due today    She has not tried the megace. She is not interested in taking it. Has likely had gradual weight loss but no significant loss recently. She continues to eat mainly breakfast, occasionally a sandwich later in the day. Hypertension  Compliant with medications. No adverse effects from medication. No lightheadedness, palpitations, or chest pain. Hx of cerebral hemorrhage. She is no longer on anticoagulation. At neurological baseline. Seizure disorder, stable on phenytoin. There may be a discrepancy on the dosing. She should be taking 300 mg ER tablet once daily. She thinks it is still being filled as 100 mg, possibly 200 mg. She is taking 3 tablets daily. She has pressure ulcers to buttocks. She has had skin breakdown. Her niece is treating with topical abx. They are trying to rotate her but difficult. She is in bed most of the time. Chronic weakness. She needs an order for a wheelchair. She is borrowing one currently. She has significant chronic weakness secondary to past cerebral hemorrhage, seizure disorder. Review of Systems   Constitutional: Positive for fatigue (chronic). Negative for chills and fever. HENT: Negative for congestion, ear pain and sore throat. Respiratory: Negative for cough, chest tightness, shortness of breath and wheezing. Cardiovascular: Negative for chest pain. Gastrointestinal: Negative for abdominal pain, diarrhea and nausea. Musculoskeletal: Negative for arthralgias and myalgias. Skin: Positive for wound (pressure ulcers buttocks). Negative for rash. Neurological: Positive for weakness (chronic).        Past Medical History:   Diagnosis Date    Arthritis     Back pain     bulging disc    CAD (coronary artery disease)     BMS to RCA    Cerebral artery occlusion with cerebral infarction ([facility] [address]     small strokes,balance issues    Cystitis     Depression     Dizziness     Dysuria     Edema     ankles,mild pedal edema    Falls frequently     H/O blood clots     left leg and lungs    Head injury     Headache     Heartburn     [place] (hard of hearing)     Hyperlipidemia     Hypertension     Hypothyroidism     Hypothyroidism     Memory problem     Neuropathy     Shingles     TIA (transient ischemic attack)     Urinary incontinence     UTI (urinary tract infection)        Current Outpatient Medications   Medication Sig Dispense Refill    trimethoprim-polymyxin b (POLYTRIM) 55124-4.1 UNIT/ML-% ophthalmic solution Place 2 drops into both eyes 3 times daily for 7 days 10 mL 0    atorvastatin (LIPITOR) 80 MG tablet Take 1 tablet by mouth nightly 30 tablet 3    sertraline (ZOLOFT) 100 MG tablet TAKE 1 TABLET BY MOUTH EVERY DAY 90 tablet 3    levothyroxine (SYNTHROID) 175 MCG tablet TAKE 1 TABLET BY MOUTH EVERY DAY 90 tablet 3    hydroCHLOROthiazide (MICROZIDE) 12.5 MG capsule TAKE 1 CAPSULE BY MOUTH EVERY DAY IN THE MORNING 90 capsule 3    pantoprazole (PROTONIX) 20 MG tablet TAKE 1 TABLET BY MOUTH EVERY DAY 90 tablet 3    amLODIPine (NORVASC) 5 MG tablet TAKE 1 TABLET BY MOUTH EVERY DAY 90 tablet 3    metoprolol succinate (TOPROL XL) 50 MG extended release tablet TAKE 1 TABLET BY MOUTH EVERY DAY 90 tablet 3    megestrol (MEGACE) 40 MG/ML suspension Take 10 mLs by mouth daily 480 mL 5    oxybutynin (DITROPAN-XL) 5 MG extended release tablet TAKE 1 TABLET BY MOUTH DAILY FOR OVERACTIVE BLADDER 90 tablet 1    nystatin (MYCOSTATIN) 210410 UNIT/GM cream Apply topically 2 times daily. 30 g 5    fluticasone-salmeterol (ADVAIR HFA) 230-21 MCG/ACT inhaler Inhale 2 puffs into the lungs 2 times daily 3 each 3    Misc. Devices [facility].  1 Device 0    guaiFENesin (MUCINEX) 600 MG extended release tablet Take 1 tablet by mouth 2 times daily 60 tablet 0    phenytoin (PHENYTEK) 300 MG ER capsule Take 1 capsule by mouth daily 30 capsule 5    Misc. Devices MISC 1 each by Does not apply route once as needed (As needed) [place] 1 each 0     No current facility-administered medications for this visit. Allergies   Allergen Reactions    Morphine      Pt becomes mean (refusing, cursing)         Past Surgical History:   Procedure Laterality Date    APPENDECTOMY  1947    CHOLECYSTECTOMY      EYE SURGERY  2009    bilateral cataract     HYSTERECTOMY, TOTAL ABDOMINAL      JOINT REPLACEMENT      Bilateral Knee    KNEE ARTHROPLASTY Right     LOBECTOMY  10/2015    sx--bilateral    PTCA  2002    with stent placement/bms to rca       Social History     Tobacco Use    Smoking status: Never Smoker    Smokeless tobacco: Never Used   Vaping Use    Vaping Use: Never used   Substance Use Topics    Alcohol use: Never    Drug use: Never       Family History   Problem Relation Age of Onset    Diabetes Mother     Hypertension Mother    Duana Big Rheum Arthritis Mother     Stroke Mother     Tuberculosis Father        /82   Pulse 90   Temp 97.8 °F (36.6 °C) (Temporal)   Resp 18   Wt 199 lb (90.3 kg)   SpO2 94%   BMI 30.26 kg/m²     Physical Exam  Vitals reviewed. Constitutional:       General: She is not in acute distress. Appearance: Normal appearance. She is well-developed. HENT:      Head: Normocephalic. Eyes:      Conjunctiva/sclera: Conjunctivae normal.      Pupils: Pupils are equal, round, and reactive to light. Neck:      Thyroid: No thyromegaly. Vascular: No carotid bruit or JVD. Trachea: No tracheal deviation. Cardiovascular:      Rate and Rhythm: Normal rate and regular rhythm. Heart sounds: Normal heart sounds. No murmur heard. Pulmonary:      Effort: Pulmonary effort is normal. No respiratory distress. Breath sounds: Normal breath sounds.  No wheezing or rhonchi. Musculoskeletal:         General: Normal range of motion. Cervical back: Normal range of motion and neck supple. Lymphadenopathy:      Cervical: No cervical adenopathy. Skin:     General: Skin is warm and dry. Findings: Erythema present. No rash. Comments: Unable to assess in office due to pt's weakness, inability to stand. Pictures reviewed from niece, pt buttocks with erythema and skin breakdown, no drainage noted   Neurological:      Mental Status: She is alert. Psychiatric:         Mood and Affect: Mood normal.         Behavior: Behavior normal.         Thought Content: Thought content normal.         ASSESSMENT/PLAN:  1. Routine general medical examination at a health care facility  -MAW completed. See additional note. -Usual screenings deferred due to age  -Flu shot today    2. Essential hypertension  -Stable, controlled. Continue current medication    3. Nontraumatic subcortical hemorrhage of left cerebral hemisphere ([facility] [address]  -Stable, at neurologic baseline  - DME Order for (Specify) as OP  - [address] [place] [address]    4. Seizure disorder ([facility] [address]  -Stable, no recent seizures  -Her niece will verify current dose of phenytoin. It should be  mg daily. - DME Order for (Specify) as OP  - [address]    5. Pressure injury of buttock, stage 2, unspecified laterality ([facility] [address]  -Refer to home health for evaluation, treatment  - [address] [place] [address]    6. Need for vaccination    - INFLUENZA, QUADV, ADJUVANTED, 65 YRS =, IM, PF, PREFILL SYR, 0.5ML (FLUAD)         Return in 3 months (on 5/2/2022) for follow up. Curtis Pastrana was seen today for medicare awv.     Diagnoses and all orders for this visit:    Need for vaccination  -     INFLUENZA, QUADV, ADJUVANTED, 72 YRS =, IM, PF, PREFILL SYR, 0.5ML (FLUAD)    Routine general medical examination at a health care facility    Essential hypertension    Nontraumatic subcortical hemorrhage of left cerebral hemisphere [facility])  -     DME Order for (Specify) as OP  -     [address] [facility] [address], [place]    Seizure disorder [facility])  -     DME Order for (Specify) as OP  -     [address] [facility] [address], [place]    Pressure injury of buttock, stage 2, unspecified laterality ([facility] [address]  -     [address] [facility]    Other orders  -     trimethoprim-polymyxin b (POLYTRIM) 17595-7.1 UNIT/ML-% ophthalmic solution; Place 2 drops into both eyes 3 times daily for 7 days      There are no discontinued medications. Patient Instructions     Personalized Preventive Plan for [facility] COGNITIVE DISORDERS - 2/2/2022  Medicare offers a range of preventive health benefits. Some of the tests and screenings are paid in full while other may be subject to a deductible, co-insurance, and/or copay. Some of these benefits include a comprehensive review of your medical history including lifestyle, illnesses that may run in your family, and various assessments and screenings as appropriate. After reviewing your medical record and screening and assessments performed today your provider may have ordered immunizations, labs, imaging, and/or referrals for you. A list of these orders (if applicable) as well as your Preventive Care list are included within your After Visit Summary for your review. Other Preventive Recommendations:    · A preventive eye exam performed by an eye specialist is recommended every 1-2 years to screen for glaucoma; cataracts, macular degeneration, and other eye disorders. · A preventive dental visit is recommended every 6 months. · Try to get at least 150 minutes of exercise per week or 10,000 steps per day on a pedometer . · Order or download the FREE \"Exercise & Physical Activity: Your Everyday Guide\" from [employer]. Call 3-142.948.3188 or search [employer] online. · You need 1201-2577 mg of calcium and 1879-3003 IU of vitamin D per day.  It is possible to meet your calcium requirement with diet alone, but a vitamin D supplement is usually necessary to meet this goal.  · When exposed to the sun, use a sunscreen that protects against both UVA and UVB radiation with an SPF of 30 or greater. Reapply every 2 to 3 hours or after sweating, drying off with a towel, or swimming. · Always wear a seat belt when traveling in a car. Always wear a helmet when riding a bicycle or motorcycle. Patient voicesunderstanding and agrees to plans along with risks and benefits of plan. Counseling:  Maximo Sheth's case, medications and options were discussed in detail. Patient was instructed to call the office if she questionsregarding her treatment. Should her conditions worsen, she should return to office to be reassessed by VIN Rogers. she Should to go the closest Emergency Department for any emergency. They verbalizedunderstanding the above instructions. Return in 3 months (on 5/2/2022) for follow up.

## 2022-02-02 NOTE — TELEPHONE ENCOUNTER
[facility] just needs the ok to admit pt this weekend on Sat or Sun  - usually admit in 48 hrs but with impending weather forecast need to delay is that ok  ?

## 2022-02-02 NOTE — PROGRESS NOTES
Immunizations Administered     Name Date Dose Route    Influenza, Quadv, adjuvanted, 65 yrs +, IM, PF (Fluad) 2/2/2022 0.5 mL Intramuscular    Site: Deltoid- Left    Lot: [id number]    NDC: 38743-706-77

## 2022-02-02 NOTE — PROGRESS NOTES
Medicare Annual Wellness Visit  Name: Godfrey Nieves Date: 2022   MRN: [id number] Sex: Female   Age: 80 y.o. Ethnicity: Non- / Non    : 1931 Race: White (non-)      Lorenzo is here for Medicare AWV    Screenings for behavioral, psychosocial and functional/safety risks, and cognitive dysfunction are all negative except as indicated below. These results, as well as other patient data from the [address] form, are documented in Flowsheets linked to this Encounter. Allergies   Allergen Reactions    Morphine      Pt becomes mean (refusing, cursing)         Prior to Visit Medications    Medication Sig Taking? Authorizing Provider   atorvastatin (LIPITOR) 80 MG tablet Take 1 tablet by mouth nightly Yes Zelda Zendejas MD   sertraline (ZOLOFT) 100 MG tablet TAKE 1 TABLET BY MOUTH EVERY DAY Yes Zelda Zendejas MD   levothyroxine (SYNTHROID) 175 MCG tablet TAKE 1 TABLET BY MOUTH EVERY DAY Yes Zelda Zendejas MD   hydroCHLOROthiazide (MICROZIDE) 12.5 MG capsule TAKE 1 CAPSULE BY MOUTH EVERY DAY IN THE MORNING Yes Zelda Zendejas MD   pantoprazole (PROTONIX) 20 MG tablet TAKE 1 TABLET BY MOUTH EVERY DAY Yes Zelda Zendejas MD   amLODIPine (NORVASC) 5 MG tablet TAKE 1 TABLET BY MOUTH EVERY DAY Yes Zelda Zendejas MD   metoprolol succinate (TOPROL XL) 50 MG extended release tablet TAKE 1 TABLET BY MOUTH EVERY DAY Yes Zelda Zendejas MD   megestrol (MEGACE) 40 MG/ML suspension Take 10 mLs by mouth daily Yes Zelda Zendejas MD   oxybutynin (DITROPAN-XL) 5 MG extended release tablet TAKE 1 TABLET BY MOUTH DAILY FOR OVERACTIVE BLADDER Yes Zelda Zendejas MD   nystatin (MYCOSTATIN) 960124 UNIT/GM cream Apply topically 2 times daily. Yes Zelda Zendejas MD   fluticasone-salmeterol (ADVAIR HFA) 096-23 MCG/ACT inhaler Inhale 2 puffs into the lungs 2 times daily Yes Zelda Zendejas MD   Misc. Devices [facility].  Yes Zelda Zendejas MD   guaiFENesin (MUCINEX) 600 MG extended release tablet Take 1 tablet by mouth 2 times daily Yes Nazia Foster Pinto, DO   phenytoin (PHENYTEK) 300 MG ER capsule Take 1 capsule by mouth daily  Marni Rose MD   Misc.  Devices MISC 1 each by Does not apply route once as needed (As needed) Kristofer Jefferson MD       Past Medical History:   Diagnosis Date    Arthritis     Back pain     bulging disc    CAD (coronary artery disease)     BMS to RCA    Cerebral artery occlusion with cerebral infarction (HCC)     small strokes,balance issues    Cystitis     Depression     Dizziness     Dysuria     Edema     ankles,mild pedal edema    Falls frequently     H/O blood clots     left leg and lungs    Head injury     Headache     Heartburn     [place] (hard of hearing)     Hyperlipidemia     Hypertension     Hypothyroidism     Hypothyroidism     Memory problem     Neuropathy     Shingles     TIA (transient ischemic attack)     Urinary incontinence     UTI (urinary tract infection)        Past Surgical History:   Procedure Laterality Date    APPENDECTOMY  1947    CHOLECYSTECTOMY      EYE SURGERY  2009    bilateral cataract     HYSTERECTOMY, TOTAL ABDOMINAL      JOINT REPLACEMENT      Bilateral Knee    KNEE ARTHROPLASTY Right     LOBECTOMY  10/2015    sx--bilateral    PTCA  2002    with stent placement/bms to rca       Family History   Problem Relation Age of Onset    Diabetes Mother     Hypertension Mother    Maradiaga Rheum Arthritis Mother     Stroke Mother     Tuberculosis Father        CareTeam (Including outside providers/suppliers regularly involved in providing care):   Patient Care Team:  Marni Rose MD as PCP - General (Family Medicine)  Marni Rose MD as PCP - [facility] Empaneled Provider  VIN Anderson as Advanced Practice Nurse (Neurology)  Anand Watts MD as Consulting Physician (Vascular Surgery)    Wt Readings from Last 3 Encounters:   02/02/22 199 lb (90.3 kg)   08/19/21 199 lb 8 oz (90.5 kg)   05/29/21 204 lb (92.5 kg)     Vitals:    02/02/22 1305   BP: 128/82   Pulse: 90   Resp: 18   Temp: 97.8 °F (36.6 °C)   TempSrc: Temporal   SpO2: 94%   Weight: 199 lb (90.3 kg)     Body mass index is 30.26 kg/m². Based upon direct observation of the patient, evaluation of cognition reveals recent and remote memory intact. Patient's complete Health Risk Assessment and screening values have been reviewed and are found in Flowsheets. The following problems were reviewed today and where indicated follow up appointments were made and/or referrals ordered. Positive Risk Factor Screenings with Interventions:      Cognitive: Words recalled: 0 Words Recalled  Clock Drawing Test (CDT) Score: Normal (unable to draw)  Total Score Interpretation: Positive Mini-Cog  Cognitive Impairment Interventions:  · hx of stroke 2021, at baseline          Health Habits/Nutrition:  Health Habits/Nutrition  Do you exercise for at least 20 minutes 2-3 times per week?: (!) No  Have you lost any weight without trying in the past 3 months?: No  Do you eat only one meal per day?: (!) Yes  Have you seen the dentist within the past year?: (!) No     Health Habits/Nutrition Interventions:  · Inadequate physical activity:  inability to exercise regularly due to past stroke    Hearing/Vision:  No exam data present  Hearing/Vision  Do you or your family notice any trouble with your hearing that hasn't been managed with hearing aids?: (!) Yes  Do you have difficulty driving, watching TV, or doing any of your daily activities because of your eyesight?: (!) Yes  Have you had an eye exam within the past year?: (!) No  Hearing/Vision Interventions:  · Vision concerns:  patient encouraged to make appointment with his/her eye specialist     ADL:  ADLs  In the past 7 days, did you need help from others to perform any of the following everyday activities?  Eating, dressing, grooming, bathing, toileting, or walking/balance?: (!)

## 2022-02-03 ENCOUNTER — VIRTUAL VISIT (OUTPATIENT)
Dept: VASCULAR SURGERY | Age: 87
End: 2022-02-03
Payer: MEDICARE

## 2022-02-03 DIAGNOSIS — M79.605 LEG PAIN, LEFT: Primary | ICD-10-CM

## 2022-02-03 DIAGNOSIS — M79.89 LEG SWELLING: ICD-10-CM

## 2022-02-03 PROCEDURE — G8484 FLU IMMUNIZE NO ADMIN: HCPCS | Performed by: NURSE PRACTITIONER

## 2022-02-03 PROCEDURE — 99213 OFFICE O/P EST LOW 20 MIN: CPT | Performed by: NURSE PRACTITIONER

## 2022-02-03 PROCEDURE — 1090F PRES/ABSN URINE INCON ASSESS: CPT | Performed by: NURSE PRACTITIONER

## 2022-02-03 PROCEDURE — 4040F PNEUMOC VAC/ADMIN/RCVD: CPT | Performed by: NURSE PRACTITIONER

## 2022-02-03 PROCEDURE — G8427 DOCREV CUR MEDS BY ELIG CLIN: HCPCS | Performed by: NURSE PRACTITIONER

## 2022-02-03 PROCEDURE — G8417 CALC BMI ABV UP PARAM F/U: HCPCS | Performed by: NURSE PRACTITIONER

## 2022-02-03 PROCEDURE — 1036F TOBACCO NON-USER: CPT | Performed by: NURSE PRACTITIONER

## 2022-02-03 PROCEDURE — 1123F ACP DISCUSS/DSCN MKR DOCD: CPT | Performed by: NURSE PRACTITIONER

## 2022-02-03 NOTE — PROGRESS NOTES
Kris Pak (:  1931) is a 80 y.o. female,Established patient, here for evaluation of the following chief complaint(s): Follow-up          SUBJECTIVE/OBJECTIVE:  She presents for follow-up of known DVT. She has no known history of DVT. Her current treatment includes none due to intracranial bleed. She does not have a family history of hypercoagulability. Risk factors for hypercoagulable state include: none known. She does not have an IVC filter. She has no symptoms of DVT. Differential diagnosis includes but is not limited to CHF, thyroid disease, venous disease, DVT, SVT, peripheral vascular disease.           Kris Pak is a 80 y.o. female with the following history as recorded in [facility]:  Patient Active Problem List    Diagnosis Date Noted    Thalamic hemorrhage ([facility]     Left-sided nontraumatic intracerebral hemorrhage ([facility] [address] Left Thalamic 08/10/2021    AMS (altered mental status)     Complicated urinary tract infection 04/10/2021    Generalized weakness 04/10/2021    Hypocalcemia 04/10/2021    Acute bronchitis 04/10/2021    Leg pain, left 2020    Leg swelling 2020    Seizure disorder ([facility] 10/02/2019    Compression fx, thoracic spine, closed, initial encounter ([facility] 2019    History of cardioembolic cerebrovascular accident (CVA) 2019    Essential hypertension 2019    Acquired hypothyroidism 2019     Current Outpatient Medications   Medication Sig Dispense Refill    trimethoprim-polymyxin b (POLYTRIM) 04196-0.1 UNIT/ML-% ophthalmic solution Place 2 drops into both eyes 3 times daily for 7 days 10 mL 0    atorvastatin (LIPITOR) 80 MG tablet Take 1 tablet by mouth nightly 30 tablet 3    sertraline (ZOLOFT) 100 MG tablet TAKE 1 TABLET BY MOUTH EVERY DAY 90 tablet 3    levothyroxine (SYNTHROID) 175 MCG tablet TAKE 1 TABLET BY MOUTH EVERY DAY 90 tablet 3    hydroCHLOROthiazide (MICROZIDE) 12.5 MG capsule TAKE 1 CAPSULE BY MOUTH EVERY DAY IN THE MORNING 90 capsule 3    pantoprazole (PROTONIX) 20 MG tablet TAKE 1 TABLET BY MOUTH EVERY DAY 90 tablet 3    amLODIPine (NORVASC) 5 MG tablet TAKE 1 TABLET BY MOUTH EVERY DAY 90 tablet 3    metoprolol succinate (TOPROL XL) 50 MG extended release tablet TAKE 1 TABLET BY MOUTH EVERY DAY 90 tablet 3    phenytoin (PHENYTEK) 300 MG ER capsule Take 1 capsule by mouth daily 30 capsule 5    megestrol (MEGACE) 40 MG/ML suspension Take 10 mLs by mouth daily 480 mL 5    oxybutynin (DITROPAN-XL) 5 MG extended release tablet TAKE 1 TABLET BY MOUTH DAILY FOR OVERACTIVE BLADDER 90 tablet 1    Misc. Devices MISC 1 each by Does not apply route once as needed (As needed) Trapeze Bar 1 each 0    nystatin (MYCOSTATIN) 111541 UNIT/GM cream Apply topically 2 times daily. 30 g 5    fluticasone-salmeterol (ADVAIR HFA) 230-21 MCG/ACT inhaler Inhale 2 puffs into the lungs 2 times daily 3 each 3    Misc. Devices [facility]. 1 Device 0    guaiFENesin (MUCINEX) 600 MG extended release tablet Take 1 tablet by mouth 2 times daily 60 tablet 0     No current facility-administered medications for this visit.      Allergies: Morphine  Past Medical History:   Diagnosis Date    Arthritis     Back pain     bulging disc    CAD (coronary artery disease)     BMS to RCA    Cerebral artery occlusion with cerebral infarction (HCC)     small strokes,balance issues    Cystitis     Depression     Dizziness     Dysuria     Edema     ankles,mild pedal edema    Falls frequently     H/O blood clots     left leg and lungs    Head injury     Headache     Heartburn     [place] (hard of hearing)     Hyperlipidemia     Hypertension     Hypothyroidism     Hypothyroidism     Memory problem     Neuropathy     Shingles     TIA (transient ischemic attack)     Urinary incontinence     UTI (urinary tract infection)      Past Surgical History:   Procedure Laterality Date    APPENDECTOMY  1947    CHOLECYSTECTOMY      EYE SURGERY  2009    bilateral cataract     HYSTERECTOMY, TOTAL ABDOMINAL      JOINT REPLACEMENT      Bilateral Knee    KNEE ARTHROPLASTY Right     LOBECTOMY  10/2015    sx--bilateral    PTCA  2002    with stent placement/bms to rca     Family History   Problem Relation Age of Onset    Diabetes Mother     Hypertension Mother    [facility] Rheum Arthritis Mother     Stroke Mother     Tuberculosis Father      Social History     Tobacco Use    Smoking status: Never Smoker    Smokeless tobacco: Never Used   Substance Use Topics    Alcohol use: Never       ROS  Eyes  no sudden vision change or amaurosis. Respiratory  no significant shortness of breath,  Cardiovascular  no chest pain or syncope.  has not had  significant leg swelling. no claudication. Musculoskeletal  no gait disturbance  Skin  no new wound. Neurologic   No speech difficulty or lateralizing weakness. All other review of systems are negative. Patient-Reported Vitals 2/19/2021   Patient-Reported Weight 195#   Patient-Reported Height 5 7   Patient-Reported Systolic 091   Patient-Reported Diastolic 97   Patient-Reported Pulse 82   Patient-Reported Temperature 97.9   Patient-Reported SpO2 98          Physical Exam    Due to this being a TeleHealth encounter, evaluation of the following organ systems is limited: Vitals/Constitutional/EENT/Resp/CV/GI//MS/Neuro/Skin/Heme-Lymph-Imm. Constitutional  well developed, well nourished. No diaphoresis or acute distress. Neck- ROM appears normal  Extremities -No cyanosis, clubbing, no edema. No signs atheroembolic event. Pulmonary  effort appears normal.  No respiratory distress. No accessory muscle use  Neurologic  alert and oriented X 3. Cranial Nerves II-XII grossly intact  Skin  intact. No rash, erythema, or pallor.    Psychiatric  mood, affect, and behavior appear normal.  Judgment and thought processes appear normal.    Risk factors for atherosclerosis of all vascular beds have been reviewed with the patient including:  Family history, tobacco abuse in all forms, elevated cholesterol, hyperlipidemia, and diabetes. Venous Scan: DVT involving left leg involving the popliteal   vein(s) which is chronic and only partially occlusive    Individual films reviewed: Yes. Test results were reviewed with the patient  Disease process is stable and chronic           Reviewed on this visit: recent hospitalization notes from august    Reviewed previous studies including: venous scan  Individual images were reviewed. I agree with the findings  Results were discussed with the patient. ASSESSMENT/PLAN:  1. Leg pain, left  2. Leg swelling        Discussed management of venous scan which includes:not taking anticoagulation. She has risk with this and without. However, she has been without it for almost 6 months. There is no progression. At this point we feel it is safer to be without this. They will call right away with any sudden swelling  Recommend no smoking - discussed the effect tobacco has on illness;   Proceed with follow up prn  Patient instructed to keep leg elevated as much as possible due to the increased swelling that is associated with DVT. Call the office if pain, swelling, and tenderness extends beyond where it is today. Wear  support hose every day from the time they get up in the morning until they go to be at night. Use warm moist heat for comfort if needed. An electronic signature was used to authenticate this note. --Sam Hui [facility] [place] [facility] [place] [facility], was evaluated through a synchronous (real-time) audio-video  encounter. The patient (or guardian if applicable) is aware that this is a billable  service, which includes applicable co-pays. This Virtual Visit was conducted with  patient's (and/or legal guardian's) consent.  The visit was conducted pursuant to  the emergency declaration under the [address] [facility] [place], [address] waiver authority and the [employer] General Act. Patient identification was verified,  and a caregiver was present when appropriate. The patient was located in a  state where the provider was licensed to provide care. Patient identification was verified at the start of the visit: Yes      Services were provided through a video synchronous discussion virtually to substitute for in-person clinic visit. Patient and provider were located at their individual homes. An electronic signature was used to authenticate this note.     --VIN Chan

## 2022-02-11 ENCOUNTER — TELEPHONE (OUTPATIENT)
Dept: FAMILY MEDICINE CLINIC | Age: 87
End: 2022-02-11

## 2022-02-11 NOTE — TELEPHONE ENCOUNTER
Jany Easley with [facility] called and is wanting to know if pt can take melatonin at night to help with sleep. Please advise if ok to take?

## 2022-03-24 RX ORDER — OXYBUTYNIN CHLORIDE 5 MG/1
5 TABLET, EXTENDED RELEASE ORAL DAILY
Qty: 90 TABLET | Refills: 1 | Status: ON HOLD | OUTPATIENT
Start: 2022-03-24 | End: 2022-05-16

## 2022-03-24 NOTE — TELEPHONE ENCOUNTER
Lyndon Barerto called to request a refill on her medication.       Last office visit : 2/2/2022   Next office visit : 5/2/2022     Requested Prescriptions     Signed Prescriptions Disp Refills    oxybutynin (DITROPAN-XL) 5 MG extended release tablet 90 tablet 1     Sig: TAKE 1 TABLET BY MOUTH DAILY FOR OVERACTIVE BLADDER     Authorizing Provider: Rabon Heimlich     Ordering User: Harsh Vaughn

## 2022-04-29 ENCOUNTER — TELEPHONE (OUTPATIENT)
Dept: FAMILY MEDICINE CLINIC | Age: 87
End: 2022-04-29

## 2022-04-29 NOTE — TELEPHONE ENCOUNTER
Nurse Triage call transferred red flag word was Extreme fatigue     Pt's niece Virgilio Hassan called and said she was very tired today her BP was 108/55 Pulse 90. She says she is not having any other symptoms but can't stay awake. Virgilio Hassan is not sure if maybe she is not sleeping well at night or if she is awake. She says she does talk in her sleep almost all night long. She has an appointment with you on Monday but she just wanted to make sure the low BP was ok. Please advise?

## 2022-04-29 NOTE — TELEPHONE ENCOUNTER
That blood pressure is pretty low. I would recommend she discontinue both the hydrochlorothiazide and amlodipine.   We will see if that helps

## 2022-05-02 ENCOUNTER — APPOINTMENT (OUTPATIENT)
Dept: CT IMAGING | Age: 87
End: 2022-05-02
Payer: MEDICARE

## 2022-05-02 ENCOUNTER — OFFICE VISIT (OUTPATIENT)
Dept: FAMILY MEDICINE CLINIC | Age: 87
End: 2022-05-02
Payer: MEDICARE

## 2022-05-02 ENCOUNTER — HOSPITAL ENCOUNTER (EMERGENCY)
Age: 87
Discharge: HOME | End: 2022-05-02
Payer: MEDICARE

## 2022-05-02 VITALS
TEMPERATURE: 97 F | DIASTOLIC BLOOD PRESSURE: 62 MMHG | OXYGEN SATURATION: 97 % | SYSTOLIC BLOOD PRESSURE: 112 MMHG | HEIGHT: 68 IN | HEART RATE: 77 BPM | BODY MASS INDEX: 30.26 KG/M2

## 2022-05-02 VITALS
DIASTOLIC BLOOD PRESSURE: 74 MMHG | HEART RATE: 89 BPM | SYSTOLIC BLOOD PRESSURE: 133 MMHG | OXYGEN SATURATION: 94 % | TEMPERATURE: 97.5 F | RESPIRATION RATE: 18 BRPM

## 2022-05-02 DIAGNOSIS — R53.83 FATIGUE, UNSPECIFIED TYPE: Primary | ICD-10-CM

## 2022-05-02 DIAGNOSIS — R41.0 DISORIENTATION: ICD-10-CM

## 2022-05-02 DIAGNOSIS — J44.9 CHRONIC OBSTRUCTIVE PULMONARY DISEASE, UNSPECIFIED COPD TYPE (HCC): ICD-10-CM

## 2022-05-02 DIAGNOSIS — L89.302 PRESSURE INJURY OF BUTTOCK, STAGE 2, UNSPECIFIED LATERALITY (HCC): ICD-10-CM

## 2022-05-02 DIAGNOSIS — I10 ESSENTIAL HYPERTENSION: Primary | ICD-10-CM

## 2022-05-02 DIAGNOSIS — R53.1 GENERALIZED WEAKNESS: ICD-10-CM

## 2022-05-02 DIAGNOSIS — F41.8 DEPRESSION WITH ANXIETY: ICD-10-CM

## 2022-05-02 DIAGNOSIS — H10.9 CONJUNCTIVITIS OF BOTH EYES, UNSPECIFIED CONJUNCTIVITIS TYPE: ICD-10-CM

## 2022-05-02 LAB
ALBUMIN SERPL-MCNC: 3.6 G/DL (ref 3.5–5.2)
ALP BLD-CCNC: 163 U/L (ref 35–104)
ALT SERPL-CCNC: 22 U/L (ref 5–33)
ANION GAP SERPL CALCULATED.3IONS-SCNC: 15 MMOL/L (ref 7–19)
AST SERPL-CCNC: 21 U/L (ref 5–32)
BACTERIA: ABNORMAL /HPF
BASOPHILS ABSOLUTE: 0 K/UL (ref 0–0.2)
BASOPHILS RELATIVE PERCENT: 0.2 % (ref 0–1)
BILIRUB SERPL-MCNC: 0.4 MG/DL (ref 0.2–1.2)
BILIRUBIN URINE: NEGATIVE
BLOOD, URINE: ABNORMAL
BUN BLDV-MCNC: 11 MG/DL (ref 8–23)
CALCIUM SERPL-MCNC: 8.7 MG/DL (ref 8.8–10.2)
CHLORIDE BLD-SCNC: 104 MMOL/L (ref 98–111)
CLARITY: CLEAR
CO2: 23 MMOL/L (ref 22–29)
COLOR: ABNORMAL
CREAT SERPL-MCNC: 0.7 MG/DL (ref 0.5–0.9)
CRYSTALS, UA: ABNORMAL /HPF
EOSINOPHILS ABSOLUTE: 0.1 K/UL (ref 0–0.6)
EOSINOPHILS RELATIVE PERCENT: 0.7 % (ref 0–5)
EPITHELIAL CELLS, UA: ABNORMAL /HPF
GFR AFRICAN AMERICAN: >59
GFR NON-AFRICAN AMERICAN: >60
GLUCOSE BLD-MCNC: 153 MG/DL (ref 74–109)
GLUCOSE URINE: NEGATIVE MG/DL
HCT VFR BLD CALC: 50.1 % (ref 37–47)
HEMOGLOBIN: 15.9 G/DL (ref 12–16)
IMMATURE GRANULOCYTES #: 0.1 K/UL
KETONES, URINE: ABNORMAL MG/DL
LEUKOCYTE ESTERASE, URINE: ABNORMAL
LYMPHOCYTES ABSOLUTE: 1.7 K/UL (ref 1.1–4.5)
LYMPHOCYTES RELATIVE PERCENT: 14.2 % (ref 20–40)
MCH RBC QN AUTO: 30.9 PG (ref 27–31)
MCHC RBC AUTO-ENTMCNC: 31.7 G/DL (ref 33–37)
MCV RBC AUTO: 97.3 FL (ref 81–99)
MONOCYTES ABSOLUTE: 0.5 K/UL (ref 0–0.9)
MONOCYTES RELATIVE PERCENT: 4.5 % (ref 0–10)
NEUTROPHILS ABSOLUTE: 9.6 K/UL (ref 1.5–7.5)
NEUTROPHILS RELATIVE PERCENT: 79.8 % (ref 50–65)
NITRITE, URINE: NEGATIVE
PDW BLD-RTO: 13.7 % (ref 11.5–14.5)
PH UA: 5.5 (ref 5–8)
PLATELET # BLD: 294 K/UL (ref 130–400)
PMV BLD AUTO: 10 FL (ref 9.4–12.3)
POTASSIUM REFLEX MAGNESIUM: 4 MMOL/L (ref 3.5–5)
PRO-BNP: 320 PG/ML (ref 0–1800)
PROTEIN UA: NEGATIVE MG/DL
RBC # BLD: 5.15 M/UL (ref 4.2–5.4)
RBC UA: ABNORMAL /HPF (ref 0–2)
SODIUM BLD-SCNC: 142 MMOL/L (ref 136–145)
SPECIFIC GRAVITY UA: 1.02 (ref 1–1.03)
TOTAL PROTEIN: 6.8 G/DL (ref 6.6–8.7)
TROPONIN: <0.01 NG/ML (ref 0–0.03)
UROBILINOGEN, URINE: 1 E.U./DL
WBC # BLD: 12 K/UL (ref 4.8–10.8)
WBC UA: ABNORMAL /HPF (ref 0–5)

## 2022-05-02 PROCEDURE — G8427 DOCREV CUR MEDS BY ELIG CLIN: HCPCS | Performed by: FAMILY MEDICINE

## 2022-05-02 PROCEDURE — 83880 ASSAY OF NATRIURETIC PEPTIDE: CPT

## 2022-05-02 PROCEDURE — 81001 URINALYSIS AUTO W/SCOPE: CPT

## 2022-05-02 PROCEDURE — 85025 COMPLETE CBC W/AUTO DIFF WBC: CPT

## 2022-05-02 PROCEDURE — 84484 ASSAY OF TROPONIN QUANT: CPT

## 2022-05-02 PROCEDURE — 36415 COLL VENOUS BLD VENIPUNCTURE: CPT

## 2022-05-02 PROCEDURE — 4040F PNEUMOC VAC/ADMIN/RCVD: CPT | Performed by: FAMILY MEDICINE

## 2022-05-02 PROCEDURE — 1036F TOBACCO NON-USER: CPT | Performed by: FAMILY MEDICINE

## 2022-05-02 PROCEDURE — 80053 COMPREHEN METABOLIC PANEL: CPT

## 2022-05-02 PROCEDURE — 99284 EMERGENCY DEPT VISIT MOD MDM: CPT

## 2022-05-02 PROCEDURE — 87086 URINE CULTURE/COLONY COUNT: CPT

## 2022-05-02 PROCEDURE — G8417 CALC BMI ABV UP PARAM F/U: HCPCS | Performed by: FAMILY MEDICINE

## 2022-05-02 PROCEDURE — 99213 OFFICE O/P EST LOW 20 MIN: CPT | Performed by: FAMILY MEDICINE

## 2022-05-02 PROCEDURE — 3023F SPIROM DOC REV: CPT | Performed by: FAMILY MEDICINE

## 2022-05-02 PROCEDURE — 70450 CT HEAD/BRAIN W/O DYE: CPT

## 2022-05-02 PROCEDURE — 1090F PRES/ABSN URINE INCON ASSESS: CPT | Performed by: FAMILY MEDICINE

## 2022-05-02 PROCEDURE — 1123F ACP DISCUSS/DSCN MKR DOCD: CPT | Performed by: FAMILY MEDICINE

## 2022-05-02 RX ORDER — METOPROLOL SUCCINATE 25 MG/1
25 TABLET, EXTENDED RELEASE ORAL DAILY
Qty: 30 TABLET | Refills: 5 | Status: SHIPPED | OUTPATIENT
Start: 2022-05-02

## 2022-05-02 RX ORDER — GENTAMICIN SULFATE 3 MG/ML
1 SOLUTION/ DROPS OPHTHALMIC 4 TIMES DAILY
Qty: 5 ML | Refills: 0 | Status: SHIPPED | OUTPATIENT
Start: 2022-05-02 | End: 2022-05-12

## 2022-05-02 RX ORDER — MIRTAZAPINE 15 MG/1
15 TABLET, FILM COATED ORAL NIGHTLY
Qty: 30 TABLET | Refills: 3 | Status: ON HOLD | OUTPATIENT
Start: 2022-05-02 | End: 2022-05-21 | Stop reason: HOSPADM

## 2022-05-02 ASSESSMENT — ENCOUNTER SYMPTOMS
ABDOMINAL PAIN: 0
SHORTNESS OF BREATH: 0
VOMITING: 0
DIARRHEA: 0
NAUSEA: 0

## 2022-05-02 NOTE — PROGRESS NOTES
[facility] PHYSICIAN SERVICES  [facility]  [address] [address] [place] [address]  Dept: [phone]  Dept Fax: [phone]: [phone]    Ana Laura Abernathy is a 80 y.o. female who presents today for her medical conditions/complaints as noted below. Ana Laura Abernathy is here for 3 Month Follow-Up        HPI:   CC: Here today to discuss the following:    Received a message on April 29 regarding low blood pressure. Suggested she discontinue hydrochlorothiazide and amlodipine and return today for blood pressure recheck. Today, she is feeling lightheaded and nauseated. She began vomiting in the office. She states she feels like she is going to pass out and fall out of her chair. She started vomiting in the office and stating she was feeling poorly. An ambulance was called and she was transferred to the emergency department. HPI    Subjective:      Review of Systems   Gastrointestinal: Positive for nausea and vomiting. Neurological: Positive for dizziness, tremors, speech difficulty, weakness and light-headedness. Negative for facial asymmetry and headaches. SeeHPI for visit specific review of symptoms. All others negative      Objective:   /62   Pulse 77   Temp 97 °F (36.1 °C)   Ht 5' 8\" (1.727 m)   SpO2 97%   BMI 30.26 kg/m²   Physical Exam  Constitutional:       Appearance: She is ill-appearing and diaphoretic. Cardiovascular:      Rate and Rhythm: Normal rate and regular rhythm. Pulmonary:      Effort: Pulmonary effort is normal.      Breath sounds: Wheezing present. Abdominal:      General: Abdomen is flat. Bowel sounds are normal.      Palpations: Abdomen is soft. Assessment & Plan: The following diagnoses and conditions are stable with no further orders unless indicated:  1.  Essential hypertension  BP Readings from Last 3 Encounters:   05/02/22 133/74   05/02/22 112/62   02/02/22 128/82     Continue to hold amlodipine and hydrochlorothiazide  Reduce Toprol to 25 mg  - metoprolol succinate (TOPROL XL) 25 MG extended release tablet; Take 1 tablet by mouth daily  Dispense: 30 tablet; Refill: 5    2. Chronic obstructive pulmonary disease, unspecified COPD type ([address]  Suggested we get a chest x-ray and continue with Advair    3. Gastroesophageal reflux disease without esophagitis  Continue with Protonix    4. Acquired hypothyroidism  Continue with Synthroid    5. Depression with anxiety  Switch from sertraline to Remeron  - mirtazapine (REMERON) 15 MG tablet; Take 1 tablet by mouth nightly  Dispense: 30 tablet; Refill: 3    6. Acute conjunctivitis of both eyes, unspecified acute conjunctivitis type    - gentamicin (GARAMYCIN) 0.3 % ophthalmic solution; Place 1 drop into both eyes 4 times daily for 10 days  Dispense: 5 mL; Refill: 0    7. Cough    - XR Chest Standard Extended VW; Future    _______________________________________________________________    As we were performing the above discussion, she began vomiting feeling like she was going to pass out. Ambulance was called and she was transferred to the emergency department. When discharged from the emergency department would suggest home health services. She suffers from memory loss, COPD, deconditioning. She had challenges with improving her appetite. She refuses to take Megace. Hopefully switching her to Remeron will improve her appetite. She is also a major fall risk. She becomes very ill when transported from home to the office. No follow-ups on file. Discussed use, benefit, and side effects of prescribed medications. All patient questions answered. Pt voiced understanding. Reviewed health maintenance. Instructedto continue current medications, diet and exercise. Patient agreed with treatmentplan.  Follow up as directed.     _______________________________________________________________      Past Medical History:   Diagnosis Date    Arthritis     Back pain     bulging disc    CAD (coronary artery disease)     BMS to RCA    Cerebral artery occlusion with cerebral infarction (HCC)     small strokes,balance issues    Cystitis     Depression     Dizziness     Dysuria     Edema     ankles,mild pedal edema    Falls frequently     H/O blood clots     left leg and lungs    Head injury     Headache     Heartburn     [place] (hard of hearing)     Hyperlipidemia     Hypertension     Hypothyroidism     Hypothyroidism     Memory problem     Neuropathy     Shingles     TIA (transient ischemic attack)     Urinary incontinence     UTI (urinary tract infection)       Past Surgical History:   Procedure Laterality Date    APPENDECTOMY  1947    CHOLECYSTECTOMY      EYE SURGERY  2009    bilateral cataract     HYSTERECTOMY, TOTAL ABDOMINAL      JOINT REPLACEMENT      Bilateral Knee    KNEE ARTHROPLASTY Right     LOBECTOMY  10/2015    sx--bilateral    PTCA  2002    with stent placement/bms to rca       Family History   Problem Relation Age of Onset    Diabetes Mother     Hypertension Mother    [facility] Rheum Arthritis Mother     Stroke Mother     Tuberculosis Father        Social History     Tobacco Use    Smoking status: Never Smoker    Smokeless tobacco: Never Used   Substance Use Topics    Alcohol use: Never     Current Outpatient Medications   Medication Sig Dispense Refill    gentamicin (GARAMYCIN) 0.3 % ophthalmic solution Place 1 drop into both eyes 4 times daily for 10 days 5 mL 0    metoprolol succinate (TOPROL XL) 25 MG extended release tablet Take 1 tablet by mouth daily 30 tablet 5    mirtazapine (REMERON) 15 MG tablet Take 1 tablet by mouth nightly 30 tablet 3    oxybutynin (DITROPAN-XL) 5 MG extended release tablet TAKE 1 TABLET BY MOUTH DAILY FOR OVERACTIVE BLADDER 90 tablet 1    atorvastatin (LIPITOR) 80 MG tablet Take 1 tablet by mouth nightly 30 tablet 3    levothyroxine (SYNTHROID) 175 MCG tablet TAKE 1 TABLET BY MOUTH EVERY DAY 90 tablet 3    pantoprazole (PROTONIX) 20 MG tablet TAKE 1 TABLET BY MOUTH EVERY DAY 90 tablet 3    nystatin (MYCOSTATIN) 151119 UNIT/GM cream Apply topically 2 times daily. 30 g 5    fluticasone-salmeterol (ADVAIR HFA) 230-21 MCG/ACT inhaler Inhale 2 puffs into the lungs 2 times daily 3 each 3    Misc. Devices [facility]. 1 Device 0    guaiFENesin (MUCINEX) 600 MG extended release tablet Take 1 tablet by mouth 2 times daily 60 tablet 0    phenytoin (PHENYTEK) 300 MG ER capsule Take 1 capsule by mouth daily 30 capsule 5    Misc. Devices MISC 1 each by Does not apply route once as needed (As needed) Trapeze Bar 1 each 0     No current facility-administered medications for this visit. Allergies   Allergen Reactions    Morphine      Pt becomes mean (refusing, cursing)         Health Maintenance   Topic Date Due    Pneumococcal 65+ years Vaccine (1 - PCV) Never done    DTaP/Tdap/Td vaccine (1 - Tdap) Never done    Shingles vaccine (1 of 2) Never done    COVID-19 Vaccine (2 - Pfizer 3-dose series) 09/08/2021    Lipids  08/10/2022    TSH  09/30/2022    Depression Monitoring  02/02/2023    Annual Wellness Visit (AWV)  02/03/2023    Potassium  05/02/2023    Creatinine  05/02/2023    Flu vaccine  Completed    Hepatitis A vaccine  Aged Out    Hepatitis B vaccine  Aged Out    Hib vaccine  Aged Out    Meningococcal (ACWY) vaccine  Aged Out       _______________________________________________________________    Note dictated using [id number] [facility]  Sometimes this dictation software makes erroneous transcriptions.

## 2022-05-02 NOTE — ED NOTES
Pt had a light brown, soft BM in brief. Maribel-care provided, clean brief applied, straight cath for UA obtained. Pt tolerated very well. Warm blankets provided.      Jennifer Barrera RN  05/02/22 0633

## 2022-05-03 ASSESSMENT — ENCOUNTER SYMPTOMS
VOMITING: 1
NAUSEA: 1

## 2022-05-03 NOTE — ED PROVIDER NOTES
[facility] - [facility] EMERGENCY DEPT  eMERGENCY dEPARTMENT eNCOUnter      Pt Name: Librado Malagon  MRN: [id number]  [place] 8/18/1931  Date of evaluation: 5/2/2022  Provider: Devon Ramírez, [facility]       Chief Complaint   Patient presents with    Other     Sent from PCP for looking hypotensive and about to pass out. EMS states normotensive but states family says she is at baseline but \"something is off. \"         HISTORY OF PRESENT ILLNESS   (Location/Symptom, Timing/Onset,Context/Setting, Quality, Duration, Modifying Factors, Severity)  Note limiting factors. Librado Malagon is a 80 y.o. female with history including hypertension, hypothyroidism, chronic UTIs, seizures, generalized weakness, and previous CVA who presents to the emergency department with complaint of generalized weakness and change from her baseline. This has been gradually worsening over the last few months. The patient's is the main historian. She notes progressive weakness, confusion, and alertness. She states that the patient is sleeping a lot more than normal.  She denies any recent falls. She denies any sudden changes in baseline. She was seen by the PCP earlier today who sent the patient for looking hypotensive and about to have a syncopal episode. The family denies any loss of consciousness at the primary care doctor office. HPI    NursingNotes were reviewed. REVIEW OF SYSTEMS    (2-9 systems for level 4, 10 or more for level 5)     Review of Systems   Constitutional: Positive for fatigue. Negative for chills and fever. Respiratory: Negative for shortness of breath. Cardiovascular: Negative for chest pain. Gastrointestinal: Negative for abdominal pain, diarrhea, nausea and vomiting. Genitourinary: Negative for dysuria, flank pain, frequency and hematuria. Musculoskeletal: Negative for myalgias. Neurological: Positive for weakness. Negative for dizziness, syncope and headaches.    All other systems reviewed and are negative.            PAST MEDICALHISTORY     Past Medical History:   Diagnosis Date    Arthritis     Back pain     bulging disc    CAD (coronary artery disease)     BMS to RCA    Cerebral artery occlusion with cerebral infarction ([facility] [address]     small strokes,balance issues    Cystitis     Depression     Dizziness     Dysuria     Edema     ankles,mild pedal edema    Falls frequently     H/O blood clots     left leg and lungs    Head injury     Headache     Heartburn     [place] (hard of hearing)     Hyperlipidemia     Hypertension     Hypothyroidism     Hypothyroidism     Memory problem     Neuropathy     Shingles     TIA (transient ischemic attack)     Urinary incontinence     UTI (urinary tract infection)          SURGICAL HISTORY       Past Surgical History:   Procedure Laterality Date    APPENDECTOMY  1947    CHOLECYSTECTOMY      EYE SURGERY  2009    bilateral cataract     HYSTERECTOMY, TOTAL ABDOMINAL      JOINT REPLACEMENT      Bilateral Knee    KNEE ARTHROPLASTY Right     LOBECTOMY  10/2015    sx--bilateral    PTCA  2002    with stent placement/bms to rca         CURRENT MEDICATIONS     Discharge Medication List as of 5/2/2022  9:43 PM      CONTINUE these medications which have NOT CHANGED    Details   gentamicin (GARAMYCIN) 0.3 % ophthalmic solution Place 1 drop into both eyes 4 times daily for 10 days, Disp-5 mL, R-0Normal      metoprolol succinate (TOPROL XL) 25 MG extended release tablet Take 1 tablet by mouth daily, Disp-30 tablet, R-5Normal      mirtazapine (REMERON) 15 MG tablet Take 1 tablet by mouth nightly, Disp-30 tablet, R-3Normal      oxybutynin (DITROPAN-XL) 5 MG extended release tablet TAKE 1 TABLET BY MOUTH DAILY FOR OVERACTIVE BLADDER, Disp-90 tablet, R-1Normal      atorvastatin (LIPITOR) 80 MG tablet Take 1 tablet by mouth nightly, Disp-30 tablet, R-3Normal      levothyroxine (SYNTHROID) 175 MCG tablet TAKE 1 TABLET BY MOUTH EVERY DAY, Disp-90 tablet, R-3Normal pantoprazole (PROTONIX) 20 MG tablet TAKE 1 TABLET BY MOUTH EVERY DAY, Disp-90 tablet, R-3Normal      phenytoin (PHENYTEK) 300 MG ER capsule Take 1 capsule by mouth daily, Disp-30 capsule, R-5Normal      nystatin (MYCOSTATIN) 425768 UNIT/GM cream Apply topically 2 times daily. , Disp-30 g, R-5, Normal      fluticasone-salmeterol (ADVAIR HFA) 230-21 MCG/ACT inhaler Inhale 2 puffs into the lungs 2 times daily, Disp-3 each, R-3Normal      Misc. Devices MISC Disp-1 Device, R-0, [facility] guaiFENesin (MUCINEX) 600 MG extended release tablet Take 1 tablet by mouth 2 times daily, Disp-60 tablet, R-0Normal             ALLERGIES     Morphine    FAMILY HISTORY       Family History   Problem Relation Age of Onset    Diabetes Mother     Hypertension Mother    Bernestine Bruce Rheum Arthritis Mother     Stroke Mother     Tuberculosis Father           SOCIAL HISTORY       Social History     Socioeconomic History    Marital status:      Spouse name: Not on file    Number of children: Not on file    Years of education: Not on file    Highest education level: Not on file   Occupational History    Not on file   Tobacco Use    Smoking status: Never Smoker    Smokeless tobacco: Never Used   Vaping Use    Vaping Use: Never used   Substance and Sexual Activity    Alcohol use: Never    Drug use: Never    Sexual activity: Not on file   Other Topics Concern    Not on file   Social History Narrative    Not on file     Social Determinants of Health     Financial Resource Strain:     Difficulty of Paying Living Expenses: Not on file   Food Insecurity:     Worried About Running Out of Food in the Last Year: Not on file    Shiva of Food in the Last Year: Not on file   Transportation Needs:     Lack of Transportation (Medical): Not on file    Lack of Transportation (Non-Medical):  Not on file   Physical Activity:     Days of Exercise per Week: Not on file    Minutes of Exercise per Session: Not on file   Stress:  Feeling of Stress : Not on file   Social Connections:     Frequency of Communication with Friends and Family: Not on file    Frequency of Social Gatherings with Friends and Family: Not on file    Attends [employer] Services: Not on file    Active Member of Clubs or Organizations: Not on file    Attends Club or Organization Meetings: Not on file    Marital Status: Not on file   Intimate Partner Violence:     Fear of Current or Ex-Partner: Not on file    Emotionally Abused: Not on file    Physically Abused: Not on file    Sexually Abused: Not on file   Housing Stability:     Unable to Pay for Housing in the Last Year: Not on file    Number of [place] in the Last Year: Not on file    Unstable Housing in the Last Year: Not on file       SCREENINGS    [place] Coma Scale  Eye Opening: Spontaneous  Best Verbal Response: Confused  Best Motor Response: Obeys commands  [place] Coma Scale Score: 14        PHYSICAL EXAM    (up to 7 for level 4, 8 or more for level 5)     ED Triage Vitals [05/02/22 1607]   BP Temp Temp Source Pulse Resp SpO2 Height Weight   (!) 129/50 97.5 °F (36.4 °C) Oral 74 18 91 % -- --       Physical Exam  Vitals and nursing note reviewed. Constitutional:       General: She is not in acute distress. Appearance: Normal appearance. She is ill-appearing (chronically, frail). She is not toxic-appearing or diaphoretic. HENT:      Head: Normocephalic and atraumatic. Mouth/Throat:      Mouth: Mucous membranes are moist.   Eyes:      Extraocular Movements: Extraocular movements intact. Conjunctiva/sclera: Conjunctivae normal.      Pupils: Pupils are equal, round, and reactive to light. Cardiovascular:      Rate and Rhythm: Normal rate and regular rhythm. Pulses: Normal pulses. Heart sounds: Normal heart sounds. Pulmonary:      Effort: Pulmonary effort is normal. No respiratory distress. Breath sounds: Normal breath sounds.    Chest:      Chest wall: No tenderness. Abdominal:      General: There is no distension. Palpations: Abdomen is soft. Tenderness: There is no abdominal tenderness. Musculoskeletal:         General: No swelling, tenderness, deformity or signs of injury. Cervical back: Normal range of motion and neck supple. No rigidity or tenderness. Lymphadenopathy:      Cervical: No cervical adenopathy. Skin:     General: Skin is warm and dry. Neurological:      General: No focal deficit present. Mental Status: She is alert and oriented to person, place, and time. Mental status is at baseline. Cranial Nerves: No cranial nerve deficit. Sensory: No sensory deficit. Motor: Weakness (bilateral, no change from normal) present. DIAGNOSTIC RESULTS     EKG: All EKG's areinterpreted by the Emergency Department Physician who either signs or Co-signs this chart in the absence of a cardiologist.    Edmund Born interpreted by attending, normal sinus rhythm at a rate of 76, no STEMI or acute ischemia, atrial premature complexes, normal P axis, , QTc 457    RADIOLOGY:  Non-plain film images such as CT, Ultrasound and MRI are read by the radiologist. Plain radiographic images are visualized and preliminarily interpreted bythe emergency physician with the below findings:    [address]   Final Result   1. Moderate cerebral and cerebellar volume loss with chronic   microvascular disease but no evidence of acute intracranial process. Remote left PCA territory infarct.    Signed by Dr Escobar Gains:  Kodi Ingram - Abnormal; Notable for the following components:       Result Value    WBC 12.0 (*)     Hematocrit 50.1 (*)     MCHC 31.7 (*)     Neutrophils % 79.8 (*)     Lymphocytes % 14.2 (*)     Neutrophils Absolute 9.6 (*)     All other components within normal limits   COMPREHENSIVE METABOLIC PANEL W/ REFLEX TO MG FOR LOW K - Abnormal; Notable for the following components: Glucose 153 (*)     Calcium 8.7 (*)     Alkaline Phosphatase 163 (*)     All other components within normal limits   URINALYSIS WITH MICROSCOPIC - Abnormal; Notable for the following components:    Color, UA DARK YELLOW (*)     Ketones, Urine TRACE (*)     Blood, Urine TRACE (*)     Leukocyte Esterase, Urine SMALL (*)     Bacteria, UA 4+ (*)     Crystals, UA NEG (*)     All other components within normal limits   CULTURE, URINE   TROPONIN   BRAIN NATRIURETIC PEPTIDE       All other labs were within normal range or not returned as of this dictation. EMERGENCY DEPARTMENT COURSE and DIFFERENTIAL DIAGNOSIS/MDM:   Vitals:    Vitals:    05/02/22 1607 05/02/22 1932 05/02/22 2102   BP: (!) 129/50 121/76 133/74   Pulse: 74 89    Resp: 18     Temp: 97.5 °F (36.4 °C)     TempSrc: Oral     SpO2: 91% 94% 94%       MDM  Patient is a 80-year-old female presents the ER with complaint of progressive generalized weakness and confusion. All signs are stable in the ER. She has not had any hypotension or signs of syncope while in the ER. She was attached to the monitor and did not have any signs of arrhythmia. Her EKG showed sinus rhythm without STEMI or acute ischemia. She had a negative troponin-as this has been going on for months, no repeats are warranted at this time. She does have mild elevation of her white blood count without any significant anemia. Urinalysis does not show significant infection but the culture was obtained. If there is concerning findings for infection on culture we will add antibiotic at that time. He is negative for electrolyte disturbance, elevated kidney function, or elevated liver function. No significant elevation of her BNP concerning for heart failure. CT of the head is negative for any acute intracranial process or change. I have stressed the importance of follow-up with primary care.   I have encouraged as well as the option of OT and PT at home if they are not interested in a short rehab stay to help with strength and daily activities. The patient and her niece are agreeable to this plan. Return precautions were given to them and they verbalized understanding. I do feel she safe for discharge at this time as she has no acute changes or complaints. FINAL IMPRESSION      1.  Fatigue, unspecified type          DISPOSITION/PLAN   DISPOSITION Decision To Discharge 05/02/2022 08:31:19 PM      PATIENT REFERRED TO:  John Singh MD  [address]  [phone]            DISCHARGE MEDICATIONS:  Discharge Medication List as of 5/2/2022  9:43 PM             (Please note that portions of this note were completed with a voice recognition program.  Efforts were made to edit thedictations but occasionally words are mis-transcribed.)    INGRIS Posey (electronically signed)     Jenniffer Posey  05/02/22 7010

## 2022-05-04 LAB — URINE CULTURE, ROUTINE: NORMAL

## 2022-05-05 ENCOUNTER — TELEPHONE (OUTPATIENT)
Dept: INTERNAL MEDICINE CLINIC | Age: 87
End: 2022-05-05

## 2022-05-05 NOTE — TELEPHONE ENCOUNTER
[facility] [facility] was to admit pt today however they discussed at length with dgt and son and they would like to have a Hospice eval if that is ok ?     Pt is at her baseline and [address] did not believe pt would make much improvement with [facility]      Is it ok for Hospice to eval  ?

## 2022-05-10 LAB
EKG P AXIS: 55 DEGREES
EKG P-R INTERVAL: 204 MS
EKG Q-T INTERVAL: 412 MS
EKG QRS DURATION: 106 MS
EKG QTC CALCULATION (BAZETT): 447 MS
EKG T AXIS: 58 DEGREES

## 2022-05-11 ENCOUNTER — TELEPHONE (OUTPATIENT)
Dept: INTERNAL MEDICINE CLINIC | Age: 87
End: 2022-05-11

## 2022-05-11 NOTE — TELEPHONE ENCOUNTER
HH did not admit pt, Hospice order was received last week and family wanted to wait until later this week to eval her for services  - Hospice will follow up with the family to set a time that is convenient for the caregivers/family  Later this week

## 2022-05-13 ENCOUNTER — APPOINTMENT (OUTPATIENT)
Dept: CT IMAGING | Age: 87
DRG: 871 | End: 2022-05-13
Payer: MEDICARE

## 2022-05-13 ENCOUNTER — APPOINTMENT (OUTPATIENT)
Dept: GENERAL RADIOLOGY | Age: 87
DRG: 871 | End: 2022-05-13
Payer: MEDICARE

## 2022-05-13 ENCOUNTER — HOSPITAL ENCOUNTER (INPATIENT)
Age: 87
LOS: 8 days | Discharge: HOSPICE | DRG: 871 | End: 2022-05-21
Attending: EMERGENCY MEDICINE | Admitting: HOSPITALIST
Payer: MEDICARE

## 2022-05-13 DIAGNOSIS — R09.02 HYPOXIA: ICD-10-CM

## 2022-05-13 DIAGNOSIS — J18.9 PNEUMONIA OF LEFT LOWER LOBE DUE TO INFECTIOUS ORGANISM: ICD-10-CM

## 2022-05-13 DIAGNOSIS — R41.0 DISORIENTATION: Primary | ICD-10-CM

## 2022-05-13 PROBLEM — A41.9 SEPSIS (HCC): Status: ACTIVE | Noted: 2022-05-13

## 2022-05-13 LAB
ALBUMIN SERPL-MCNC: 3.2 G/DL (ref 3.5–5.2)
ALLENS TEST: ABNORMAL
ALP BLD-CCNC: 132 U/L (ref 35–104)
ALT SERPL-CCNC: 16 U/L (ref 5–33)
ANION GAP SERPL CALCULATED.3IONS-SCNC: 12 MMOL/L (ref 7–19)
AST SERPL-CCNC: 19 U/L (ref 5–32)
BACTERIA: NEGATIVE /HPF
BASE EXCESS ARTERIAL: 1.9 MMOL/L (ref -2–2)
BASOPHILS ABSOLUTE: 0 K/UL (ref 0–0.2)
BASOPHILS RELATIVE PERCENT: 0.2 % (ref 0–1)
BILIRUB SERPL-MCNC: 0.8 MG/DL (ref 0.2–1.2)
BILIRUBIN URINE: NEGATIVE
BLOOD, URINE: NEGATIVE
BUN BLDV-MCNC: 15 MG/DL (ref 8–23)
CALCIUM SERPL-MCNC: 8.1 MG/DL (ref 8.8–10.2)
CARBOXYHEMOGLOBIN ARTERIAL: 2.5 % (ref 0–5)
CHLORIDE BLD-SCNC: 105 MMOL/L (ref 98–111)
CLARITY: CLEAR
CO2: 26 MMOL/L (ref 22–29)
COLOR: ABNORMAL
CREAT SERPL-MCNC: 0.5 MG/DL (ref 0.5–0.9)
CRYSTALS, UA: ABNORMAL /HPF
EOSINOPHILS ABSOLUTE: 0 K/UL (ref 0–0.6)
EOSINOPHILS RELATIVE PERCENT: 0.2 % (ref 0–5)
EPITHELIAL CELLS, UA: 0 /HPF (ref 0–5)
FIO2: 21 %
FOLATE: 4 NG/ML (ref 4.8–37.3)
GFR AFRICAN AMERICAN: >59
GFR NON-AFRICAN AMERICAN: >60
GLUCOSE BLD-MCNC: 146 MG/DL (ref 74–109)
GLUCOSE URINE: NEGATIVE MG/DL
HCO3 ARTERIAL: 25.7 MMOL/L (ref 22–26)
HCT VFR BLD CALC: 44.8 % (ref 37–47)
HEMOGLOBIN, ART, EXTENDED: 14.8 G/DL (ref 12–16)
HEMOGLOBIN: 14.2 G/DL (ref 12–16)
HYALINE CASTS: 0 /HPF (ref 0–8)
IMMATURE GRANULOCYTES #: 0.1 K/UL
KETONES, URINE: NEGATIVE MG/DL
LACTIC ACID: 4.1 MMOL/L (ref 0.5–1.9)
LEUKOCYTE ESTERASE, URINE: NEGATIVE
LYMPHOCYTES ABSOLUTE: 1.7 K/UL (ref 1.1–4.5)
LYMPHOCYTES RELATIVE PERCENT: 10.5 % (ref 20–40)
MAGNESIUM: 1.8 MG/DL (ref 1.7–2.3)
MCH RBC QN AUTO: 30.7 PG (ref 27–31)
MCHC RBC AUTO-ENTMCNC: 31.7 G/DL (ref 33–37)
MCV RBC AUTO: 96.8 FL (ref 81–99)
METHEMOGLOBIN ARTERIAL: 1.3 %
MONOCYTES ABSOLUTE: 0.8 K/UL (ref 0–0.9)
MONOCYTES RELATIVE PERCENT: 4.9 % (ref 0–10)
NEUTROPHILS ABSOLUTE: 13.9 K/UL (ref 1.5–7.5)
NEUTROPHILS RELATIVE PERCENT: 83.7 % (ref 50–65)
NITRITE, URINE: POSITIVE
O2 CONTENT ARTERIAL: 18.4 ML/DL
O2 SAT, ARTERIAL: 88.7 %
O2 THERAPY: ABNORMAL
PCO2 ARTERIAL: 37 MMHG (ref 35–45)
PDW BLD-RTO: 14.3 % (ref 11.5–14.5)
PH ARTERIAL: 7.45 (ref 7.35–7.45)
PH UA: 5.5 (ref 5–8)
PHENYTOIN LEVEL: 5.7 UG/ML (ref 10–20)
PHENYTOIN LEVEL: 7.3 UG/ML (ref 10–20)
PLATELET # BLD: 301 K/UL (ref 130–400)
PMV BLD AUTO: 9.7 FL (ref 9.4–12.3)
PO2 ARTERIAL: 55 MMHG (ref 80–100)
POTASSIUM REFLEX MAGNESIUM: 4.3 MMOL/L (ref 3.5–5)
POTASSIUM, WHOLE BLOOD: 3.7
PRO-BNP: 638 PG/ML (ref 0–1800)
PROTEIN UA: NEGATIVE MG/DL
RBC # BLD: 4.63 M/UL (ref 4.2–5.4)
RBC UA: 0 /HPF (ref 0–4)
SAMPLE SOURCE: ABNORMAL
SARS-COV-2, NAAT: NOT DETECTED
SODIUM BLD-SCNC: 143 MMOL/L (ref 136–145)
SPECIFIC GRAVITY UA: 1.02 (ref 1–1.03)
TOTAL PROTEIN: 6.5 G/DL (ref 6.6–8.7)
TROPONIN: 0.01 NG/ML (ref 0–0.03)
TSH SERPL DL<=0.05 MIU/L-ACNC: 0.28 UIU/ML (ref 0.27–4.2)
UROBILINOGEN, URINE: 1 E.U./DL
VITAMIN B-12: 392 PG/ML (ref 211–946)
VITAMIN D 25-HYDROXY: 27.9 NG/ML
WBC # BLD: 16.6 K/UL (ref 4.8–10.8)
WBC UA: 0 /HPF (ref 0–5)

## 2022-05-13 PROCEDURE — 87150 DNA/RNA AMPLIFIED PROBE: CPT

## 2022-05-13 PROCEDURE — 81001 URINALYSIS AUTO W/SCOPE: CPT

## 2022-05-13 PROCEDURE — 73130 X-RAY EXAM OF HAND: CPT

## 2022-05-13 PROCEDURE — 2580000003 HC RX 258: Performed by: NURSE PRACTITIONER

## 2022-05-13 PROCEDURE — 2580000003 HC RX 258: Performed by: EMERGENCY MEDICINE

## 2022-05-13 PROCEDURE — 2580000003 HC RX 258: Performed by: HOSPITALIST

## 2022-05-13 PROCEDURE — 84484 ASSAY OF TROPONIN QUANT: CPT

## 2022-05-13 PROCEDURE — 6360000002 HC RX W HCPCS: Performed by: HOSPITALIST

## 2022-05-13 PROCEDURE — 6360000002 HC RX W HCPCS: Performed by: EMERGENCY MEDICINE

## 2022-05-13 PROCEDURE — 6370000000 HC RX 637 (ALT 250 FOR IP): Performed by: NURSE PRACTITIONER

## 2022-05-13 PROCEDURE — 82746 ASSAY OF FOLIC ACID SERUM: CPT

## 2022-05-13 PROCEDURE — 87040 BLOOD CULTURE FOR BACTERIA: CPT

## 2022-05-13 PROCEDURE — 82306 VITAMIN D 25 HYDROXY: CPT

## 2022-05-13 PROCEDURE — 96361 HYDRATE IV INFUSION ADD-ON: CPT

## 2022-05-13 PROCEDURE — 82607 VITAMIN B-12: CPT

## 2022-05-13 PROCEDURE — 96374 THER/PROPH/DIAG INJ IV PUSH: CPT

## 2022-05-13 PROCEDURE — 83880 ASSAY OF NATRIURETIC PEPTIDE: CPT

## 2022-05-13 PROCEDURE — 71275 CT ANGIOGRAPHY CHEST: CPT

## 2022-05-13 PROCEDURE — 36415 COLL VENOUS BLD VENIPUNCTURE: CPT

## 2022-05-13 PROCEDURE — 70450 CT HEAD/BRAIN W/O DYE: CPT

## 2022-05-13 PROCEDURE — 80185 ASSAY OF PHENYTOIN TOTAL: CPT

## 2022-05-13 PROCEDURE — 99285 EMERGENCY DEPT VISIT HI MDM: CPT

## 2022-05-13 PROCEDURE — 36600 WITHDRAWAL OF ARTERIAL BLOOD: CPT

## 2022-05-13 PROCEDURE — 87186 SC STD MICRODIL/AGAR DIL: CPT

## 2022-05-13 PROCEDURE — 6360000002 HC RX W HCPCS: Performed by: NURSE PRACTITIONER

## 2022-05-13 PROCEDURE — 93005 ELECTROCARDIOGRAM TRACING: CPT | Performed by: NURSE PRACTITIONER

## 2022-05-13 PROCEDURE — 6370000000 HC RX 637 (ALT 250 FOR IP): Performed by: HOSPITALIST

## 2022-05-13 PROCEDURE — 1210000000 HC MED SURG R&B

## 2022-05-13 PROCEDURE — C9113 INJ PANTOPRAZOLE SODIUM, VIA: HCPCS | Performed by: HOSPITALIST

## 2022-05-13 PROCEDURE — 83735 ASSAY OF MAGNESIUM: CPT

## 2022-05-13 PROCEDURE — 83605 ASSAY OF LACTIC ACID: CPT

## 2022-05-13 PROCEDURE — 84443 ASSAY THYROID STIM HORMONE: CPT

## 2022-05-13 PROCEDURE — 2500000003 HC RX 250 WO HCPCS: Performed by: HOSPITALIST

## 2022-05-13 PROCEDURE — 2700000000 HC OXYGEN THERAPY PER DAY

## 2022-05-13 PROCEDURE — 87635 SARS-COV-2 COVID-19 AMP PRB: CPT

## 2022-05-13 PROCEDURE — 82803 BLOOD GASES ANY COMBINATION: CPT

## 2022-05-13 PROCEDURE — 94640 AIRWAY INHALATION TREATMENT: CPT

## 2022-05-13 PROCEDURE — 85025 COMPLETE CBC W/AUTO DIFF WBC: CPT

## 2022-05-13 PROCEDURE — 71045 X-RAY EXAM CHEST 1 VIEW: CPT

## 2022-05-13 PROCEDURE — 87086 URINE CULTURE/COLONY COUNT: CPT

## 2022-05-13 PROCEDURE — 80053 COMPREHEN METABOLIC PANEL: CPT

## 2022-05-13 PROCEDURE — 6360000004 HC RX CONTRAST MEDICATION: Performed by: EMERGENCY MEDICINE

## 2022-05-13 RX ORDER — IPRATROPIUM BROMIDE AND ALBUTEROL SULFATE 2.5; .5 MG/3ML; MG/3ML
1 SOLUTION RESPIRATORY (INHALATION)
Status: DISCONTINUED | OUTPATIENT
Start: 2022-05-13 | End: 2022-05-13

## 2022-05-13 RX ORDER — 0.9 % SODIUM CHLORIDE 0.9 %
500 INTRAVENOUS SOLUTION INTRAVENOUS ONCE
Status: DISCONTINUED | OUTPATIENT
Start: 2022-05-13 | End: 2022-05-13

## 2022-05-13 RX ORDER — PHENYTOIN SODIUM 100 MG/1
300 CAPSULE, EXTENDED RELEASE ORAL DAILY
Status: DISCONTINUED | OUTPATIENT
Start: 2022-05-14 | End: 2022-05-21 | Stop reason: HOSPADM

## 2022-05-13 RX ORDER — ACETAMINOPHEN 325 MG/1
650 TABLET ORAL EVERY 6 HOURS PRN
Status: DISCONTINUED | OUTPATIENT
Start: 2022-05-13 | End: 2022-05-21 | Stop reason: HOSPADM

## 2022-05-13 RX ORDER — IPRATROPIUM BROMIDE AND ALBUTEROL SULFATE 2.5; .5 MG/3ML; MG/3ML
1 SOLUTION RESPIRATORY (INHALATION)
Status: DISCONTINUED | OUTPATIENT
Start: 2022-05-13 | End: 2022-05-21 | Stop reason: HOSPADM

## 2022-05-13 RX ORDER — 0.9 % SODIUM CHLORIDE 0.9 %
500 INTRAVENOUS SOLUTION INTRAVENOUS ONCE
Status: COMPLETED | OUTPATIENT
Start: 2022-05-13 | End: 2022-05-13

## 2022-05-13 RX ORDER — ERGOCALCIFEROL 1.25 MG/1
50000 CAPSULE ORAL WEEKLY
Status: DISCONTINUED | OUTPATIENT
Start: 2022-05-13 | End: 2022-05-21 | Stop reason: HOSPADM

## 2022-05-13 RX ORDER — METOPROLOL SUCCINATE 50 MG/1
25 TABLET, EXTENDED RELEASE ORAL DAILY
Status: DISCONTINUED | OUTPATIENT
Start: 2022-05-13 | End: 2022-05-13

## 2022-05-13 RX ORDER — SODIUM CHLORIDE 0.9 % (FLUSH) 0.9 %
5-40 SYRINGE (ML) INJECTION PRN
Status: DISCONTINUED | OUTPATIENT
Start: 2022-05-13 | End: 2022-05-21 | Stop reason: HOSPADM

## 2022-05-13 RX ORDER — ACETAMINOPHEN 650 MG/1
650 SUPPOSITORY RECTAL EVERY 6 HOURS PRN
Status: DISCONTINUED | OUTPATIENT
Start: 2022-05-13 | End: 2022-05-21 | Stop reason: HOSPADM

## 2022-05-13 RX ORDER — ENOXAPARIN SODIUM 100 MG/ML
40 INJECTION SUBCUTANEOUS DAILY
Status: DISCONTINUED | OUTPATIENT
Start: 2022-05-14 | End: 2022-05-14

## 2022-05-13 RX ORDER — ONDANSETRON 4 MG/1
4 TABLET, ORALLY DISINTEGRATING ORAL EVERY 8 HOURS PRN
Status: DISCONTINUED | OUTPATIENT
Start: 2022-05-13 | End: 2022-05-21 | Stop reason: HOSPADM

## 2022-05-13 RX ORDER — IPRATROPIUM BROMIDE AND ALBUTEROL SULFATE 2.5; .5 MG/3ML; MG/3ML
1 SOLUTION RESPIRATORY (INHALATION) ONCE
Status: COMPLETED | OUTPATIENT
Start: 2022-05-13 | End: 2022-05-13

## 2022-05-13 RX ORDER — MIRTAZAPINE 15 MG/1
7.5 TABLET, FILM COATED ORAL NIGHTLY
Status: DISCONTINUED | OUTPATIENT
Start: 2022-05-13 | End: 2022-05-15

## 2022-05-13 RX ORDER — FOLIC ACID 1 MG/1
1 TABLET ORAL DAILY
Status: DISCONTINUED | OUTPATIENT
Start: 2022-05-13 | End: 2022-05-21 | Stop reason: HOSPADM

## 2022-05-13 RX ORDER — SODIUM CHLORIDE 9 MG/ML
INJECTION, SOLUTION INTRAVENOUS CONTINUOUS
Status: DISCONTINUED | OUTPATIENT
Start: 2022-05-13 | End: 2022-05-15

## 2022-05-13 RX ORDER — SODIUM CHLORIDE 0.9 % (FLUSH) 0.9 %
5-40 SYRINGE (ML) INJECTION EVERY 12 HOURS SCHEDULED
Status: DISCONTINUED | OUTPATIENT
Start: 2022-05-13 | End: 2022-05-21 | Stop reason: HOSPADM

## 2022-05-13 RX ORDER — ONDANSETRON 2 MG/ML
4 INJECTION INTRAMUSCULAR; INTRAVENOUS EVERY 6 HOURS PRN
Status: DISCONTINUED | OUTPATIENT
Start: 2022-05-13 | End: 2022-05-21 | Stop reason: HOSPADM

## 2022-05-13 RX ORDER — POLYETHYLENE GLYCOL 3350 17 G/17G
17 POWDER, FOR SOLUTION ORAL DAILY PRN
Status: DISCONTINUED | OUTPATIENT
Start: 2022-05-13 | End: 2022-05-21 | Stop reason: HOSPADM

## 2022-05-13 RX ORDER — LEVOTHYROXINE SODIUM 175 UG/1
1 TABLET ORAL DAILY
Status: DISCONTINUED | OUTPATIENT
Start: 2022-05-14 | End: 2022-05-13 | Stop reason: SDUPTHER

## 2022-05-13 RX ORDER — ATORVASTATIN CALCIUM 10 MG/1
10 TABLET, FILM COATED ORAL NIGHTLY
Status: DISCONTINUED | OUTPATIENT
Start: 2022-05-14 | End: 2022-05-15

## 2022-05-13 RX ORDER — GUAIFENESIN 600 MG/1
600 TABLET, EXTENDED RELEASE ORAL 2 TIMES DAILY
Status: DISCONTINUED | OUTPATIENT
Start: 2022-05-14 | End: 2022-05-21 | Stop reason: HOSPADM

## 2022-05-13 RX ORDER — METOPROLOL TARTRATE 5 MG/5ML
2.5 INJECTION INTRAVENOUS EVERY 8 HOURS
Status: DISCONTINUED | OUTPATIENT
Start: 2022-05-13 | End: 2022-05-15

## 2022-05-13 RX ORDER — SODIUM CHLORIDE 9 MG/ML
INJECTION, SOLUTION INTRAVENOUS PRN
Status: DISCONTINUED | OUTPATIENT
Start: 2022-05-13 | End: 2022-05-21 | Stop reason: HOSPADM

## 2022-05-13 RX ADMIN — WATER 1000 MG: 1 INJECTION INTRAMUSCULAR; INTRAVENOUS; SUBCUTANEOUS at 13:43

## 2022-05-13 RX ADMIN — IPRATROPIUM BROMIDE AND ALBUTEROL SULFATE 1 AMPULE: 2.5; .5 SOLUTION RESPIRATORY (INHALATION) at 12:56

## 2022-05-13 RX ADMIN — PIPERACILLIN AND TAZOBACTAM 3375 MG: 3; .375 INJECTION, POWDER, FOR SOLUTION INTRAVENOUS at 18:23

## 2022-05-13 RX ADMIN — SODIUM CHLORIDE 500 ML: 9 INJECTION, SOLUTION INTRAVENOUS at 12:14

## 2022-05-13 RX ADMIN — SODIUM CHLORIDE, PRESERVATIVE FREE 40 MG: 5 INJECTION INTRAVENOUS at 18:32

## 2022-05-13 RX ADMIN — IPRATROPIUM BROMIDE AND ALBUTEROL SULFATE 1 AMPULE: 2.5; .5 SOLUTION RESPIRATORY (INHALATION) at 18:30

## 2022-05-13 RX ADMIN — IOPAMIDOL 90 ML: 755 INJECTION, SOLUTION INTRAVENOUS at 14:51

## 2022-05-13 RX ADMIN — SODIUM CHLORIDE, PRESERVATIVE FREE 10 ML: 5 INJECTION INTRAVENOUS at 19:38

## 2022-05-13 RX ADMIN — METOPROLOL TARTRATE 2.5 MG: 1 INJECTION, SOLUTION INTRAVENOUS at 19:38

## 2022-05-13 RX ADMIN — VANCOMYCIN HYDROCHLORIDE 2000 MG: 10 INJECTION, POWDER, LYOPHILIZED, FOR SOLUTION INTRAVENOUS at 19:38

## 2022-05-13 RX ADMIN — SODIUM CHLORIDE: 9 INJECTION, SOLUTION INTRAVENOUS at 18:22

## 2022-05-13 ASSESSMENT — ENCOUNTER SYMPTOMS
COUGH: 1
VOMITING: 0
TROUBLE SWALLOWING: 1
SHORTNESS OF BREATH: 1
ABDOMINAL PAIN: 0

## 2022-05-13 ASSESSMENT — PAIN - FUNCTIONAL ASSESSMENT: PAIN_FUNCTIONAL_ASSESSMENT: NONE - DENIES PAIN

## 2022-05-13 NOTE — PROGRESS NOTES
BLOOD GAS, ARTERIAL [[id number]] (Abnormal) Collected: 05/13/22 1243     Specimen: Blood gases Updated: 05/13/22 1244      pH, Arterial 7.450      pCO2, Arterial 37.0 mmHg       pO2, Arterial 55.0 mmHg       HCO3, Arterial 25.7 mmol/L       Base Excess, Arterial 1.9 mmol/L       Hemoglobin, Art, Extended 14.8 g/dL       O2 Sat, Arterial 88.7 %       Carboxyhgb, Arterial 2.5 %       Methemoglobin, Arterial 1.3 %       O2 Content, Arterial 18.4 mL/dL       O2 Therapy Unknown      FIO2 21.0 %       ALLENS TEST POS      Sample Source RR      Potassium, Whole Blood 3.7     Culture, Blood 1 [[id number]] Collected     AT +  RR  R/A

## 2022-05-13 NOTE — ED PROVIDER NOTES
Attending Supervisory Note/Shared Visit   I have personally performed a face to face diagnostic evaluation on this patient. I have reviewed the mid-levels findings and agree. Ms. Michael Dutta is a 80-year-old female brought to the emergency department by EMS for altered mental status. She is found to be hypoxic here 80% on room air and does not wear home oxygen. She has had a cough. Vitals stable on n/c 02. Appears ill. Patient will arouse but barely speaks appears dehydrated clinically and has intermittent wet cough. Coarse BS bilaterally. abd soft. Dry scaly skin on legs. 108 sinus tachycardia no obvious ST changes  nondiagnostic EKG      Hypoxic secondary to likely aspiration pneumonia, added zosyn in addition to rocephin that was given earlier. Shahid Adair d/w hospitalist for admission      FINAL IMPRESSION      1. Disorientation    2. Hypoxia    3.  Pneumonia of left lower lobe due to infectious organism          Rohan Ames MD  Attending Emergency Physician        Mitali Escobar MD  05/13/22 7917

## 2022-05-13 NOTE — H&P
Depression     Dizziness     Dysuria     Edema     ankles,mild pedal edema    Falls frequently     H/O blood clots     left leg and lungs    Head injury     Headache     Heartburn     [place] hearing[place]     Hyperlipidemia     Hypertension     Hypothyroidism     Hypothyroidism     Memory problem     Neuropathy     Shingles     TIA (transient ischemic attack)     Urinary incontinence     UTI (urinary tract infection)        Past Surgical History:        Procedure Laterality Date    APPENDECTOMY  1947    CHOLECYSTECTOMY      EYE SURGERY  2009    bilateral cataract     HYSTERECTOMY, TOTAL ABDOMINAL      JOINT REPLACEMENT      Bilateral Knee    KNEE ARTHROPLASTY Right     LOBECTOMY  10/2015    sx--bilateral    PTCA  2002    with stent placement/[facility]ms to [facility]       Home Medications:  Prior to Admission medications    Medication Sig Start Date End Date Taking? Authorizing Provider   metoprolol succinate (TOPROL XL) 25 MG extended release tablet Take 1 tablet by mouth daily 5/2/22   Aly Teran MD   mirtazapine (REMERON) 15 MG tablet Take 1 tablet by mouth nightly 5/2/22   Aly Teran MD   oxybutynin (DITROPAN-XL) 5 MG extended release tablet TAKE 1 TABLET BY MOUTH DAILY FOR OVERACTIVE BLADDER 3/24/22   Aly Teran MD   atorvastatin (LIPITOR) 80 MG tablet Take 1 tablet by mouth nightly 12/30/21   Aly Teran MD   levothyroxine (SYNTHROID) 175 MCG tablet TAKE 1 TABLET BY MOUTH EVERY DAY 12/23/21   Aly Teran MD   phenytoin (PHENYTEK) 300 MG ER capsule Take 1 capsule by mouth daily 10/1/21 10/31/21  Aly Teran MD   Misc. Devices MISC 1 each by Does not apply route once as needed (As needed) Trapeze Bar 9/22/21 9/22/21  Aly Teran MD   nystatin (MYCOSTATIN) 233239 UNIT/GM cream Apply topically 2 times daily.  9/20/21   Aly Teran MD   fluticasone-salmeterol (ADVAIR HFA) 855-30 MCG/ACT inhaler Inhale 2 puffs into the lungs 2 times daily 9/17/21 Donn Szymanski MD   [facility]. Devices [facility]. 6/3/21   Donn Szymanski MD   guaiFENesin [facility]) 600 MG extended release tablet Take 1 tablet by mouth 2 times daily 4/13/21   Katharina Santizo DO       Allergies:    Morphine    Social History:    The patient currently lives with family-niece, niece's  and brother  Tobacco:   reports that she has never smoked. She has never used smokeless tobacco.  Alcohol:   reports no history of alcohol use. Illicit Drugs: none    Family History:      Problem Relation Age of Onset    Diabetes Mother     Hypertension Mother    Lola Turner Rheum Arthritis Mother     Stroke Mother     Tuberculosis Father        Review of Systems:   Review of Systems   Constitutional: Negative for fever. HENT: Positive for trouble swallowing (?). Respiratory: Positive for cough and shortness of breath. Gastrointestinal: Negative for abdominal pain and vomiting. Musculoskeletal: Positive for arthralgias (right hand). Neurological: Positive for weakness (global). Psychiatric/Behavioral: Positive for confusion. All other systems reviewed and are negative. 14 point review of systems is negative except as specifically addressed above. Physical Examination:  BP (!) 154/75   Pulse 121   Temp 98.6 °F (37 °C) (Temporal)   Resp 18   Wt 190 lb (86.2 kg)   SpO2 92%   BMI 28.89 kg/m²   Physical Exam  Vitals reviewed. Constitutional:       Appearance: She is ill-appearing. Comments: 80 yr old female does not open eyes, respond to questions or follows commands, she moans when her right hand is touched. Oxygen in place at 3L per nc   HENT:      Head: Normocephalic. Right Ear: External ear normal.      Left Ear: External ear normal.   Eyes:      Conjunctiva/sclera: Conjunctivae normal.      Pupils: Pupils are equal, round, and reactive to light. Cardiovascular:      Rate and Rhythm: Normal rate and regular rhythm. Heart sounds: Normal heart sounds.    Pulmonary: cerebellar infarcts. There is a chronic left occipital cortical and subcortical infarct. There is mild to moderate atrophy. There is moderate to severe dilatation of the lateral and third ventricles. There is low-density in the hemispheric white matter. There is biparietal thinning of the calvarium. There has been prior mastoidectomy on the right. The left mastoid air cells are clear. The visualized paranasal sinuses are essentially clear. Vascular calcification is noted. 1. No hemorrhage, edema or mass effect. 2. Chronic small cerebellar infarct. Chronic left occipital cortical and subcortical infarct. 3. Mild to moderate atrophy. Moderate to severe dilatation of the lateral and third ventricles is likely related to the atrophy. 4. Low-density in the hemispheric white matter is nonspecific and likely due to chronic small vessel disease. 5. Prior mastoidectomy on the right. The full report of this exam was immediately signed and available to the emergency room. The patient is currently in the emergency room. Signed by Dr Carmencita Reyes    XR CHEST PORTABLE    Result Date: 5/13/2022  EXAMINATION:  XR CHEST PORTABLE  5/13/2022 1:08 PM HISTORY: Altered. Hypertension. COMPARISON: 8/10/2021. TECHNIQUE: Single view portable chest x-ray. FINDINGS:  There is mild hypoventilation with vascular crowding. There is stable scarring in the left costophrenic angle. There is no dense consolidation. Mild bronchial wall thickening is stable. Heart size is upper limits of normal. The thoracic aorta is atheromatous. 1. Hypoventilation with vascular crowding. 2. Stable scarring in the left costophrenic angle. 3. Stable bronchial wall thickening.  Signed by Dr Carmencita Reyes    CTA PULMONARY W CONTRAST    Result Date: 5/13/2022  CTA chest 5/13/2022 2:34 PM HISTORY: Hypoxia TECHNIQUE: Axial images of the chest were obtained following IV contrast. . Coronal and sagittal maximum intensity projectional reformatted images are reconstructed and reviewed. COMPARISON: 2/23/2020. DLP: 5 6 mGy cm Automated exposure control was utilized to minimize patient radiation dose. FINDINGS: There are no pulmonary emboli. Moderate thoracic aortic calcification with no aneurysm or dissection. Dense coronary calcification with probable coronary vascular stents. Mild cardia megaly. No pericardial or pleural effusion. No pathologic intrathoracic or axillary lymphadenopathy. There is air and fluid suspected within the esophagus which may relate to poor primary peristalsis versus gastroesophageal reflux. Images the upper abdomen demonstrate cholecystectomy clips. No adrenal nodule. Decompressed stomach. Diffuse atherosclerotic change. Moderate fecal stasis. There is interval partial left lower lobe collapse with narrowing of the left lower lobe bronchi, perhaps related to mucous secretions. Patent basilar atelectasis. No discrete suspicious pulmonary nodule. No pneumothorax. Chronic T6 and L1 compression deformities. Exaggerated kyphosis of the degenerative thoracic spine. 1. No pulmonary emboli. Previous emboli have resolved since the 2/23/2020 study. 2. Moderate vascular calcification. Diffuse coronary artery calcification. Cardiomegaly. 3. Interval partial left lower lobe collapse with narrowing of the left lower lobe bronchi, perhaps related to mucous secretions. Secretions are identified within the dependent portion of the trachea. Fluid present within the esophagus (which may relate to gastroesophageal reflux and reported primary peristalsis) increases risk for aspiration. Signed by Dr Magali Burnham      Assessment/Plan:  Principal Problem:    Sepsis [facility])  Active Problems:    Atypical pneumonia    Essential hypertension    Acquired hypothyroidism    Seizure disorder ([facility] [address]    AMS (altered mental status)    UTI (urinary tract infection)  Resolved Problems:    * No resolved hospital problems.  *     Principal Problem:    Sepsis/Atypical pneumonia/UTI/AMS/Hypoxia   -follow pan cx    -zoysn 4.5g iv q8hrs   -Vancomycin 1g iv q12hrs   -NS at 75cc/hr   -NS fluid bolus given   -repeat lactic acid in am   -npo   -consult case management   -consult palliative care   -monitor for increasing supplemental oxygen requirements   -pt currently requiring 3L of oxygen per nc   -duoneb breathing treatment q4hrs   -xr right hand   -magnesium   -b12/folate   -vit [id number]  Active Problems:    Essential hypertension   -monitor blood pressure   -avoid hypotension   -lopressor 2.5mg iv q8hrs    Hypothyroidism   -tsh   -continue synthroid    Seizure disorder (HCC)   -continue dilantin   -dilantin level-7.3    Resolved Problems:    * No resolved hospital problems. *Signed:  VIN Brownlee CNP, 5/13/2022 6:52 PM       Attestation Statement     I have independently seen and examined this patient and agree with the asesment and plan by mid level provider        Objective:   Vitals: /64   Pulse 93   Temp 96.8 °F (36 °C)   Resp 18   Ht 5' 8\" (1.727 m)   Wt 184 lb 7 oz (83.7 kg)   SpO2 96%   BMI 28.04 kg/m²   General appearance: lethargic, confused  Skin: Skin color, texture, turgor normal.   HEENT: Head: Normocephalic, no lesions, without obvious abnormality.   Neck: no adenopathy, no carotid bruit, no JVD and supple, symmetrical, trachea midline  Lungs: clear to auscultation bilaterally  Heart: regular rate and rhythm, S1, S2 normal, no murmur, click, rub or gallop  Abdomen: soft, non-tender; bowel sounds normal; no masses,  no organomegaly  Extremities: extremities normal, atraumatic, no cyanosis or edema  Lymphatic: No significant lymph node enlargement papable  Neurologic: Mental status: Alert, oriented, thought content appropriate      Assessment & Plan:    · Sepsis, POA due to PNA- IV ab, IVF- cultures    · PNA, suspect aspiration - zosyn, npo, speech eval  · Hypoxia 2/2 to PNA  · Acute metabolic encephalopathy  · DNR  · History of PE- NOT on anticoagulation at home   · GERD     Patient Active Problem List:     History of cardioembolic cerebrovascular accident (CVA)     Essential hypertension     Acquired hypothyroidism     Seizure disorder ([facility] [address]     Generalized weakness      See orders   Disposition: will need SNF if improves       Randee Briggs MD

## 2022-05-13 NOTE — CARE COORDINATION
Date / Time of Evaluation: 5/13/2022 2:05 PM  Assessment Completed by: Jia Callejas    Patient Admission Status:     [facility]    [phone] (home)   Telephone Information:   Mobile [phone]       (Best Practice:  Have patient / caregiver verify above address and phone number by stating out loud their current address and reachable phone number.)  Is above information correct? yes      Current PCP:  Brigid Harris MD  PCP verified? yes     Initial Assessment Completed at bedside with:  Niece     Emergency Contacts:  Extended Emergency Contact Information  Primary Emergency Contact: [place] [address] Phone: [phone]  Relation: Niece/Nephew    Advance Directives: Code Status:  Prior    Financial:  Payor: Hellen Dc / Plan: Aly Alert / Product Type: *No Product type* /     Pharmacy:   [address] [facility], [address] [facility] [address] - [address] [phone]  [address] [place] [address] [address] [address]  Phone: [phone] Fax: [phone]      Potential assistance purchasing medications? no    ADLS:  Support System:    family provides total care     Current Home Environment:  Her niece (and her spouse) and nephew     Plans to RETURN to current housing: At this time, yes  Barriers to RETURNING to current housing: none at this time    Currently ACTIVE with [address]: no  [address] [place] [address]:  n/a    Had HOME OXYGEN prior to admission:  no  Oxygen Company:  n/a  Informed of need to bring portable home O2 tank to hospital on day of DISCHARGE:  n/a  Name of person committed to bringing portable tank at discharge:  n/a    Active with HD/PD prior to admission: no  Nephrologist:  n/a  HD Center:  N/a    Additional CM/SW Notes:  SW met with niece at bedside. She states pt lives with her. Her spouse and her brother also live in the home. She states pt is bed bound. The plan is for her to return home at [place].  She states they are interested in home hospice. Hospice called niece, however, she said she stated she would call them back once she spoke to her spouse about this. She does not want any decisions made without him. She states he is currently in ICU. Pt was receiving HH services, however, Chuy Russell stated \"there's nothing we can do for pt\" as she is bed bound. She denied any needs at this time. Walter Rader and/or her family were provided with choice of provider.         [address] [place] [address] Management Department  [phone]   Fax: [phone]

## 2022-05-13 NOTE — PROGRESS NOTES
[address] [facility] Pharmacokinetic Monitoring Service - Vancomycin     Enrique Rubi is a 80 y.o. female starting on vancomycin therapy for pneumonia. Pharmacy consulted by Dr Norma Chao for monitoring and adjustment. Target Concentration: Goal AUC/-600 mg*hr/L    Additional Antimicrobials: zosyn    Pertinent Laboratory Values: Wt Readings from Last 1 Encounters:   05/13/22 190 lb (86.2 kg)     Temp Readings from Last 1 Encounters:   05/13/22 98.7 °F (37.1 °C) (Oral)     Estimated Creatinine Clearance: 86 mL/min (based on SCr of 0.5 mg/dL).   Recent Labs     05/13/22  1119   CREATININE 0.5   WBC 16.6*     Procalcitonin:     Pertinent Cultures:  Culture Date Source Results   05/13/22 bloos collected   MRSA Nasal Swab:       Plan:  Dosing recommendations based on Bayesian software  Start vancomycin 2000 mg ld then 1000 mg iv every 12 hours  Anticipated AUC of 507 and trough concentration of 16.2 at steady state  Renal labs as indicated   Vancomycin concentration ordered for 05/15/22 @ [address] will continue to monitor patient and adjust therapy as indicated    Thank you for the consult,  Edie DelA ngel [facility]  5/13/2022 5:26 PM

## 2022-05-13 NOTE — ED PROVIDER NOTES
[facility] EMERGENCY DEPT  eMERGENCY dEPARTMENT eNCOUnter      Pt Name: Tez Che  MRN: [id number]  [place] 8/18/1931  Date of evaluation: 5/13/2022  Provider: [address]       Chief Complaint   Patient presents with    Altered Mental Status     per EMS    Fatigue     per family/ EMS         HISTORY OF PRESENT ILLNESS   (Location/Symptom, Timing/Onset,Context/Setting, Quality, Duration, Modifying Factors, Severity)  Note limiting factors. Tez Che is a 80 y.o. female who presents to the emergency department by ambulance with altered mental status. Patient is cared for at home by her family. They said she was not very responsive this morning. She she has an 80% oxygen saturation on room air.  + cough. family says she does not wear oxygen at home. She will open her eyes and say a word or 2 to you. She has very dry mucous membranes. The daughter that normally cares for her has her  in the hospital and the patient is being cared for by a son. Poor p.o. intake    The history is provided by a caregiver, medical records and the EMS personnel. Altered Mental Status  Presenting symptoms: lethargy    Associated symptoms: weakness    Associated symptoms: no fever    Fatigue  Severity:  Moderate  Onset quality:  Sudden  Duration:  1 day  Timing:  Constant  Chronicity:  New  Associated symptoms: cough and lethargy    Associated symptoms: no fever        NursingNotes were reviewed. REVIEW OF SYSTEMS    (2-9 systems for level 4, 10 or more for level 5)     Review of Systems   Constitutional: Positive for fatigue. Negative for fever. Respiratory: Positive for cough. Neurological: Positive for speech difficulty and weakness. Except as noted above the remainder of the review of systems was reviewed and negative.        PAST MEDICAL HISTORY     Past Medical History:   Diagnosis Date    Arthritis     Back pain     bulging disc    CAD (coronary artery disease)     BMS to RCA    Cerebral artery occlusion with cerebral infarction ([facility] [address]     small strokes,balance issues    Cystitis     Depression     Dizziness     Dysuria     Edema     ankles,mild pedal edema    Falls frequently     H/O blood clots     left leg and lungs    Head injury     Headache     Heartburn     [place] (hard of hearing)     Hyperlipidemia     Hypertension     Hypothyroidism     Hypothyroidism     Memory problem     Neuropathy     Shingles     TIA (transient ischemic attack)     Urinary incontinence     UTI (urinary tract infection)          SURGICALHISTORY       Past Surgical History:   Procedure Laterality Date    APPENDECTOMY  1947    CHOLECYSTECTOMY      EYE SURGERY  2009    bilateral cataract     HYSTERECTOMY, TOTAL ABDOMINAL      JOINT REPLACEMENT      Bilateral Knee    KNEE ARTHROPLASTY Right     LOBECTOMY  10/2015    sx--bilateral    PTCA  2002    with stent placement/bms to rca         CURRENT MEDICATIONS       Previous Medications    ATORVASTATIN (LIPITOR) 80 MG TABLET    Take 1 tablet by mouth nightly    FLUTICASONE-SALMETEROL (ADVAIR HFA) 230-21 MCG/ACT INHALER    Inhale 2 puffs into the lungs 2 times daily    GUAIFENESIN (MUCINEX) 600 MG EXTENDED RELEASE TABLET    Take 1 tablet by mouth 2 times daily    LEVOTHYROXINE (SYNTHROID) 175 MCG TABLET    TAKE 1 TABLET BY MOUTH EVERY DAY    METOPROLOL SUCCINATE (TOPROL XL) 25 MG EXTENDED RELEASE TABLET    Take 1 tablet by mouth daily    MIRTAZAPINE (REMERON) 15 MG TABLET    Take 1 tablet by mouth nightly    MISC. [address] MISC. DEVICES MISC    1 each by Does not apply route once as needed (As needed) Trapeze Bar    NYSTATIN (MYCOSTATIN) 156546 UNIT/GM CREAM    Apply topically 2 times daily.     OXYBUTYNIN (DITROPAN-XL) 5 MG EXTENDED RELEASE TABLET    TAKE 1 TABLET BY MOUTH DAILY FOR OVERACTIVE BLADDER    PHENYTOIN (PHENYTEK) 300 MG ER CAPSULE    Take 1 capsule by mouth daily       ALLERGIES     Morphine    FAMILY HISTORY       Family History   Problem Relation Age of Onset    Diabetes Mother     Hypertension Mother    [facility] Rheum Arthritis Mother     Stroke Mother     Tuberculosis Father           SOCIAL HISTORY       Social History     Socioeconomic History    Marital status:      Spouse name: None    Number of children: None    Years of education: None    Highest education level: None   Occupational History    None   Tobacco Use    Smoking status: Never Smoker    Smokeless tobacco: Never Used   Vaping Use    Vaping Use: Never used   Substance and Sexual Activity    Alcohol use: Never    Drug use: Never    Sexual activity: None   Other Topics Concern    None   Social History Narrative    None     Social Determinants of Health     Financial Resource Strain:     Difficulty of Paying Living Expenses: Not on file   Food Insecurity:     Worried About Running Out of Food in the Last Year: Not on file    Shiva of Food in the Last Year: Not on file   Transportation Needs:     Lack of Transportation (Medical): Not on file    Lack of Transportation (Non-Medical):  Not on file   Physical Activity:     Days of Exercise per Week: Not on file    Minutes of Exercise per Session: Not on file   Stress:     Feeling of Stress : Not on file   Social Connections:     Frequency of Communication with Friends and Family: Not on file    Frequency of Social Gatherings with Friends and Family: Not on file    Attends Sikhism Services: Not on file    Active Member of Clubs or Organizations: Not on file    Attends Club or Organization Meetings: Not on file    Marital Status: Not on file   Intimate Partner Violence:     Fear of Current or Ex-Partner: Not on file    Emotionally Abused: Not on file    Physically Abused: Not on file    Sexually Abused: Not on file   Housing Stability:     Unable to Pay for Housing in the Last Year: Not on file    Number of Places Lived in the Last Year: Not on file    Unstable Housing in the Last Year: Not on file       SCREENINGS    [place] Coma Scale  Eye Opening: Spontaneous  Best Verbal Response: Inappropriate words  Best Motor Response: Localizes pain  [place] Coma Scale Score: 12 @FLOW([id number])@      PHYSICAL EXAM    (up to 7 for level 4, 8 or more for level 5)     ED Triage Vitals [05/13/22 1108]   BP Temp Temp Source Pulse Resp SpO2 Height Weight   (!) 153/93 98.7 °F (37.1 °C) Oral 99 18 (!) 88 % -- 190 lb (86.2 kg)       Physical Exam  Vitals and nursing note reviewed. Constitutional:       Appearance: She is well-developed. She is obese. She is toxic-appearing. Comments: Bedridden  Foot drop   HENT:      Head: Normocephalic and atraumatic. Mouth/Throat:      Mouth: Mucous membranes are dry. Eyes:      General: No scleral icterus. Right eye: No discharge. Left eye: No discharge. Cardiovascular:      Rate and Rhythm: Normal rate. Pulmonary:      Effort: No respiratory distress. Musculoskeletal:      Cervical back: Normal range of motion and neck supple. Neurological:      Mental Status: She is lethargic. Psychiatric:         Behavior: Behavior normal.         DIAGNOSTIC RESULTS     EKG: All EKG's are interpreted by the Emergency Department Physician who either signs or Co-signsthis chart in the absence of a cardiologist.  108 sinus tach read at 1720 by Dr. Lluvia Zamora:   Jackalyn Officer such as CT, Ultrasound and MRI are read by the radiologist. Plain radiographic images are visualized and preliminarily interpreted by the emergency physician with the below findings:      Interpretation per the Radiologist below, if available at the time of this note:    CTA PULMONARY W CONTRAST   Final Result   1. No pulmonary emboli. Previous emboli have resolved since the   2/23/2020 study. 2. Moderate vascular calcification. Diffuse coronary artery   calcification. Cardiomegaly.    3. Interval partial left lower lobe collapse with narrowing of the   left lower lobe bronchi, perhaps related to mucous secretions. Secretions are identified within the dependent portion of the trachea. Fluid present within the esophagus (which may relate to   gastroesophageal reflux and reported primary peristalsis) increases   risk for aspiration. Signed by Dr Jc Lugo   Final Result   1. No hemorrhage, edema or mass effect. 2. Chronic small cerebellar infarct. Chronic left occipital cortical   and subcortical infarct. 3. Mild to moderate atrophy. Moderate to severe dilatation of the   lateral and third ventricles is likely related to the atrophy. 4. Low-density in the hemispheric white matter is nonspecific and   likely due to chronic small vessel disease. 5. Prior mastoidectomy on the right. The full report of this exam was immediately signed and available to   the emergency room. The patient is currently in the emergency room. Signed by Dr Carmencita Reyes      XR CHEST PORTABLE   Final Result   1. Hypoventilation with vascular crowding. 2. Stable scarring in the left costophrenic angle. 3. Stable bronchial wall thickening.    Signed by Dr Carmencita Reyes            ED BEDSIDEULTRASOUND:   Performed by ED Physician -none    LABS:  Labs Reviewed   CBC WITH AUTO DIFFERENTIAL - Abnormal; Notable for the following components:       Result Value    WBC 16.6 (*)     MCHC 31.7 (*)     Neutrophils % 83.7 (*)     Lymphocytes % 10.5 (*)     Neutrophils Absolute 13.9 (*)     All other components within normal limits   URINALYSIS WITH REFLEX TO CULTURE - Abnormal; Notable for the following components:    Color, UA DARK YELLOW (*)     Nitrite, Urine POSITIVE (*)     All other components within normal limits   COMPREHENSIVE METABOLIC PANEL W/ REFLEX TO MG FOR LOW K - Abnormal; Notable for the following components:    Glucose 146 (*)     Calcium 8.1 (*)     Total Protein 6.5 (*)     Albumin 3.2 (*)     Alkaline Phosphatase 132 (*)     All other components within normal limits   BLOOD GAS, ARTERIAL - Abnormal; Notable for the following components:    pO2, Arterial 55.0 (*)     O2 Sat, Arterial 88.7 (*)     All other components within normal limits   LACTIC ACID - Abnormal; Notable for the following components:    Lactic Acid 4.1 (*)     All other components within normal limits    Narrative:     CALL  [place]  [facility] tel. ,  Chemistry results called to and read back by luis alberto valdes, 05/13/2022  12:52, by [address] [place] [address] - Abnormal; Notable for the following components:    Bacteria, UA NEGATIVE (*)     Crystals, UA NEG (*)     All other components within normal limits   FOLATE - Abnormal; Notable for the following components:    Folate 4.0 (*)     All other components within normal limits   VITAMIN D 25 HYDROXY - Abnormal; Notable for the following components:    Vit D, 25-Hydroxy 27.9 (*)     All other components within normal limits   COVID-19, RAPID   CULTURE, BLOOD 2   CULTURE, BLOOD 1   CULTURE, URINE   BRAIN NATRIURETIC PEPTIDE   TROPONIN   VITAMIN B12   TSH   MAGNESIUM   PHENYTOIN LEVEL, TOTAL       All other labs were within normal range or not returned as of this dictation. EMERGENCY DEPARTMENT COURSE and DIFFERENTIALDIAGNOSIS/MDM:   Vitals:    Vitals:    05/13/22 1236 05/13/22 1345 05/13/22 1430 05/13/22 1500   BP: (!) 164/82 (!) 161/80 (!) 153/71 (!) 156/70   Pulse:  122  114   Resp:  25  30   Temp:       TempSrc:       SpO2:  92%  90%   Weight:               MDM spoke to hospitalist for admission      CONSULTS:  IP CONSULT TO CASE MANAGEMENT    PROCEDURES:  Unless otherwise noted below, none     Procedures    FINAL IMPRESSION      1. Disorientation    2. Hypoxia    3. Pneumonia of left lower lobe due to infectious organism        DISPOSITION/PLAN   DISPOSITION Admitted 05/13/2022 03:34:38 PM      PATIENT REFERRED TO:  No follow-up provider specified.     DISCHARGE MEDICATIONS:  New Prescriptions    No medications on file (Please note that portions of this note were completed with a voice recognitionprogram.  Efforts were made to edit the dictations but occasionally words are mis-transcribed.)    VIN Fu (electronically signed)          VIN Fu  05/13/22 4606

## 2022-05-13 NOTE — ED NOTES
Straight Cath procedure assisted by [address] [facility] [address], RN  05/13/22 2479

## 2022-05-14 LAB
ACINETOBACTER CALCOAC BAUMANNII COMPLEX BY PCR: NOT DETECTED
ANION GAP SERPL CALCULATED.3IONS-SCNC: 14 MMOL/L (ref 7–19)
BACTEROIDES FRAGILIS BY PCR: NOT DETECTED
BUN BLDV-MCNC: 11 MG/DL (ref 8–23)
CALCIUM SERPL-MCNC: 8 MG/DL (ref 8.8–10.2)
CANDIDA ALBICANS BY PCR: NOT DETECTED
CANDIDA AURIS BY PCR: NOT DETECTED
CANDIDA GLABRATA BY PCR: NOT DETECTED
CANDIDA KRUSEI BY PCR: NOT DETECTED
CANDIDA PARAPSILOSIS BY PCR: NOT DETECTED
CANDIDA TROPICALIS BY PCR: NOT DETECTED
CHLORIDE BLD-SCNC: 104 MMOL/L (ref 98–111)
CO2: 22 MMOL/L (ref 22–29)
CREAT SERPL-MCNC: 0.4 MG/DL (ref 0.5–0.9)
CRYPTOCOCCUS NEOFORMANS/GATTII BY PCR: NOT DETECTED
ENTEROBACTER CLOACAE COMPLEX BY PCR: NOT DETECTED
ENTEROBACTERALES BY PCR: NOT DETECTED
ENTEROCOCCUS FAECALIS BY PCR: NOT DETECTED
ENTEROCOCCUS FAECIUM BY PCR: NOT DETECTED
ESCHERICHIA COLI BY PCR: NOT DETECTED
GFR AFRICAN AMERICAN: >59
GFR NON-AFRICAN AMERICAN: >60
GLUCOSE BLD-MCNC: 135 MG/DL (ref 74–109)
HAEMOPHILUS INFLUENZAE BY PCR: NOT DETECTED
HCT VFR BLD CALC: 50 % (ref 37–47)
HEMOGLOBIN: 14.9 G/DL (ref 12–16)
KLEBSIELLA AEROGENES BY PCR: NOT DETECTED
KLEBSIELLA OXYTOCA BY PCR: NOT DETECTED
KLEBSIELLA PNEUMONIAE GROUP BY PCR: NOT DETECTED
LACTIC ACID: 2.7 MMOL/L (ref 0.5–1.9)
LISTERIA MONOCYTOGENES BY PCR: NOT DETECTED
MCH RBC QN AUTO: 30.6 PG (ref 27–31)
MCHC RBC AUTO-ENTMCNC: 29.8 G/DL (ref 33–37)
MCV RBC AUTO: 102.7 FL (ref 81–99)
METHICILLIN RESISTANCE MECA/C  BY PCR: DETECTED
MRSA SCREEN RT-PCR: NOT DETECTED
NEISSERIA MENINGITIDIS BY PCR: NOT DETECTED
PDW BLD-RTO: 14 % (ref 11.5–14.5)
PLATELET # BLD: 223 K/UL (ref 130–400)
PMV BLD AUTO: 10.5 FL (ref 9.4–12.3)
POTASSIUM SERPL-SCNC: 3.7 MMOL/L (ref 3.5–5)
PROTEUS SPECIES BY PCR: NOT DETECTED
PSEUDOMONAS AERUGINOSA BY PCR: NOT DETECTED
RBC # BLD: 4.87 M/UL (ref 4.2–5.4)
SALMONELLA SPECIES BY PCR: NOT DETECTED
SERRATIA MARCESCENS BY PCR: NOT DETECTED
SODIUM BLD-SCNC: 140 MMOL/L (ref 136–145)
STAPHYLOCOCCUS AUREUS BY PCR: NOT DETECTED
STAPHYLOCOCCUS EPIDERMIDIS BY PCR: DETECTED
STAPHYLOCOCCUS LUGDUNENSIS BY PCR: NOT DETECTED
STAPHYLOCOCCUS SPECIES BY PCR: DETECTED
STENOTROPHOMONAS MALTOPHILIA BY PCR: NOT DETECTED
STREPTOCOCCUS AGALACTIAE BY PCR: NOT DETECTED
STREPTOCOCCUS PNEUMONIAE BY PCR: NOT DETECTED
STREPTOCOCCUS PYOGENES  BY PCR: NOT DETECTED
STREPTOCOCCUS SPECIES BY PCR: NOT DETECTED
WBC # BLD: 17.1 K/UL (ref 4.8–10.8)

## 2022-05-14 PROCEDURE — 2580000003 HC RX 258: Performed by: HOSPITALIST

## 2022-05-14 PROCEDURE — 83605 ASSAY OF LACTIC ACID: CPT

## 2022-05-14 PROCEDURE — 51798 US URINE CAPACITY MEASURE: CPT

## 2022-05-14 PROCEDURE — 1210000000 HC MED SURG R&B

## 2022-05-14 PROCEDURE — 2580000003 HC RX 258: Performed by: NURSE PRACTITIONER

## 2022-05-14 PROCEDURE — 6370000000 HC RX 637 (ALT 250 FOR IP): Performed by: HOSPITALIST

## 2022-05-14 PROCEDURE — 6360000002 HC RX W HCPCS: Performed by: HOSPITALIST

## 2022-05-14 PROCEDURE — 87641 MR-STAPH DNA AMP PROBE: CPT

## 2022-05-14 PROCEDURE — 2500000003 HC RX 250 WO HCPCS: Performed by: HOSPITALIST

## 2022-05-14 PROCEDURE — 87040 BLOOD CULTURE FOR BACTERIA: CPT

## 2022-05-14 PROCEDURE — 94640 AIRWAY INHALATION TREATMENT: CPT

## 2022-05-14 PROCEDURE — C9113 INJ PANTOPRAZOLE SODIUM, VIA: HCPCS | Performed by: HOSPITALIST

## 2022-05-14 PROCEDURE — 51701 INSERT BLADDER CATHETER: CPT

## 2022-05-14 PROCEDURE — 85027 COMPLETE CBC AUTOMATED: CPT

## 2022-05-14 PROCEDURE — 6360000002 HC RX W HCPCS: Performed by: NURSE PRACTITIONER

## 2022-05-14 PROCEDURE — 2700000000 HC OXYGEN THERAPY PER DAY

## 2022-05-14 PROCEDURE — 92610 EVALUATE SWALLOWING FUNCTION: CPT

## 2022-05-14 PROCEDURE — 36415 COLL VENOUS BLD VENIPUNCTURE: CPT

## 2022-05-14 PROCEDURE — 80048 BASIC METABOLIC PNL TOTAL CA: CPT

## 2022-05-14 RX ADMIN — VANCOMYCIN HYDROCHLORIDE 1000 MG: 10 INJECTION, POWDER, LYOPHILIZED, FOR SOLUTION INTRAVENOUS at 06:33

## 2022-05-14 RX ADMIN — METOPROLOL TARTRATE 2.5 MG: 1 INJECTION, SOLUTION INTRAVENOUS at 03:30

## 2022-05-14 RX ADMIN — VANCOMYCIN HYDROCHLORIDE 1000 MG: 10 INJECTION, POWDER, LYOPHILIZED, FOR SOLUTION INTRAVENOUS at 21:35

## 2022-05-14 RX ADMIN — PIPERACILLIN SODIUM AND TAZOBACTAM SODIUM 4500 MG: 4; .5 INJECTION, POWDER, LYOPHILIZED, FOR SOLUTION INTRAVENOUS at 00:00

## 2022-05-14 RX ADMIN — SODIUM CHLORIDE, PRESERVATIVE FREE 40 MG: 5 INJECTION INTRAVENOUS at 16:10

## 2022-05-14 RX ADMIN — IPRATROPIUM BROMIDE AND ALBUTEROL SULFATE 1 AMPULE: 2.5; .5 SOLUTION RESPIRATORY (INHALATION) at 18:58

## 2022-05-14 RX ADMIN — METOPROLOL TARTRATE 2.5 MG: 1 INJECTION, SOLUTION INTRAVENOUS at 11:57

## 2022-05-14 RX ADMIN — PIPERACILLIN SODIUM AND TAZOBACTAM SODIUM 4500 MG: 4; .5 INJECTION, POWDER, LYOPHILIZED, FOR SOLUTION INTRAVENOUS at 16:11

## 2022-05-14 RX ADMIN — SODIUM CHLORIDE, PRESERVATIVE FREE 10 ML: 5 INJECTION INTRAVENOUS at 08:44

## 2022-05-14 RX ADMIN — ENOXAPARIN SODIUM 40 MG: 100 INJECTION SUBCUTANEOUS at 08:42

## 2022-05-14 RX ADMIN — PIPERACILLIN SODIUM AND TAZOBACTAM SODIUM 4500 MG: 4; .5 INJECTION, POWDER, LYOPHILIZED, FOR SOLUTION INTRAVENOUS at 08:41

## 2022-05-14 RX ADMIN — IPRATROPIUM BROMIDE AND ALBUTEROL SULFATE 1 AMPULE: 2.5; .5 SOLUTION RESPIRATORY (INHALATION) at 06:53

## 2022-05-14 RX ADMIN — METOPROLOL TARTRATE 2.5 MG: 1 INJECTION, SOLUTION INTRAVENOUS at 21:33

## 2022-05-14 RX ADMIN — IPRATROPIUM BROMIDE AND ALBUTEROL SULFATE 1 AMPULE: 2.5; .5 SOLUTION RESPIRATORY (INHALATION) at 10:50

## 2022-05-14 RX ADMIN — IPRATROPIUM BROMIDE AND ALBUTEROL SULFATE 1 AMPULE: 2.5; .5 SOLUTION RESPIRATORY (INHALATION) at 14:40

## 2022-05-14 NOTE — PROGRESS NOTES
Patient has not voided. Bladder scan showed 617 mL. Notified Dr. Parveen Mccabe. New orders received to straight cath patient. Straight cath output 650 mL.     Electronically signed by Ming Garcia RN on 5/14/2022 at 3:59 AM

## 2022-05-14 NOTE — PROGRESS NOTES
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  ONSET DATE: 2022    Recent Chest Xray/CT of Chest: (2022 )    Date of Eval: 2022  Evaluating Therapist: ITZEL Salgado    Current Diet level:  Current Diet : NPO      Primary Complaint  Patient Complaint: Pt verbalized no complaints. Pain:  Pain Assessment  Pain Assessment: None - Denies Pain    Reason for Referral  Emily Morrison was referred for a bedside swallow evaluation to assess the efficiency of her swallow function, identify signs and symptoms of aspiration and make recommendations regarding safe dietary consistencies, effective compensatory strategies, and safe eating environment. Impression  Dysphagia Diagnosis: Severe pharyngeal stage dysphagia; Severe oral stage dysphagia  Dysphagia Impression : Pt demo severe oral and pharyngeal stage dysphagia. Pt is at VERY high risk for aspiration. Dysphagia Outcome Severity Scale: Level 1: Severe dysphagia- NPO. Unable to tolerate any PO safely     Treatment Plan  Requires SLP Intervention: Yes  Duration of Treatment: 2-3 days  D/C Recommendations:  To be determined       Recommended Diet and Intervention   NPO     Recommended Form of Meds: Via alternative means of nutrition  Recommendations: NPO;Consider alternative nutrition  Therapeutic Interventions: Oral care; Patient/Family education    Compensatory Swallowing Strategies       Treatment/Goals  Short-term Goals  Goal 1: Pt to remain NPO. Goal 2: Oral care to be provided by staff. Goal 3: Re-assess swallow function. General  Chart Reviewed: Yes  Subjective  Subjective: Pt was confused but cooperative. Pt requires cuing to complete tasks. Behavior/Cognition: Cooperative;Confused; Requires cueing  Respiratory Status: O2 via nasual cannula  O2 Device: Nasal cannula  Liters of Oxygen: 4 L  Communication Observation:  (Pt able to state her name but unable to answer any other questions.)  Follows Directions: Simple (with visual and verbal cuing required)  Dentition: Dentures top; Some missing teeth  Patient Positioning: Partially reclined  Baseline Vocal Quality: Weak  Volitional Cough: Weak  Prior Dysphagia History: Per family report in ER notes, the pt has demo decreased PO intake. Pt has been holding food in mouth and then spitting it out. Consistencies Administered: Ice Chips           Vision/Hearing  Vision  Vision: Impaired  Hearing  Hearing: Exceptions to [facility]  Hearing Exceptions: Hard of hearing/hearing concerns    Oral Motor Deficits  Oral/Motor  Oral Hygiene: Dried secretions    Oral Phase Dysfunction  Oral Phase  Oral Phase: Exceptions  Oral Phase  Oral Phase - Comment: Pt demo poor bolus control with suspected premature bolus loss. Pt demo oral spillage. Indicators of Pharyngeal Phase Dysfunction   Pharyngeal Phase   Pharyngeal Phase: Pt demo no laryngeal elevation (rocking only) with wet vocal quality noted.  Pt did not demo coughing with any PO trial.    Prognosis  Individuals consulted  Consulted and agree with results and recommendations: RN  RN Name: Jennifer Pool  Patient Education Response: No evidence of learning                    [address] [facility]", [address]  5/14/2022 2:23 PM

## 2022-05-14 NOTE — PROGRESS NOTES
HOSPITAL MEDICINE  - PROGRESS NOTE    Admit Date: 5/13/2022         CC:  AMS    Subjective: obtunded     Objective: obtunded, in bed, on NC oxygen     Diet: Diet NPO  Pain is:Mild  Nausea:None  Bowel Movement/Flatus yes    Data:   Scheduled Meds: Reviewed  Current Facility-Administered Medications   Medication Dose Route Frequency Provider Last Rate Last Admin    nystatin (MYCOSTATIN) 584467 UNIT/ML suspension 500,000 Units  5 mL Oral 4x Daily Cy Borden MD        0.9 % sodium chloride infusion   IntraVENous Continuous Cy Borden MD   Stopped at 05/14/22 0841    sodium chloride flush 0.9 % injection 5-40 mL  5-40 mL IntraVENous 2 times per day Cy Borden MD   10 mL at 05/14/22 0844    sodium chloride flush 0.9 % injection 5-40 mL  5-40 mL IntraVENous PRN Cy Borden MD        0.9 % sodium chloride infusion   IntraVENous PRN Cy Borden MD        enoxaparin (LOVENOX) injection 40 mg  40 mg SubCUTAneous Daily Cy Borden MD   40 mg at 05/14/22 2913    ondansetron (ZOFRAN-ODT) disintegrating tablet 4 mg  4 mg Oral Q8H PRN Cy Borden MD        Or    [facility]dansetron [facility]) injection 4 mg  4 mg IntraVENous Q6H PRN Cy Borden MD        polyethylene glycol (GLYCOLAX) packet 17 g  17 g Oral Daily PRN Cy Borden MD        acetaminophen (TYLENOL) tablet 650 mg  650 mg Oral Q6H PRN Cy Borden MD        Or    acetaminophen (TYLENOL) suppository 650 mg  650 mg Rectal Q6H PRN Cy Borden MD        atorvastatin (LIPITOR) tablet 10 mg  10 mg Oral Nightly Cy Borden MD        guaiFENesin [facility]) extended release tablet 600 mg  600 mg Oral BID Cy Borden MD        [Held by provider] mirtazapine (REMERON) tablet 7.5 mg  7.5 mg Oral Nightly Cy Borden MD        pantoprazole (PROTONIX) 40 mg in sodium chloride (PF) 10 mL injection  40 mg IntraVENous Q24H Zahira Barrera MD   40 mg at 05/13/22 1832    levothyroxine (SYNTHROID) tablet 175 mcg  175 mcg Oral Daily Zahira Barrera MD        piperacillin-tazobactam (ZOSYN) 4,500 mg in dextrose 5 % 100 mL IVPB extended infusion (mini-bag)  4,500 mg IntraVENous Q8H Orestes Tee APRN - CNP   Stopped at 73/46/57 3554    folic acid (FOLVITE) tablet 1 mg  1 mg Oral Daily Zahira Barrera MD        vitamin D (ERGOCALCIFEROL) capsule 50,000 Units  50,000 Units Oral Weekly Zahira Barrera MD        ipratropium-albuterol (DUONEB) nebulizer solution 1 ampule  1 ampule Inhalation Q4H WA Zahira Barrera MD   1 ampule at 05/14/22 1050    vancomycin (VANCOCIN) 1000 mg in dextrose 5% 250 mL IVPB  1,000 mg IntraVENous Q12H Zahira Barrera MD   Stopped at 05/14/22 0124    vancomycin (VANCOCIN) intermittent dosing (placeholder)   Other RX Placeholder Zahira Barrera MD        metoprolol (LOPRESSOR) injection 2.5 mg  2.5 mg IntraVENous Q8H Zahira Barrera MD   2.5 mg at 05/14/22 1157    phenytoin (DILANTIN) ER capsule 300 mg  300 mg Oral Daily Zahira Barrera MD         DVT Prophylaxis: Sequential Compression Devices    Continuous Infusions:   sodium chloride Stopped (05/14/22 0841)    sodium chloride         Intake/Output Summary (Last 24 hours) at 5/14/2022 1428  Last data filed at 5/14/2022 1335  Gross per 24 hour   Intake 1388.8 ml   Output 650 ml   Net 738.8 ml     CBC:   Recent Labs     05/13/22  1119 05/14/22  0324   WBC 16.6* 17.1*   HGB 14.2 14.9    223     BMP:  Recent Labs     05/13/22  1119 05/13/22  1243 05/14/22  0324     --  140   K 4.3 3.7 3.7     --  104   CO2 26  --  22   BUN 15  --  11   CREATININE 0.5  --  0.4*   GLUCOSE 146*  --  135*     ABGs:   Lab Results   Component Value Date    PHART 7.450 05/13/2022    PO2ART 55.0 05/13/2022    RWP0LRG 37.0 05/13/2022 INR: No results for input(s): INR in the last 72 hours. Objective:   Vitals: /68   Pulse 103   Temp 98.4 °F (36.9 °C) (Temporal)   Resp 22   Wt 190 lb (86.2 kg)   SpO2 96%   BMI 28.89 kg/m²   General appearance: obtunded in distress  Skin: Skin color, texture, turgor normal.   HEENT: Head: Normocephalic, no lesions, without obvious abnormality.   Neck: no adenopathy, no carotid bruit, no JVD and supple, symmetrical, trachea midline  Lungs: rales bilaterally  Heart: regular rate and rhythm, S1, S2 normal, no murmur, click, rub or gallop  Abdomen: soft, non-tender; bowel sounds normal; no masses,  no organomegaly  Extremities: extremities normal, atraumatic, no cyanosis or edema  Lymphatic: No significant lymph node enlargement papable  Neurologic: Mental status: obtunded         Assessment & Plan:    · Sepsis, POA due to PNA and bacteremia  · PNA, suspect aspiration - zosyn, npo, speech eval  · Hypoxia 2/2 to PNA  · Acute metabolic encephalopathy  · Staph bacteremia- vanc, repeat cultures, ID consult when available   · Vit D def- replace   · Folate def- replace   · DNR  · History of PE- NOT on anticoagulation at home   · GERD- IV PPI    Patient Active Problem List:     History of cardioembolic cerebrovascular accident (CVA)     Essential hypertension     Acquired hypothyroidism     Seizure disorder (HCC)     Generalized weakness      See orders   Disposition: will need SNF if improves    Marquita Villafana MD

## 2022-05-15 LAB
ANION GAP SERPL CALCULATED.3IONS-SCNC: 14 MMOL/L (ref 7–19)
BLOOD CULTURE, ROUTINE: ABNORMAL
BLOOD CULTURE, ROUTINE: ABNORMAL
BUN BLDV-MCNC: 15 MG/DL (ref 8–23)
CALCIUM SERPL-MCNC: 7.8 MG/DL (ref 8.8–10.2)
CHLORIDE BLD-SCNC: 103 MMOL/L (ref 98–111)
CO2: 19 MMOL/L (ref 22–29)
CREAT SERPL-MCNC: 1.1 MG/DL (ref 0.5–0.9)
EKG P AXIS: 71 DEGREES
EKG P-R INTERVAL: 174 MS
EKG Q-T INTERVAL: 348 MS
EKG QRS DURATION: 96 MS
EKG QTC CALCULATION (BAZETT): 432 MS
EKG T AXIS: 74 DEGREES
GFR AFRICAN AMERICAN: 56
GFR NON-AFRICAN AMERICAN: 47
GLUCOSE BLD-MCNC: 139 MG/DL (ref 74–109)
HCT VFR BLD CALC: 45.2 % (ref 37–47)
HEMOGLOBIN: 13.6 G/DL (ref 12–16)
LACTIC ACID: 2.5 MMOL/L (ref 0.5–1.9)
MCH RBC QN AUTO: 31.4 PG (ref 27–31)
MCHC RBC AUTO-ENTMCNC: 30.1 G/DL (ref 33–37)
MCV RBC AUTO: 104.4 FL (ref 81–99)
ORGANISM: ABNORMAL
PDW BLD-RTO: 13.9 % (ref 11.5–14.5)
PLATELET # BLD: 204 K/UL (ref 130–400)
PMV BLD AUTO: 10.9 FL (ref 9.4–12.3)
POTASSIUM SERPL-SCNC: 3.2 MMOL/L (ref 3.5–5)
RBC # BLD: 4.33 M/UL (ref 4.2–5.4)
SODIUM BLD-SCNC: 136 MMOL/L (ref 136–145)
URINE CULTURE, ROUTINE: NORMAL
VANCOMYCIN RANDOM: 18.8 UG/ML
VANCOMYCIN TROUGH: 23 UG/ML (ref 10–20)
WBC # BLD: 22.5 K/UL (ref 4.8–10.8)

## 2022-05-15 PROCEDURE — 6370000000 HC RX 637 (ALT 250 FOR IP): Performed by: HOSPITALIST

## 2022-05-15 PROCEDURE — 97162 PT EVAL MOD COMPLEX 30 MIN: CPT

## 2022-05-15 PROCEDURE — 36415 COLL VENOUS BLD VENIPUNCTURE: CPT

## 2022-05-15 PROCEDURE — 80202 ASSAY OF VANCOMYCIN: CPT

## 2022-05-15 PROCEDURE — 2580000003 HC RX 258: Performed by: HOSPITALIST

## 2022-05-15 PROCEDURE — 2700000000 HC OXYGEN THERAPY PER DAY

## 2022-05-15 PROCEDURE — 2500000003 HC RX 250 WO HCPCS: Performed by: HOSPITALIST

## 2022-05-15 PROCEDURE — 2580000003 HC RX 258: Performed by: NURSE PRACTITIONER

## 2022-05-15 PROCEDURE — 80048 BASIC METABOLIC PNL TOTAL CA: CPT

## 2022-05-15 PROCEDURE — 93010 ELECTROCARDIOGRAM REPORT: CPT | Performed by: INTERNAL MEDICINE

## 2022-05-15 PROCEDURE — 6360000002 HC RX W HCPCS: Performed by: INTERNAL MEDICINE

## 2022-05-15 PROCEDURE — 87040 BLOOD CULTURE FOR BACTERIA: CPT

## 2022-05-15 PROCEDURE — 85027 COMPLETE CBC AUTOMATED: CPT

## 2022-05-15 PROCEDURE — 1210000000 HC MED SURG R&B

## 2022-05-15 PROCEDURE — 6360000002 HC RX W HCPCS: Performed by: NURSE PRACTITIONER

## 2022-05-15 PROCEDURE — C9113 INJ PANTOPRAZOLE SODIUM, VIA: HCPCS | Performed by: HOSPITALIST

## 2022-05-15 PROCEDURE — 6360000002 HC RX W HCPCS: Performed by: HOSPITALIST

## 2022-05-15 PROCEDURE — 94640 AIRWAY INHALATION TREATMENT: CPT

## 2022-05-15 PROCEDURE — 83605 ASSAY OF LACTIC ACID: CPT

## 2022-05-15 RX ORDER — POTASSIUM CHLORIDE 7.45 MG/ML
10 INJECTION INTRAVENOUS ONCE
Status: COMPLETED | OUTPATIENT
Start: 2022-05-15 | End: 2022-05-15

## 2022-05-15 RX ORDER — POTASSIUM CHLORIDE 20 MEQ/1
40 TABLET, EXTENDED RELEASE ORAL PRN
Status: DISCONTINUED | OUTPATIENT
Start: 2022-05-15 | End: 2022-05-21 | Stop reason: HOSPADM

## 2022-05-15 RX ORDER — POTASSIUM CHLORIDE 7.45 MG/ML
10 INJECTION INTRAVENOUS PRN
Status: DISCONTINUED | OUTPATIENT
Start: 2022-05-15 | End: 2022-05-21 | Stop reason: HOSPADM

## 2022-05-15 RX ORDER — METOPROLOL TARTRATE 5 MG/5ML
2.5 INJECTION INTRAVENOUS EVERY 6 HOURS
Status: DISCONTINUED | OUTPATIENT
Start: 2022-05-15 | End: 2022-05-16

## 2022-05-15 RX ORDER — MEROPENEM AND SODIUM CHLORIDE 1 G/50ML
1000 INJECTION, SOLUTION INTRAVENOUS EVERY 12 HOURS
Status: DISCONTINUED | OUTPATIENT
Start: 2022-05-15 | End: 2022-05-16

## 2022-05-15 RX ADMIN — SODIUM CHLORIDE, PRESERVATIVE FREE 10 ML: 5 INJECTION INTRAVENOUS at 11:22

## 2022-05-15 RX ADMIN — METOPROLOL TARTRATE 2.5 MG: 1 INJECTION, SOLUTION INTRAVENOUS at 21:07

## 2022-05-15 RX ADMIN — IPRATROPIUM BROMIDE AND ALBUTEROL SULFATE 1 AMPULE: 2.5; .5 SOLUTION RESPIRATORY (INHALATION) at 14:29

## 2022-05-15 RX ADMIN — METOPROLOL TARTRATE 2.5 MG: 1 INJECTION, SOLUTION INTRAVENOUS at 02:37

## 2022-05-15 RX ADMIN — VANCOMYCIN HYDROCHLORIDE 750 MG: 750 INJECTION, POWDER, LYOPHILIZED, FOR SOLUTION INTRAVENOUS at 21:08

## 2022-05-15 RX ADMIN — POTASSIUM CHLORIDE 10 MEQ: 7.46 INJECTION, SOLUTION INTRAVENOUS at 11:15

## 2022-05-15 RX ADMIN — SODIUM CHLORIDE 3000 MG: 900 INJECTION INTRAVENOUS at 13:26

## 2022-05-15 RX ADMIN — NYSTATIN 500000 UNITS: 100000 SUSPENSION ORAL at 21:06

## 2022-05-15 RX ADMIN — SODIUM BICARBONATE: 84 INJECTION, SOLUTION INTRAVENOUS at 11:10

## 2022-05-15 RX ADMIN — MEROPENEM AND SODIUM CHLORIDE 1000 MG: 1 INJECTION, SOLUTION INTRAVENOUS at 18:09

## 2022-05-15 RX ADMIN — IPRATROPIUM BROMIDE AND ALBUTEROL SULFATE 1 AMPULE: 2.5; .5 SOLUTION RESPIRATORY (INHALATION) at 10:42

## 2022-05-15 RX ADMIN — PIPERACILLIN SODIUM AND TAZOBACTAM SODIUM 4500 MG: 4; .5 INJECTION, POWDER, LYOPHILIZED, FOR SOLUTION INTRAVENOUS at 00:20

## 2022-05-15 RX ADMIN — SODIUM CHLORIDE, PRESERVATIVE FREE 40 MG: 5 INJECTION INTRAVENOUS at 18:06

## 2022-05-15 RX ADMIN — METOPROLOL TARTRATE 2.5 MG: 1 INJECTION, SOLUTION INTRAVENOUS at 11:21

## 2022-05-15 RX ADMIN — IPRATROPIUM BROMIDE AND ALBUTEROL SULFATE 1 AMPULE: 2.5; .5 SOLUTION RESPIRATORY (INHALATION) at 06:32

## 2022-05-15 RX ADMIN — IPRATROPIUM BROMIDE AND ALBUTEROL SULFATE 1 AMPULE: 2.5; .5 SOLUTION RESPIRATORY (INHALATION) at 18:18

## 2022-05-15 RX ADMIN — METOPROLOL TARTRATE 2.5 MG: 1 INJECTION, SOLUTION INTRAVENOUS at 18:10

## 2022-05-15 NOTE — PLAN OF CARE
Problem: Discharge Planning  Goal: Discharge to home or other facility with appropriate resources  Outcome: Progressing     Problem: ABCDS Injury Assessment  Goal: Absence of physical injury  Outcome: Progressing     Problem: Skin/Tissue Integrity  Goal: Absence of new skin breakdown  Description: 1. Monitor for areas of redness and/or skin breakdown  2. Assess vascular access sites hourly  3. Every 4-6 hours minimum:  Change oxygen saturation probe site  4. Every 4-6 hours:  If on nasal continuous positive airway pressure, respiratory therapy assess nares and determine need for appliance change or resting period.   Outcome: Progressing

## 2022-05-15 NOTE — PROGRESS NOTES
After I completed consult, niece returned to room. Went in to speak with her to try and get some additional history. She indicates for the few days prior to admission she had been eating poorly. She had had some increased weakness. She had had some increased confusion. She typically is not on oxygen at home. Her niece checked her oxygen level and it was in the low 80s. Typically will run in the low to mid 90s. She had also noticed some increasing cough and congestion as well. She indicates oftentimes when she has had a UTI or other infection in the past she will rebound more quickly. She indicates currently it seems like she remains weak and only wants to sleep. Reviewed culture results with her niece. Explained staph hominis is typically associated with foreign body infection and is not a common pathogen outside that setting (also explained it is a little bit atypical for contaminant to grow from all 4 blood cultures). Explained we are treating broadly to cover both the positive blood cultures and for the possibility of a left lower lobe pneumonia. Explained prognosis given advanced age and other medical conditions remains guarded. Continue supportive care. Continue to follow.     Leah Garcia MD

## 2022-05-15 NOTE — PROGRESS NOTES
4 Eyes Skin Assessment    [place] is being assessed upon: Admission    I agree that I, Shade Jacinto RN, along with Augusta Bowden RN (either 2 RN's or 1 LPN and 1 RN) have performed a thorough Head to Toe Skin Assessment on the patient. ALL assessment sites listed below have been assessed. Areas assessed by both nurses:     [x]   Head, Face, and Ears   [x]   Shoulders, Back, and Chest  [x]   Arms, Elbows, and Hands   [x]   Coccyx, Sacrum, and Ischium  [x]   Legs, Feet, and Heels    Does the Patient have Skin Breakdown? No    Peter Prevention initiated: Yes  Wound Care Orders initiated: No    WO nurse consulted for Pressure Injury (Stage 3,4, Unstageable, DTI, NWPT, and Complex wounds) and New or Established Ostomies: No        Primary Nurse eSignature:  Shade Jacinto RN on 5/14/2022 at 11:59 PM      Co-Signer eSignature: {Esignature:[id number]}

## 2022-05-15 NOTE — PROGRESS NOTES
[address] [place] Pharmacokinetic Monitoring Service - Vancomycin    Consulting Provider: Amada Anderson   Indication: Pneumonia  Target Concentration: Goal AUC/-600 mg*hr/L  Day of Therapy: 3  Additional Antimicrobials: Zosyn    Pertinent Laboratory Values: Wt Readings from Last 1 Encounters:   05/13/22 190 lb (86.2 kg)     Temp Readings from Last 1 Encounters:   05/15/22 97.5 °F (36.4 °C) (Temporal)     Estimated Creatinine Clearance: 39 mL/min (A) (based on SCr of 1.1 mg/dL (H)). Recent Labs     05/14/22  0324 05/15/22  0416   CREATININE 0.4* 1.1*   WBC 17.1* 22.5*     Procalcitonin: None ordered at this time     Pertinent Cultures:  Culture Date Source Results   05/13/22 Blood x 2 Gram positive cocci in clusters resembling Staphylococcus    05/13/22 Urine No growth     Blood x 2 sent   MRSA Nasal Swab: 5/14 resulted with none detected. Is in the blood however. Recent vancomycin administrations                     vancomycin (VANCOCIN) 1000 mg in dextrose 5% 250 mL IVPB (mg) 1,000 mg New Bag 05/14/22 2135     1,000 mg New Bag  0633    vancomycin (VANCOCIN) 2,000 mg in dextrose 5 % 500 mL IVPB (mg) 2,000 mg New Bag 05/13/22 1938                    Assessment:  Date/Time Current Dose Concentration Timing of Concentration (h) AUC   [email or web address] 1000 mg IV q 12hr 23 6h 41 m 1199 (SCR jump)   Note: Serum concentrations collected for AUC dosing may appear elevated if collected in close proximity to the dose administered, this is not necessarily an indication of toxicity    Plan:  Current dosing regimen is supra-therapeutic  Hold Vancomycin for now .   Repeat vancomycin concentration ordered for 5/15 @ 1700  Pharmacy will continue to monitor patient and adjust therapy as indicated    Thank you for the consult,  Goran Campa, [facility]  5/15/2022 6:09 AM

## 2022-05-15 NOTE — PLAN OF CARE
Problem: Discharge Planning  Goal: Discharge to home or other facility with appropriate resources  Outcome: Progressing  Flowsheets (Taken 5/15/2022 0800)  Discharge to home or other facility with appropriate resources: Identify barriers to discharge with patient and caregiver     Problem: ABCDS Injury Assessment  Goal: Absence of physical injury  Outcome: Progressing     Problem: Skin/Tissue Integrity  Goal: Absence of new skin breakdown  Description: 1. Monitor for areas of redness and/or skin breakdown  2. Assess vascular access sites hourly  3. Every 4-6 hours minimum:  Change oxygen saturation probe site  4. Every 4-6 hours:  If on nasal continuous positive airway pressure, respiratory therapy assess nares and determine need for appliance change or resting period.   Outcome: Progressing     Problem: Safety - Adult  Goal: Free from fall injury  Outcome: Progressing

## 2022-05-15 NOTE — PROGRESS NOTES
Physical Therapy  Facility/Department: [facility] 3 CAROLYN/VAS/MED  Physical Therapy Initial Assessment    Name: Nereyda Gillespie  : 1931  MRN: [id number]  Date of Service: 5/15/2022    Discharge Recommendations:  Continue to assess pending progress          Patient Diagnosis(es): The primary encounter diagnosis was Disorientation. Diagnoses of Hypoxia and Pneumonia of left lower lobe due to infectious organism were also pertinent to this visit. Past Medical History:  has a past medical history of Arthritis, Back pain, CAD (coronary artery disease), Cerebral artery occlusion with cerebral infarction ([facility] [address] Cystitis, Depression, Dizziness, Dysuria, Edema, Falls frequently, H/O blood clots, Head injury, Headache, Heartburn, [place] (hard of hearing), Hyperlipidemia, Hypertension, Hypothyroidism, Hypothyroidism, Memory problem, Neuropathy, Shingles, TIA (transient ischemic attack), Urinary incontinence, and UTI (urinary tract infection). Past Surgical History:  has a past surgical history that includes joint replacement; Cholecystectomy; Hysterectomy, total abdominal; Knee Arthroplasty (Right); lobectomy (10/2015); Appendectomy (194); Percutaneous Transluminal Coronary Angio (); and Eye surgery (). Assessment   Assessment: FAMILY MEMBER (NIECE) IN ROOM TO VERIFY PLOF. Pt IS BEDBOUND, REQUIRES TOTAL CARE BUT RECENTLY WAS ABLE TO ASSIST IN FEEDING HERSELF. NIECE REPORTS CHANGE IN AROUSAL LEVEL WITH CURRENT ILLNESS. Pt AT BASELINE FOR MOBILITY. No Skilled PT: No PT goals identified  Requires PT Follow-Up: NO  Plan   Safety Devices  Type of Devices: Bed alarm in place     Restrictions        Subjective   Pain: Pt MOANS SLIGHTLY AND GRIMACES WITH RUE PROM AND PARRISH ANKLE PROM.   General  Diagnosis: SEPSIS, PNA  Subjective  Subjective: NO VERBALIZATIONS         Social/Functional History  Social/Functional History  Additional Comments: Pt BEDBOUND, NIECE REPORTS WAS ABLE TO ASSIST IN FEEDING WHEN SET-UP PROPERLY  Vision/Hearing       Cognition   Cognition  Cognition Comment: DOES NOT RESPOND TO QUESTIONS, RESPONDS TO PAIN     Objective   Pulse: 116  Heart Rate Source: Monitor  BP: (!) 142/72  MAP (Calculated): 95.33  Resp: 22  SpO2: 90 %  O2 Device: Nasal cannula        Gross Assessment  AROM: Grossly decreased, non-functional  PROM: Within functional limits (EXCEPT RUE DUE TO PAIN, PARRISH ANKLES POSITIONED IN FULL PF)  Strength: Grossly decreased, non-functional                    Bed mobility  Rolling to Left: Dependent/Total  Rolling to Right: Dependent/Total  Supine to Sit: Unable to assess           Balance  Comments: UNABLE TO ASSESS           OutComes Score                                                  AM-PAC Score             Goals          Education         Therapy Time   Individual Concurrent Group Co-treatment   Time In           Time Out           Minutes                   Alvin Peraza, PT

## 2022-05-15 NOTE — PROGRESS NOTES
[facility] MEDICINE  - PROGRESS NOTE    Admit Date: 5/13/2022         CC:  AMS    Subjective: obtunded     Objective: obtunded, in bed, on 4 LNC oxygen     Diet: Diet NPO  Pain is: none   Nausea:None  Bowel Movement/Flatus no     Data:   Scheduled Meds: Reviewed  Current Facility-Administered Medications   Medication Dose Route Frequency Provider Last Rate Last Admin    potassium chloride (KLOR-CON M) extended release tablet 40 mEq  40 mEq Oral PRN Patience Donnell, DO        Or    potassium bicarb-citric acid (EFFER-K) effervescent tablet 40 mEq  40 mEq Oral PRN Patience Donnell, DO        Or    potassium chloride 10 mEq/100 mL IVPB (Peripheral Line)  10 mEq IntraVENous PRN Patience Donnell, DO        metoprolol (LOPRESSOR) injection 2.5 mg  2.5 mg IntraVENous Q6H Jaxon Jose MD   2.5 mg at 05/15/22 1121    potassium chloride 20 mEq, sodium bicarbonate 50 mEq in sodium chloride 0.9 % 1,000 mL infusion   IntraVENous Continuous Jaxon Jose  mL/hr at 05/15/22 1110 New Bag at 05/15/22 1110    meropenem (MERREM) 1000 mg in sodium chloride 0.9% 50 mL (duplex)  1,000 mg IntraVENous Q12H Ashanti Manley MD        nystatin (MYCOSTATIN) 294211 UNIT/ML suspension 500,000 Units  5 mL Oral 4x Daily Jaxon Jose MD        sodium chloride flush 0.9 % injection 5-40 mL  5-40 mL IntraVENous 2 times per day Jaxon Jose MD   10 mL at 05/15/22 1122    sodium chloride flush 0.9 % injection 5-40 mL  5-40 mL IntraVENous PRN Jaxon Jose MD        0.9 % sodium chloride infusion   IntraVENous PRN Jaxon Jose MD        ondansetron (ZOFRAN-ODT) disintegrating tablet 4 mg  4 mg Oral Q8H PRN Jaxon Jose MD        Or    ondansetron [facility]) injection 4 mg  4 mg IntraVENous Q6H PRN Jaxon Jose MD        polyethylene glycol (GLYCOLAX) packet 17 g  17 g Oral Daily PRN Nnamdi Oliveira Brenda Ivan MD        acetaminophen (TYLENOL) tablet 650 mg  650 mg Oral Q6H PRN Scott Ocampo MD        Or    acetaminophen (TYLENOL) suppository 650 mg  650 mg Rectal Q6H PRN Scott Ocampo MD        guaiFENesin [facility]) extended release tablet 600 mg  600 mg Oral BID Scott Ocampo MD        pantoprazole (PROTONIX) 40 mg in sodium chloride (PF) 10 mL injection  40 mg IntraVENous Q24H Scott Ocampo MD   40 mg at 05/14/22 1610    levothyroxine (SYNTHROID) tablet 175 mcg  175 mcg Oral Daily Scott Ocampo MD        folic acid (FOLVITE) tablet 1 mg  1 mg Oral Daily Scott Ocampo MD        vitamin D (ERGOCALCIFEROL) capsule 50,000 Units  50,000 Units Oral Weekly Scott Ocampo MD        ipratropium-albuterol (DUONEB) nebulizer solution 1 ampule  1 ampule Inhalation Q4H WA Scott Ocampo MD   1 ampule at 05/15/22 1429    vancomycin (VANCOCIN) intermittent dosing (placeholder)   Other RX Chuy Chavez MD        phenytoin (DILANTIN) ER capsule 300 mg  300 mg Oral Daily Scott Ocampo MD         DVT Prophylaxis: Sequential Compression Devices    Continuous Infusions:   IV infusion builder 100 mL/hr at 05/15/22 1110    sodium chloride         Intake/Output Summary (Last 24 hours) at 5/15/2022 1431  Last data filed at 5/14/2022 1941  Gross per 24 hour   Intake --   Output 200 ml   Net -200 ml     CBC:   Recent Labs     05/13/22  1119 05/14/22  0324 05/15/22  0416   WBC 16.6* 17.1* 22.5*   HGB 14.2 14.9 13.6    223 204     BMP:  Recent Labs     05/13/22  1119 05/13/22  1243 05/14/22  0324 05/15/22  0416     --  140 136   K 4.3 3.7 3.7 3.2*     --  104 103   CO2 26  --  22 19*   BUN 15  --  11 15   CREATININE 0.5  --  0.4* 1.1*   GLUCOSE 146*  --  135* 139*     ABGs:   Lab Results   Component Value Date    PHART 7.450 05/13/2022    PO2ART 55.0 05/13/2022    CKA9AFV 37.0 05/13/2022     INR: No results for input(s): INR in the last 72 hours. Objective:   Vitals: BP (!) 142/72   Pulse 116   Temp 98.1 °F (36.7 °C)   Resp 22   Wt 190 lb (86.2 kg)   SpO2 90%   BMI 28.89 kg/m²   General appearance: obtunded in no distress  Skin: Skin color, texture, turgor normal.   HEENT: Head: Normocephalic, no lesions, without obvious abnormality.   Neck: no adenopathy, no carotid bruit, no JVD and supple, symmetrical, trachea midline  Lungs: rales bilaterally  Heart: regular rate and rhythm, S1, S2 normal, no murmur, click, rub or gallop  Abdomen: soft, non-tender; bowel sounds normal; no masses,  no organomegaly  Extremities: extremities normal, atraumatic, no cyanosis or edema  Lymphatic: No significant lymph node enlargement papable  Neurologic: Mental status: obtunded         Assessment & Plan:    · Sepsis, POA due to PNA and bacteremia  · PNA, LLL suspect aspiration - merrem  · Hypoxia 2/2 to PNA  · Acute metabolic encephalopathy  · Staph bacteremia- vanc, follow cultures, ID on board  · Vit D def- replacement    · Folate def- replacement    · DNR  · History of PE- NOT on anticoagulation at home   · GERD- IV PPI  · Hypokalemia- replace     Patient Active Problem List:     History of cardioembolic cerebrovascular accident (CVA)     Essential hypertension     Acquired hypothyroidism     Seizure disorder (HCC)     Generalized weakness      See orders   Disposition: will need SNF if improves, if doesn't palliative consult     Rj Hogan MD

## 2022-05-15 NOTE — CONSULTS
INFECTIOUS DISEASES CONSULT NOTE    Patient:  [place] [facility] FOR COGNITIVE DISORDERS 80 y.o. female  ROOM # [unfilled]  YOB: 1931  MRN: [id number]  CSN:  [id number]  Admit date: 5/13/2022   Admitting Physician: Blanquita Pak, *  Primary Care Physician: Williams Paiz MD  REFERRING PROVIDER: No ref. provider found    Reason for Consultation: \"Bacteremia. Call when ID on-call\"    History of Present Illness/Chief Complaint: 70-year-old woman. No family at bedside at present. History is obtained from chart review. Talked with nurse who has her today. She was not provided much history at checkout. I was not on-call today. Noticed patient's name on my list as I was trying to complete paperwork. Came to see patient and assist with care. Per review of her admit H&P she had presented to the emergency room. There have been complaints of fatigue and a change in mental status. Per review of the admit H&P she has had some cough, congestion, and decreased oral intake. She had had some increased confusion. When I am in the room trying to speak with her and examine her today I can get her to arouse. I can get her to mumble a few answers which I could cannot really understand. She did seem to have some congestion and cough some during evaluation. She is on oxygen via nasal cannula. Per review of the admit H&P she had not been swallowing her food and had been more holding it in her mouth and spitting it out the day or 2 prior to admission. She had not been experiencing any vomiting or diarrhea. She has had positive blood cultures. Infectious disease asked to evaluate and offer recommendations. Initial past medical history, surgical history and other background history as outlined below was obtained via chart review.     Current Scheduled Medications:    metoprolol  2.5 mg IntraVENous Q6H    ampicillin-sulbactam  3,000 mg IntraVENous Q6H    nystatin  5 mL Oral 4x Daily    sodium chloride flush  5-40 mL IntraVENous 2 times per day    guaiFENesin  600 mg Oral BID    pantoprazole (PROTONIX) 40 mg injection  40 mg IntraVENous Q24H    levothyroxine  175 mcg Oral Daily    folic acid  1 mg Oral Daily    vitamin D  50,000 Units Oral Weekly    ipratropium-albuterol  1 ampule Inhalation Q4H WA    vancomycin (VANCOCIN) intermittent dosing (placeholder)   Other RX Placeholder    phenytoin  300 mg Oral Daily     Current PRN Medications:  potassium chloride **OR** potassium alternative oral replacement **OR** potassium chloride, sodium chloride flush, sodium chloride, ondansetron **OR** ondansetron, polyethylene glycol, acetaminophen **OR** acetaminophen    Allergies: Allergies   Allergen Reactions    Morphine      Pt becomes mean (refusing, cursing)       Past Medical History: Coronary artery disease. Stroke. Thyroid dysfunction. Shingles. UTI. Depression. Hypertension. Hyperlipidemia. Memory difficulty. Past Surgical History: Appendectomy. Cholecystectomy. Eye surgery. Hysterectomy. Knee arthroplasty. Lobectomy. Stent placement. Social History: No tobacco use. No alcohol use. No illicit drug use. Per chart review lives with family. Family History: Diabetes. Hypertension. Rheumatoid arthritis. Tuberculosis. Stroke. Exposure History: No close contacts of been ill    Review of Systems: Unobtainable from patient due to mental status. Vital Signs:  /60   Pulse 103   Temp 98.2 °F (36.8 °C)   Resp 22   Wt 190 lb (86.2 kg)   SpO2 92%   BMI 28.89 kg/m²  Temp (24hrs), Av °F (36.7 °C), Min:97.5 °F (36.4 °C), Max:98.4 °F (36.9 °C)    Physical Exam:   Vital signs reviewed. Sclera nonicteric  No conjunctival hemorrhages  Neck supple without meningismus  She was sleeping when I entered the room  I was able to get her to arouse and mumble a few answers as outlined in the HPI  She seemed to have some cough and congestion as I spoke with her and try to examine her.   Lungs with few scattered coarse crackles. No wheezing. Heart distant heart sounds. Not appreciate any murmur  Abdomen is soft and nontender. No hepatosplenomegaly. Extremities with trace edema. Skin without rash. Extremities without splinter hemorrhages or [place] lesions  Although not able to get her to move extremities very much, she did seem to move all extremities equally with stimuli. She seems diffusely weak. Lab Results:  CBC:   Recent Labs     05/13/22  1119 05/14/22  0324 05/15/22  0416   WBC 16.6* 17.1* 22.5*   HGB 14.2 14.9 13.6   HCT 44.8 50.0* 45.2    223 204   LYMPHOPCT 10.5*  --   --    MONOPCT 4.9  --   --      CMP:   Recent Labs     05/13/22  1119 05/13/22  1243 05/14/22  0324 05/15/22  0416     --  140 136   K 4.3 3.7 3.7 3.2*     --  104 103   CO2 26  --  22 19*   BUN 15  --  11 15   CREATININE 0.5  --  0.4* 1.1*   CALCIUM 8.1*  --  8.0* 7.8*   BILITOT 0.8  --   --   --    ALKPHOS 132*  --   --   --    ALT 16  --   --   --    AST 19  --   --   --    GLUCOSE 146*  --  135* 139*     Culture:   Blood cultures May 13, 2022-Staph hominis-isolated from 4 out of 4 bottles (1 set of blood cultures appears to been drawn from left hand and the other from left antecubital as best I can tell from notes in the lab section)  Blood cultures May 14, 2022-pending    Urine culture May 2, 2022-no growth    Radiology:   CT angiogram of the chest done May 13, 2022:  Impression   1. No pulmonary emboli. Previous emboli have resolved since the   2/23/2020 study. 2. Moderate vascular calcification. Diffuse coronary artery   calcification. Cardiomegaly. 3. Interval partial left lower lobe collapse with narrowing of the   left lower lobe bronchi, perhaps related to mucous secretions. Secretions are identified within the dependent portion of the trachea.    Fluid present within the esophagus (which may relate to   gastroesophageal reflux and reported primary peristalsis) increases   risk for aspiration. Signed by Dr Terri Tamayo     Chest x-ray May 13, 2022:  Impression   1. Hypoventilation with vascular crowding. 2. Stable scarring in the left costophrenic angle. 3. Stable bronchial wall thickening. Signed by Dr Marsha Berman         Additional Studies Reviewed: See HPI    Impression:   1. Possible left lower lobe pneumonia versus atelectasis on CT scan. She does seem to be at risk for aspiration pneumonia. 2.  Positive blood cultures for Staphylococcus hominis-the fact this bacteria grew from all 4 bottles would suggest it may be a pathogen, this organism however is not a common pathogen in the absence of an indwelling intravascular foreign body such as an IV line, [id number], or prosthetic valve. 3.  Leukocytosis-likely related to pneumonia/positive blood cultures  4. Advanced age  11. Change in mental status-suspect multifactorial    Recommendations:    Continue broad antibiotic treatment to cover both the Staph hominis in the blood and also for potential causes of left lower lobe pneumonia. If pneumonia, suspect possible aspiration.   To avoid some of the increased risk of renal dysfunction combining vancomycin with a penicillin derivative, going to change current antibiotic treatment to vancomycin plus meropenem  Going to ask for manual differential on white blood cell count to make sure leukocytosis related to neutrophilia as opposed to some other hematologic process  Continue to monitor    Roni Ernst MD  05/15/22  11:53 AM

## 2022-05-16 ENCOUNTER — APPOINTMENT (OUTPATIENT)
Dept: GENERAL RADIOLOGY | Age: 87
DRG: 871 | End: 2022-05-16
Payer: MEDICARE

## 2022-05-16 PROBLEM — Z51.5 PALLIATIVE CARE PATIENT: Status: ACTIVE | Noted: 2022-05-16

## 2022-05-16 LAB
ANION GAP SERPL CALCULATED.3IONS-SCNC: 11 MMOL/L (ref 7–19)
BANDED NEUTROPHILS RELATIVE PERCENT: 1 % (ref 0–5)
BASOPHILS ABSOLUTE: 0 K/UL (ref 0–0.2)
BASOPHILS RELATIVE PERCENT: 0 % (ref 0–1)
BUN BLDV-MCNC: 17 MG/DL (ref 8–23)
CALCIUM SERPL-MCNC: 7.2 MG/DL (ref 8.8–10.2)
CHLORIDE BLD-SCNC: 111 MMOL/L (ref 98–111)
CO2: 25 MMOL/L (ref 22–29)
CREAT SERPL-MCNC: 1.1 MG/DL (ref 0.5–0.9)
CULTURE, BLOOD 2: ABNORMAL
CULTURE, BLOOD 2: ABNORMAL
EOSINOPHILS ABSOLUTE: 0.13 K/UL (ref 0–0.6)
EOSINOPHILS RELATIVE PERCENT: 1 % (ref 0–5)
GFR AFRICAN AMERICAN: 56
GFR NON-AFRICAN AMERICAN: 47
GLUCOSE BLD-MCNC: 104 MG/DL (ref 74–109)
HCT VFR BLD CALC: 36.7 % (ref 37–47)
HEMOGLOBIN: 11.9 G/DL (ref 12–16)
LYMPHOCYTES ABSOLUTE: 1 K/UL (ref 1.1–4.5)
LYMPHOCYTES RELATIVE PERCENT: 8 % (ref 20–40)
MCH RBC QN AUTO: 30.6 PG (ref 27–31)
MCHC RBC AUTO-ENTMCNC: 32.4 G/DL (ref 33–37)
MCV RBC AUTO: 94.3 FL (ref 81–99)
MONOCYTES ABSOLUTE: 0.6 K/UL (ref 0–0.9)
MONOCYTES RELATIVE PERCENT: 5 % (ref 0–10)
NEUTROPHILS ABSOLUTE: 10.9 K/UL (ref 1.5–7.5)
NEUTROPHILS RELATIVE PERCENT: 85 % (ref 50–65)
ORGANISM: ABNORMAL
PDW BLD-RTO: 14.5 % (ref 11.5–14.5)
PLATELET # BLD: 260 K/UL (ref 130–400)
PLATELET SLIDE REVIEW: ADEQUATE
PMV BLD AUTO: 9.9 FL (ref 9.4–12.3)
POTASSIUM SERPL-SCNC: 3.3 MMOL/L (ref 3.5–5)
RBC # BLD: 3.89 M/UL (ref 4.2–5.4)
RBC # BLD: NORMAL 10*6/UL
SODIUM BLD-SCNC: 147 MMOL/L (ref 136–145)
WBC # BLD: 12.7 K/UL (ref 4.8–10.8)

## 2022-05-16 PROCEDURE — 85007 BL SMEAR W/DIFF WBC COUNT: CPT

## 2022-05-16 PROCEDURE — 2500000003 HC RX 250 WO HCPCS: Performed by: HOSPITALIST

## 2022-05-16 PROCEDURE — 6370000000 HC RX 637 (ALT 250 FOR IP): Performed by: HOSPITALIST

## 2022-05-16 PROCEDURE — 94640 AIRWAY INHALATION TREATMENT: CPT

## 2022-05-16 PROCEDURE — 1210000000 HC MED SURG R&B

## 2022-05-16 PROCEDURE — 80048 BASIC METABOLIC PNL TOTAL CA: CPT

## 2022-05-16 PROCEDURE — 6360000002 HC RX W HCPCS: Performed by: INTERNAL MEDICINE

## 2022-05-16 PROCEDURE — 99222 1ST HOSP IP/OBS MODERATE 55: CPT | Performed by: PHYSICIAN ASSISTANT

## 2022-05-16 PROCEDURE — 2700000000 HC OXYGEN THERAPY PER DAY

## 2022-05-16 PROCEDURE — 87040 BLOOD CULTURE FOR BACTERIA: CPT

## 2022-05-16 PROCEDURE — 6360000002 HC RX W HCPCS: Performed by: HOSPITALIST

## 2022-05-16 PROCEDURE — 92522 EVALUATE SPEECH PRODUCTION: CPT

## 2022-05-16 PROCEDURE — C9113 INJ PANTOPRAZOLE SODIUM, VIA: HCPCS | Performed by: HOSPITALIST

## 2022-05-16 PROCEDURE — 92526 ORAL FUNCTION THERAPY: CPT

## 2022-05-16 PROCEDURE — 2580000003 HC RX 258: Performed by: HOSPITALIST

## 2022-05-16 PROCEDURE — 74018 RADEX ABDOMEN 1 VIEW: CPT

## 2022-05-16 PROCEDURE — 36415 COLL VENOUS BLD VENIPUNCTURE: CPT

## 2022-05-16 PROCEDURE — 85027 COMPLETE CBC AUTOMATED: CPT

## 2022-05-16 RX ORDER — MIRTAZAPINE 15 MG/1
7.5 TABLET, FILM COATED ORAL NIGHTLY
Status: DISCONTINUED | OUTPATIENT
Start: 2022-05-16 | End: 2022-05-21 | Stop reason: HOSPADM

## 2022-05-16 RX ORDER — METOPROLOL TARTRATE 5 MG/5ML
2.5 INJECTION INTRAVENOUS EVERY 6 HOURS PRN
Status: DISCONTINUED | OUTPATIENT
Start: 2022-05-16 | End: 2022-05-21 | Stop reason: HOSPADM

## 2022-05-16 RX ORDER — MEROPENEM AND SODIUM CHLORIDE 1 G/50ML
1000 INJECTION, SOLUTION INTRAVENOUS EVERY 12 HOURS
Status: COMPLETED | OUTPATIENT
Start: 2022-05-16 | End: 2022-05-19

## 2022-05-16 RX ORDER — POTASSIUM CHLORIDE 7.45 MG/ML
10 INJECTION INTRAVENOUS
Status: COMPLETED | OUTPATIENT
Start: 2022-05-16 | End: 2022-05-16

## 2022-05-16 RX ORDER — DEXTROSE MONOHYDRATE 50 MG/ML
INJECTION, SOLUTION INTRAVENOUS CONTINUOUS
Status: DISCONTINUED | OUTPATIENT
Start: 2022-05-16 | End: 2022-05-17

## 2022-05-16 RX ORDER — POLYETHYLENE GLYCOL 3350 17 G/17G
17 POWDER, FOR SOLUTION ORAL DAILY
Status: DISCONTINUED | OUTPATIENT
Start: 2022-05-16 | End: 2022-05-21 | Stop reason: HOSPADM

## 2022-05-16 RX ORDER — METOPROLOL SUCCINATE 25 MG/1
25 TABLET, EXTENDED RELEASE ORAL DAILY
Status: DISCONTINUED | OUTPATIENT
Start: 2022-05-16 | End: 2022-05-17

## 2022-05-16 RX ORDER — SENNA AND DOCUSATE SODIUM 50; 8.6 MG/1; MG/1
2 TABLET, FILM COATED ORAL DAILY
Status: DISCONTINUED | OUTPATIENT
Start: 2022-05-16 | End: 2022-05-21 | Stop reason: HOSPADM

## 2022-05-16 RX ADMIN — NYSTATIN 500000 UNITS: 100000 SUSPENSION ORAL at 14:49

## 2022-05-16 RX ADMIN — IPRATROPIUM BROMIDE AND ALBUTEROL SULFATE 1 AMPULE: 2.5; .5 SOLUTION RESPIRATORY (INHALATION) at 15:50

## 2022-05-16 RX ADMIN — DEXTROSE MONOHYDRATE: 50 INJECTION, SOLUTION INTRAVENOUS at 17:17

## 2022-05-16 RX ADMIN — PHENYTOIN SODIUM 300 MG: 100 CAPSULE ORAL at 10:46

## 2022-05-16 RX ADMIN — METOPROLOL TARTRATE 2.5 MG: 1 INJECTION, SOLUTION INTRAVENOUS at 10:45

## 2022-05-16 RX ADMIN — SODIUM CHLORIDE, PRESERVATIVE FREE 10 ML: 5 INJECTION INTRAVENOUS at 20:57

## 2022-05-16 RX ADMIN — POTASSIUM CHLORIDE 10 MEQ: 10 INJECTION, SOLUTION INTRAVENOUS at 14:43

## 2022-05-16 RX ADMIN — IPRATROPIUM BROMIDE AND ALBUTEROL SULFATE 1 AMPULE: 2.5; .5 SOLUTION RESPIRATORY (INHALATION) at 10:48

## 2022-05-16 RX ADMIN — SODIUM CHLORIDE, PRESERVATIVE FREE 10 ML: 5 INJECTION INTRAVENOUS at 10:46

## 2022-05-16 RX ADMIN — METOPROLOL SUCCINATE 25 MG: 25 TABLET, EXTENDED RELEASE ORAL at 14:31

## 2022-05-16 RX ADMIN — SODIUM CHLORIDE, PRESERVATIVE FREE 40 MG: 5 INJECTION INTRAVENOUS at 17:14

## 2022-05-16 RX ADMIN — MEROPENEM AND SODIUM CHLORIDE 1000 MG: 1 INJECTION, SOLUTION INTRAVENOUS at 17:14

## 2022-05-16 RX ADMIN — GUAIFENESIN 600 MG: 600 TABLET ORAL at 20:57

## 2022-05-16 RX ADMIN — MIRTAZAPINE 7.5 MG: 15 TABLET, FILM COATED ORAL at 20:57

## 2022-05-16 RX ADMIN — GUAIFENESIN 600 MG: 600 TABLET ORAL at 10:46

## 2022-05-16 RX ADMIN — NYSTATIN 500000 UNITS: 100000 SUSPENSION ORAL at 17:13

## 2022-05-16 RX ADMIN — METOPROLOL TARTRATE 2.5 MG: 1 INJECTION, SOLUTION INTRAVENOUS at 06:13

## 2022-05-16 RX ADMIN — IPRATROPIUM BROMIDE AND ALBUTEROL SULFATE 1 AMPULE: 2.5; .5 SOLUTION RESPIRATORY (INHALATION) at 06:59

## 2022-05-16 RX ADMIN — NYSTATIN 500000 UNITS: 100000 SUSPENSION ORAL at 10:45

## 2022-05-16 RX ADMIN — FOLIC ACID 1 MG: 1 TABLET ORAL at 10:46

## 2022-05-16 RX ADMIN — POTASSIUM CHLORIDE 10 MEQ: 10 INJECTION, SOLUTION INTRAVENOUS at 15:43

## 2022-05-16 RX ADMIN — IPRATROPIUM BROMIDE AND ALBUTEROL SULFATE 1 AMPULE: 2.5; .5 SOLUTION RESPIRATORY (INHALATION) at 19:07

## 2022-05-16 RX ADMIN — SODIUM BICARBONATE: 84 INJECTION, SOLUTION INTRAVENOUS at 11:09

## 2022-05-16 RX ADMIN — VANCOMYCIN HYDROCHLORIDE 750 MG: 750 INJECTION, POWDER, LYOPHILIZED, FOR SOLUTION INTRAVENOUS at 21:06

## 2022-05-16 RX ADMIN — MEROPENEM AND SODIUM CHLORIDE 1000 MG: 1 INJECTION, SOLUTION INTRAVENOUS at 03:08

## 2022-05-16 RX ADMIN — ACETAMINOPHEN 650 MG: 325 TABLET ORAL at 18:21

## 2022-05-16 ASSESSMENT — PAIN DESCRIPTION - LOCATION: LOCATION: HEAD

## 2022-05-16 ASSESSMENT — PAIN SCALES - GENERAL: PAINLEVEL_OUTOF10: 7

## 2022-05-16 NOTE — PROGRESS NOTES
Pharmacy Renal Adjustment    Chandra Garcia is a 80 y.o. female. Pharmacy has renally adjusted medications per protocol. Recent Labs     05/15/22  0416 05/16/22  0841   BUN 15 17       Recent Labs     05/15/22  0416 05/16/22  0841   CREATININE 1.1* 1.1*       Estimated Creatinine Clearance: 39 mL/min (A) (based on SCr of 1.1 mg/dL (H)).     Height:   Ht Readings from Last 1 Encounters:   05/02/22 5' 8\" (1.727 m)     Weight:  Wt Readings from Last 1 Encounters:   05/13/22 190 lb (86.2 kg)       Plan: Adjust the following medications based on renal function:          Change Merrem 1000 mg IV over 30 minutes every 12 hours to 1000 mg over 180 minutes extended infusion for CRCL 26-49     Electronically signed by [facility] on 5/16/2022 at 1:52 PM

## 2022-05-16 NOTE — PLAN OF CARE
Problem: Discharge Planning  Goal: Discharge to home or other facility with appropriate resources  5/16/2022 1514 by Pooja Bolivar RN  Outcome: Progressing  Flowsheets (Taken 5/16/2022 0800)  Discharge to home or other facility with appropriate resources: Identify barriers to discharge with patient and caregiver  5/16/2022 0204 by Luis Casas RN  Outcome: Progressing     Problem: ABCDS Injury Assessment  Goal: Absence of physical injury  5/16/2022 1514 by Pooja Bolivar RN  Outcome: Progressing  5/16/2022 0204 by Luis Casas RN  Outcome: Progressing     Problem: Skin/Tissue Integrity  Goal: Absence of new skin breakdown  Description: 1. Monitor for areas of redness and/or skin breakdown  2. Assess vascular access sites hourly  3. Every 4-6 hours minimum:  Change oxygen saturation probe site  4. Every 4-6 hours:  If on nasal continuous positive airway pressure, respiratory therapy assess nares and determine need for appliance change or resting period.   5/16/2022 1514 by Pooja Bolivar RN  Outcome: Progressing  5/16/2022 0204 by Luis Casas RN  Outcome: Progressing     Problem: Safety - Adult  Goal: Free from fall injury  5/16/2022 1514 by Pooja Bolivar RN  Outcome: Progressing  5/16/2022 0204 by Luis Casas RN  Outcome: Progressing     Problem: Chronic Conditions and Co-morbidities  Goal: Patient's chronic conditions and co-morbidity symptoms are monitored and maintained or improved  Outcome: Progressing  Flowsheets (Taken 5/16/2022 0800)  Care Plan - Patient's Chronic Conditions and Co-Morbidity Symptoms are Monitored and Maintained or Improved: Monitor and assess patient's chronic conditions and comorbid symptoms for stability, deterioration, or improvement

## 2022-05-16 NOTE — PLAN OF CARE
Problem: Discharge Planning  Goal: Discharge to home or other facility with appropriate resources  5/16/2022 0204 by Jessica Hickman RN  Outcome: Progressing  5/15/2022 1845 by Lenin Trejo RN  Outcome: Progressing  Flowsheets (Taken 5/15/2022 0800)  Discharge to home or other facility with appropriate resources: Identify barriers to discharge with patient and caregiver     Problem: ABCDS Injury Assessment  Goal: Absence of physical injury  5/16/2022 0204 by Jessica Hickman RN  Outcome: Progressing  5/15/2022 1845 by Lenin Trejo RN  Outcome: Progressing     Problem: Skin/Tissue Integrity  Goal: Absence of new skin breakdown  Description: 1. Monitor for areas of redness and/or skin breakdown  2. Assess vascular access sites hourly  3. Every 4-6 hours minimum:  Change oxygen saturation probe site  4. Every 4-6 hours:  If on nasal continuous positive airway pressure, respiratory therapy assess nares and determine need for appliance change or resting period.   5/16/2022 0204 by Jessica Hickman RN  Outcome: Progressing  5/15/2022 1845 by Lenin Trejo RN  Outcome: Progressing     Problem: Safety - Adult  Goal: Free from fall injury  5/16/2022 0204 by Jessica Hickman RN  Outcome: Progressing  5/15/2022 1845 by Lenin Trejo RN  Outcome: Progressing

## 2022-05-16 NOTE — CONSULTS
Palliative Care Consult Note    5/16/2022 3:58 PM  Subjective:  Admit Date: 5/13/2022  PCP: Donn Szymanski MD    Date of Service: 5/16/2022    Reason for Consultation:  Goals of Care, Code Status, Family Support     History Obtained From: EMR/Patient and their Family    History Of Present Illness: The patient is a 80 y.o. female with PMH CAD, CVA, HLD, HTN, Hypothyroidism, seizure disorder who presented to [facility] ED on 05/13/2022 for altered mental status. Patient lives with her niece and noticed she was less responsive than normal and had a wet sounding cough. She also reported patient had decreased appetite for several days. EMS was summoned, patient was noted to be hypoxic. CTA chest was concerning for interval partial LLL collapse w/ narrowing of LLL bronchi, possibly related to mucous secretions. CT head revealed a chronic small cerebellar infarct, chronic left occipital cortical and subcortical infarct and mild to moderate atrophy with chronic small vessel disease. WBC 16.6, lactic acid 4.1. Patient was admitted to Hospitalist service for sepsis, pneumonia and was placed on empiric antibiotic therapy. Blood cultures are concerning for staph hominis. ID is following and patient is currently receiving Vancomycin and Merrem. Palliative care has been consulted to assist with goals of care, family support and symptom management.     Past Medical History:        Diagnosis Date    Arthritis     Back pain     bulging disc    CAD (coronary artery disease)     BMS to RCA    Cerebral artery occlusion with cerebral infarction (HCC)     small strokes,balance issues    Cystitis     Depression     Dizziness     Dysuria     Edema     ankles,mild pedal edema    Falls frequently     H/O blood clots     left leg and lungs    Head injury     Headache     Heartburn     [place] (hard of hearing)     Hyperlipidemia     Hypertension     Hypothyroidism     Hypothyroidism     Memory problem     Neuropathy     Shingles     TIA (transient ischemic attack)     Urinary incontinence     UTI (urinary tract infection)      Past Surgical History:        Procedure Laterality Date    APPENDECTOMY  1947    CHOLECYSTECTOMY      EYE SURGERY  2009    bilateral cataract     HYSTERECTOMY, TOTAL ABDOMINAL      JOINT REPLACEMENT      Bilateral Knee    KNEE ARTHROPLASTY Right     LOBECTOMY  10/2015    sx--bilateral    PTCA  2002    with stent placement/bms to rca     Home Medications:  Prior to Admission medications    Medication Sig Start Date End Date Taking? Authorizing Provider   metoprolol succinate (TOPROL XL) 25 MG extended release tablet Take 1 tablet by mouth daily 5/2/22   Philippe Rosario MD   mirtazapine (REMERON) 15 MG tablet Take 1 tablet by mouth nightly 5/2/22   Philippe Rosario MD   atorvastatin (LIPITOR) 80 MG tablet Take 1 tablet by mouth nightly 12/30/21   Philippe Rosario MD   levothyroxine (SYNTHROID) 175 MCG tablet TAKE 1 TABLET BY MOUTH EVERY DAY 12/23/21   Philippe Rosario MD   phenytoin (PHENYTEK) 300 MG ER capsule Take 1 capsule by mouth daily 10/1/21 10/31/21  Philippe Rosario MD   Misc. Devices MISC 1 each by Does not apply route once as needed (As needed) Trapeze Bar 9/22/21 9/22/21  Philippe Rosario MD   nystatin (MYCOSTATIN) 771850 UNIT/GM cream Apply topically 2 times daily. 9/20/21   Philippe Rosario MD   fluticasone-salmeterol ([facility]) 431-83 MCG/ACT inhaler Inhale 2 puffs into the lungs 2 times daily 9/17/21   Philippe Rosario MD   [facility]. Devices [facility]. 6/3/21   Philippe Rosario MD   guaiFENesin [facility]) 600 MG extended release tablet Take 1 tablet by mouth 2 times daily 4/13/21   Arabella Chavez DO     Allergies:    Morphine    Social History:    The patient currently lives with her Niece. Tobacco:   reports that she has never smoked. She has never used smokeless tobacco.  Alcohol:   reports no history of alcohol use.   Illicit Drugs: None    Family History:      Problem Relation Age of Onset    Diabetes Mother     Hypertension Mother    [facility] Rheum Arthritis Mother     Stroke Mother     Tuberculosis Father        Review of Systems:   Unable to obtain full ROS 2/2 AMS. Patient does deny pain. Endorses a cough. Physical Examination:  /66   Pulse 101   Temp 98.4 °F (36.9 °C) (Temporal)   Resp 18   Wt 190 lb (86.2 kg)   SpO2 97%   BMI 28.89 kg/m²   General appearance: 81 yo female, chronically ill appearing, no acute distress. Laying on left side in bed  Head: Normocephalic, without obvious abnormality, atraumatic  Eyes: conjunctivae/corneas clear. PERRL, EOM's intact. Ears: normal external ears and nose, hard of hearing   Neck:  supple, symmetrical, trachea midline   Lungs: diminished throughout with upper airway rhonchi   Heart: regular rate and rhythm, S1, S2 normal, no murmur  Abdomen:soft, non-tender; non-distended, normal bowel sounds   Extremities:No lower extremity edema,  No erythema, there is ecchymosis to her left hand  Skin: Warm, dry  Neurologic: awake, oriented to self and place, follows few simple commands, speech fluent   Psychiatric: calm, appropriate mood and affect     Diagnostic Data:  CBC:  Recent Labs     05/14/22  0324 05/15/22  0416 05/16/22  0841   WBC 17.1* 22.5* 12.7*   HGB 14.9 13.6 11.9*   HCT 50.0* 45.2 36.7*    204 260     BMP:  Recent Labs     05/14/22  0324 05/15/22  0416 05/16/22  0841    136 147*   K 3.7 3.2* 3.3*    103 111   CO2 22 19* 25   BUN 11 15 17   CREATININE 0.4* 1.1* 1.1*   CALCIUM 8.0* 7.8* 7.2*     XR HAND RIGHT (MIN 3 VIEWS)  1. No visualized fracture or joint subluxation. 2. Advanced osteoarthritis changed; interphalangeal joints, first CMC joint and triscaphe joint. Signed by Dr Terra Zuniga  1. No hemorrhage, edema or mass effect. 2. Chronic small cerebellar infarct. Chronic left occipital cortical and subcortical infarct. 3. Mild to moderate atrophy.  Moderate to severe dilatation of the lateral and third ventricles is likely related to the atrophy. 4. Low-density in the hemispheric white matter is nonspecific and likely due to chronic small vessel disease. 5. Prior mastoidectomy on the right. The full report of this exam was immediately signed and available to the emergency room. The patient is currently in the emergency room. Signed by Dr Kei Paredes  1. Hypoventilation with vascular crowding. 2. Stable scarring in the left costophrenic angle. 3. Stable bronchial wall thickening. Signed by Dr Anabel Cat  1. No pulmonary emboli. Previous emboli have resolved since the 2/23/2020 study. 2. Moderate vascular calcification. Diffuse coronary artery calcification. Cardiomegaly. 3. Interval partial left lower lobe collapse with narrowing of the left lower lobe bronchi, perhaps related to mucous secretions. Secretions are identified within the dependent portion of the trachea. Fluid present within the esophagus (which may relate to gastroesophageal reflux and reported primary peristalsis) increases risk for aspiration.  Signed by Dr Joe Dong    Palliative Performance Scale:  [x] 30% Bed Bound  Extensive disease  Total care  Reduced Intake  LOC full/confusion    Palliative Review of Advance Directives:     Surrogate Decision Paddy Amas    Durable Power of :N    Advanced Directives/Living Reyes: Yes    Out of hospital medical orders in place to reflect resuscitation status (MOLST/POLST): No    Information Sharing:  Patient's awareness of illness:  [] Terminal [] Life-Threatening [x] Serious [] Non life-threatening [] Not serious   [] Not discussed    Family awareness of illness:   [] Terminal [] Life-Threatening [x] Serious [] Nonlife-threatening [] Not serious   [] Not discussed    Assessment/Plan:  Principal Problem:    Sepsis ([facility] [address]  Active Problems:    Atypical pneumonia    Palliative care patient Essential hypertension    Acquired hypothyroidism    Seizure disorder ([facility] [address]    AMS (altered mental status)    UTI (urinary tract infection)  Resolved Problems:    * No resolved hospital problems. *     Visit Summary:  Chart reviewed, patient discussed with consulting service and nursing staff. Reviewed health issues, work up and treatment plan as well as factors that lead to hospitalization. I saw Ms. Sheth at her bedside with her nephew present in the room. I introduced myself, the role of palliative care and the reason for consultation. Patient's Nephew states his sister can answer more questions but is currently down in ICU visiting her . He tells me she will be back and can have further conversation in AM. He is able to tell me that patient has been bed bound for about one year. Her mental status has improved since admission. Patient is hard of hearing and is able to respond to questions. She denies pain. Endorses a cough. She also shows me bruising on her left hand. Plain films were done on admit and revealed advanced osteoarthritis but was negative for fracture. Her nephew states she grabs on to bed rail with that hand any time she is repositioned for fear of falling. Otherwise she has had no known falls or other injuries. She has been seen by speech therapy who has recommended full liquid diet w/ honey thick liquids. Patient tolerated this well thus far per her Nephew. She does have a living will on file that names Madonna Topete as her healthcare surrogate. I will follow up with Tiny Coker in the morning. Recommendations:     1. Palliative Care-GOC to be determined. Patient's nephew voices interest in SNF. Code status: DNR  2. Staphylococcus hominis bacteremia-no identifiable source, ID following, empiric antibiotics continued   3. Pneumonia vs atelectasis-Vancomycin/Merrem continued, Leukocytosis improving  4. Dysphagia-SLP following, modified diet ordered   5.  AMS w/ Hx of CVA, question component of vascular dementia as well-bed bound at this time, Patient's family report improvement in mental status w/ treatment  6. Hypernatremia/hypokalemia-Hospitalist aware and managing     Thank you for consulting palliative care and allowing us to participate in the care of the patient.     CounselingTopics: Goals of care, Code Status, Disease process education, pt/family support    Time Spent Counseling > 50%:  YES                                   Total Time Spent with patient/family counseling, workup/treatment review, counseling and placement of orders/preparation of this note: 50 minutes    Electronically signed by Hayes Birch PA-C on 5/16/2022 at 3:58 PM    (Please note that portions of this note were completed with a voice recognition program.  Efforts were made to edit the dictations but occasionally words are mis-transcribed.)

## 2022-05-16 NOTE — PROGRESS NOTES
[address] [facility] Pharmacokinetic Monitoring Service - Vancomycin     Consulting Provider: Ellen Arriaza   Indication: Pneumonia  Target Concentration: Goal AUC/-600 mg*hr/L  Day of Therapy: 3  Additional Antimicrobials: Zosyn     Pertinent Laboratory Values: Wt Readings from Last 1 Encounters:   05/13/22 190 lb (86.2 kg)          Temp Readings from Last 1 Encounters:   05/15/22 97.5 °F (36.4 °C) (Temporal)      Estimated Creatinine Clearance: 39 mL/min (A) (based on SCr of 1.1 mg/dL (H)). Recent Labs     05/14/22  0324 05/15/22  0416   CREATININE 0.4* 1.1*   WBC 17.1* 22.5*      Procalcitonin: None ordered at this time     Pertinent Cultures:  Culture Date Source Results   05/13/22 Blood x 2 Staphylococcus hominis   05/13/22 Urine No growth    05/14/22 Blood x 2 No growth   05/15/22 blood sent   MRSA Nasal Swab: 5/14 resulted with none detected. Is in the blood however. Recent vancomycin administrations                     vancomycin (VANCOCIN) 1000 mg in dextrose 5% 250 mL IVPB (mg) 1,000 mg New Bag 05/14/22 2135     1,000 mg New Bag  3251    vancomycin (VANCOCIN) 2,000 mg in dextrose 5 % 500 mL IVPB (mg) 2,000 mg New Bag 05/13/22 1938                    Assessment:  Date/Time Current Dose Concentration Timing of Concentration (h) AUC   05/15/22 @1708 1000mg IV q12hr 18.8 19 h 33 m 1294   Note: Serum concentrations collected for AUC dosing may appear elevated if collected in close proximity to the dose administered, this is not necessarily an indication of toxicity    Plan:  Current dosing regimen is supra-therapeutic  \"Decrease dose to Vancomycin 750mg IV q24hr  to start 2100 tonight.   Repeat vancomycin concentration ordered for 5/17 @ 2030  Pharmacy will continue to monitor patient and adjust therapy as indicated    Thank you for the consult,  Beena Wells [facility]  5/15/2022 7:44 PM

## 2022-05-16 NOTE — PROGRESS NOTES
Infectious Diseases Progress Note    Patient:  Melania Gonzales  YOB: 1931  MRN: [id number]   Admit date: 5/13/2022   Admitting Physician: Jerel Courtney, *  Primary Care Physician: Aly Teran MD    Chief Complaint/Interval History:  She is more alert. She is more communicative. Her voice is stronger. She looks better. Niece indicates it is been a year or more since she is walked. They utilize a lift at home to get her from bed to chair. In/Out    Intake/Output Summary (Last 24 hours) at 5/16/2022 6478  Last data filed at 5/15/2022 1838  Gross per 24 hour   Intake --   Output 750 ml   Net -750 ml     Allergies:    Allergies   Allergen Reactions    Morphine      Pt becomes mean (refusing, cursing)       Current Meds: potassium chloride (KLOR-CON M) extended release tablet 40 mEq, PRN   Or  potassium bicarb-citric acid (EFFER-K) effervescent tablet 40 mEq, PRN   Or  potassium chloride 10 mEq/100 mL IVPB (Peripheral Line), PRN  metoprolol (LOPRESSOR) injection 2.5 mg, Q6H  potassium chloride 20 mEq, sodium bicarbonate 50 mEq in sodium chloride 0.9 % 1,000 mL infusion, Continuous  meropenem (MERREM) 1000 mg in sodium chloride 0.9% 50 mL (duplex), Q12H  vancomycin (VANCOCIN) 750 mg in dextrose 5 % 250 mL IVPB, Q24H  nystatin (MYCOSTATIN) 543570 UNIT/ML suspension 500,000 Units, 4x Daily  sodium chloride flush 0.9 % injection 5-40 mL, 2 times per day  sodium chloride flush 0.9 % injection 5-40 mL, PRN  0.9 % sodium chloride infusion, PRN  ondansetron (ZOFRAN-ODT) disintegrating tablet 4 mg, Q8H PRN   Or  ondansetron (ZOFRAN) injection 4 mg, Q6H PRN  polyethylene glycol (GLYCOLAX) packet 17 g, Daily PRN  acetaminophen (TYLENOL) tablet 650 mg, Q6H PRN   Or  acetaminophen (TYLENOL) suppository 650 mg, Q6H PRN  guaiFENesin (MUCINEX) extended release tablet 600 mg, BID  pantoprazole (PROTONIX) 40 mg in sodium chloride (PF) 10 mL injection, Q24H  levothyroxine (SYNTHROID) tablet 175 mcg, Daily  folic acid (FOLVITE) tablet 1 mg, Daily  vitamin D (ERGOCALCIFEROL) capsule 50,000 Units, Weekly  ipratropium-albuterol (DUONEB) nebulizer solution 1 ampule, Q4H WA  vancomycin (VANCOCIN) intermittent dosing (placeholder), RX Placeholder  phenytoin (DILANTIN) ER capsule 300 mg, Daily      Review of Systems See HPI    VitalSigns:  /68   Pulse 104   Temp 98.6 °F (37 °C) (Temporal)   Resp 18   Wt 190 lb (86.2 kg)   SpO2 92%   BMI 28.89 kg/m²      Physical Exam  Line/IV site: No erythema, warmth, induration, or tenderness.   Lungs few scattered coarse crackles  Abdomen soft and nontender    Lab Results:  CBC:   Recent Labs     05/13/22  1119 05/14/22  0324 05/15/22  0416   WBC 16.6* 17.1* 22.5*   HGB 14.2 14.9 13.6    223 204     BMP:  Recent Labs     05/13/22  1119 05/13/22  1243 05/14/22  0324 05/15/22  0416     --  140 136   K 4.3 3.7 3.7 3.2*     --  104 103   CO2 26  --  22 19*   BUN 15  --  11 15   CREATININE 0.5  --  0.4* 1.1*   GLUCOSE 146*  --  135* 139*     Radiology: None    Additional Studies Reviewed:  None    Impression:  Problem left lower lobe pneumonia  Leukocytosis likely secondary #1  Advanced age  Improvement in status    Recommendations:  Continue current empiric antibiotic treatment  Encourage incentive spirometry  Feel it would be good for her to sit and dangle legs at bedside with assistance to let her upright more and help her mobilize secretions  Continue to follow    Tonya Craft MD

## 2022-05-16 NOTE — PROGRESS NOTES
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    Date of Treat/Eval: 2022  Evaluating Therapist: ITZEL Contreras    Current Diet level:  NPO    Reason for Referral  Eliel Guerin was referred for a bedside swallow evaluation to assess the efficiency of her swallow function, identify signs and symptoms of aspiration and make recommendations regarding safe dietary consistencies, effective compensatory strategies, and safe eating environment. Impression  Re-assessed patient's swallowing function. Patient exhibited decreased oral prep of more solid consistencies, fast oral transit and suspected swallow delay with even thickened liquid consistencies, and sluggish, mild-moderately decreased laryngeal elevation for swallow airway protection. No outward S/S penetration/aspiration was noted with any ice chip trial, moderately thick/honey thick liquid trial, or mildly thick/nectar thick liquid trial presented during treatment session this date. Mild delayed coughs were observed with puree consistency trials. It is noted that moderate delayed coughs were observed with thin H2O trials.  Did not note swallow function with any additional consistency secondary to observed lethargy within short period of time. At this time, would trial full liquid diet with moderately thick/honey thick liquids. Recommend meds crushed in pudding/applesauce. If patient receives mouth care prior to intake, okay for ice chips IN BETWEEN MEALS for comfort. Will continue to follow. Treatment Plan  Requires SLP Intervention: Yes     Recommended Diet and Intervention  Liquid Consistency Recommendation: Full liquid diet with moderately thick/honey think liquids   Recommended Form of Meds: Meds crushed in puree as able  Therapeutic Interventions: Patient/Family education;Diet tolerance monitoring; Therapeutic PO trials with SLP     Compensatory Swallowing Strategies  Compensatory Swallowing Strategies: Upright as possible for all oral intake;Small bites/sips;Eat/Feed slowly; Alternate solids and liquids; Remain upright for 30-45 minutes after meals     Treatment/Goals  Timeframe for Short-term Goals: 1x/day for 3 days   Goal 1: Patient will tolerate full liquid diet with moderately thick/honey thick liquids with min S/S penetration/aspiration during PO intake. Goal 2: Patient staff will follow swallow safety recommendations to decrease risk of penetration/aspiration during PO intake. Goal 3: Re-assessment of swallow function for potential diet upgrade. Goal 4: Monitor speech production. Goal 5: Potential cognitive-linguistic eval      General  Chart Reviewed: Yes  Behavior/Cognition: Patient appeared lethargic within short period of time. O2 Device: Nasal Cannula  Communication Observation: (Assessed patient's speech production. Patient exhibited decreased volume of speech and slow, decreased lingual movements during verbalizations. SLP ranked functional intelligibility of speech for unfamiliar listeners at 50-60% in utterances with background noise present.)  Follows Directions: Simple   Patient Positioning: Upright in bed  Consistencies Administered: Ice chips;Dysphagia Pureed (Dysphagia I); Honey - cup;Nectar -

## 2022-05-16 NOTE — CARE COORDINATION
Call placed to Niece, Darnell Quiles, to follow up on dc plans. Darnell Quiles is currently down in the icu visiting her spouse whom is also hospitalized. Made plans to follow up with her in the morning in pts room.   Electronically signed by Delmar Robert RN on 5/16/2022 at 3:36 PM

## 2022-05-16 NOTE — PROGRESS NOTES
[facility] MEDICINE  - PROGRESS NOTE    Admit Date: 5/13/2022         CC: AMS    Subjective: feels better, no chest pain, or palpitations, has cough and dyspnea, no N/V/D. No abd pain. May be constipated. No dysuria or hematuria. Objective: no acute distress, sitting up in bed, asking for glass of water     Diet: ADULT DIET; Full Liquid;  Moderately Thick (Honey)  Pain is: none   Nausea:None  Bowel Movement/Flatus no     Data:   Scheduled Meds: Reviewed  Current Facility-Administered Medications   Medication Dose Route Frequency Provider Last Rate Last Admin    dextrose 5 % solution   IntraVENous Continuous Nayeli Rivera MD        potassium chloride 10 mEq/100 mL IVPB (Peripheral Line)  10 mEq IntraVENous Q1H Nayeli Rivera MD        metoprolol succinate (TOPROL XL) extended release tablet 25 mg  25 mg Oral Daily Nayeli Rivera MD        mirtazapine (REMERON) tablet 7.5 mg  7.5 mg Oral Nightly Nayeli Rivera MD        metoprolol (LOPRESSOR) injection 2.5 mg  2.5 mg IntraVENous Q6H PRN Nayeli Rivera MD        potassium chloride (KLOR-CON M) extended release tablet 40 mEq  40 mEq Oral PRN Patience Donnell, DO        Or    potassium bicarb-citric acid (EFFER-K) effervescent tablet 40 mEq  40 mEq Oral PRN Patience Donnell, DO        Or    potassium chloride 10 mEq/100 mL IVPB (Peripheral Line)  10 mEq IntraVENous PRN Patience Donnell, DO        meropenem (MERREM) 1000 mg in sodium chloride 0.9% 50 mL (duplex)  1,000 mg IntraVENous Q12H Tran Lazo MD   Stopped at 05/16/22 0350    vancomycin (VANCOCIN) 750 mg in dextrose 5 % 250 mL IVPB  750 mg IntraVENous Q24H Nayeli Rivera MD   Stopped at 05/15/22 2213    nystatin (MYCOSTATIN) [id number] UNIT/ML suspension 500,000 Units  5 mL Oral 4x Daily Nayeli Rivera MD   500,000 Units at 05/16/22 1045    sodium chloride flush 0.9 % injection 5-40 mL 5-40 mL IntraVENous 2 times per day Christie Mcmahon MD   10 mL at 05/16/22 1046    sodium chloride flush 0.9 % injection 5-40 mL  5-40 mL IntraVENous PRN Christie Mcmahon MD        0.9 % sodium chloride infusion   IntraVENous PRN Christie Mcmahon MD        ondansetron (ZOFRAN-ODT) disintegrating tablet 4 mg  4 mg Oral Q8H PRN Christie Mcmahon MD        Or    ondansetron [facility]) injection 4 mg  4 mg IntraVENous Q6H PRN Christie Mcmahon MD        polyethylene glycol [facility]) packet 17 g  17 g Oral Daily PRN Christie Mcmahon MD        acetaminophen (TYLENOL) tablet 650 mg  650 mg Oral Q6H PRN Christie Mcmahon MD        Or    acetaminophen (TYLENOL) suppository 650 mg  650 mg Rectal Q6H PRN Christie Mcmahon MD        guaiFENesin [facility]) extended release tablet 600 mg  600 mg Oral BID Christie Mcmahon MD   600 mg at 05/16/22 1046    pantoprazole (PROTONIX) 40 mg in sodium chloride (PF) 10 mL injection  40 mg IntraVENous Q24H Christie Mcmahon MD   40 mg at 05/15/22 1806    levothyroxine (SYNTHROID) tablet 175 mcg  175 mcg Oral Daily Christie Mcmahon MD        folic acid (FOLVITE) tablet 1 mg  1 mg Oral Daily Christie Mcmahon MD   1 mg at 05/16/22 1046    vitamin D (ERGOCALCIFEROL) capsule 50,000 Units  50,000 Units Oral Weekly Christie Mcmahon MD        ipratropium-albuterol (DUONEB) nebulizer solution 1 ampule  1 ampule Inhalation Q4H WA Christie Mcmahon MD   1 ampule at 05/16/22 1048    vancomycin (VANCOCIN) intermittent dosing (placeholder)   Other RX Placeholder Christie Mcmahon MD        phenytoin (DILANTIN) ER capsule 300 mg  300 mg Oral Daily Christie Mcmahon MD   300 mg at 05/16/22 1046     DVT Prophylaxis: Sequential Compression Devices    Continuous Infusions:   dextrose      sodium chloride         Intake/Output Summary (Last 24 hours) at 5/16/2022 1245  Last data filed at 5/16/2022 1009  Gross per 24 hour   Intake 740 ml   Output 750 ml   Net -10 ml     CBC:   Recent Labs     05/14/22  0324 05/15/22  0416 05/16/22  0841   WBC 17.1* 22.5* 12.7*   HGB 14.9 13.6 11.9*    204 260     BMP:  Recent Labs     05/14/22  0324 05/15/22  0416 05/16/22  0841    136 147*   K 3.7 3.2* 3.3*    103 111   CO2 22 19* 25   BUN 11 15 17   CREATININE 0.4* 1.1* 1.1*   GLUCOSE 135* 139* 104     ABGs:   Lab Results   Component Value Date    PHART 7.450 05/13/2022    PO2ART 55.0 05/13/2022    OEL2VSW 37.0 05/13/2022     INR: No results for input(s): INR in the last 72 hours. Objective:   Vitals: /66   Pulse 101   Temp 98.4 °F (36.9 °C) (Temporal)   Resp 18   Wt 190 lb (86.2 kg)   SpO2 97%   BMI 28.89 kg/m²   General appearance: alert, oriented, no distress  Skin: Skin color, texture, turgor normal.   HEENT: Head: Normocephalic, no lesions, without obvious abnormality.   Neck: no adenopathy, no carotid bruit, no JVD and supple, symmetrical, trachea midline  Lungs: rales bilaterally  Heart: regular rate and rhythm, S1, S2 normal, no murmur, click, rub or gallop  Abdomen: soft, non-tender; bowel sounds normal; no masses,  no organomegaly  Extremities: extremities normal, atraumatic, no cyanosis or edema  Lymphatic: No significant lymph node enlargement papable  Neurologic: Mental status: alert, oriented to self        Assessment & Plan:    · Sepsis, POA due to PNA and bacteremia- improving   · PNA, LLL suspect aspiration - merrem  · Hypoxia 2/2 to PNA  · Acute metabolic encephalopathy- improving    · Staph bacteremia- vanc, ID on board  · Vit D def- on replacement    · Folate def- on replacement    · DNR  · History of PE- NOT on anticoagulation at home   · GERD- IV PPI  · Hypokalemia- replace   · Hypernatremia- start D5    Patient Active Problem List:     History of cardioembolic cerebrovascular accident (CVA)     Essential hypertension     Acquired hypothyroidism     Seizure

## 2022-05-17 LAB
ANION GAP SERPL CALCULATED.3IONS-SCNC: 11 MMOL/L (ref 7–19)
BUN BLDV-MCNC: 13 MG/DL (ref 8–23)
CALCIUM SERPL-MCNC: 7.1 MG/DL (ref 8.8–10.2)
CHLORIDE BLD-SCNC: 105 MMOL/L (ref 98–111)
CO2: 23 MMOL/L (ref 22–29)
CREAT SERPL-MCNC: 0.9 MG/DL (ref 0.5–0.9)
GFR AFRICAN AMERICAN: >59
GFR NON-AFRICAN AMERICAN: 59
GLUCOSE BLD-MCNC: 117 MG/DL (ref 74–109)
HCT VFR BLD CALC: 37.2 % (ref 37–47)
HEMOGLOBIN: 11.6 G/DL (ref 12–16)
MCH RBC QN AUTO: 30.4 PG (ref 27–31)
MCHC RBC AUTO-ENTMCNC: 31.2 G/DL (ref 33–37)
MCV RBC AUTO: 97.6 FL (ref 81–99)
PDW BLD-RTO: 14.4 % (ref 11.5–14.5)
PLATELET # BLD: 265 K/UL (ref 130–400)
PMV BLD AUTO: 9.9 FL (ref 9.4–12.3)
POTASSIUM SERPL-SCNC: 3.2 MMOL/L (ref 3.5–5)
RBC # BLD: 3.81 M/UL (ref 4.2–5.4)
SODIUM BLD-SCNC: 139 MMOL/L (ref 136–145)
VANCOMYCIN TROUGH: 15.6 UG/ML (ref 10–20)
WBC # BLD: 11.1 K/UL (ref 4.8–10.8)

## 2022-05-17 PROCEDURE — 85027 COMPLETE CBC AUTOMATED: CPT

## 2022-05-17 PROCEDURE — 2700000000 HC OXYGEN THERAPY PER DAY

## 2022-05-17 PROCEDURE — 36415 COLL VENOUS BLD VENIPUNCTURE: CPT

## 2022-05-17 PROCEDURE — 6370000000 HC RX 637 (ALT 250 FOR IP): Performed by: HOSPITALIST

## 2022-05-17 PROCEDURE — 80048 BASIC METABOLIC PNL TOTAL CA: CPT

## 2022-05-17 PROCEDURE — 80202 ASSAY OF VANCOMYCIN: CPT

## 2022-05-17 PROCEDURE — C9113 INJ PANTOPRAZOLE SODIUM, VIA: HCPCS | Performed by: HOSPITALIST

## 2022-05-17 PROCEDURE — 99232 SBSQ HOSP IP/OBS MODERATE 35: CPT | Performed by: PHYSICIAN ASSISTANT

## 2022-05-17 PROCEDURE — 6360000002 HC RX W HCPCS: Performed by: INTERNAL MEDICINE

## 2022-05-17 PROCEDURE — 6360000002 HC RX W HCPCS: Performed by: HOSPITALIST

## 2022-05-17 PROCEDURE — 2580000003 HC RX 258: Performed by: HOSPITALIST

## 2022-05-17 PROCEDURE — 1210000000 HC MED SURG R&B

## 2022-05-17 PROCEDURE — 94640 AIRWAY INHALATION TREATMENT: CPT

## 2022-05-17 RX ORDER — POTASSIUM CHLORIDE 750 MG/1
10 TABLET, EXTENDED RELEASE ORAL 2 TIMES DAILY
Status: DISCONTINUED | OUTPATIENT
Start: 2022-05-17 | End: 2022-05-21 | Stop reason: HOSPADM

## 2022-05-17 RX ORDER — METOPROLOL SUCCINATE 50 MG/1
50 TABLET, EXTENDED RELEASE ORAL DAILY
Status: DISCONTINUED | OUTPATIENT
Start: 2022-05-18 | End: 2022-05-20

## 2022-05-17 RX ORDER — POTASSIUM CHLORIDE 7.45 MG/ML
10 INJECTION INTRAVENOUS
Status: COMPLETED | OUTPATIENT
Start: 2022-05-17 | End: 2022-05-17

## 2022-05-17 RX ADMIN — VANCOMYCIN HYDROCHLORIDE 750 MG: 750 INJECTION, POWDER, LYOPHILIZED, FOR SOLUTION INTRAVENOUS at 21:29

## 2022-05-17 RX ADMIN — LEVOTHYROXINE SODIUM 175 MCG: 25 TABLET ORAL at 10:10

## 2022-05-17 RX ADMIN — POTASSIUM CHLORIDE 10 MEQ: 10 TABLET, EXTENDED RELEASE ORAL at 10:24

## 2022-05-17 RX ADMIN — IPRATROPIUM BROMIDE AND ALBUTEROL SULFATE 1 AMPULE: 2.5; .5 SOLUTION RESPIRATORY (INHALATION) at 15:05

## 2022-05-17 RX ADMIN — POTASSIUM CHLORIDE 10 MEQ: 7.46 INJECTION, SOLUTION INTRAVENOUS at 10:10

## 2022-05-17 RX ADMIN — SODIUM CHLORIDE, PRESERVATIVE FREE 10 ML: 5 INJECTION INTRAVENOUS at 10:11

## 2022-05-17 RX ADMIN — MEROPENEM AND SODIUM CHLORIDE 1000 MG: 1 INJECTION, SOLUTION INTRAVENOUS at 17:38

## 2022-05-17 RX ADMIN — GUAIFENESIN 600 MG: 600 TABLET ORAL at 10:11

## 2022-05-17 RX ADMIN — IPRATROPIUM BROMIDE AND ALBUTEROL SULFATE 1 AMPULE: 2.5; .5 SOLUTION RESPIRATORY (INHALATION) at 10:44

## 2022-05-17 RX ADMIN — POTASSIUM CHLORIDE 10 MEQ: 10 TABLET, EXTENDED RELEASE ORAL at 21:21

## 2022-05-17 RX ADMIN — PHENYTOIN SODIUM 300 MG: 100 CAPSULE ORAL at 10:10

## 2022-05-17 RX ADMIN — SODIUM CHLORIDE, PRESERVATIVE FREE 10 ML: 5 INJECTION INTRAVENOUS at 21:21

## 2022-05-17 RX ADMIN — SODIUM CHLORIDE, PRESERVATIVE FREE 40 MG: 5 INJECTION INTRAVENOUS at 17:36

## 2022-05-17 RX ADMIN — METOPROLOL SUCCINATE 25 MG: 25 TABLET, EXTENDED RELEASE ORAL at 10:11

## 2022-05-17 RX ADMIN — IPRATROPIUM BROMIDE AND ALBUTEROL SULFATE 1 AMPULE: 2.5; .5 SOLUTION RESPIRATORY (INHALATION) at 19:24

## 2022-05-17 RX ADMIN — NYSTATIN 500000 UNITS: 100000 SUSPENSION ORAL at 21:32

## 2022-05-17 RX ADMIN — ACETAMINOPHEN 650 MG: 325 TABLET ORAL at 12:28

## 2022-05-17 RX ADMIN — POTASSIUM CHLORIDE 10 MEQ: 7.46 INJECTION, SOLUTION INTRAVENOUS at 11:15

## 2022-05-17 RX ADMIN — GUAIFENESIN 600 MG: 600 TABLET ORAL at 21:21

## 2022-05-17 RX ADMIN — FOLIC ACID 1 MG: 1 TABLET ORAL at 10:11

## 2022-05-17 RX ADMIN — MIRTAZAPINE 7.5 MG: 15 TABLET, FILM COATED ORAL at 21:21

## 2022-05-17 RX ADMIN — SODIUM CHLORIDE: 9 INJECTION, SOLUTION INTRAVENOUS at 10:08

## 2022-05-17 RX ADMIN — IPRATROPIUM BROMIDE AND ALBUTEROL SULFATE 1 AMPULE: 2.5; .5 SOLUTION RESPIRATORY (INHALATION) at 06:49

## 2022-05-17 RX ADMIN — NYSTATIN 500000 UNITS: 100000 SUSPENSION ORAL at 12:28

## 2022-05-17 RX ADMIN — MEROPENEM AND SODIUM CHLORIDE 1000 MG: 1 INJECTION, SOLUTION INTRAVENOUS at 02:53

## 2022-05-17 RX ADMIN — NYSTATIN 500000 UNITS: 100000 SUSPENSION ORAL at 10:20

## 2022-05-17 RX ADMIN — NYSTATIN 500000 UNITS: 100000 SUSPENSION ORAL at 17:36

## 2022-05-17 ASSESSMENT — PAIN SCALES - GENERAL
PAINLEVEL_OUTOF10: 0
PAINLEVEL_OUTOF10: 4

## 2022-05-17 ASSESSMENT — PAIN SCALES - WONG BAKER: WONGBAKER_NUMERICALRESPONSE: 0

## 2022-05-17 NOTE — PROGRESS NOTES
Palliative Care Progress Note  5/17/2022 11:21 AM    Patient:  Ana Laura Abernathy  YOB: 1931  Primary Care Physician: Tyson Skiff, MD  Advance Directive: DNR  Admit Date: 5/13/2022       Hospital Day: 4  Portions of this note have been copied forward, however, changed to reflect the most current clinical status of this patient. CHIEF COMPLAINT/REASON FOR CONSULTATION Goals of care, code status, family support    SUBJECTIVE:  Ms. Judd Moreland complains of fatigue this morning. Cough is slightly improved. No family present in the room at time of visit. Review of Systems:   14 point review of systems is negative except as specifically addressed above. Objective:   VITALS:  BP (!) 132/51   Pulse 92   Temp 96.3 °F (35.7 °C) (Temporal)   Resp 18   Wt 190 lb (86.2 kg)   SpO2 95%   BMI 28.89 kg/m²   24HR INTAKE/OUTPUT:      Intake/Output Summary (Last 24 hours) at 5/17/2022 1121  Last data filed at 5/17/2022 1009  Gross per 24 hour   Intake 3340 ml   Output --   Net 3340 ml     General appearance: 81 yo female, chronically ill appearing, no acute distress  Head: Normocephalic, without obvious abnormality, atraumatic  Eyes: conjunctivae/corneas clear. PERRL, EOM's intact.    Ears: normal external ears and nose, hard of hearing  Neck: no JVD, supple, symmetrical, trachea midline   Lungs: soft scattered upper airway rhonchi   Heart: regular rate and rhythm, S1, S2 normal, no murmur  Abdomen:soft, non-tender; non-distended, bowel sounds present    Extremities:No lower extremity edema,  No erythema, ecchymosis left hand  Skin: warm, dry  Neurologic: somnolent, easily awakened, oriented to self and place, generalized weakness  Psychiatric: appropriate mood/affect     Medications:      sodium chloride 25 mL/hr at 05/17/22 1008      potassium chloride  10 mEq Oral BID    metoprolol succinate  25 mg Oral Daily    mirtazapine  7.5 mg Oral Nightly    meropenem  1,000 mg IntraVENous Q12H    polyethylene glycol  17 g Oral Daily    sennosides-docusate sodium  2 tablet Oral Daily    vancomycin  750 mg IntraVENous Q24H    nystatin  5 mL Oral 4x Daily    sodium chloride flush  5-40 mL IntraVENous 2 times per day    guaiFENesin  600 mg Oral BID    pantoprazole (PROTONIX) 40 mg injection  40 mg IntraVENous Q24H    levothyroxine  175 mcg Oral Daily    folic acid  1 mg Oral Daily    vitamin D  50,000 Units Oral Weekly    ipratropium-albuterol  1 ampule Inhalation Q4H WA    vancomycin (VANCOCIN) intermittent dosing (placeholder)   Other RX Placeholder    phenytoin  300 mg Oral Daily     metoprolol, potassium chloride **OR** potassium alternative oral replacement **OR** potassium chloride, sodium chloride flush, sodium chloride, ondansetron **OR** ondansetron, polyethylene glycol, acetaminophen **OR** acetaminophen  ADULT DIET; Full Liquid; Moderately Thick (Honey)     Lab and other Data:     Recent Labs     05/15/22  0416 05/16/22  0841 05/17/22  0213   WBC 22.5* 12.7* 11.1*   HGB 13.6 11.9* 11.6*    260 265     Recent Labs     05/15/22  0416 05/16/22  0841 05/17/22  0213    147* 139   K 3.2* 3.3* 3.2*    111 105   CO2 19* 25 23   BUN 15 17 13   CREATININE 1.1* 1.1* 0.9   GLUCOSE 139* 104 117*     Assessment/Plan   Principal Problem:    Sepsis (HCC)  Active Problems:    Atypical pneumonia    Palliative care patient    Essential hypertension    Acquired hypothyroidism    [facility]    AMS (altered mental status)    UTI (urinary tract infection)  Resolved Problems:    * No resolved hospital problems. *    Visit Summary:  Chart reviewed, patient discussed with consulting service and nursing staff. Reviewed health issues, work up and treatment plan as well as factors that lead to hospitalization. I saw Ms. Sheth at her bedside. No family present in the room. She has no new complaints today. I called and spoke with her medical POA, Hussain Burgos.  She confirms patient has been bed bound for about one year. She's never been diagnosed with dementia but does have a cognitive deficit with memory loss. She has dysphagia as well but is currently tolerating oral intake. Plans at this time are for patient to discharge to [facility]. I did discuss hospice care at [facility]. At this time she is hopeful patient will be able to receive therapy to include speech therapy given dysphagia. We discussed that if patient continues to decline she would be appropriate for and benefit from hospice services. Mrs. Moi Braswell states understanding and that she will request hospice when she feels it is time for HS at the SNF. Recommendations:   1. Palliative care: [address] to [facility] when medically stable. Code status: DNR    2. Staphylococcus hominis bacteremia-ID following, empiric antibiotics continued    3. Pneumonia vs atelectasis-Vancomycin/Merrem continued, Leukocytosis improving     4. Dysphagia-tolerating modified diet at this time    5. AMS w/ hx of CVA-suspect underlying Vascular dementia-AMS improving per patient's family    6. Hypernatremia/hypokalemia-Hospitalist aware/managing    Thank you for consulting Palliative Care and allowing us to participate in the care of this patient.    Time Spent Counseling > 50%:  YES                                   Total Time Spent with patient/family counseling, workup/treatment review, counseling and placement of orders/preparation of this note: 28 minutes    Electronically signed by Lino Maguire PA-C on 5/17/2022 at 11:21 AM    (Please note that portions of this note were completed with a voice recognition program.  Franca Lawrence made to edit the dictations but occasionally words are mis-transcribed.)

## 2022-05-17 NOTE — CARE COORDINATION
Spoke with pts niece Anthony Bhardwaj in pts room this am, Anthony Bhardwaj wants a referal to [facility]. Destiney's spouse is still currently in icu and she is not going to be able to take pt back home with her at this time. Referal sent to [facility]. Will follow.   Electronically signed by Merlyn Schroeder RN on 5/17/2022 at 9:47 AM

## 2022-05-17 NOTE — PROGRESS NOTES
Or    potassium chloride 10 mEq/100 mL IVPB (Peripheral Line)  10 mEq IntraVENous PRN Patience Donnell, DO        vancomycin (VANCOCIN) 750 mg in dextrose 5 % 250 mL IVPB  750 mg IntraVENous Q24H Davion Ramsey MD   Stopped at 05/16/22 2259    nystatin (MYCOSTATIN) [id number] UNIT/ML suspension 500,000 Units  5 mL Oral 4x Daily Davion Ramsey MD   500,000 Units at 05/17/22 1228    sodium chloride flush 0.9 % injection 5-40 mL  5-40 mL IntraVENous 2 times per day Davion Ramsey MD   10 mL at 05/17/22 1011    sodium chloride flush 0.9 % injection 5-40 mL  5-40 mL IntraVENous PRN Davion Ramsey MD        0.9 % sodium chloride infusion   IntraVENous PRN Davion Ramsey MD 25 mL/hr at 05/17/22 1008 New Bag at 05/17/22 1008    ondansetron (ZOFRAN-ODT) disintegrating tablet 4 mg  4 mg Oral Q8H PRN Davion Ramsey MD        Or    ondansetron [facility]) injection 4 mg  4 mg IntraVENous Q6H PRN Davion Ramsey MD        polyethylene glycol (GLYCOLAX) packet 17 g  17 g Oral Daily PRN Davion Ramsey MD        acetaminophen (TYLENOL) tablet 650 mg  650 mg Oral Q6H PRN Davion Ramsey MD   650 mg at 05/17/22 1228    Or    acetaminophen (TYLENOL) suppository 650 mg  650 mg Rectal Q6H PRN Davion Ramsey MD        guaiFENesin [facility]) extended release tablet 600 mg  600 mg Oral BID Davion Ramsey MD   600 mg at 05/17/22 1011    pantoprazole (PROTONIX) 40 mg in sodium chloride (PF) 10 mL injection  40 mg IntraVENous Q24H Davion Ramsey MD   40 mg at 05/16/22 1714    levothyroxine (SYNTHROID) tablet 175 mcg  175 mcg Oral Daily Davion Ramsey MD   175 mcg at 43/66/21 9130    folic acid (FOLVITE) tablet 1 mg  1 mg Oral Daily Davion Ramsey MD   1 mg at 05/17/22 1011    vitamin D (ERGOCALCIFEROL) capsule 50,000 Units  50,000 Units Oral Weekly Davion Ramsey MD        ipratropium-albuterol (Mini Beat) nebulizer solution 1 ampule  1 ampule Inhalation Q4H WA Christie Mcmahon MD   1 ampule at 05/17/22 1505    vancomycin (VANCOCIN) intermittent dosing (placeholder)   Other RX Placeholder Christie Mcmahon MD        phenytoin (DILANTIN) ER capsule 300 mg  300 mg Oral Daily Christie Mcmahon MD   300 mg at 05/17/22 1010     DVT Prophylaxis: Sequential Compression Devices    Continuous Infusions:   sodium chloride 25 mL/hr at 05/17/22 1008       Intake/Output Summary (Last 24 hours) at 5/17/2022 1656  Last data filed at 5/17/2022 1451  Gross per 24 hour   Intake 3340 ml   Output --   Net 3340 ml     CBC:   Recent Labs     05/15/22  0416 05/16/22  0841 05/17/22  0213   WBC 22.5* 12.7* 11.1*   HGB 13.6 11.9* 11.6*    260 265     BMP:  Recent Labs     05/15/22  0416 05/16/22  0841 05/17/22  0213    147* 139   K 3.2* 3.3* 3.2*    111 105   CO2 19* 25 23   BUN 15 17 13   CREATININE 1.1* 1.1* 0.9   GLUCOSE 139* 104 117*     ABGs:   Lab Results   Component Value Date    PHART 7.450 05/13/2022    PO2ART 55.0 05/13/2022    UGE5WWZ 37.0 05/13/2022     INR: No results for input(s): INR in the last 72 hours. Objective:   Vitals: BP (!) 155/76   Pulse 113   Temp 97 °F (36.1 °C) (Temporal)   Resp 18   Wt 190 lb (86.2 kg)   SpO2 90%   BMI 28.89 kg/m²   General appearance: alert, oriented, no distress  Skin: Skin color, texture, turgor normal.   HEENT: Head: Normocephalic, no lesions, without obvious abnormality.   Neck: no adenopathy, no carotid bruit, no JVD and supple, symmetrical, trachea midline  Lungs: rales bilaterally  Heart: regular rate and rhythm, S1, S2 normal, no murmur, click, rub or gallop  Abdomen: soft, non-tender; bowel sounds normal; no masses,  no organomegaly  Extremities: extremities normal, atraumatic, no cyanosis or edema  Lymphatic: No significant lymph node enlargement papable  Neurologic: Mental status: alert, oriented to self        Assessment & Plan: · Sepsis, POA due to PNA and bacteremia- improving   · PNA, LLL suspect aspiration - merrem  · Hypoxia 2/2 to PNA  · Acute metabolic encephalopathy- improving    · Staph bacteremia  · Vit D def- on replacement    · Folate def- on replacement    · DNR  · History of PE- NOT on anticoagulation at home   · GERD- IV PPI  · Hypokalemia- replace   · Hypernatremia- resolved    Per ID Would suggest continuing vancomycin and meropenem through May 19, 2022    Patient Active Problem List:     History of cardioembolic cerebrovascular accident (CVA)     Essential hypertension     Acquired hypothyroidism     Seizure disorder ([facility]     Generalized weakness      See orders   Disposition: to SNF    Facundo Lucero MD

## 2022-05-17 NOTE — PROGRESS NOTES
Infectious Diseases Progress Note    Patient:  Dhruv Ying  YOB: 1931  MRN: [id number]   Admit date: 5/13/2022   Admitting Physician: Jesus Chen, *  Primary Care Physician: Torey Amaral MD    Chief Complaint/Interval History: She seems to be doing better. As best I can tell mental status is baseline. She was responding to some questions. She looks comfortable. Previously she had looked weaker and was coughing some during evaluation. Those symptoms seemed to be improved    In/Out    Intake/Output Summary (Last 24 hours) at 5/17/2022 1303  Last data filed at 5/17/2022 1009  Gross per 24 hour   Intake 3340 ml   Output --   Net 3340 ml     Allergies:    Allergies   Allergen Reactions    Morphine      Pt becomes mean (refusing, cursing)       Current Meds: potassium chloride (KLOR-CON M) extended release tablet 10 mEq, BID  metoprolol succinate (TOPROL XL) extended release tablet 25 mg, Daily  mirtazapine (REMERON) tablet 7.5 mg, Nightly  metoprolol (LOPRESSOR) injection 2.5 mg, Q6H PRN  meropenem (MERREM) 1000 mg in sodium chloride 0.9% 50 mL (duplex), Q12H  polyethylene glycol (GLYCOLAX) packet 17 g, Daily  sennosides-docusate sodium (SENOKOT-S) 8.6-50 MG tablet 2 tablet, Daily  potassium chloride (KLOR-CON M) extended release tablet 40 mEq, PRN   Or  potassium bicarb-citric acid (EFFER-K) effervescent tablet 40 mEq, PRN   Or  potassium chloride 10 mEq/100 mL IVPB (Peripheral Line), PRN  vancomycin (VANCOCIN) 750 mg in dextrose 5 % 250 mL IVPB, Q24H  nystatin (MYCOSTATIN) [id number] UNIT/ML suspension 500,000 Units, 4x Daily  sodium chloride flush 0.9 % injection 5-40 mL, 2 times per day  sodium chloride flush 0.9 % injection 5-40 mL, PRN  0.9 % sodium chloride infusion, PRN  ondansetron (ZOFRAN-ODT) disintegrating tablet 4 mg, Q8H PRN   Or  ondansetron (ZOFRAN) injection 4 mg, Q6H PRN  polyethylene glycol (GLYCOLAX) packet 17 g, Daily PRN  acetaminophen (TYLENOL) tablet 650 mg, Q6H PRN Or  acetaminophen (TYLENOL) suppository 650 mg, Q6H PRN  guaiFENesin (MUCINEX) extended release tablet 600 mg, BID  pantoprazole (PROTONIX) 40 mg in sodium chloride (PF) 10 mL injection, Q24H  levothyroxine (SYNTHROID) tablet 080 mcg, Daily  folic acid (FOLVITE) tablet 1 mg, Daily  vitamin D (ERGOCALCIFEROL) capsule 50,000 Units, Weekly  ipratropium-albuterol (DUONEB) nebulizer solution 1 ampule, Q4H WA  vancomycin (VANCOCIN) intermittent dosing (placeholder), RX Placeholder  phenytoin (DILANTIN) ER capsule 300 mg, Daily      Review of Systems no diarrhea or rash    VitalSigns:  BP (!) 155/76   Pulse 113   Temp 97 °F (36.1 °C) (Temporal)   Resp 18   Wt 190 lb (86.2 kg)   SpO2 90%   BMI 28.89 kg/m²      Physical Exam  Line/IV site: No erythema, warmth, induration, or tenderness. General comfortable appearing  Lungs without wheezing  Few coarse crackles    Lab Results:  CBC:   Recent Labs     05/15/22  0416 05/16/22  0841 05/17/22  0213   WBC 22.5* 12.7* 11.1*   HGB 13.6 11.9* 11.6*    260 265     BMP:  Recent Labs     05/15/22  0416 05/16/22  0841 05/17/22  0213    147* 139   K 3.2* 3.3* 3.2*    111 105   CO2 19* 25 23   BUN 15 17 13   CREATININE 1.1* 1.1* 0.9   GLUCOSE 139* 104 117*     CultureResults:  Blood cultures May 13, 2022-Staph hominis  Blood cultures May 14, 2022-no growth  Blood cultures May 15, 2022-no growth  Blood cultures yesterday-no growth    Radiology:   Abdominal films yesterday:  Impression   1. No radiographic evidence of acute abdominopelvic process.     Signed by Dr Jacky Cabrera     Additional Studies Reviewed:  None    Impression:  Probable left lower lobe pneumonia  Leukocytosis-likely secondary to #1-improving  Advanced age  Improved changes in mental status    Recommendations:  Feel she has pneumonia that is responding to treatment  Would suggest continuing vancomycin and meropenem through May 19, 2022  At that time she will have had 7-day course of antibiotic treatment  Antibiotics can be discontinued at that point  Will sign off for now  Would be happy to reassess if new problems or additional questions for infectious diseases  Otherwise follow-up with infectious diseases as needed    Pino Avila MD

## 2022-05-17 NOTE — PLAN OF CARE
Problem: Discharge Planning  Goal: Discharge to home or other facility with appropriate resources  5/17/2022 1536 by Charlotte Delgado RN  Outcome: Progressing  Flowsheets (Taken 5/17/2022 0800)  Discharge to home or other facility with appropriate resources: Identify barriers to discharge with patient and caregiver  5/17/2022 0457 by Adeline Crowe RN  Outcome: Progressing  Flowsheets (Taken 5/16/2022 2108)  Discharge to home or other facility with appropriate resources: Identify barriers to discharge with patient and caregiver     Problem: ABCDS Injury Assessment  Goal: Absence of physical injury  5/17/2022 1536 by Charlotte Delgado RN  Outcome: Progressing  5/17/2022 0457 by Adeline Crowe RN  Outcome: Progressing  Flowsheets (Taken 5/17/2022 0454)  Absence of Physical Injury: Implement safety measures based on patient assessment     Problem: Skin/Tissue Integrity  Goal: Absence of new skin breakdown  Description: 1. Monitor for areas of redness and/or skin breakdown  2. Assess vascular access sites hourly  3. Every 4-6 hours minimum:  Change oxygen saturation probe site  4. Every 4-6 hours:  If on nasal continuous positive airway pressure, respiratory therapy assess nares and determine need for appliance change or resting period.   5/17/2022 1536 by Charlotte Delgado RN  Outcome: Progressing  5/17/2022 0457 by Adeline Crowe RN  Outcome: Progressing     Problem: Safety - Adult  Goal: Free from fall injury  5/17/2022 1536 by Charlotte Delgado RN  Outcome: Progressing  5/17/2022 0457 by Adeline Crowe RN  Outcome: Progressing  Flowsheets (Taken 5/17/2022 0454)  Free From Fall Injury: Fabiana Jalloh family/caregiver on patient safety     Problem: Chronic Conditions and Co-morbidities  Goal: Patient's chronic conditions and co-morbidity symptoms are monitored and maintained or improved  5/17/2022 1536 by Charlotte Delgado RN  Outcome: Progressing  Flowsheets (Taken 5/17/2022 0800)  Care Plan - Patient's Chronic Conditions and Co-Morbidity Symptoms are Monitored and Maintained or Improved: Monitor and assess patient's chronic conditions and comorbid symptoms for stability, deterioration, or improvement  5/17/2022 0457 by Ghanshyam Fontenot RN  Outcome: Progressing  Flowsheets (Taken 5/16/2022 2108)  Care Plan - Patient's Chronic Conditions and Co-Morbidity Symptoms are Monitored and Maintained or Improved: Monitor and assess patient's chronic conditions and comorbid symptoms for stability, deterioration, or improvement

## 2022-05-17 NOTE — PLAN OF CARE
Problem: Discharge Planning  Goal: Discharge to home or other facility with appropriate resources  5/17/2022 0457 by Charmaine Mai RN  Outcome: Progressing  Flowsheets (Taken 5/16/2022 2108)  Discharge to home or other facility with appropriate resources: Identify barriers to discharge with patient and caregiver  5/16/2022 1514 by Ever Ruvalcaba RN  Outcome: Progressing  Flowsheets (Taken 5/16/2022 0800)  Discharge to home or other facility with appropriate resources: Identify barriers to discharge with patient and caregiver     Problem: ABCDS Injury Assessment  Goal: Absence of physical injury  5/17/2022 0457 by Charmaine Mai RN  Outcome: Progressing  Flowsheets (Taken 5/17/2022 0454)  Absence of Physical Injury: Implement safety measures based on patient assessment  5/16/2022 1514 by Ever Ruvalcaba RN  Outcome: Progressing     Problem: Skin/Tissue Integrity  Goal: Absence of new skin breakdown  Description: 1. Monitor for areas of redness and/or skin breakdown  2. Assess vascular access sites hourly  3. Every 4-6 hours minimum:  Change oxygen saturation probe site  4. Every 4-6 hours:  If on nasal continuous positive airway pressure, respiratory therapy assess nares and determine need for appliance change or resting period.   5/17/2022 0457 by Charmaine Mai RN  Outcome: Progressing  5/16/2022 1514 by Ever Ruvalcaba RN  Outcome: Progressing     Problem: Safety - Adult  Goal: Free from fall injury  5/17/2022 0457 by Charmaine Mai RN  Outcome: Progressing  Flowsheets (Taken 5/17/2022 0454)  Free From Fall Injury: Instruct family/caregiver on patient safety  5/16/2022 1514 by Ever Ruvalcaba RN  Outcome: Progressing     Problem: Chronic Conditions and Co-morbidities  Goal: Patient's chronic conditions and co-morbidity symptoms are monitored and maintained or improved  5/17/2022 0457 by Charmaine Mai RN  Outcome: Progressing  Flowsheets (Taken 5/16/2022 2108)  Care Plan - Patient's Chronic Conditions and Co-Morbidity Symptoms are Monitored and Maintained or Improved: Monitor and assess patient's chronic conditions and comorbid symptoms for stability, deterioration, or improvement  5/16/2022 1514 by Ina Yousif RN  Outcome: Progressing  Flowsheets (Taken 5/16/2022 0800)  Care Plan - Patient's Chronic Conditions and Co-Morbidity Symptoms are Monitored and Maintained or Improved: Monitor and assess patient's chronic conditions and comorbid symptoms for stability, deterioration, or improvement

## 2022-05-18 LAB
ANION GAP SERPL CALCULATED.3IONS-SCNC: 9 MMOL/L (ref 7–19)
BUN BLDV-MCNC: 8 MG/DL (ref 8–23)
CALCIUM SERPL-MCNC: 7.2 MG/DL (ref 8.8–10.2)
CHLORIDE BLD-SCNC: 106 MMOL/L (ref 98–111)
CO2: 26 MMOL/L (ref 22–29)
CREAT SERPL-MCNC: 0.8 MG/DL (ref 0.5–0.9)
GFR AFRICAN AMERICAN: >59
GFR NON-AFRICAN AMERICAN: >60
GLUCOSE BLD-MCNC: 107 MG/DL (ref 74–109)
HCT VFR BLD CALC: 36.5 % (ref 37–47)
HEMOGLOBIN: 11.8 G/DL (ref 12–16)
MCH RBC QN AUTO: 30.7 PG (ref 27–31)
MCHC RBC AUTO-ENTMCNC: 32.3 G/DL (ref 33–37)
MCV RBC AUTO: 95.1 FL (ref 81–99)
PDW BLD-RTO: 14.4 % (ref 11.5–14.5)
PLATELET # BLD: 318 K/UL (ref 130–400)
PMV BLD AUTO: 9.6 FL (ref 9.4–12.3)
POTASSIUM SERPL-SCNC: 3.3 MMOL/L (ref 3.5–5)
PRO-BNP: 4024 PG/ML (ref 0–1800)
RBC # BLD: 3.84 M/UL (ref 4.2–5.4)
SODIUM BLD-SCNC: 141 MMOL/L (ref 136–145)
WBC # BLD: 9.5 K/UL (ref 4.8–10.8)

## 2022-05-18 PROCEDURE — 80048 BASIC METABOLIC PNL TOTAL CA: CPT

## 2022-05-18 PROCEDURE — 2580000003 HC RX 258: Performed by: HOSPITALIST

## 2022-05-18 PROCEDURE — 2700000000 HC OXYGEN THERAPY PER DAY

## 2022-05-18 PROCEDURE — C9113 INJ PANTOPRAZOLE SODIUM, VIA: HCPCS | Performed by: HOSPITALIST

## 2022-05-18 PROCEDURE — 1210000000 HC MED SURG R&B

## 2022-05-18 PROCEDURE — 92526 ORAL FUNCTION THERAPY: CPT

## 2022-05-18 PROCEDURE — 94640 AIRWAY INHALATION TREATMENT: CPT

## 2022-05-18 PROCEDURE — 99231 SBSQ HOSP IP/OBS SF/LOW 25: CPT | Performed by: PHYSICIAN ASSISTANT

## 2022-05-18 PROCEDURE — 83880 ASSAY OF NATRIURETIC PEPTIDE: CPT

## 2022-05-18 PROCEDURE — 36415 COLL VENOUS BLD VENIPUNCTURE: CPT

## 2022-05-18 PROCEDURE — 6360000002 HC RX W HCPCS: Performed by: INTERNAL MEDICINE

## 2022-05-18 PROCEDURE — 6360000002 HC RX W HCPCS: Performed by: NURSE PRACTITIONER

## 2022-05-18 PROCEDURE — 6360000002 HC RX W HCPCS: Performed by: HOSPITALIST

## 2022-05-18 PROCEDURE — 85027 COMPLETE CBC AUTOMATED: CPT

## 2022-05-18 PROCEDURE — 6370000000 HC RX 637 (ALT 250 FOR IP): Performed by: HOSPITALIST

## 2022-05-18 RX ORDER — POTASSIUM CHLORIDE 7.45 MG/ML
10 INJECTION INTRAVENOUS
Status: COMPLETED | OUTPATIENT
Start: 2022-05-18 | End: 2022-05-18

## 2022-05-18 RX ADMIN — SODIUM CHLORIDE, PRESERVATIVE FREE 10 ML: 5 INJECTION INTRAVENOUS at 09:59

## 2022-05-18 RX ADMIN — POTASSIUM CHLORIDE 10 MEQ: 7.45 INJECTION INTRAVENOUS at 17:51

## 2022-05-18 RX ADMIN — MIRTAZAPINE 7.5 MG: 15 TABLET, FILM COATED ORAL at 20:42

## 2022-05-18 RX ADMIN — IPRATROPIUM BROMIDE AND ALBUTEROL SULFATE 1 AMPULE: 2.5; .5 SOLUTION RESPIRATORY (INHALATION) at 07:10

## 2022-05-18 RX ADMIN — POTASSIUM CHLORIDE 10 MEQ: 7.45 INJECTION INTRAVENOUS at 19:30

## 2022-05-18 RX ADMIN — NYSTATIN 500000 UNITS: 100000 SUSPENSION ORAL at 17:52

## 2022-05-18 RX ADMIN — MEROPENEM AND SODIUM CHLORIDE 1000 MG: 1 INJECTION, SOLUTION INTRAVENOUS at 02:40

## 2022-05-18 RX ADMIN — IPRATROPIUM BROMIDE AND ALBUTEROL SULFATE 1 AMPULE: 2.5; .5 SOLUTION RESPIRATORY (INHALATION) at 15:04

## 2022-05-18 RX ADMIN — IPRATROPIUM BROMIDE AND ALBUTEROL SULFATE 1 AMPULE: 2.5; .5 SOLUTION RESPIRATORY (INHALATION) at 02:50

## 2022-05-18 RX ADMIN — NYSTATIN 500000 UNITS: 100000 SUSPENSION ORAL at 09:56

## 2022-05-18 RX ADMIN — NYSTATIN 500000 UNITS: 100000 SUSPENSION ORAL at 14:35

## 2022-05-18 RX ADMIN — SODIUM CHLORIDE, PRESERVATIVE FREE 10 ML: 5 INJECTION INTRAVENOUS at 20:42

## 2022-05-18 RX ADMIN — VANCOMYCIN HYDROCHLORIDE 750 MG: 750 INJECTION, POWDER, LYOPHILIZED, FOR SOLUTION INTRAVENOUS at 20:41

## 2022-05-18 RX ADMIN — SODIUM CHLORIDE, PRESERVATIVE FREE 40 MG: 5 INJECTION INTRAVENOUS at 14:38

## 2022-05-18 RX ADMIN — MEROPENEM AND SODIUM CHLORIDE 1000 MG: 1 INJECTION, SOLUTION INTRAVENOUS at 14:42

## 2022-05-18 RX ADMIN — PHENYTOIN SODIUM 300 MG: 100 CAPSULE ORAL at 09:58

## 2022-05-18 RX ADMIN — IPRATROPIUM BROMIDE AND ALBUTEROL SULFATE 1 AMPULE: 2.5; .5 SOLUTION RESPIRATORY (INHALATION) at 19:05

## 2022-05-18 NOTE — PLAN OF CARE
Patient's Chronic Conditions and Co-Morbidity Symptoms are Monitored and Maintained or Improved: Monitor and assess patient's chronic conditions and comorbid symptoms for stability, deterioration, or improvement  5/17/2022 1536 by Lenin Trejo RN  Outcome: Progressing  Flowsheets (Taken 5/17/2022 0800)  Care Plan - Patient's Chronic Conditions and Co-Morbidity Symptoms are Monitored and Maintained or Improved: Monitor and assess patient's chronic conditions and comorbid symptoms for stability, deterioration, or improvement

## 2022-05-18 NOTE — PROGRESS NOTES
[id number] [facility] Pharmacokinetic Monitoring Service - Vancomycin    Consulting Provider: Dr. Christian Due    Indication: Pneumonia  Target Concentration: Goal AUC/-600 mg*hr/L  Day of Therapy: 5  Additional Antimicrobials: Meropenem    Pertinent Laboratory Values: Wt Readings from Last 1 Encounters:   05/13/22 190 lb (86.2 kg)     Temp Readings from Last 1 Encounters:   05/17/22 96.6 °F (35.9 °C) (Temporal)     Estimated Creatinine Clearance: 48 mL/min (based on SCr of 0.9 mg/dL).   Recent Labs     05/16/22  0841 05/17/22  0213   CREATININE 1.1* 0.9   WBC 12.7* 11.1*       Pertinent Cultures:  Culture Date Source Results   05/13/22 Blood x 2 Staphylococcus hominis   05/13/22 Urine No growth    05/14/22 Blood x 2 No growth to date   05/15/22 Blood No growth to date   05/16/22 Blood No growth to date     MRSA Nasal Swab: was negative on 5/14    Recent vancomycin administrations                     vancomycin (VANCOCIN) 750 mg in dextrose 5 % 250 mL IVPB (mg) 750 mg New Bag 05/17/22 2129     750 mg New Bag 05/16/22 2106     750 mg New Bag 05/15/22 2108                    Assessment:  Date/Time Current Dose Concentration Timing of Concentration (h) AUC   05/17/22 2004 750 mg IV every 24 hours 15.6 22 h 58 min 425   Note: Serum concentrations collected for AUC dosing may appear elevated if collected in close proximity to the dose administered, this is not necessarily an indication of toxicity    Plan:  Current dosing regimen is therapeutic  Continue current dose  Repeat vancomycin concentration not ordered at this time  Pharmacy will continue to monitor patient and adjust therapy as indicated    Thank you for the consult,  Yasir Kumari [facility]  5/17/2022 11:02 PM

## 2022-05-18 NOTE — ACP (ADVANCE CARE PLANNING)
Advance Care Planning     Advance Care Planning (ACP) Physician/NP/PA (Provider) Conversation      Date of ACP Conversation:05/17/2022  Conversation Conducted with:    Healthcare Decision Maker: Next of Kin by law (only applies in absence of above) (name) [id number] [facility]:    Current Designated Health Care Decision Maker:    Primary Decision Maker: Tara Moy - Niece/Nephew - [phone]    Care Preferences:    Hospitalization: \"If your health worsens and it becomes clear that your chance of recovery is unlikely, what would your preference be regarding hospitalization? \"  No      Ventilation: \"If you were in your present state of health and suddenly became very ill and were unable to breathe on your own, what would your preference be about the use of a ventilator (breathing machine) if it was available to you? \"    No    \"If your health worsens and it becomes clear that your chance of recovery is unlikely, what would your preference be about the use of a ventilator (breathing machine) if it was available to you? \"   No    Resuscitation:  \"CPR works best to restart the heart when there is a sudden event, like a heart attack, in someone who is otherwise healthy. Unfortunately, CPR does not typically restart the heart for people who have serious health conditions or who are very sick. \"    \"In the event your heart stopped as a result of an underlying serious health condition, would you want attempts to be made to restart your heart (answer \"yes\" for attempt to resuscitate) or would you prefer a natural death (answer \"no\" for do not attempt to resuscitate)? \"   No      Conversation Outcomes / Follow-Up Plan:   Confirmed DNR.  SNF placement pending, aware of hospice services if needed in future      Length of Voluntary ACP Conversation in minutes: 25 minutes  Sudarshan Martinez PA-C

## 2022-05-18 NOTE — PROGRESS NOTES
Patient respirations were 28 and o2 sat 92% on 4L via nasal cannula, patient sounded congested. Respiratory contacted for breathing treatment. Humidity also added to oxygen. Respirations 24, 94% on 4L approximately 30 minutes after breathing treatment.

## 2022-05-18 NOTE — CARE COORDINATION
Pt was accepted to [facility], precert was started yesterday afternoon.  Will follow for approval.  Leland JOSUE  Electronically signed by Kandis Rivera RN on 5/18/2022 at 9:39 AM

## 2022-05-18 NOTE — PROGRESS NOTES
Palliative Care Progress Note  5/18/2022 12:26 PM    Patient:  Marianne Sargent  YOB: 1931  Primary Care Physician: Olinda Rodriguez MD  Advance Directive: DNR  Admit Date: 5/13/2022       Hospital Day: 5  Portions of this note have been copied forward, however, changed to reflect the most current clinical status of this patient. CHIEF COMPLAINT/REASON FOR CONSULTATION Goals of care, code status, family support    SUBJECTIVE:  Ms. Amaya Bates has no new complaints this AM. Her cough has continued to improve. She appears to be feeling better today. Denies pain or dyspnea. Plans for discharge to SNF at this point. Review of Systems:   14 point review of systems is negative except as specifically addressed above. Objective:   VITALS:  /64   Pulse 98   Temp 97.3 °F (36.3 °C) (Temporal)   Resp 28   Wt 190 lb (86.2 kg)   SpO2 93%   BMI 28.89 kg/m²   24HR INTAKE/OUTPUT:      Intake/Output Summary (Last 24 hours) at 5/18/2022 1226  Last data filed at 5/18/2022 0557  Gross per 24 hour   Intake 1812 ml   Output 1000 ml   Net 812 ml     General appearance: 79 yo female, frail, no acute distress, resting comfortably in bed   Head: Normocephalic, without obvious abnormality, atraumatic  Eyes: conjunctivae/corneas clear. PERRL, EOM's intact.    Ears: normal external ears and nose, hard of hearing  Neck: no JVD, supple, symmetrical, trachea midline   Lungs: improving rhonchi,no wheezing or rales  Heart: RRR, S1, S2 normal, no murmur  Abdomen:soft, non-tender; non-distended, bowel sounds present    Extremities:trace lower extremity edema,  No erythema, ecchymosis left hand  Skin: warm, dry  Neurologic: awake, speech fluent, follows commands without difficulty, generalized weakness  Psychiatric: appropriate mood/affect     Medications:      sodium chloride 25 mL/hr at 05/17/22 1008      potassium chloride  10 mEq Oral BID    metoprolol succinate  50 mg Oral Daily    mirtazapine  7.5 mg Oral Nightly  meropenem  1,000 mg IntraVENous Q12H    polyethylene glycol  17 g Oral Daily    sennosides-docusate sodium  2 tablet Oral Daily    vancomycin  750 mg IntraVENous Q24H    nystatin  5 mL Oral 4x Daily    sodium chloride flush  5-40 mL IntraVENous 2 times per day    guaiFENesin  600 mg Oral BID    pantoprazole (PROTONIX) 40 mg injection  40 mg IntraVENous Q24H    levothyroxine  175 mcg Oral Daily    folic acid  1 mg Oral Daily    vitamin D  50,000 Units Oral Weekly    ipratropium-albuterol  1 ampule Inhalation Q4H WA    vancomycin (VANCOCIN) intermittent dosing (placeholder)   Other RX Placeholder    phenytoin  300 mg Oral Daily     metoprolol, potassium chloride **OR** potassium alternative oral replacement **OR** potassium chloride, sodium chloride flush, sodium chloride, ondansetron **OR** ondansetron, polyethylene glycol, acetaminophen **OR** acetaminophen  ADULT DIET; Full Liquid; Moderately Thick (Honey)     Lab and other Data:     Recent Labs     05/16/22  0841 05/17/22 0213 05/18/22  0630   WBC 12.7* 11.1* 9.5   HGB 11.9* 11.6* 11.8*    265 318     Recent Labs     05/16/22  0841 05/17/22  0213 05/18/22  0630   * 139 141   K 3.3* 3.2* 3.3*    105 106   CO2 25 23 26   BUN 17 13 8   CREATININE 1.1* 0.9 0.8   GLUCOSE 104 117* 107     Assessment/Plan   Principal Problem:    Sepsis ([address]  Active Problems:    Atypical pneumonia    Palliative care patient    Essential hypertension    Acquired hypothyroidism    Seizure disorder ([facility] [address]    AMS (altered mental status)    UTI (urinary tract infection)  Resolved Problems:    * No resolved hospital problems. *    Visit Summary:  Chart reviewed. No acute overnight events. Ms. Michael Dutta has been accepted to [facility] at this time. No family present in the room. Patient's niece has been active in her care and assisting with decision making. No new needs identified at this time. Emotional support provided. Recommendations:   1.  Palliative care: [address] to [facility] when pre-cert approved. Code status: DNR ACP completed     2. Staphylococcus hominis bacteremia-ID following, No growth seen on repeat cultures    3. Pneumonia vs atelectasis-Vancomycin/Merrem continued, clinically improved     4. Dysphagia-tolerating modified diet at this time    5. AMS w/ hx of CVA-suspect underlying Vascular dementia-AMS improving per patient's family    6. Hypernatremia/hypokalemia-sodium WNL, Potassium 3.3 today, prn replacement orders      Thank you for consulting Palliative Care and allowing us to participate in the care of this patient.    Time Spent Counseling > 50%:  YES                                   Total Time Spent with patient/family counseling, workup/treatment review, counseling and placement of orders/preparation of this note: 20 minutes    Electronically signed by Saskia Tolbert PA-C on 5/18/2022 at 12:26 PM    (Please note that portions of this note were completed with a voice recognition program.  Efrain Redding made to edit the dictations but occasionally words are mis-transcribed.)

## 2022-05-18 NOTE — ADT AUTH CERT
Utilization Reviews         Sepsis and Other Febrile Illness, without Focal Infection - Care Day 4 (5/16/2022) by Nicole Durán RN       Review Status Review Entered   Completed 5/18/2022 10:32      Criteria Review      Care Day: 4 Care Date: 5/16/2022 Level of Care: Inpatient Floor    Guideline Day 3    Level Of Care    (X) Floor    5/18/2022 11:32 AM EDT by Jesse Le      med/surg    Clinical Status    ( ) * Mental status at baseline    (X) * Afebrile or fever improved    5/18/2022 11:32 AM EDT by Jesse Wagonering      temp 96.6    Activity    (X) Activity as tolerated    5/18/2022 11:32 AM EDT by Jesse Le      up with assistance    Routes    (X) * Oral hydration    5/18/2022 11:32 AM EDT by Jesse Wagonering      oral hydration   honey thck liquids    (X) Diet as tolerated    5/18/2022 11:32 AM EDT by Jesse Wagonering      full liquid diet with honey thick liquids    Interventions    (X) WBC    5/18/2022 11:32 AM EDT by Jesse Wagonering      wbc 12.7    Medications    (X) Possible antimicrobial treatment    5/18/2022 11:32 AM EDT by Jesse Le      merrem 1000mg iv q12hrs  vancomycin 750mg iv q24hrs    (X) Possible DVT prophylaxis    5/18/2022 11:32 AM EDT by Jesse Wagonering      scd's    * Milestone   Additional Notes   Kub: .  No radiographic evidence of acute abdominopelvic process         Na 147, k 3.3, creat 1.1, gfr 47, ca 7.2, wbc 12.7, hgb 11.9, hct 36.7         Bp: 123/50, pulse 101-104 persistently, resp 20, temp 96.6, o2 sat 95% on 4l per nasal cannula            Tylenol 650mg po q6hrs prn- had 1 dose   Folvite 1mg po daily   Mucinex 600mg po bid   Duonebs 1 ampule q4hrs while awake   Synthroid 175mcg po daily   Merrem 1000mg iv q12hrs   Remeron 7.5mg po qhs   Mycostatin 5ml po qid   Protonix 40mg iv daily   Dilantin er 300mg po daily   Glycolax 17grm po daily   Senokot 8.6-50mg 2 tabs po daily   Vancomycin 750mg iv q24hrs      Potassium chloride 10meq iv x2 runs      Dextrose 5% @ 5 cc/hr Toprol xl 35mg po daily                  HOSPITAL MEDICINE  - PROGRESS NOTE       Admit Date: 5/13/2022                             CC: AMS       Subjective: feels better, no chest pain, or palpitations, has cough and dyspnea, no N/V/D. No abd pain. May be constipated. No dysuria or hematuria.        Objective: no acute distress, sitting up in bed, asking for glass of water        Diet: ADULT DIET; Full Liquid; Moderately Thick (Honey)   Pain is: none    Nausea:None   Bowel Movement/Flatus no          Assessment & Plan:    · Sepsis, POA due to PNA and bacteremia- improving    · PNA, LLL suspect aspiration - merrem   · Hypoxia 2/2 to PNA   · Acute metabolic encephalopathy- improving     · Staph bacteremia- vanc, ID on board   · Vit D def- on replacement     · Folate def- on replacement     · DNR   · History of PE- NOT on anticoagulation at home    · GERD- IV PPI   · Hypokalemia- replace    · Hypernatremia- start D5       Patient Active Problem List:      History of cardioembolic cerebrovascular accident (CVA)      Essential hypertension      Acquired hypothyroidism      Seizure disorder (HCC)      Generalized weakness        See orders    Disposition: to [facility]       Hayley Sheridan MD                     Speech Language Pathology      Reason for Referral   [facility] was referred for a bedside swallow evaluation to assess the efficiency of her swallow function, identify signs and symptoms of aspiration and make recommendations regarding safe dietary consistencies, effective compensatory strategies, and safe eating environment.       Impression   Re-assessed patient's swallowing function. Patient exhibited decreased oral prep of more solid consistencies, fast oral transit and suspected swallow delay with even thickened liquid consistencies, and sluggish, mild-moderately decreased laryngeal elevation for swallow airway protection.  No outward S/S penetration/aspiration was noted with any ice chip trial, moderately thick/honey thick liquid trial, or mildly thick/nectar thick liquid trial presented during treatment session this date. Mild delayed coughs were observed with puree consistency trials. It is noted that moderate delayed coughs were observed with thin H2O trials. Did not note swallow function with any additional consistency secondary to observed lethargy within short period of time.       At this time, would trial full liquid diet with moderately thick/honey thick liquids. Recommend meds crushed in pudding/applesauce. If patient receives mouth care prior to intake, okay for ice chips IN BETWEEN MEALS for comfort. Will continue to follow.       Treatment Plan   Requires SLP Intervention: Yes       Recommended Diet and Intervention   Liquid Consistency Recommendation: Full liquid diet with moderately thick/honey think liquids    Recommended Form of Meds: Meds crushed in puree as able   Therapeutic Interventions: Patient/Family education;Diet tolerance monitoring; Therapeutic PO trials with SLP       Compensatory Swallowing Strategies   Compensatory Swallowing Strategies: Upright as possible for all oral intake;Small bites/sips;Eat/Feed slowly; Alternate solids and liquids; Remain upright for 30-45 minutes after meals       Treatment/Goals   Timeframe for Short-term Goals: 1x/day for 3 days    Goal 1: Patient will tolerate full liquid diet with moderately thick/honey thick liquids with min S/S penetration/aspiration during PO intake. Goal 2: Patient staff will follow swallow safety recommendations to decrease risk of penetration/aspiration during PO intake. Goal 3: Re-assessment of swallow function for potential diet upgrade. Goal 4: Monitor speech production. Goal 5: Potential cognitive-linguistic eval        General   Chart Reviewed: Yes   Behavior/Cognition: Patient appeared lethargic within short period of time.    O2 Device: Nasal Cannula   Communication Observation: (Assessed patient's speech production. Patient exhibited decreased volume of speech and slow, decreased lingual movements during verbalizations. SLP ranked functional intelligibility of speech for unfamiliar listeners at 50-60% in utterances with background noise present.)   Follows Directions: Simple    Patient Positioning: Upright in bed   Consistencies Administered: Ice chips;Dysphagia Pureed (Dysphagia I); Honey - cup;Nectar - cup; Thin - cup        Oral Motor Examination    Labial ROM: (Decreased, bilaterally, during labial protrusion trials.)   Labial Strength: (Decreased during labial compression trials.)   Labial Coordination: (Slowed movements were noted.)   Lingual ROM: (Adequate during lingual extension trials with full point achieved; decreased during lingual elevation trials without use of accessory jaw movement; decreased movements noted bilaterally.)   Lingual Strength: (Decreased.)   Lingual Coordination: (Slowed movements were noted.)       Re-assessed patient's swallowing function with the following observations noted:       Oral Phase   Mastication: Ice chips (Patient exhibited decreased vertical jaw movement at the front of the mouth during oral prep of ice chip trials presented by SLP.)   Suspected Premature Bolus Loss: Ice chips;Puree;Nectar - cup; Thin - cup (Oral transit of ice chip trials was considered inconsistently fast. Oral transit of puree consistency trials, presented by SLP, varied from 1-4 seconds in length. Patient exhibited fast oral transit of nectar thick liquid trials and thin H2O trials presented via cup by SLP.)   Oral Phase - Comment: Observed oral cavity at start of treatment session and copious amount of sticky mucus was noted throughout the mouth. Complete oral care prior to presentation of PO trials. No puree consistency residue was observed post swallows. .    Pharyngeal Phase   Suspected Swallow Delay: Ice chips;Puree;Nectar - cup; Thin - cup (Suspect secondary to oral transit times.)   Laryngeal Elevation: (Patient exhibited sluggish, mild-moderately decreased laryngeal elevation for swallow airway protection.)   Delayed Coughs: Puree; Thin - cup    Pharyngeal Phase - Comment: No outward S/S penetration/aspiration was noted with any ice chip trial, moderately thick/honey thick liquid trial, or mildly thick/nectar thick liquid trial presented during treatment session this date. Mild delayed coughs were observed with puree consistency trials. It is noted that moderate delayed coughs were observed with thin H2O trials. Did not note swallow function with any additional consistency secondary to observed lethargy within short period of time.       At this time, would trial full liquid diet with moderately thick/honey thick liquids. Recommend meds crushed in pudding/applesauce. If patient receives mouth care prior to intake, okay for ice chips IN BETWEEN MEALS for comfort. Will continue to follow.       Electronically signed by Sunday Landau, SLP on 5/16/2022 at 1:24 PM                     Case management/d/c planning note:      Call placed to Omayra Alejandra Paredes, to follow up on [place] is currently down in the icu visiting her spouse whom is also hospitalized. [facility] plans to follow up with her in the morning in pts room. Electronically signed by Shyann Yeh RN on 5/16/2022 at 3:36 PM                   Palliative Care Consult Note       .History Of Present Illness: The patient is a 80 y.o. female with PMH CAD, CVA, HLD, HTN, Hypothyroidism, seizure disorder who presented to [facility] ED on 05/13/2022 for altered mental status. Patient lives with her niece and noticed she was less responsive than normal and had a wet sounding cough. She also reported patient had decreased appetite for several days. EMS was summoned, patient was noted to be hypoxic. CTA chest was concerning for interval partial LLL collapse w/ narrowing of LLL bronchi, possibly related to mucous secretions.  CT head revealed a chronic small cerebellar infarct, chronic left occipital cortical and subcortical infarct and mild to moderate atrophy with chronic small vessel disease. WBC 16.6, lactic acid 4.1. Patient was admitted to Hospitalist service for sepsis, pneumonia and was placed on empiric antibiotic therapy. Blood cultures are concerning for staph hominis. ID is following and patient is currently receiving Vancomycin and Merrem. Palliative care has been consulted to assist with goals of care, family support and symptom management. Visit Summary:   Chart reviewed, patient discussed with consulting service and nursing staff. Reviewed health issues, work up and treatment plan as well as factors that lead to hospitalization. I saw Ms. Sheth at her bedside with her nephew present in the room. I introduced myself, the role of palliative care and the reason for consultation. Patient's Nephew states his sister can answer more questions but is currently down in ICU visiting her . He tells me she will be back and can have further conversation in AM. He is able to tell me that patient has been bed bound for about one year. Her mental status has improved since admission. Patient is hard of hearing and is able to respond to questions. She denies pain. Endorses a cough. She also shows me bruising on her left hand. Plain films were done on admit and revealed advanced osteoarthritis but was negative for fracture. Her nephew states she grabs on to bed rail with that hand any time she is repositioned for fear of falling. Otherwise she has had no known falls or other injuries. She has been seen by speech therapy who has recommended full liquid diet w/ honey thick liquids. Patient tolerated this well thus far per her Nephew. She does have a living will on file that names Jr Parra as her healthcare surrogate. I will follow up with Inocente Dumas in the morning.        Recommendations:        1. Palliative Care-GOC to be determined.  Patient's nephew voices interest in SNF. Code status: DNR   2. Staphylococcus hominis bacteremia-no identifiable source, ID following, empiric antibiotics continued    3. Pneumonia vs atelectasis-Vancomycin/Merrem continued, Leukocytosis improving   4. Dysphagia-SLP following, modified diet ordered    5. AMS w/ Hx of CVA, question component of vascular dementia as well-bed bound at this time, Patient's family report improvement in mental status w/ treatment   6.  Hypernatremia/hypokalemia-Hospitalist aware and managing            Sepsis and Other Febrile Illness, without Focal Infection - Care Day 3 (5/15/2022) by Shilpa Newman RN       Review Status Review Entered   Completed 5/18/2022 10:16      Criteria Review      Care Day: 3 Care Date: 5/15/2022 Level of Care: Inpatient Floor    Guideline Day 2    Level Of Care    (X) ICU or floor    5/18/2022 11:16 AM EDT by Abimael Soriano      med/surg    Clinical Status    ( ) * Hemodynamic stability    ( ) * Hypoxemia absent    ( ) * Tachypnea absent    Interventions    (X) WBC    5/18/2022 11:16 AM EDT by Abimael Soriano      wbc 22.5    Medications    (X) Possible antimicrobial treatment    5/18/2022 11:16 AM EDT by Abimael Soriano      vancomycin 750mg iv q24hrs  zosyn 4500mg iv q8hrs    (X) Possible DVT prophylaxis    5/18/2022 11:16 AM EDT by Abimael Soriano      scd's    * Milestone   Additional Notes   Ruslan   Scd's   Daily cbc, bmp   Continuous pulse ox monitoring   Routine vitals q4hrs   Dnr   Speech eval and treat   Pt/ot eval and treat   Up as tolerated         Blood culture #1: staphylococcus hominis         Bp: 142/72, pulse 103-110 persistently, temp 98.2, o2 sat 89% on 4l per nasal cannula         Folvite 1mg po daily   Mucinex 600mg po bid   Duonebs 1 ampule q4hrs while awake   Synthroid 175mcg po daily   Mycostatin 5ml po qid   Protonix 40mg iv q24hrs   Dilantin er 300mg po daily   Vancomycin 750mg iv q24hrs      Potassium chloride 10meq iv x1      Unasyn 300mg iv [id number] Merrem 1000mg iv q12hrs   Lopressor 2.5mg iv q6hrs   Zosyn 4500mg iv q8hrs   Sodium bicarb w/20k @ 100 cc/hr               HOSPITAL MEDICINE  - PROGRESS NOTE       Admit Date: 5/13/2022                             CC: AMS       Subjective: obtunded        Objective: obtunded, in bed, on 4 LNC oxygen        Diet: Diet NPO   Pain is: none    Nausea:None   Bowel Movement/Flatus no       General appearance: obtunded in no distress   Skin: Skin color, texture, turgor normal.    HEENT: Head: Normocephalic, no lesions, without obvious abnormality. Neck: no adenopathy, no carotid bruit, no JVD and supple, symmetrical, trachea midline   Lungs: rales bilaterally   Heart: regular rate and rhythm, S1, S2 normal, no murmur, click, rub or gallop   Abdomen: soft, non-tender; bowel sounds normal; no masses,  no organomegaly   Extremities: extremities normal, atraumatic, no cyanosis or edema   Lymphatic: No significant lymph node enlargement papable   Neurologic: Mental status: obtunded                Assessment & Plan:    · Sepsis, POA due to PNA and bacteremia   · PNA, LLL suspect aspiration - merrem   · Hypoxia 2/2 to PNA   · Acute metabolic encephalopathy   · Staph bacteremia- vanc, follow cultures, ID on board   · Vit D def- replacement     · Folate def- replacement     · DNR   · History of PE- NOT on anticoagulation at home    · GERD- IV PPI   · Hypokalemia- replace        Patient Active Problem List:      History of cardioembolic cerebrovascular accident (CVA)      Essential hypertension      Acquired hypothyroidism      Seizure disorder (HCC)      Generalized weakness        See orders    Disposition: will need SNF if improves, if doesn't palliative consult        Konstantin Schwartz MD                  Infectious disease note:      After I completed consult, niece returned to room.  Went in to speak with her to try and get some additional history.    She indicates for the few days prior to admission she had been eating poorly.  She had had some increased weakness.  She had had some increased confusion.  She typically is not on oxygen at home. Omer Dandy niece checked her oxygen level and it was in the low 80s.  Typically will run in the low to mid 90s. She had also noticed some increasing cough and congestion as well. She indicates oftentimes when she has had a UTI or other infection in the past she will rebound more quickly. Petty Jefferson indicates currently it seems like she remains weak and only wants to sleep. Reviewed culture results with her niece. [place] hominis is typically associated with foreign body infection and is not a common pathogen outside that setting (also explained it is a little bit atypical for contaminant to grow from all 4 blood cultures).  Explained we are treating broadly to cover both the positive blood cultures and for the possibility of a left lower lobe pneumonia.  Explained prognosis given advanced age and other medical conditions remains guarded. Continue supportive care.  Continue to follow.       Corry Quiroz MD               INFECTIOUS DISEASES CONSULT NOTE                  Physical Therapy      History of Present Illness/Chief Complaint: 44-year-old woman.  No family at bedside at present. Gerry Hernandez is obtained from chart review.  Talked with nurse who has her today. Petty Jefferson was not provided much history at [facility] was not on-call today.  Noticed patient's name on my list as I was trying to complete paperwork.  Came to see patient and assist with care. Per review of her admit H&P she had presented to the emergency room. Lucetta Record have been complaints of fatigue and a change in mental status.  Per review of the admit H&P she has had some cough, congestion, and decreased oral intake.  She had had some increased confusion.  When I am in the room trying to speak with her and examine her today I can get her to arouse.  I can get her to mumble a few answers which I could cannot really understand.  She did seem to have some congestion and cough some during evaluation.  She is on oxygen via nasal cannula.  Per review of the admit H&P she had not been swallowing her food and had been more holding it in her mouth and spitting it out the day or 2 prior to admission.  She had not been experiencing any vomiting or diarrhea. Armin Moore has had positive blood cultures.  Infectious disease asked to evaluate and offer recommendations. Initial past medical history, surgical history and other background history as outlined below was obtained via chart review. Impression:    1.  Possible left lower lobe pneumonia versus atelectasis on CT scan.  She does seem to be at risk for aspiration pneumonia. 2.  Positive blood cultures for Staphylococcus hominis-the fact this bacteria grew from all 4 bottles would suggest it may be a pathogen, this organism however is not a common pathogen in the absence of an indwelling intravascular foreign body such as an IV line, [id number], or prosthetic valve. 3.  Leukocytosis-likely related to pneumonia/positive blood cultures   4.  Advanced age   11.  Change in mental status-suspect multifactorial       Recommendations:     Continue broad antibiotic treatment to cover both the Staph hominis in the blood and also for potential causes of left lower lobe pneumonia.  If pneumonia, suspect possible aspiration. To avoid some of the increased risk of renal dysfunction combining vancomycin with a penicillin derivative, going to change current antibiotic treatment to vancomycin plus meropenem   Going to ask for manual differential on white blood cell count to make sure leukocytosis related to neutrophilia as opposed to some other hematologic process   Continue to monitor   Paola Davey MD   05/15/22   11:53 AM       Discharge Recommendations:   Continue to assess pending progress       Assessment    Assessment: FAMILY MEMBER (NIECE) IN ROOM TO VERIFY PLOF.  Pt IS BEDBOUND, REQUIRES TOTAL CARE BUT RECENTLY WAS ABLE TO ASSIST IN FEEDING HERSELF. NIECE REPORTS CHANGE IN AROUSAL LEVEL WITH CURRENT ILLNESS. Pt AT BASELINE FOR MOBILITY. No Skilled PT: No PT goals identified   Requires PT Follow-Up: NO   Plan    Safety Devices   Type of Devices: Bed alarm in place        Restrictions            Subjective    Pain: Pt MOANS SLIGHTLY AND GRIMACES WITH RUE PROM AND PARRISH ANKLE PROM.    General   Diagnosis: SEPSIS, PNA   Subjective   Subjective: NO VERBALIZATIONS        Social/Functional History   Social/Functional History   Additional Comments: Pt BEDBOUND, NIECE REPORTS WAS ABLE TO ASSIST IN FEEDING WHEN SET-UP PROPERLY   Vision/Hearing         Cognition    Cognition   Cognition Comment: DOES NOT RESPOND TO QUESTIONS, RESPONDS TO PAIN        Objective    Pulse: 116   Heart Rate Source: Monitor   BP: (!) 142/72   MAP (Calculated): 95.33   Resp: 22   SpO2: 90 %   O2 Device: Nasal cannula   Gross Assessment   AROM: Grossly decreased, non-functional   PROM: Within functional limits (EXCEPT RUE DUE TO PAIN, PARRISH ANKLES POSITIONED IN FULL PF)   Strength: Grossly decreased, non-functional        Bed mobility   Rolling to Left: Dependent/Total   Rolling to Right: Dependent/Total   Supine to Sit: Unable to assess   Balance   Comments: UNABLE TO ASSESS

## 2022-05-18 NOTE — PROGRESS NOTES
[facility]      Patient:  Nereyda Gillespie  YOB: 1931  Date of Service: 5/18/2022  MRN: [id number]   Acct: [de-identified]   Primary Care Physician: Shan Mccarthy MD  Advance Directive: DNR  Admit Date: 5/13/2022       Hospital Day: 5  Portions of this note have been copied forward, however, changed to reflect the most current clinical status of this patient. CHIEF COMPLAINT AMS    SUBJECTIVE: Somewhat talkative but difficult to understand. No complaints today. No issues overnight. CUMULATIVE HOSPITAL STAY:  Ms. Ana Cristina Palacios is a 26-year-old female who was admitted on 5/13/2022 due to complaints of altered mental status and fatigue. Medical history includes CVA, depression, CAD, HTN, hypothyroidism and seizures. Family reported that the patient had decreased intake of food and fluid 2 days prior to the admission. Her niece is her primary caretaker states that she has been generally weak and more fatigued. Additionally reported that she was not swallowing her food and possibly pocketing them in her buccal cavity and spitting it out. Further ED work-up revealed a CTA no pulmonary embolism interval partial lower lobe collapse with narrowing of the left lower lobe bronchi questionably related to mucus secretions with secretions in the dependent portion of the trachea, fluid within the esophagus. Chest x-ray showed hypoventilation and vascular crowding stable bronchial wall thickening. CT of the head was negative. Chronic cerebellar infarct. She was found to have leukocytosis and hypoxia with lactic acid of 4.1 upon admission. The patient was subsequently diagnosed with sepsis due to positive blood cultures with staph hominis-ID was consulted. Patient was changed to [address] per ID. Blood cultures since 5/14/2022 show NGTD. Her mental status has progressively improved. Family has requested SNF placement Case management involved.   Patient received Vanco and Merrem per ID recommendations through 5/19/2022. Review of Systems:   Review of Systems   Unable to perform ROS: Mental status change         Objective:   VITALS:  BP (!) 151/89   Pulse 117   Temp 97.5 °F (36.4 °C)   Resp 30   Wt 190 lb (86.2 kg)   SpO2 95%   BMI 28.89 kg/m²   24HR INTAKE/OUTPUT:    Intake/Output Summary (Last 24 hours) at 5/18/2022 1808  Last data filed at 5/18/2022 1300  Gross per 24 hour   Intake 2292 ml   Output 400 ml   Net 1892 ml       Physical Exam        Medications:      sodium chloride 25 mL/hr at 05/17/22 1008      potassium chloride  10 mEq IntraVENous Q1H    potassium chloride  10 mEq Oral BID    metoprolol succinate  50 mg Oral Daily    mirtazapine  7.5 mg Oral Nightly    meropenem  1,000 mg IntraVENous Q12H    polyethylene glycol  17 g Oral Daily    sennosides-docusate sodium  2 tablet Oral Daily    vancomycin  750 mg IntraVENous Q24H    nystatin  5 mL Oral 4x Daily    sodium chloride flush  5-40 mL IntraVENous 2 times per day    guaiFENesin  600 mg Oral BID    pantoprazole (PROTONIX) 40 mg injection  40 mg IntraVENous Q24H    levothyroxine  175 mcg Oral Daily    folic acid  1 mg Oral Daily    vitamin D  50,000 Units Oral Weekly    ipratropium-albuterol  1 ampule Inhalation Q4H WA    vancomycin (VANCOCIN) intermittent dosing (placeholder)   Other RX Placeholder    phenytoin  300 mg Oral Daily     metoprolol, potassium chloride **OR** potassium alternative oral replacement **OR** potassium chloride, sodium chloride flush, sodium chloride, ondansetron **OR** ondansetron, polyethylene glycol, acetaminophen **OR** acetaminophen  ADULT DIET; Full Liquid;  Moderately Thick (Honey)     Lab and other Data:     Recent Labs     05/16/22  0841 05/17/22  0213 05/18/22  0630   WBC 12.7* 11.1* 9.5   HGB 11.9* 11.6* 11.8*    265 318     Recent Labs     05/16/22  0841 05/17/22  0213 05/18/22  0630   * 139 141   K 3.3* 3.2* 3.3*    105 106   CO2 25 23 26   BUN 17 13 8 CREATININE 1.1* 0.9 0.8   GLUCOSE 104 117* 107       RAD:   XR HAND RIGHT (MIN 3 VIEWS)    Result Date: 5/13/2022  1. No visualized fracture or joint subluxation. 2. Advanced osteoarthritis changed; interphalangeal joints, first CMC joint and triscaphe joint. Signed by Dr Carlos Carroll    XR ABDOMEN (KUB) (SINGLE AP VIEW)    Result Date: 5/16/2022  1. No radiographic evidence of acute abdominopelvic process. Signed by Dr Luís Traylor    Result Date: 5/13/2022  1. No hemorrhage, edema or mass effect. 2. Chronic small cerebellar infarct. Chronic left occipital cortical and subcortical infarct. 3. Mild to moderate atrophy. Moderate to severe dilatation of the lateral and third ventricles is likely related to the atrophy. 4. Low-density in the hemispheric white matter is nonspecific and likely due to chronic small vessel disease. 5. Prior mastoidectomy on the right. The full report of this exam was immediately signed and available to the emergency room. The patient is currently in the emergency room. Signed by Dr Jeff Mcmullen    XR CHEST PORTABLE    Result Date: 5/13/2022  1. Hypoventilation with vascular crowding. 2. Stable scarring in the left costophrenic angle. 3. Stable bronchial wall thickening. Signed by Dr Jeff Mcmullen    CTA PULMONARY W CONTRAST    Result Date: 5/13/2022  1. No pulmonary emboli. Previous emboli have resolved since the 2/23/2020 study. 2. Moderate vascular calcification. Diffuse coronary artery calcification. Cardiomegaly. 3. Interval partial left lower lobe collapse with narrowing of the left lower lobe bronchi, perhaps related to mucous secretions. Secretions are identified within the dependent portion of the trachea. Fluid present within the esophagus (which may relate to gastroesophageal reflux and reported primary peristalsis) increases risk for aspiration.  Signed by Dr Flavio Cha     Blood cultures May 13, 2022-Staph hominis  Blood cultures May 14, 2022-no growth  Blood cultures May 15, 2022-no growth  Blood cultures 5/17/2022-no growth      Assessment/Plan   Principal Problem:    Sepsis ([address]   -Follow labs   -Continue Vanco and Merrem through 5/19/2022 per ID recommendations   -Vital signs   -Vanco dosing through pharmacy   -Leukocytosis resolved today-9.5   -Strict I&O   -Continue to follow previous cultures    Active Problems:    Atypical pneumonia   -PT OT   -Aspiration precautions   -Oxygen per protocol      Palliative care patient   -Palliative care following      Essential hypertension   -Continue current medications      Acquired hypothyroidism   Continue current levothyroxine dosing      Seizure disorder ([address]   -continue current antiepileptics      AMS (altered mental status)   -Improved      UTI (urinary tract infection)   -Merrem    Resolved Problems:    * No resolved hospital problems.  *      Antibiotic: Vanco/Merrem through May 19, 2022 per ID    DVT Prophylaxis: SCDs    GI prophylaxis: Pantoprazole    Disposition: TBD-case management working on placement    VIN Bob, 5/18/2022 6:08 PM

## 2022-05-18 NOTE — CARE COORDINATION
5/18/22: referral sent to [facility]; Pt's niece has decided since her own spouse likely to go to local LTACH; she wants Pt to go to a local SNF also; she is adamant per Francisco Javier.   ([facility] started precert yesterday)  Electronically signed by IVA Knapp on 5/18/2022 at 4:06 PM

## 2022-05-18 NOTE — PROGRESS NOTES
Speech Language Pathology  Facility/Department: [facility]  Dysphagia Daily Treatment Note    NAME: Kimi Chaves  : 1931  MRN: [id number]    Patient Diagnosis(es):   Patient Active Problem List    Diagnosis Date Noted    Palliative care patient 2022    Sepsis ([facility] 2022    Atypical pneumonia 2022    Thalamic hemorrhage ([address]     Left-sided nontraumatic intracerebral hemorrhage ([address] Left Thalamic 08/10/2021    AMS (altered mental status) 08/10/2021    UTI (urinary tract infection)     Complicated urinary tract infection 04/10/2021    Generalized weakness 04/10/2021    Hypocalcemia 04/10/2021    Acute bronchitis 04/10/2021    Leg pain, left 2020    Leg swelling 2020    Seizure disorder ([address] 10/02/2019    Compression fx, thoracic spine, closed, initial encounter ([facility] [address] 2019    History of cardioembolic cerebrovascular accident (CVA) 2019    Essential hypertension 2019    Acquired hypothyroidism 2019     Allergies: Allergies   Allergen Reactions    Morphine      Pt becomes mean (refusing, cursing)         Per physician notes:  81 yo female presented from home with AMS, dx with aspiration PNA and bacteremia. ID evaluated. Would suggest continuing vancomycin and meropenem through May 19, 2022. They signed off. Pt is returning back to her baseline. SNF placement needed.       CXR 22:  Narrative   EXAMINATION:  XR CHEST PORTABLE  2022 1:08 PM   HISTORY: Altered. Hypertension. COMPARISON: 8/10/2021. TECHNIQUE: Single view portable chest x-ray. FINDINGS: Carlie Seller is mild hypoventilation with vascular crowding. There   is stable scarring in the left costophrenic angle. There is no dense   consolidation. Mild bronchial wall thickening is stable. Heart size is   upper limits of normal. The thoracic aorta is atheromatous.       Impression   1. Hypoventilation with vascular crowding.    2. Stable scarring in the left costophrenic angle. 3. Stable bronchial wall thickening. Signed by Dr Bridgett Arellano           Pain:  Pain Assessment  Pain Assessment: Toscano-Baker FACES  Pain Level: 0  Toscano-Baker Pain Rating: No hurt  Pain Location: Head        P.O. Trials: The patient was upright in bed and her caregiver was present. Her nurse aide was feeding her breakfast this morning. She tolerated bites of yogurt and moderately thick liquids via cup and straw. She was presented with nectar thick liquids via cup by the SLP and immediate extended wet coughing episode was observed. A very small controlled sip of thin liquid was also presented and no overt s/s of aspiration was observed. Moderately weak hyolaryngeal elevation and delayed swallow responses (up to five seconds) were noted. Her nurse aide did report that coughing with cream of wheat was observed earlier during breakfast. She did exhibit a strong cough and throat clear upon command. She did exhibit good vocal intensity as well. Left labial asymmetry and left lingual deviation was noted. She does wear an upper denture. She does not wear a lower denture. White lingual coating was observed. She had just completed breakfast and did not wish to attempt further p.o. trials. Impressions: The patient tolerated honey thick liquids via cup and straw as well as yogurt. Moderately weak hyolaryngeal elevation and delayed swallow responses were noted this date. Do suspect premature loss of the bolus over the tongue base. Recommend she remain on a full liquid diet(moderately thick/honey thick liquids). Medications should be crushed in applesauce as able. She is afebrile this date. Recommend consider completing a modified barium swallow study to further evaluate the swallow function and determine if upgrade is possible.       Recommended Diet and Intervention  Liquid Consistency Recommendation: Full liquid diet with moderately thick/honey think liquids   Recommended Form of Meds: Meds crushed in puree as able  Therapeutic Interventions: Patient/Family education;Diet tolerance monitoring; Therapeutic PO trials with SLP     Compensatory Swallowing Strategies  Compensatory Swallowing Strategies: Upright as possible for all oral intake;Small bites/sips;Eat/Feed slowly; Alternate solids and liquids; Remain upright for 30-45 minutes after meals     Treatment/Goals  Timeframe for Short-term Goals: 1x/day for 3 days   Goal 1: Patient will tolerate full liquid diet with moderately thick/honey thick liquids with min S/S penetration/aspiration during PO intake. Goal 2: Patient staff will follow swallow safety recommendations to decrease risk of penetration/aspiration during PO intake. Goal 3: Re-assessment of swallow function for potential diet upgrade. Goal 4: Monitor speech production. Goal 5: Potential cognitive-linguistic eval              Patient/Family/Caregiver Education:  Discussed rationale for current diet and and reviewed swallowing precautions. Plan:    Continued daily Dysphagia treatment with goals per plan of care.             Electronically Signed By:  Krystian Hudson  5/18/2022,11:15 AM.

## 2022-05-19 LAB
ANION GAP SERPL CALCULATED.3IONS-SCNC: 10 MMOL/L (ref 7–19)
BLOOD CULTURE, ROUTINE: NORMAL
BUN BLDV-MCNC: 6 MG/DL (ref 8–23)
CALCIUM SERPL-MCNC: 7.2 MG/DL (ref 8.8–10.2)
CHLORIDE BLD-SCNC: 106 MMOL/L (ref 98–111)
CO2: 21 MMOL/L (ref 22–29)
CREAT SERPL-MCNC: 0.8 MG/DL (ref 0.5–0.9)
CULTURE, BLOOD 2: NORMAL
GFR AFRICAN AMERICAN: >59
GFR NON-AFRICAN AMERICAN: >60
GLUCOSE BLD-MCNC: 118 MG/DL (ref 74–109)
HCT VFR BLD CALC: 37.2 % (ref 37–47)
HEMOGLOBIN: 11.5 G/DL (ref 12–16)
MCH RBC QN AUTO: 30.6 PG (ref 27–31)
MCHC RBC AUTO-ENTMCNC: 30.9 G/DL (ref 33–37)
MCV RBC AUTO: 98.9 FL (ref 81–99)
PDW BLD-RTO: 14.5 % (ref 11.5–14.5)
PLATELET # BLD: 324 K/UL (ref 130–400)
PMV BLD AUTO: 9.7 FL (ref 9.4–12.3)
POTASSIUM SERPL-SCNC: 3.8 MMOL/L (ref 3.5–5)
RBC # BLD: 3.76 M/UL (ref 4.2–5.4)
SODIUM BLD-SCNC: 137 MMOL/L (ref 136–145)
WBC # BLD: 9.6 K/UL (ref 4.8–10.8)

## 2022-05-19 PROCEDURE — 2700000000 HC OXYGEN THERAPY PER DAY

## 2022-05-19 PROCEDURE — 92526 ORAL FUNCTION THERAPY: CPT

## 2022-05-19 PROCEDURE — 6360000002 HC RX W HCPCS: Performed by: HOSPITALIST

## 2022-05-19 PROCEDURE — 6370000000 HC RX 637 (ALT 250 FOR IP): Performed by: HOSPITALIST

## 2022-05-19 PROCEDURE — C9113 INJ PANTOPRAZOLE SODIUM, VIA: HCPCS | Performed by: HOSPITALIST

## 2022-05-19 PROCEDURE — 2580000003 HC RX 258: Performed by: HOSPITALIST

## 2022-05-19 PROCEDURE — 85027 COMPLETE CBC AUTOMATED: CPT

## 2022-05-19 PROCEDURE — 94640 AIRWAY INHALATION TREATMENT: CPT

## 2022-05-19 PROCEDURE — 92507 TX SP LANG VOICE COMM INDIV: CPT

## 2022-05-19 PROCEDURE — 6360000002 HC RX W HCPCS: Performed by: INTERNAL MEDICINE

## 2022-05-19 PROCEDURE — 80048 BASIC METABOLIC PNL TOTAL CA: CPT

## 2022-05-19 PROCEDURE — 99231 SBSQ HOSP IP/OBS SF/LOW 25: CPT | Performed by: PHYSICIAN ASSISTANT

## 2022-05-19 PROCEDURE — 36415 COLL VENOUS BLD VENIPUNCTURE: CPT

## 2022-05-19 PROCEDURE — 1210000000 HC MED SURG R&B

## 2022-05-19 PROCEDURE — 97165 OT EVAL LOW COMPLEX 30 MIN: CPT

## 2022-05-19 PROCEDURE — 97161 PT EVAL LOW COMPLEX 20 MIN: CPT

## 2022-05-19 RX ADMIN — NYSTATIN 500000 UNITS: 100000 SUSPENSION ORAL at 14:57

## 2022-05-19 RX ADMIN — IPRATROPIUM BROMIDE AND ALBUTEROL SULFATE 1 AMPULE: 2.5; .5 SOLUTION RESPIRATORY (INHALATION) at 10:26

## 2022-05-19 RX ADMIN — IPRATROPIUM BROMIDE AND ALBUTEROL SULFATE 1 AMPULE: 2.5; .5 SOLUTION RESPIRATORY (INHALATION) at 14:40

## 2022-05-19 RX ADMIN — SODIUM CHLORIDE, PRESERVATIVE FREE 10 ML: 5 INJECTION INTRAVENOUS at 21:13

## 2022-05-19 RX ADMIN — MIRTAZAPINE 7.5 MG: 15 TABLET, FILM COATED ORAL at 21:12

## 2022-05-19 RX ADMIN — GUAIFENESIN 600 MG: 600 TABLET ORAL at 21:12

## 2022-05-19 RX ADMIN — NYSTATIN 500000 UNITS: 100000 SUSPENSION ORAL at 10:36

## 2022-05-19 RX ADMIN — IPRATROPIUM BROMIDE AND ALBUTEROL SULFATE 1 AMPULE: 2.5; .5 SOLUTION RESPIRATORY (INHALATION) at 06:37

## 2022-05-19 RX ADMIN — MEROPENEM AND SODIUM CHLORIDE 1000 MG: 1 INJECTION, SOLUTION INTRAVENOUS at 02:25

## 2022-05-19 RX ADMIN — MEROPENEM AND SODIUM CHLORIDE 1000 MG: 1 INJECTION, SOLUTION INTRAVENOUS at 14:56

## 2022-05-19 RX ADMIN — POTASSIUM CHLORIDE 10 MEQ: 10 TABLET, EXTENDED RELEASE ORAL at 21:12

## 2022-05-19 RX ADMIN — IPRATROPIUM BROMIDE AND ALBUTEROL SULFATE 1 AMPULE: 2.5; .5 SOLUTION RESPIRATORY (INHALATION) at 19:20

## 2022-05-19 RX ADMIN — NYSTATIN 500000 UNITS: 100000 SUSPENSION ORAL at 18:09

## 2022-05-19 RX ADMIN — SODIUM CHLORIDE, PRESERVATIVE FREE 40 MG: 5 INJECTION INTRAVENOUS at 14:57

## 2022-05-19 RX ADMIN — NYSTATIN 500000 UNITS: 100000 SUSPENSION ORAL at 21:12

## 2022-05-19 RX ADMIN — SODIUM CHLORIDE, PRESERVATIVE FREE 10 ML: 5 INJECTION INTRAVENOUS at 09:00

## 2022-05-19 RX ADMIN — LEVOTHYROXINE SODIUM 175 MCG: 25 TABLET ORAL at 10:38

## 2022-05-19 RX ADMIN — VANCOMYCIN HYDROCHLORIDE 750 MG: 750 INJECTION, POWDER, LYOPHILIZED, FOR SOLUTION INTRAVENOUS at 21:18

## 2022-05-19 NOTE — PROGRESS NOTES
Palliative Care Progress Note  5/19/2022 2:22 PM    Patient:  Gigi Shahid  YOB: 1931  Primary Care Physician: Dominik Borrero MD  Advance Directive: DNR  Admit Date: 5/13/2022       Hospital Day: 6  Portions of this note have been copied forward, however, changed to reflect the most current clinical status of this patient. CHIEF COMPLAINT/REASON FOR CONSULTATION Goals of care, code status, family support    SUBJECTIVE:  Ms. COELLO has no new complaints. States she tolerated breakfast and lunch without difficulty. Denies pain or dyspnea today. Review of Systems:   14 point review of systems is negative except as specifically addressed above. Objective:   VITALS:  /64   Pulse 115   Temp 97.7 °F (36.5 °C) (Temporal)   Resp 16   Ht 5' 8\" (1.727 m)   Wt 190 lb (86.2 kg)   SpO2 99%   BMI 28.89 kg/m²   24HR INTAKE/OUTPUT:      Intake/Output Summary (Last 24 hours) at 5/19/2022 1422  Last data filed at 5/19/2022 1405  Gross per 24 hour   Intake 600 ml   Output 1050 ml   Net -450 ml     General appearance: 81 yo female, frail, no acute distress, resting comfortably in bed in supine position  Head: Normocephalic, without obvious abnormality, atraumatic  Eyes: conjunctivae/corneas clear. PERRL, EOM's intact.    Ears: normal external ears and nose, hard of hearing  Neck: no JVD, supple, symmetrical, trachea midline   Lungs: improving rhonchi,no wheezing or rales  Heart: regular rate rhythm, S1, S2 normal, no murmur  Abdomen:soft, non-tender; non-distended, bowel sounds present    Extremities: trace lower extremity edema,  No erythema, ecchymosis left hand  Skin: warm, dry  Neurologic: awake, speech fluent, follows simple commands, generalized weakness  Psychiatric: calm, flat affect     Medications:      sodium chloride 25 mL/hr at 05/17/22 1008      potassium chloride  10 mEq Oral BID    metoprolol succinate  50 mg Oral Daily    mirtazapine  7.5 mg Oral Nightly    meropenem  1,000 mg IntraVENous Q12H    polyethylene glycol  17 g Oral Daily    sennosides-docusate sodium  2 tablet Oral Daily    vancomycin  750 mg IntraVENous Q24H    nystatin  5 mL Oral 4x Daily    sodium chloride flush  5-40 mL IntraVENous 2 times per day    guaiFENesin  600 mg Oral BID    pantoprazole (PROTONIX) 40 mg injection  40 mg IntraVENous Q24H    levothyroxine  175 mcg Oral Daily    folic acid  1 mg Oral Daily    vitamin D  50,000 Units Oral Weekly    ipratropium-albuterol  1 ampule Inhalation Q4H WA    vancomycin (VANCOCIN) intermittent dosing (placeholder)   Other RX Placeholder    phenytoin  300 mg Oral Daily     metoprolol, potassium chloride **OR** potassium alternative oral replacement **OR** potassium chloride, sodium chloride flush, sodium chloride, ondansetron **OR** ondansetron, polyethylene glycol, acetaminophen **OR** acetaminophen  ADULT DIET; Dysphagia - Minced and Moist; Moderately Thick (Honey)     Lab and other Data:     Recent Labs     05/17/22 0213 05/18/22  0630 05/19/22  0225   WBC 11.1* 9.5 9.6   HGB 11.6* 11.8* 11.5*    318 324     Recent Labs     05/17/22 0213 05/18/22  0630 05/19/22  0225    141 137   K 3.2* 3.3* 3.8    106 106   CO2 23 26 21*   BUN 13 8 6*   CREATININE 0.9 0.8 0.8   GLUCOSE 117* 107 118*     Assessment/Plan   Principal Problem:    Sepsis (HCC)  Active Problems:    Atypical pneumonia    Palliative care patient    Essential hypertension    Acquired hypothyroidism    Seizure disorder ([facility] [facility]    AMS (altered mental status)    UTI (urinary tract infection)  Resolved Problems:    * No resolved hospital problems. *    Visit Summary:  Chart reviewed. No acute overnight events. Plans for SNF placement once facility/pre-cert approved. No new concerns or needs identified at this time. Recommendations:   1. Palliative care: [address] to [facility] or [place] when pre-cert approved. Social work assisting. Code status: DNR ACP completed     2. Staphylococcus hominis bacteremia-ID following, No growth seen on repeat cultures    3. Pneumonia vs atelectasis-Vancomycin/Merrem completed, clinically improved     4. Dysphagia-tolerating modified diet at this time, SLP is following    5. AMS w/ hx of CVA-suspect underlying Vascular dementia-AMS improving per patient's family    6. Hypernatremia/hypokalemia-resolved     Thank you for consulting Palliative Care and allowing us to participate in the care of this patient.    Time Spent Counseling > 50%:  YES                                   Total Time Spent with patient/family counseling, workup/treatment review, counseling and placement of orders/preparation of this note: 15 minutes    Electronically signed by Lino Maguire PA-C on 5/19/2022 at 2:22 PM    (Please note that portions of this note were completed with a voice recognition program.  Franca Lawrence made to edit the dictations but occasionally words are mis-transcribed.)

## 2022-05-19 NOTE — PROGRESS NOTES
Physical Therapy  Facility/Department: [facility]/MED  Physical Therapy Initial Assessment    Name: Darwin Rachel  : 1931  MRN: [id number]  Date of Service: 2022    Discharge Recommendations:  Continue to assess pending progress          Patient Diagnosis(es): The primary encounter diagnosis was Disorientation. Diagnoses of Hypoxia and Pneumonia of left lower lobe due to infectious organism were also pertinent to this visit. Past Medical History:  has a past medical history of Arthritis, Back pain, CAD (coronary artery disease), Cerebral artery occlusion with cerebral infarction ([facility] [address] Cystitis, Depression, Dizziness, Dysuria, Edema, Falls frequently, H/O blood clots, Head injury, Headache, Heartburn, [place] (hard of hearing), Hyperlipidemia, Hypertension, Hypothyroidism, Hypothyroidism, Memory problem, Neuropathy, Shingles, TIA (transient ischemic attack), Urinary incontinence, and UTI (urinary tract infection). Past Surgical History:  has a past surgical history that includes joint replacement; Cholecystectomy; Hysterectomy, total abdominal; Knee Arthroplasty (Right); lobectomy (10/2015); Appendectomy (); Percutaneous Transluminal Coronary Angio (); and Eye surgery (). Assessment   Body Structures, Functions, Activity Limitations Requiring Skilled Therapeutic Intervention: Decreased functional mobility ; Decreased ADL status; Increased pain;Decreased ROM; Decreased strength  Assessment: Pt REQUIRES ASSIST FOR ALL ASPECTS OF MOBILITY. MAX-DEPENDENT FOR ALL BED MOB. DID NOT ATTEMPT BEDSIDE SITTING THIS VISIT DUE TO BEING INCONTINENT OF STOOL, BUT WOULD LIKELY REQUIRE TOTAL ASSIST BASED ON PREREQUISITE FUNCTION.   Requires PT Follow-Up: Yes  Activity Tolerance  Activity Tolerance: Patient limited by pain     Plan   Plan  Plan: 1 time a day 3-6 times a week  Current Treatment Recommendations: ROM,Strengthening,Balance training,Functional mobility training,Safety education & training,Patient/Caregiver education & training  Safety Devices  Type of Devices: Bed alarm in place     Restrictions  Restrictions/Precautions  Restrictions/Precautions: Fall Risk     Subjective   Pain: Pt C/O PAIN WITH RLE ROM  General  Patient assessed for rehabilitation services?: Yes  Diagnosis: SEPSIS, PNA  Subjective  Subjective: Pt HESITANT TO PARTICIPATE DUE TO PAIN         Social/Functional History  Social/Functional History  Lives With: Family  ADL Assistance: Needs assistance  Ambulation Assistance: Non-ambulatory  Transfer Assistance: Needs assistance  Additional Comments: Pt BEDBOUND, NIECE REPORTS WAS ABLE TO ASSIST IN FEEDING WHEN SET-UP PROPERLY  Vision/Hearing       Cognition   Cognition  Cognition Comment: --     Objective   Pulse: 93  Heart Rate Source: Monitor  BP: 127/61  BP Location: Right upper arm  MAP (Calculated): 83  Resp: 24  SpO2: 97 %  O2 Device: Nasal cannula     Observation/Palpation  Posture: Poor  Gross Assessment  AROM: Grossly decreased, non-functional (DOES MOVE LLE > RLE)  PROM: Within functional limits (PAINFUL RLE, PARRISH ANKLES POSITIONED IN PF)  Strength: Grossly decreased, non-functional                    Bed mobility  Rolling to Left: Dependent/Total;Maximum assistance (Simultaneous filing. User may not have seen previous data.)  Rolling to Right: Maximum assistance;Dependent/Total (Simultaneous filing. User may not have seen previous data.)  Supine to Sit: Unable to assess (LIKELY MAX-DEPENDENT DUE TO PREREQUISITE FUNCTION  Simultaneous filing. User may not have seen previous data.)                       OutComes Score                                                  AM-PAC Score             Goals  Long Term Goals  Time Frame for Long term goals : 14 DAYS  Long term goal 1: ROLLING MOD-MAX  Long term goal 2: SUP TO/FROM SIT MOD-MAX       Education  Patient Education  Education Given To: Patient; Family  Education Provided: Role of Therapy;Plan of Care      Therapy Time Individual Concurrent Group Co-treatment   Time In           Time Out           Minutes                   Deepa Hensley, PT

## 2022-05-19 NOTE — PROGRESS NOTES
[facility] Hospitalists      Patient:  Tez Che  YOB: 1931  Date of Service: 5/19/2022  MRN: [id number]   Acct: [de-identified]   Primary Care Physician: Julisa Plummer MD  Advance Directive: DNR  Admit Date: 5/13/2022       Hospital Day: 6  Portions of this note have been copied forward, however, changed to reflect the most current clinical status of this patient. CHIEF COMPLAINT AMS    SUBJECTIVE: Talkative today and seems more oriented. Her niece is at bedside and voices concern that she is not taking her medication due to it being crushed in the unpalatable taste. case management working toward a safe discharge. CUMULATIVE HOSPITAL STAY:  Ms. Mc Dutta is a 59-year-old female who was admitted on 5/13/2022 due to complaints of altered mental status and fatigue. Medical history includes CVA, depression, CAD, HTN, hypothyroidism and seizures. Family reported that the patient had decreased intake of food and fluid 2 days prior to the admission. Her niece is her primary caretaker states that she has been generally weak and more fatigued. Additionally reported that she was not swallowing her food and possibly pocketing them in her buccal cavity and spitting it out. Further ED work-up revealed a CTA no pulmonary embolism interval partial lower lobe collapse with narrowing of the left lower lobe bronchi questionably related to mucus secretions with secretions in the dependent portion of the trachea, fluid within the esophagus. Chest x-ray showed hypoventilation and vascular crowding stable bronchial wall thickening. CT of the head was negative. Chronic cerebellar infarct. She was found to have leukocytosis and hypoxia with lactic acid of 4.1 upon admission. The patient was subsequently diagnosed with sepsis due to positive blood cultures with staph hominis-ID was consulted. Patient was changed to [address] per ID. Blood cultures since 5/14/2022 show NGTD.  Her mental status has progressively improved. Family has requested SNF placement Case management [id number] completed, however, was denied by insurance. Case management aware. Patient received Vanco and Merrem per ID recommendations through 5/19/2022. Labs and vitals remained stable. SLP recommends minced and moist with pills halved been applesauce. Review of Systems:   Review of Systems   Unable to perform ROS: Mental status change         Objective:   VITALS:  /64   Pulse 115   Temp 97.7 °F (36.5 °C) (Temporal)   Resp 16   Ht 5' 8\" (1.727 m)   Wt 190 lb (86.2 kg)   SpO2 99%   BMI 28.89 kg/m²   24HR INTAKE/OUTPUT:      Intake/Output Summary (Last 24 hours) at 5/19/2022 1454  Last data filed at 5/19/2022 1405  Gross per 24 hour   Intake 600 ml   Output 1050 ml   Net -450 ml       Physical Exam  Vitals and nursing note reviewed. Constitutional:       General: She is not in acute distress. Appearance: Normal appearance. She is ill-appearing. She is not toxic-appearing or diaphoretic. HENT:      Head: Normocephalic and atraumatic. Right Ear: External ear normal.      Left Ear: External ear normal.      Nose: Nose normal. No congestion or rhinorrhea. Mouth/Throat:      Mouth: Mucous membranes are moist.      Pharynx: Oropharynx is clear. Eyes:      General: No scleral icterus. Extraocular Movements: Extraocular movements intact. Conjunctiva/sclera: Conjunctivae normal.   Cardiovascular:      Rate and Rhythm: Normal rate and regular rhythm. Pulses: Normal pulses. Heart sounds: Normal heart sounds. No murmur heard. No friction rub. No gallop. Pulmonary:      Effort: Pulmonary effort is normal. No respiratory distress. Breath sounds: Examination of the right-lower field reveals rales. Examination of the left-lower field reveals rales. Rales present. No wheezing or rhonchi. Comments: Loose hacking cough  Abdominal:      General: Abdomen is flat.  Bowel sounds are normal. There is distension (Mildly). Palpations: Abdomen is soft. Tenderness: There is no abdominal tenderness. Musculoskeletal:         General: No swelling. Normal range of motion. Cervical back: Normal range of motion and neck supple. Right lower leg: No edema. Left lower leg: No edema. Skin:     General: Skin is warm and dry. Coloration: Skin is not jaundiced. Findings: No erythema, lesion or rash. Neurological:      General: No focal deficit present. Mental Status: She is alert and oriented to person, place, and time. Mental status is at baseline. Cranial Nerves: No cranial nerve deficit. Sensory: No sensory deficit. Motor: No weakness. Psychiatric:         Mood and Affect: Mood normal.         Behavior: Behavior normal.         Thought Content:  Thought content normal.         Judgment: Judgment normal.         Medications:      sodium chloride 25 mL/hr at 05/17/22 1008      potassium chloride  10 mEq Oral BID    metoprolol succinate  50 mg Oral Daily    mirtazapine  7.5 mg Oral Nightly    meropenem  1,000 mg IntraVENous Q12H    polyethylene glycol  17 g Oral Daily    sennosides-docusate sodium  2 tablet Oral Daily    vancomycin  750 mg IntraVENous Q24H    nystatin  5 mL Oral 4x Daily    sodium chloride flush  5-40 mL IntraVENous 2 times per day    guaiFENesin  600 mg Oral BID    pantoprazole (PROTONIX) 40 mg injection  40 mg IntraVENous Q24H    levothyroxine  175 mcg Oral Daily    folic acid  1 mg Oral Daily    vitamin D  50,000 Units Oral Weekly    ipratropium-albuterol  1 ampule Inhalation Q4H WA    vancomycin (VANCOCIN) intermittent dosing (placeholder)   Other RX Placeholder    phenytoin  300 mg Oral Daily     metoprolol, potassium chloride **OR** potassium alternative oral replacement **OR** potassium chloride, sodium chloride flush, sodium chloride, ondansetron **OR** ondansetron, polyethylene glycol, acetaminophen **OR** acetaminophen  ADULT DIET; Dysphagia - Minced and Moist; Moderately Thick (Honey)     Lab and other Data:     Recent Labs     05/17/22 0213 05/18/22 0630 05/19/22 0225   WBC 11.1* 9.5 9.6   HGB 11.6* 11.8* 11.5*    318 324     Recent Labs     05/17/22 0213 05/18/22 0630 05/19/22 0225    141 137   K 3.2* 3.3* 3.8    106 106   CO2 23 26 21*   BUN 13 8 6*   CREATININE 0.9 0.8 0.8   GLUCOSE 117* 107 118*       RAD:   XR HAND RIGHT (MIN 3 VIEWS)    Result Date: 5/13/2022  1. No visualized fracture or joint subluxation. 2. Advanced osteoarthritis changed; interphalangeal joints, first CMC joint and triscaphe joint. Signed by Dr Saul Rowley    XR ABDOMEN (KUB) (SINGLE AP VIEW)    Result Date: 5/16/2022  1. No radiographic evidence of acute abdominopelvic process. Signed by Dr Joshua Mccauley    Result Date: 5/13/2022  1. No hemorrhage, edema or mass effect. 2. Chronic small cerebellar infarct. Chronic left occipital cortical and subcortical infarct. 3. Mild to moderate atrophy. Moderate to severe dilatation of the lateral and third ventricles is likely related to the atrophy. 4. Low-density in the hemispheric white matter is nonspecific and likely due to chronic small vessel disease. 5. Prior mastoidectomy on the right. The full report of this exam was immediately signed and available to the emergency room. The patient is currently in the emergency room. Signed by Dr Chris Frye    XR CHEST PORTABLE    Result Date: 5/13/2022  1. Hypoventilation with vascular crowding. 2. Stable scarring in the left costophrenic angle. 3. Stable bronchial wall thickening. Signed by Dr Chris Frye    CTA PULMONARY W CONTRAST    Result Date: 5/13/2022  1. No pulmonary emboli. Previous emboli have resolved since the 2/23/2020 study. 2. Moderate vascular calcification. Diffuse coronary artery calcification. Cardiomegaly.  3. Interval partial left lower lobe collapse with narrowing of the left lower lobe bronchi, perhaps related to mucous secretions. Secretions are identified within the dependent portion of the trachea. Fluid present within the esophagus (which may relate to gastroesophageal reflux and reported primary peristalsis) increases risk for aspiration. Signed by Dr Selina Wagoner     Blood cultures May 13, 2022-Staph hominis  Blood cultures May 14, 2022-no growth  Blood cultures May 15, 2022-no growth  Blood cultures 5/17/2022-no growth      Assessment/Plan   Principal Problem:    Sepsis ([facility] [address]   -Follow labs   -Continue Vanco and Merrem through 5/19/2022 per ID recommendations   -Vital signs   -Vanco dosing through pharmacy   -Leukocytosis resolved today-9.5   -Strict I&O   -Continue to follow previous cultures    Active Problems:    Atypical pneumonia   -PT OT   -Aspiration precautions   -Oxygen per protocol      Palliative care patient   -Palliative care following      Essential hypertension   -Continue current medications      Acquired hypothyroidism   Continue current levothyroxine dosing      Seizure disorder ([facility] [address]   -continue current antiepileptics      AMS (altered mental status)   -Improved      UTI (urinary tract infection)   -Merrem    Resolved Problems:    * No resolved hospital problems. *      Antibiotic: Vanco/Merrem through May 19, 2022 per ID    DVT Prophylaxis: SCDs    GI prophylaxis: Pantoprazole    Disposition: TBD-case management working on placement    [employer], APRN, 5/19/2022 2:54 PM     Talked with Dr. Sinai Riley with peer to peer for insurance company. Case discussed. Discussed that due to patient's previous debility and lack of skilled nursing needs that she will be denied for SNF at this time.

## 2022-05-19 NOTE — CARE COORDINATION
Received a call from [facility]- case went for MD review and will need a peer to peer done. Can call [phone] opt [phone]. Deadline is 930 am  cst.  Please have name, , and ins id # for verification. Spoke with Lorenzo Gage NP. Regarding peer to peer.  Will try to call today or in am.  Electronically signed by Christiano Harry RN on 2022 at 3:33 PM

## 2022-05-19 NOTE — PROGRESS NOTES
pudding/applesauce. If patient receives mouth care prior to intake, okay for ice chips IN BETWEEN MEALS for comfort. Will continue to follow.     Treatment Plan  Requires SLP Intervention: Yes     Recommended Diet and Intervention  Solid Consistency Recommendation: Minced and moist  Liquid Consistency Recommendation: Moderately thick (honey think liquids)  Recommended Form of Meds: Meds halved in puree as able  Therapeutic Interventions: Patient/Family education;Diet tolerance monitoring; Therapeutic PO trials with SLP     Compensatory Swallowing Strategies  Compensatory Swallowing Strategies: Upright as possible for all oral intake;Small bites/sips;Eat/Feed slowly; Alternate solids and liquids; Remain upright for 30-45 minutes after meals     Treatment/Goals  Timeframe for Short-term Goals: 1x/day for 3 days   Goal 1: Patient will tolerate minced and moist consistency and moderately thick/honey thick liquids with min S/S penetration/aspiration during PO intake. Goal 2: Patient staff will follow swallow safety recommendations to decrease risk of penetration/aspiration during PO intake. Goal 3: Re-assessment of swallow function for potential diet upgrade. Goal 4: Monitor speech production. Goal 5: Potential cognitive-linguistic eval      General  Chart Reviewed: Yes  Behavior/Cognition: Patient appeared lethargic within short period of time. O2 Device: Nasal Cannula  Communication Observation: (Re-assessed patient's speech production. Patient exhibited decreased volume of speech and slow, decreased labial and lingual movements during verbalizations. SLP ranked functional intelligibility of speech for unfamiliar listeners at 60-70% in utterances with background noise present.)  Follows Directions: Simple   Patient Positioning: Upright in bed  Consistencies Administered: Ice chips;Dysphagia Pureed (Dysphagia I); Regular solid; Honey - cup;Nectar - cup     Oral Motor Examination   Labial ROM: (Decreased, bilaterally, all coughs were related to penetration/aspiration secondary to timing of swallows and presence of throat movement and patient exhibited coughs at rest.     At this time, would trial minced and moist consistency. Continue moderately thick/honey thick liquids. Recommend meds halved in pudding/applesauce. If patient receives mouth care prior to intake, okay for ice chips IN BETWEEN MEALS for comfort. Will continue to follow.     Electronically signed by ITZEL Stroud on 5/19/2022 at 1:39 PM

## 2022-05-19 NOTE — PROGRESS NOTES
Occupational Therapy  Facility/Department: [facility] 3 CAROLYN/VAS/MED  Occupational Therapy Initial Assessment    Name: Ana Laura Abernathy  : 1931  MRN: [id number]  Date of Service: 2022    Discharge Recommendations:             Patient Diagnosis(es): The primary encounter diagnosis was Disorientation. Diagnoses of Hypoxia and Pneumonia of left lower lobe due to infectious organism were also pertinent to this visit. Past Medical History:  has a past medical history of Arthritis, Back pain, CAD (coronary artery disease), Cerebral artery occlusion with cerebral infarction ([facility] [address] Cystitis, Depression, Dizziness, Dysuria, Edema, Falls frequently, H/O blood clots, Head injury, Headache, Heartburn, [place] (hard of hearing), Hyperlipidemia, Hypertension, Hypothyroidism, Hypothyroidism, Memory problem, Neuropathy, Shingles, TIA (transient ischemic attack), Urinary incontinence, and UTI (urinary tract infection). Past Surgical History:  has a past surgical history that includes joint replacement; Cholecystectomy; Hysterectomy, total abdominal; Knee Arthroplasty (Right); lobectomy (10/2015); Appendectomy (); Percutaneous Transluminal Coronary Angio (); and Eye surgery (). Treatment Diagnosis: Hypoxia, sepsis, Pneumonia      Assessment   Performance deficits / Impairments: Decreased functional mobility ; Decreased endurance;Decreased ADL status; Decreased balance;Decreased ROM; Decreased high-level IADLs;Decreased safe awareness;Decreased cognition;Decreased strength  Assessment: Will progress as tolerated  Treatment Diagnosis: Hypoxia, sepsis, Pneumonia  Prognosis: Fair  Decision Making: Low Complexity  REQUIRES OT FOLLOW-UP: Yes  Activity Tolerance  Activity Tolerance: Treatment limited secondary to medical complications (free text)        Plan   Plan  Times per Week: 3-5x/week  Times per Day: Daily     Restrictions  Restrictions/Precautions  Restrictions/Precautions: Fall Risk    Subjective   General  Chart Reviewed: Yes  Patient assessed for rehabilitation services?: Yes  Family / Caregiver Present: Yes  Diagnosis: Hypoxia, Sepsis, Pneumonia  General Comment  Comments: Per chart review, pt. was bed bound at home prior to her admission to hospital  Pain: Pt C/O PAIN WITH RLE ROM  Social/Functional History  Social/Functional History  Lives With: Family  ADL Assistance: Needs assistance  Ambulation Assistance: Non-ambulatory  Transfer Assistance: Needs assistance  Additional Comments: Pt BEDBOUND, NIECE REPORTS WAS ABLE TO ASSIST IN FEEDING WHEN SET-UP PROPERLY       Objective   Pulse: 93  Heart Rate Source: Monitor  BP: 127/61  BP Location: Right upper arm  MAP (Calculated): 83  Resp: 24  SpO2: 97 %  O2 Device: Nasal cannula  Hearing: Exceptions to [email or web address] [facility]  Hearing Exceptions: Hard of hearing/hearing concerns       Observation/Palpation  Posture: Poor  Safety Devices  Type of Devices: Bed alarm in place        AROM: Generally decreased, functional (R shld elevation to 45 deg actively, L shld elevation to 90 deg actively.)  Strength: Generally decreased, functional (Grossly 3+/5)  ADL  Feeding: Maximum assistance  Grooming: Maximum assistance  UE Bathing: Maximum assistance  LE Bathing: Dependent/Total  UE Dressing: Dependent/Total  LE Dressing: Dependent/Total  Toileting: Dependent/Total     Activity Tolerance  Activity Tolerance: Patient limited by pain     Transfers  Sit to stand: Unable to assess     Cognition  Overall Cognitive Status: Exceptions  Following Commands:  Follows one step commands with repetition  Attention Span: Difficulty dividing attention  Initiation: Requires cues for all  Sequencing: Requires cues for all                                           G-Code     OutComes Score                                                  AM-PAC Score             Goals  Short Term Goals  Time Frame for Short term goals: 1 week  Short Term Goal 1: Sy washing face with washcloth with either hand  Short Term Goal 2: Sy taking 2 bites of food self-feeding with utensil  Short Term Goal 3: Tolerate AAROM BUE for 15 minutes  Long Term Goals  Long Term Goal 1: Return to PLOF       Therapy Time   Individual Concurrent Group Co-treatment   Time In           Time Out           Minutes                   Silverio Hope, OT

## 2022-05-19 NOTE — PROGRESS NOTES
Recent Labs     05/18/22  0630 05/19/22  0225   BUN 8 6*       Recent Labs     05/18/22  0630 05/19/22  0225   CREATININE 0.8 0.8       Estimated Creatinine Clearance: 54 mL/min (based on SCr of 0.8 mg/dL). Plan: Increase Meropenem to 1 gm IV every eight hours extended infusion.      Electronically signed by Jany [facility] on 5/19/2022 at 12:30 PM

## 2022-05-19 NOTE — PROGRESS NOTES
Comprehensive Nutrition Assessment    Type and Reason for Visit:  Initial,RD Nutrition Re-Screen/LOS    Nutrition Recommendations/Plan:   1. Follow for po intake and need for ONS     Malnutrition Assessment:  Malnutrition Status:  No malnutrition (05/19/22 1253)    Context:  Acute Illness     Findings of the 6 clinical characteristics of malnutrition:  Energy Intake:  Mild decrease in energy intake (Comment)  Weight Loss:  No significant weight loss     Body Fat Loss:  No significant body fat loss     Muscle Mass Loss:  No significant muscle mass loss    Fluid Accumulation:  Mild Extremities   Strength:  Not Performed    Nutrition Assessment:    Following pateint for LOS x 6 days. Pt appears adequately nourished AEB fat and muscle mass. Diet has been advanced to minced and moist with honey thick liquids from full liquids. PO intake this a.m. was 100% on liquids. Nutrition Related Findings:      Wound Type: None       Current Nutrition Intake & Therapies:    Average Meal Intake: %  Average Supplements Intake: None Ordered  ADULT DIET; Dysphagia - Minced and Moist; Moderately Thick (Honey)    Anthropometric Measures:  Height: 5' 8\" (172.7 cm)  Ideal Body Weight (IBW): 140 lbs (64 kg)    Admission Body Weight: 190 lb (86.2 kg)  Current Body Weight: 190 lb (86.2 kg), 135.7 % IBW. Current BMI (kg/m2): 28.9  Usual Body Weight: 199 lb (90.3 kg) (2/2/2022)  % Weight Change (Calculated): -4.5  BMI Categories: Overweight (BMI 25.0-29. 9)    Nutrition Diagnosis:   · Unintended weight loss related to catabolic illness,acute injury/trauma as evidenced by weight loss      Nutrition Interventions:   Food and/or Nutrient Delivery: Continue Current Diet  Nutrition Education/Counseling: No recommendation at this time  Coordination of Nutrition Care: Continue to monitor while inpatient       Goals:     Goals: Meet at least 75% of estimated needs,PO intake 50% or greater       Nutrition Monitoring and Evaluation: Behavioral-Environmental Outcomes: None Identified  Food/Nutrient Intake Outcomes: Diet Advancement/Tolerance,Food and Nutrient Intake  Physical Signs/Symptoms Outcomes: Biochemical Data,Chewing or Swallowing,Fluid Status or Edema,Weight,Skin,Nutrition Focused Physical Findings    Discharge Planning:     Too soon to determine     Yamilet Capps MS, RD, LD  Contact: [phone]

## 2022-05-19 NOTE — PLAN OF CARE
Problem: Discharge Planning  Goal: Discharge to home or other facility with appropriate resources  Outcome: Progressing  Flowsheets  Taken 5/18/2022 2054 by Anisha Pickard RN  Discharge to home or other facility with appropriate resources: Identify barriers to discharge with patient and caregiver  Taken 5/18/2022 1247 by Gal Gannon RN  Discharge to home or other facility with appropriate resources: Identify barriers to discharge with patient and caregiver     Problem: ABCDS Injury Assessment  Goal: Absence of physical injury  Outcome: Progressing  Flowsheets (Taken 5/19/2022 0107)  Absence of Physical Injury: Implement safety measures based on patient assessment     Problem: Skin/Tissue Integrity  Goal: Absence of new skin breakdown  Description: 1. Monitor for areas of redness and/or skin breakdown  2. Assess vascular access sites hourly  3. Every 4-6 hours minimum:  Change oxygen saturation probe site  4. Every 4-6 hours:  If on nasal continuous positive airway pressure, respiratory therapy assess nares and determine need for appliance change or resting period.   Outcome: Progressing     Problem: Safety - Adult  Goal: Free from fall injury  Outcome: Progressing  Flowsheets  Taken 5/19/2022 0107 by Anisha Pickard RN  Free From Fall Injury: Instruct family/caregiver on patient safety  Taken 5/18/2022 1245 by Gal Gannon RN  Free From Fall Injury: Instruct family/caregiver on patient safety     Problem: Chronic Conditions and Co-morbidities  Goal: Patient's chronic conditions and co-morbidity symptoms are monitored and maintained or improved  Outcome: Progressing  Flowsheets  Taken 5/18/2022 2054 by Anisha Pickard RN  Care Plan - Patient's Chronic Conditions and Co-Morbidity Symptoms are Monitored and Maintained or Improved: Monitor and assess patient's chronic conditions and comorbid symptoms for stability, deterioration, or improvement  Taken 5/18/2022 1247 by Gal Gannon RN  Care Plan - Patient's Chronic Conditions and Co-Morbidity Symptoms are Monitored and Maintained or Improved: Monitor and assess patient's chronic conditions and comorbid symptoms for stability, deterioration, or improvement     Problem: Pain  Goal: Verbalizes/displays adequate comfort level or baseline comfort level  Outcome: Progressing

## 2022-05-20 LAB
ALBUMIN SERPL-MCNC: 2.8 G/DL (ref 3.5–5.2)
ANION GAP SERPL CALCULATED.3IONS-SCNC: 10 MMOL/L (ref 7–19)
BLOOD CULTURE, ROUTINE: NORMAL
BUN BLDV-MCNC: 9 MG/DL (ref 8–23)
CALCIUM SERPL-MCNC: 7 MG/DL (ref 8.8–10.2)
CHLORIDE BLD-SCNC: 103 MMOL/L (ref 98–111)
CO2: 26 MMOL/L (ref 22–29)
CREAT SERPL-MCNC: 1 MG/DL (ref 0.5–0.9)
GFR AFRICAN AMERICAN: >59
GFR NON-AFRICAN AMERICAN: 52
GLUCOSE BLD-MCNC: 111 MG/DL (ref 74–109)
HCT VFR BLD CALC: 37 % (ref 37–47)
HEMOGLOBIN: 11.5 G/DL (ref 12–16)
MCH RBC QN AUTO: 30.5 PG (ref 27–31)
MCHC RBC AUTO-ENTMCNC: 31.1 G/DL (ref 33–37)
MCV RBC AUTO: 98.1 FL (ref 81–99)
PDW BLD-RTO: 14.6 % (ref 11.5–14.5)
PLATELET # BLD: 362 K/UL (ref 130–400)
PMV BLD AUTO: 9.6 FL (ref 9.4–12.3)
POTASSIUM SERPL-SCNC: 3.8 MMOL/L (ref 3.5–5)
RBC # BLD: 3.77 M/UL (ref 4.2–5.4)
SODIUM BLD-SCNC: 139 MMOL/L (ref 136–145)
WBC # BLD: 9.5 K/UL (ref 4.8–10.8)

## 2022-05-20 PROCEDURE — 99232 SBSQ HOSP IP/OBS MODERATE 35: CPT

## 2022-05-20 PROCEDURE — 6370000000 HC RX 637 (ALT 250 FOR IP): Performed by: HOSPITALIST

## 2022-05-20 PROCEDURE — 6360000002 HC RX W HCPCS: Performed by: HOSPITALIST

## 2022-05-20 PROCEDURE — 2500000003 HC RX 250 WO HCPCS: Performed by: NURSE PRACTITIONER

## 2022-05-20 PROCEDURE — 36415 COLL VENOUS BLD VENIPUNCTURE: CPT

## 2022-05-20 PROCEDURE — 2580000003 HC RX 258: Performed by: HOSPITALIST

## 2022-05-20 PROCEDURE — 85027 COMPLETE CBC AUTOMATED: CPT

## 2022-05-20 PROCEDURE — 82040 ASSAY OF SERUM ALBUMIN: CPT

## 2022-05-20 PROCEDURE — C9113 INJ PANTOPRAZOLE SODIUM, VIA: HCPCS | Performed by: HOSPITALIST

## 2022-05-20 PROCEDURE — 2700000000 HC OXYGEN THERAPY PER DAY

## 2022-05-20 PROCEDURE — 80048 BASIC METABOLIC PNL TOTAL CA: CPT

## 2022-05-20 PROCEDURE — 94640 AIRWAY INHALATION TREATMENT: CPT

## 2022-05-20 PROCEDURE — 1210000000 HC MED SURG R&B

## 2022-05-20 RX ORDER — CALCIUM GLUCONATE 20 MG/ML
1000 INJECTION, SOLUTION INTRAVENOUS ONCE
Status: COMPLETED | OUTPATIENT
Start: 2022-05-20 | End: 2022-05-20

## 2022-05-20 RX ORDER — METOPROLOL SUCCINATE 50 MG/1
100 TABLET, EXTENDED RELEASE ORAL DAILY
Status: DISCONTINUED | OUTPATIENT
Start: 2022-05-20 | End: 2022-05-21 | Stop reason: HOSPADM

## 2022-05-20 RX ADMIN — IPRATROPIUM BROMIDE AND ALBUTEROL SULFATE 1 AMPULE: 2.5; .5 SOLUTION RESPIRATORY (INHALATION) at 06:36

## 2022-05-20 RX ADMIN — SENNOSIDES AND DOCUSATE SODIUM 2 TABLET: 50; 8.6 TABLET ORAL at 08:18

## 2022-05-20 RX ADMIN — ERGOCALCIFEROL 50000 UNITS: 1.25 CAPSULE ORAL at 08:13

## 2022-05-20 RX ADMIN — MIRTAZAPINE 7.5 MG: 15 TABLET, FILM COATED ORAL at 21:16

## 2022-05-20 RX ADMIN — IPRATROPIUM BROMIDE AND ALBUTEROL SULFATE 1 AMPULE: 2.5; .5 SOLUTION RESPIRATORY (INHALATION) at 11:12

## 2022-05-20 RX ADMIN — NYSTATIN 500000 UNITS: 100000 SUSPENSION ORAL at 17:14

## 2022-05-20 RX ADMIN — FOLIC ACID 1 MG: 1 TABLET ORAL at 08:18

## 2022-05-20 RX ADMIN — SODIUM CHLORIDE, PRESERVATIVE FREE 10 ML: 5 INJECTION INTRAVENOUS at 21:15

## 2022-05-20 RX ADMIN — GUAIFENESIN 600 MG: 600 TABLET ORAL at 21:16

## 2022-05-20 RX ADMIN — POTASSIUM CHLORIDE 10 MEQ: 10 TABLET, EXTENDED RELEASE ORAL at 21:16

## 2022-05-20 RX ADMIN — POLYETHYLENE GLYCOL 3350 17 G: 17 POWDER, FOR SOLUTION ORAL at 08:19

## 2022-05-20 RX ADMIN — IPRATROPIUM BROMIDE AND ALBUTEROL SULFATE 1 AMPULE: 2.5; .5 SOLUTION RESPIRATORY (INHALATION) at 15:09

## 2022-05-20 RX ADMIN — CALCIUM GLUCONATE 1000 MG: 20 INJECTION, SOLUTION INTRAVENOUS at 08:02

## 2022-05-20 RX ADMIN — GUAIFENESIN 600 MG: 600 TABLET ORAL at 08:13

## 2022-05-20 RX ADMIN — NYSTATIN 500000 UNITS: 100000 SUSPENSION ORAL at 13:43

## 2022-05-20 RX ADMIN — PHENYTOIN SODIUM 300 MG: 100 CAPSULE ORAL at 08:18

## 2022-05-20 RX ADMIN — LEVOTHYROXINE SODIUM 175 MCG: 25 TABLET ORAL at 06:12

## 2022-05-20 RX ADMIN — NYSTATIN 500000 UNITS: 100000 SUSPENSION ORAL at 08:19

## 2022-05-20 RX ADMIN — SODIUM CHLORIDE, PRESERVATIVE FREE 40 MG: 5 INJECTION INTRAVENOUS at 15:40

## 2022-05-20 RX ADMIN — METOPROLOL SUCCINATE 100 MG: 50 TABLET, EXTENDED RELEASE ORAL at 08:49

## 2022-05-20 RX ADMIN — IPRATROPIUM BROMIDE AND ALBUTEROL SULFATE 1 AMPULE: 2.5; .5 SOLUTION RESPIRATORY (INHALATION) at 19:17

## 2022-05-20 RX ADMIN — SODIUM CHLORIDE: 9 INJECTION, SOLUTION INTRAVENOUS at 08:01

## 2022-05-20 RX ADMIN — POTASSIUM CHLORIDE 10 MEQ: 10 TABLET, EXTENDED RELEASE ORAL at 08:18

## 2022-05-20 RX ADMIN — NYSTATIN 500000 UNITS: 100000 SUSPENSION ORAL at 21:15

## 2022-05-20 ASSESSMENT — PAIN SCALES - GENERAL: PAINLEVEL_OUTOF10: 0

## 2022-05-20 NOTE — PROGRESS NOTES
Consult received. The needed equipment has been ordered and will be set up tomorrow AM.  The Niece will call when it is in place. It will then be ok to dc the pt when medically ready. Hospice will meet the pt at her home for adm to hospice. Emotional and sc support provided.

## 2022-05-20 NOTE — CARE COORDINATION
Per Addison Garcia at [facility], She spoke with Ashwin Wells, and she is not willing orable to do medicaid pending at this time and wants to talk with palliative re: Home  Hospice. Spoke with [place], with Hospice, plan is for pt to return home with nephew and niece with hospice care services. [place] to talk with niece to get list of needs for home. Potential able to dc Saturday.   Electronically signed by Amber Malhotra RN on 5/20/2022 at 1:37 PM

## 2022-05-20 NOTE — PROGRESS NOTES
Physical Therapy  Amy Life  [id number]     05/20/22 1229   Subjective   Subjective Attempt, patient states she does not feel like sitting EOB or working with therapy at this time. Patient's family present with patient. Will cont to follow.    Electronically signed by Ruth Ann Stauffer PTA on 5/20/2022 at 12:30 PM

## 2022-05-20 NOTE — PROGRESS NOTES
[id number] [facility]      Patient:  Marin Jon  YOB: 1931  Date of Service: 5/20/2022  MRN: [id number]   Acct: [de-identified]   Primary Care Physician: Whitley Farfan MD  Advance Directive: DNR  Admit Date: 5/13/2022       Hospital Day: 7  Portions of this note have been copied forward, however, changed to reflect the most current clinical status of this patient. CHIEF COMPLAINT AMS    SUBJECTIVE: Talkative today and seems more oriented. Cough seems improved. No overnight issues. CUMULATIVE HOSPITAL STAY:  Ms. Carmen Conde is a 41-year-old female who was admitted on 5/13/2022 due to complaints of altered mental status and fatigue. Medical history includes CVA, depression, CAD, HTN, hypothyroidism and seizures. Family reported that the patient had decreased intake of food and fluid 2 days prior to the admission. Her niece is her primary caretaker states that she has been generally weak and more fatigued. Additionally reported that she was not swallowing her food and possibly pocketing them in her buccal cavity and spitting it out. Further ED work-up revealed a CTA no pulmonary embolism interval partial lower lobe collapse with narrowing of the left lower lobe bronchi questionably related to mucus secretions with secretions in the dependent portion of the trachea, fluid within the esophagus. Chest x-ray showed hypoventilation and vascular crowding stable bronchial wall thickening. CT of the head was negative. Chronic cerebellar infarct. She was found to have leukocytosis and hypoxia with lactic acid of 4.1 upon admission. The patient was subsequently diagnosed with sepsis due to positive blood cultures with staph hominis-ID was consulted. Patient was changed to [address] per ID course completed on 5/19/2022. Blood cultures since 5/14/2022 show NGTD. Her mental status has progressively improved.   Family has requested SNF placement Case management [id number] completed, however, was denied by insurance. Labs and vitals remained stable. SLP recommends minced and moist with pills halved been applesauce. Palliative care discussed [address] with family plan now is to DC home with outpatient hospice in the a.m. Review of Systems:   Review of Systems   Unable to perform ROS: Mental status change         Objective:   VITALS:  /67   Pulse 106   Temp 97.1 °F (36.2 °C)   Resp 20   Ht 5' 8\" (1.727 m)   Wt 190 lb (86.2 kg)   SpO2 96%   BMI 28.89 kg/m²   24HR INTAKE/OUTPUT:      Intake/Output Summary (Last 24 hours) at 5/20/2022 1656  Last data filed at 5/20/2022 1505  Gross per 24 hour   Intake 680 ml   Output 1800 ml   Net -1120 ml       Physical Exam  Vitals and nursing note reviewed. Constitutional:       General: She is not in acute distress. Appearance: Normal appearance. She is ill-appearing. She is not toxic-appearing or diaphoretic. HENT:      Head: Normocephalic and atraumatic. Right Ear: External ear normal.      Left Ear: External ear normal.      Nose: Nose normal. No congestion or rhinorrhea. Mouth/Throat:      Mouth: Mucous membranes are moist.      Pharynx: Oropharynx is clear. Eyes:      General: No scleral icterus. Extraocular Movements: Extraocular movements intact. Conjunctiva/sclera: Conjunctivae normal.   Cardiovascular:      Rate and Rhythm: Normal rate and regular rhythm. Pulses: Normal pulses. Heart sounds: Normal heart sounds. No murmur heard. No friction rub. No gallop. Pulmonary:      Effort: Pulmonary effort is normal. No respiratory distress. Breath sounds: Examination of the right-lower field reveals rales. Examination of the left-lower field reveals rales. Rales present. No wheezing or rhonchi. Comments: Loose hacking cough  Abdominal:      General: Abdomen is flat. Bowel sounds are normal. There is distension (Mildly). Palpations: Abdomen is soft. Tenderness:  There is no abdominal tenderness. Musculoskeletal:         General: No swelling. Normal range of motion. Cervical back: Normal range of motion and neck supple. Right lower leg: No edema. Left lower leg: No edema. Skin:     General: Skin is warm and dry. Coloration: Skin is not jaundiced. Findings: No erythema, lesion or rash. Neurological:      General: No focal deficit present. Mental Status: She is alert and oriented to person, place, and time. Mental status is at baseline. Cranial Nerves: No cranial nerve deficit. Sensory: No sensory deficit. Motor: No weakness. Psychiatric:         Mood and Affect: Mood normal.         Behavior: Behavior normal.         Thought Content: Thought content normal.         Judgment: Judgment normal.         Medications:      sodium chloride 25 mL/hr at 05/20/22 0801      metoprolol succinate  100 mg Oral Daily    potassium chloride  10 mEq Oral BID    mirtazapine  7.5 mg Oral Nightly    polyethylene glycol  17 g Oral Daily    sennosides-docusate sodium  2 tablet Oral Daily    nystatin  5 mL Oral 4x Daily    sodium chloride flush  5-40 mL IntraVENous 2 times per day    guaiFENesin  600 mg Oral BID    pantoprazole (PROTONIX) 40 mg injection  40 mg IntraVENous Q24H    levothyroxine  175 mcg Oral Daily    folic acid  1 mg Oral Daily    vitamin D  50,000 Units Oral Weekly    ipratropium-albuterol  1 ampule Inhalation Q4H WA    phenytoin  300 mg Oral Daily     metoprolol, potassium chloride **OR** potassium alternative oral replacement **OR** potassium chloride, sodium chloride flush, sodium chloride, ondansetron **OR** ondansetron, polyethylene glycol, acetaminophen **OR** acetaminophen  ADULT DIET;  Dysphagia - Minced and Moist; Moderately Thick (Honey)     Lab and other Data:     Recent Labs     05/18/22  0630 05/19/22  0225 05/20/22  0352   WBC 9.5 9.6 9.5   HGB 11.8* 11.5* 11.5*    324 362     Recent Labs     05/18/22  0630 05/19/22  0225 05/20/22  0352    137 139   K 3.3* 3.8 3.8    106 103   CO2 26 21* 26   BUN 8 6* 9   CREATININE 0.8 0.8 1.0*   GLUCOSE 107 118* 111*       RAD:   XR HAND RIGHT (MIN 3 VIEWS)    Result Date: 5/13/2022  1. No visualized fracture or joint subluxation. 2. Advanced osteoarthritis changed; interphalangeal joints, first CMC joint and triscaphe joint. Signed by Dr Saul Rowley    XR ABDOMEN (KUB) (SINGLE AP VIEW)    Result Date: 5/16/2022  1. No radiographic evidence of acute abdominopelvic process. Signed by Dr Joshua Mccauley    Result Date: 5/13/2022  1. No hemorrhage, edema or mass effect. 2. Chronic small cerebellar infarct. Chronic left occipital cortical and subcortical infarct. 3. Mild to moderate atrophy. Moderate to severe dilatation of the lateral and third ventricles is likely related to the atrophy. 4. Low-density in the hemispheric white matter is nonspecific and likely due to chronic small vessel disease. 5. Prior mastoidectomy on the right. The full report of this exam was immediately signed and available to the emergency room. The patient is currently in the emergency room. Signed by Dr Chris Frye    XR CHEST PORTABLE    Result Date: 5/13/2022  1. Hypoventilation with vascular crowding. 2. Stable scarring in the left costophrenic angle. 3. Stable bronchial wall thickening. Signed by Dr Chris Frye    CTA PULMONARY W CONTRAST    Result Date: 5/13/2022  1. No pulmonary emboli. Previous emboli have resolved since the 2/23/2020 study. 2. Moderate vascular calcification. Diffuse coronary artery calcification. Cardiomegaly. 3. Interval partial left lower lobe collapse with narrowing of the left lower lobe bronchi, perhaps related to mucous secretions. Secretions are identified within the dependent portion of the trachea.  Fluid present within the esophagus (which may relate to gastroesophageal reflux and reported primary peristalsis) increases risk for aspiration. Signed by Dr Victor Hugo Dempsey     Blood cultures May 13, 2022-Staph hominis  Blood cultures May 14, 2022-no growth  Blood cultures May 15, 2022-no growth  Blood cultures 5/17/2022-no growth      Assessment/Plan   Principal Problem:    Sepsis ([facility]   -Follow labs   -Vanco and Rena Askew completed on 5/19/2022 per ID recommendations   -Vital signs   -Vanco dosing through pharmacy   -Leukocytosis resolved today-9.5   -Strict I&O   -Continue to follow previous cultures    Active Problems:    Atypical pneumonia   -PT OT   -Aspiration precautions   -Oxygen per protocol      Palliative care patient   -Palliative care following   -Plan to go home with hospice tomorrow      Essential hypertension   -Continue current medications      Acquired hypothyroidism   Continue current levothyroxine dosing      Seizure disorder ([facility]   -continue current antiepileptics      AMS (altered mental status)   -Improved      UTI (urinary tract infection)   -Merrem-completed    Resolved Problems:    * No resolved hospital problems.  *      Antibiotic: Vanco/Merrem completed on 5/19/2022    DVT Prophylaxis: SCDs    GI prophylaxis: Pantoprazole    Disposition: Plan home with outpatient hospice tomorrow    Roxanne Celis, VIN, 5/20/2022 4:56 PM

## 2022-05-20 NOTE — CARE COORDINATION
Spoke with shana Ferrer, this am, explained the insurance denial. Andres Garcia was agreeable to referral to [facility] for possible medicaid pending bed. Will reach out to [facility] at [facility]. Will also discuss  with Palliative/hospice for possible set up at home. Will follow.   Electronically signed by Jay Magaña RN on 2022 at 8:47 AM

## 2022-05-20 NOTE — CARE COORDINATION
Late entry from 5/19/22  NP informed writer that peer to peer had been done, however approval for snf has still been denied by insurance. Spoke with Calderon Cochran at [facility] about possible Medicaid pending, they are unable to accept any medicaid pending at this time. Will follow up with Shiv Patricia, pts niece for further planning.   Electronically signed by Britany Negrete RN on 5/20/2022 at 7:53 AM

## 2022-05-20 NOTE — PROGRESS NOTES
Palliative Care Progress Note  5/20/2022 1:03 PM    Patient:  Amy Kerr  YOB: 1931  Primary Care Physician: Williams Paiz MD  Advance Directive: DNR  Admit Date: 5/13/2022       Hospital Day: 7  Portions of this note have been copied forward, however, changed to reflect the most current clinical status of this patient. CHIEF COMPLAINT/REASON FOR CONSULTATION Goals of care, code status, family support    SUBJECTIVE:  Ms. Uma Morales has no new complaints. She is alert, sitting up in bed, and pleasant    Review of Systems:   14 point review of systems is negative except as specifically addressed above. Objective:   VITALS:  /67   Pulse 106   Temp 97.1 °F (36.2 °C)   Resp 20   Ht 5' 8\" (1.727 m)   Wt 190 lb (86.2 kg)   SpO2 96%   BMI 28.89 kg/m²   24HR INTAKE/OUTPUT:      Intake/Output Summary (Last 24 hours) at 5/20/2022 1303  Last data filed at 5/20/2022 1202  Gross per 24 hour   Intake 120 ml   Output 1900 ml   Net -1780 ml     General appearance: 79 yo female, frail, no acute distress  Head: Normocephalic, without obvious abnormality, atraumatic  Eyes: conjunctivae/corneas clear. PERRL, EOM's intact.    Ears: normal external ears and nose, hard of hearing  Neck: no JVD, supple, symmetrical, trachea midline   Lungs: improving rhonchi,no wheezing or rales  Heart: regular rate rhythm, S1, S2 normal, no murmur  Abdomen: soft, non-tender; non-distended, bowel sounds present    Extremities: trace lower extremity edema,  No erythema, ecchymosis left hand  Skin: warm, dry  Neurologic: awake, speech fluent, follows simple commands, generalized weakness  Psychiatric: calm, flat affect     Medications:      sodium chloride 25 mL/hr at 05/20/22 0801      metoprolol succinate  100 mg Oral Daily    potassium chloride  10 mEq Oral BID    mirtazapine  7.5 mg Oral Nightly    polyethylene glycol  17 g Oral Daily    sennosides-docusate sodium  2 tablet Oral Daily    nystatin  5 mL Oral 4x Daily    sodium chloride flush  5-40 mL IntraVENous 2 times per day    guaiFENesin  600 mg Oral BID    pantoprazole (PROTONIX) 40 mg injection  40 mg IntraVENous Q24H    levothyroxine  175 mcg Oral Daily    folic acid  1 mg Oral Daily    vitamin D  50,000 Units Oral Weekly    ipratropium-albuterol  1 ampule Inhalation Q4H WA    phenytoin  300 mg Oral Daily     metoprolol, potassium chloride **OR** potassium alternative oral replacement **OR** potassium chloride, sodium chloride flush, sodium chloride, ondansetron **OR** ondansetron, polyethylene glycol, acetaminophen **OR** acetaminophen  ADULT DIET; Dysphagia - Minced and Moist; Moderately Thick (Honey)     Lab and other Data:     Recent Labs     05/18/22 0630 05/19/22 0225 05/20/22  0352   WBC 9.5 9.6 9.5   HGB 11.8* 11.5* 11.5*    324 362     Recent Labs     05/18/22 0630 05/19/22 0225 05/20/22  0352    137 139   K 3.3* 3.8 3.8    106 103   CO2 26 21* 26   BUN 8 6* 9   CREATININE 0.8 0.8 1.0*   GLUCOSE 107 118* 111*     Assessment/Plan   Principal Problem:    Sepsis ([facility] [address]  Active Problems:    Atypical pneumonia    Palliative care patient    Essential hypertension    Acquired hypothyroidism    Seizure disorder ([facility] [address]    AMS (altered mental status)    UTI (urinary tract infection)  Resolved Problems:    * No resolved hospital problems. *    Visit Summary:  Chart reviewed. Report obtained from RN with no acute overnight events. Discussed patient with CM and she has been denied for SNF placement. Patient continues to refuse therapies/some medical treatments and family has requested additional information regarding hospice and comfort care. I called and spoke with her niece, Rosette Kocher, who is patient's primary caregiver. We discussed qualifying HS diagnosis and due to patient's functional decline and dependence for care with history of CVA patient likely would qualify for hospice outpatient at home at this time.   Rosette Kocher reports her  remains in ICU and that she would not be able to be with patient 24 hours at home but other family would be present to provide care once patient was discharged home with hospice. Education provided [employer], billing, care team, and goals of care. I explained that patient likely could d/c home as early tomorrow if hospitalist feels she is medically stable to leave with hospice after DME is set up. I have contacted [facility]  and placed order for HS consult. Opportunity for questions and emotional support provided. Will continue to follow    Recommendations:   1. Palliative care: [address] [id number] home with outpatient hospice when medically stable. Family would like to focus on comfort care. Social work assisting. Code status: DNR     2. Staphylococcus hominis bacteremia-ID following, No growth seen on repeat cultures    3. Pneumonia vs atelectasis-Vancomycin/Merrem completed, clinically improved     4. Dysphagia-tolerating modified diet at this time, SLP is following    5. AMS w/ hx of CVA-suspect underlying Vascular dementia-AMS improving per patient's family    6. Hypernatremia/hypokalemia-resolved     Thank you for consulting Palliative Care and allowing us to participate in the care of this patient.    Time Spent Counseling > 50%:  YES                                   Total Time Spent with patient/family counseling, workup/treatment review, counseling and placement of orders/preparation of this note: 28 minutes    Electronically signed by VIN Richards CNP on 5/20/2022 at 1:03 PM    (Please note that portions of this note were completed with a voice recognition program.  Franca Lawrence made to edit the dictations but occasionally words are mis-transcribed.)

## 2022-05-21 VITALS
SYSTOLIC BLOOD PRESSURE: 127 MMHG | OXYGEN SATURATION: 96 % | WEIGHT: 184.44 LBS | BODY MASS INDEX: 27.95 KG/M2 | HEIGHT: 68 IN | RESPIRATION RATE: 18 BRPM | HEART RATE: 93 BPM | DIASTOLIC BLOOD PRESSURE: 64 MMHG | TEMPERATURE: 96.8 F

## 2022-05-21 LAB
ANION GAP SERPL CALCULATED.3IONS-SCNC: 9 MMOL/L (ref 7–19)
BUN BLDV-MCNC: 9 MG/DL (ref 8–23)
CALCIUM SERPL-MCNC: 7.3 MG/DL (ref 8.8–10.2)
CHLORIDE BLD-SCNC: 105 MMOL/L (ref 98–111)
CO2: 25 MMOL/L (ref 22–29)
CREAT SERPL-MCNC: 0.9 MG/DL (ref 0.5–0.9)
CULTURE, BLOOD 2: NORMAL
GFR AFRICAN AMERICAN: >59
GFR NON-AFRICAN AMERICAN: 59
GLUCOSE BLD-MCNC: 114 MG/DL (ref 74–109)
HCT VFR BLD CALC: 36.5 % (ref 37–47)
HEMOGLOBIN: 11.1 G/DL (ref 12–16)
MCH RBC QN AUTO: 30.5 PG (ref 27–31)
MCHC RBC AUTO-ENTMCNC: 30.4 G/DL (ref 33–37)
MCV RBC AUTO: 100.3 FL (ref 81–99)
PDW BLD-RTO: 14.3 % (ref 11.5–14.5)
PLATELET # BLD: 332 K/UL (ref 130–400)
PMV BLD AUTO: 10.2 FL (ref 9.4–12.3)
POTASSIUM SERPL-SCNC: 3.8 MMOL/L (ref 3.5–5)
RBC # BLD: 3.64 M/UL (ref 4.2–5.4)
SODIUM BLD-SCNC: 139 MMOL/L (ref 136–145)
WBC # BLD: 8.5 K/UL (ref 4.8–10.8)

## 2022-05-21 PROCEDURE — 36415 COLL VENOUS BLD VENIPUNCTURE: CPT

## 2022-05-21 PROCEDURE — 94640 AIRWAY INHALATION TREATMENT: CPT

## 2022-05-21 PROCEDURE — 6370000000 HC RX 637 (ALT 250 FOR IP): Performed by: HOSPITALIST

## 2022-05-21 PROCEDURE — 2580000003 HC RX 258: Performed by: HOSPITALIST

## 2022-05-21 PROCEDURE — 2700000000 HC OXYGEN THERAPY PER DAY

## 2022-05-21 PROCEDURE — 80048 BASIC METABOLIC PNL TOTAL CA: CPT

## 2022-05-21 PROCEDURE — 85027 COMPLETE CBC AUTOMATED: CPT

## 2022-05-21 RX ORDER — ERGOCALCIFEROL 1.25 MG/1
50000 CAPSULE ORAL WEEKLY
Qty: 5 CAPSULE | Refills: 0 | Status: SHIPPED | OUTPATIENT
Start: 2022-05-27

## 2022-05-21 RX ORDER — LEVOTHYROXINE SODIUM 175 UG/1
175 TABLET ORAL DAILY
Qty: 30 TABLET | Refills: 3 | Status: SHIPPED | OUTPATIENT
Start: 2022-05-22

## 2022-05-21 RX ORDER — MIRTAZAPINE 7.5 MG/1
7.5 TABLET, FILM COATED ORAL NIGHTLY
Qty: 30 TABLET | Refills: 3 | Status: SHIPPED | OUTPATIENT
Start: 2022-05-21

## 2022-05-21 RX ORDER — FOLIC ACID 1 MG/1
1 TABLET ORAL DAILY
Qty: 30 TABLET | Refills: 3 | Status: SHIPPED | OUTPATIENT
Start: 2022-05-22

## 2022-05-21 RX ADMIN — GUAIFENESIN 600 MG: 600 TABLET ORAL at 08:52

## 2022-05-21 RX ADMIN — LEVOTHYROXINE SODIUM 175 MCG: 25 TABLET ORAL at 06:01

## 2022-05-21 RX ADMIN — METOPROLOL SUCCINATE 100 MG: 50 TABLET, EXTENDED RELEASE ORAL at 08:52

## 2022-05-21 RX ADMIN — POTASSIUM CHLORIDE 10 MEQ: 10 TABLET, EXTENDED RELEASE ORAL at 08:52

## 2022-05-21 RX ADMIN — SODIUM CHLORIDE, PRESERVATIVE FREE 10 ML: 5 INJECTION INTRAVENOUS at 09:18

## 2022-05-21 RX ADMIN — NYSTATIN 500000 UNITS: 100000 SUSPENSION ORAL at 08:53

## 2022-05-21 RX ADMIN — PHENYTOIN SODIUM 300 MG: 100 CAPSULE ORAL at 08:53

## 2022-05-21 RX ADMIN — IPRATROPIUM BROMIDE AND ALBUTEROL SULFATE 1 AMPULE: 2.5; .5 SOLUTION RESPIRATORY (INHALATION) at 07:16

## 2022-05-21 RX ADMIN — FOLIC ACID 1 MG: 1 TABLET ORAL at 08:52

## 2022-05-21 NOTE — DISCHARGE SUMMARY
blood cultures with staph hominis-ID was consulted. Patient was changed to [address] per ID course completed on 5/19/2022. Blood cultures since 5/14/2022 show NGTD. Her mental status has progressively improved. Family has requested SNF placement Case management [id number] completed, however, was denied by insurance. Labs and vitals remained stable. SLP recommends minced and moist with pills halved been applesauce. Labs remain stable. Family discussed [address] and ultimately have decided on outpatient hospice care at home. Home hospice care has been established. The patient is stable to return home with hospice at this time. She will be discharged home in stable condition. All questions have been answered for her, niece, who is her primary caregiver. Significant Diagnostic Studies:   XR HAND RIGHT (MIN 3 VIEWS)    Result Date: 5/13/2022  XR HAND RIGHT (MIN 3 VIEWS) 5/13/2022 6:59 PM HISTORY: Index finger tenderness to palpation COMPARISON: None FINDINGS: Frontal, lateral and oblique radiographs of the right hand were provided for review. Bones are osteopenic. Advanced interphalangeal joint osteoarthritis change, DIP and PIP joints. MCP joints are relatively well preserved. No erosive changes identified. No periarticular soft tissue swelling. Additional advanced degenerative change at the first [facility] joint and triscaphe joint. Radiocarpal joint is maintained. 1. No visualized fracture or joint subluxation. 2. Advanced osteoarthritis changed; interphalangeal joints, first CMC joint and triscaphe joint. Signed by Dr Alicia Jaffe    Result Date: 5/13/2022  EXAMINATION:  CT HEAD WO CONTRAST  5/13/2022 1:10 PM HISTORY: Altered mental status. TECHNIQUE: Multiple axial images were obtained through the brain without contrast infusion. Multiplanar images were reconstructed. DLP: 543 mGy-cm. Automated exposure control was utilized. COMPARISON: 5/2/2022.  FINDINGS: There are no hemorrhage, edema or mass effect. There are chronic small cerebellar infarcts. There is a chronic left occipital cortical and subcortical infarct. There is mild to moderate atrophy. There is moderate to severe dilatation of the lateral and third ventricles. There is low-density in the hemispheric white matter. There is biparietal thinning of the calvarium. There has been prior mastoidectomy on the right. The left mastoid air cells are clear. The visualized paranasal sinuses are essentially clear. Vascular calcification is noted. 1. No hemorrhage, edema or mass effect. 2. Chronic small cerebellar infarct. Chronic left occipital cortical and subcortical infarct. 3. Mild to moderate atrophy. Moderate to severe dilatation of the lateral and third ventricles is likely related to the atrophy. 4. Low-density in the hemispheric white matter is nonspecific and likely due to chronic small vessel disease. 5. Prior mastoidectomy on the right. The full report of this exam was immediately signed and available to the emergency room. The patient is currently in the emergency room. Signed by Dr Slater Kehr    XR CHEST PORTABLE    Result Date: 5/13/2022  EXAMINATION:  XR CHEST PORTABLE  5/13/2022 1:08 PM HISTORY: Altered. Hypertension. COMPARISON: 8/10/2021. TECHNIQUE: Single view portable chest x-ray. FINDINGS:  There is mild hypoventilation with vascular crowding. There is stable scarring in the left costophrenic angle. There is no dense consolidation. Mild bronchial wall thickening is stable. Heart size is upper limits of normal. The thoracic aorta is atheromatous. 1. Hypoventilation with vascular crowding. 2. Stable scarring in the left costophrenic angle. 3. Stable bronchial wall thickening.  Signed by Dr Slater Kehr    CTA PULMONARY W CONTRAST    Result Date: 5/13/2022  CTA chest 5/13/2022 2:34 PM HISTORY: Hypoxia TECHNIQUE: Axial images of the chest were obtained following IV contrast. . Coronal and sagittal maximum intensity projectional reformatted images are reconstructed and reviewed. COMPARISON: 2/23/2020. DLP: 5 6 mGy cm Automated exposure control was utilized to minimize patient radiation dose. FINDINGS: There are no pulmonary emboli. Moderate thoracic aortic calcification with no aneurysm or dissection. Dense coronary calcification with probable coronary vascular stents. Mild cardia megaly. No pericardial or pleural effusion. No pathologic intrathoracic or axillary lymphadenopathy. There is air and fluid suspected within the esophagus which may relate to poor primary peristalsis versus gastroesophageal reflux. Images the upper abdomen demonstrate cholecystectomy clips. No adrenal nodule. Decompressed stomach. Diffuse atherosclerotic change. Moderate fecal stasis. There is interval partial left lower lobe collapse with narrowing of the left lower lobe bronchi, perhaps related to mucous secretions. Patent basilar atelectasis. No discrete suspicious pulmonary nodule. No pneumothorax. Chronic T6 and L1 compression deformities. Exaggerated kyphosis of the degenerative thoracic spine. 1. No pulmonary emboli. Previous emboli have resolved since the 2/23/2020 study. 2. Moderate vascular calcification. Diffuse coronary artery calcification. Cardiomegaly. 3. Interval partial left lower lobe collapse with narrowing of the left lower lobe bronchi, perhaps related to mucous secretions. Secretions are identified within the dependent portion of the trachea. Fluid present within the esophagus (which may relate to gastroesophageal reflux and reported primary peristalsis) increases risk for aspiration.  Signed by Dr Jenny Esposito      Pertinent Labs:   CBC:   Recent Labs     05/19/22 0225 05/20/22 0352 05/21/22 0216   WBC 9.6 9.5 8.5   HGB 11.5* 11.5* 11.1*    362 332     BMP:    Recent Labs     05/19/22 0225 05/20/22  0352 05/21/22 0216    139 139   K 3.8 3.8 3.8    103 105   CO2 21* 26 25   BUN 6* 9 9 CREATININE 0.8 1.0* 0.9   GLUCOSE 118* 111* 114*     INR: No results for input(s): INR in the last 72 hours. Physical Exam:  Vital Signs: /64   Pulse 93   Temp 96.8 °F (36 °C)   Resp 18   Ht 5' 8\" (1.727 m)   Wt 184 lb 7 oz (83.7 kg)   SpO2 96%   BMI 28.04 kg/m²   Physical Exam  Vitals and nursing note reviewed. Constitutional:       General: She is not in acute distress. Appearance: Normal appearance. She is obese. She is ill-appearing. She is not toxic-appearing or diaphoretic. HENT:      Head: Normocephalic and atraumatic. Right Ear: External ear normal.      Left Ear: External ear normal.      Nose: Nose normal. No congestion or rhinorrhea. Mouth/Throat:      Mouth: Mucous membranes are moist.      Pharynx: Oropharynx is clear. Eyes:      General: No scleral icterus. Extraocular Movements: Extraocular movements intact. Conjunctiva/sclera: Conjunctivae normal.   Cardiovascular:      Rate and Rhythm: Normal rate and regular rhythm. Pulses: Normal pulses. Heart sounds: Normal heart sounds. No murmur heard. No friction rub. No gallop. Pulmonary:      Effort: Pulmonary effort is normal. No respiratory distress. Breath sounds: Rhonchi (Throughout) present. No wheezing or rales. Comments: Loose hacking cough  Abdominal:      General: Abdomen is flat. Bowel sounds are normal. There is distension. Palpations: Abdomen is soft. Tenderness: There is no abdominal tenderness. Musculoskeletal:         General: No swelling. Normal range of motion. Cervical back: Normal range of motion and neck supple. Right lower leg: No edema. Left lower leg: No edema. Skin:     General: Skin is warm and dry. Coloration: Skin is pale. Skin is not jaundiced. Findings: No erythema, lesion or rash. Neurological:      General: No focal deficit present. Mental Status: She is alert. Mental status is at baseline. She is disoriented. Cranial Nerves: No cranial nerve deficit. Sensory: No sensory deficit. Motor: Weakness (Diffuse and profound) present. Psychiatric:         Mood and Affect: Mood normal.         Behavior: Behavior normal.         Thought Content: Thought content normal.         Judgment: Judgment normal.         Discharge Medications:         Medication List      START taking these medications    folic acid 1 MG tablet  Commonly known as: FOLVITE  Take 1 tablet by mouth daily  Start taking on: May 22, 2022     nystatin 225551 UNIT/ML suspension  Commonly known as: MYCOSTATIN  Take 5 mLs by mouth 4 times daily     Vitamin D (Ergocalciferol) 46271 units Caps  Take 50,000 Units by mouth once a week  Start taking on: May 27, 2022        CHANGE how you take these medications    levothyroxine 175 MCG tablet  Commonly known as: SYNTHROID  Take 1 tablet by mouth Daily  Start taking on: May 22, 2022  What changed: See the new instructions. mirtazapine 7.5 MG tablet  Commonly known as: REMERON  Take 1 tablet by mouth nightly  What changed:   · medication strength  · how much to take        CONTINUE taking these medications    atorvastatin 80 MG tablet  Commonly known as: LIPITOR  Take 1 tablet by mouth nightly     fluticasone-salmeterol 230-21 MCG/ACT inhaler  Commonly known as: Advair HFA  Inhale 2 puffs into the lungs 2 times daily     guaiFENesin 600 MG extended release tablet  Commonly known as: MUCINEX  Take 1 tablet by mouth 2 times daily     metoprolol succinate 25 MG extended release tablet  Commonly known as: TOPROL XL  Take 1 tablet by mouth daily     * [address]. [address] * Misc. Devices Misc  1 each by Does not apply route once as needed (As needed) Trapeze Bar     nystatin 167140 UNIT/GM cream  Commonly known as: MYCOSTATIN  Apply topically 2 times daily.      phenytoin 300 MG ER capsule  Commonly known as: PHENYTEK  Take 1 capsule by mouth daily         * This list has 2 medication(s) that are the same as other medications prescribed for you. Read the directions carefully, and ask your doctor or other care provider to review them with you. STOP taking these medications    oxybutynin 5 MG extended release tablet  Commonly known as: DITROPAN-XL     pantoprazole 20 MG tablet  Commonly known as: PROTONIX           Where to Get Your Medications      These medications were sent to [facility]/pharmacy #[id number], [address]  [address] [facility] [address]    Hours: 24-hours Phone: [phone]   · folic acid 1 MG tablet  · levothyroxine 175 MCG tablet  · mirtazapine 7.5 MG tablet  · nystatin 744107 UNIT/ML suspension  · Vitamin D (Ergocalciferol) 65987 units Caps         Discharge Instructions: Follow up with Willy Pozo MD as needed  Follow up with infectious diseases  Take medications as directed. Resume activity as tolerated. Diet: ADULT DIET; Dysphagia - Minced and Moist; Moderately Thick (Honey)     Disposition: Patient is Stable and will be discharged to [facility]. Time spent on discharge 38 minutes spent in assessing patient, reviewing medications, discussion with nursing, confirming safe discharge plan and preparation of discharge summary.     Signed:  VIN Pollokc, 5/21/2022 10:35 AM

## 2022-05-21 NOTE — PLAN OF CARE
Problem: Discharge Planning  Goal: Discharge to home or other facility with appropriate resources  5/21/2022 0523 by Nica Early LPN  Outcome: Progressing  5/20/2022 1359 by Jamison Webb RN  Outcome: Progressing     Problem: ABCDS Injury Assessment  Goal: Absence of physical injury  5/21/2022 2437 by Nica Early LPN  Outcome: Progressing  5/20/2022 1359 by Jamison Webb RN  Outcome: Progressing     Problem: Skin/Tissue Integrity  Goal: Absence of new skin breakdown  Description: 1. Monitor for areas of redness and/or skin breakdown  2. Assess vascular access sites hourly  3. Every 4-6 hours minimum:  Change oxygen saturation probe site  4. Every 4-6 hours:  If on nasal continuous positive airway pressure, respiratory therapy assess nares and determine need for appliance change or resting period.   5/21/2022 6497 by Nica Early LPN  Outcome: Progressing  5/20/2022 1359 by Jamison Webb RN  Outcome: Progressing     Problem: Safety - Adult  Goal: Free from fall injury  5/21/2022 0312 by Nica Early LPN  Outcome: Progressing  5/20/2022 1359 by Jamison Webb RN  Outcome: Progressing     Problem: Chronic Conditions and Co-morbidities  Goal: Patient's chronic conditions and co-morbidity symptoms are monitored and maintained or improved  5/21/2022 0312 by Nica Early LPN  Outcome: Progressing  5/20/2022 1359 by Jamison Webb RN  Outcome: Progressing     Problem: Pain  Goal: Verbalizes/displays adequate comfort level or baseline comfort level  5/21/2022 0312 by Nica Early LPN  Outcome: Progressing  5/20/2022 1359 by Jamison Webb RN  Outcome: Progressing

## 2022-05-23 ENCOUNTER — TELEPHONE (OUTPATIENT)
Dept: INTERNAL MEDICINE | Age: 87
End: 2022-05-23

## 2022-05-23 NOTE — TELEPHONE ENCOUNTER
[facility] Trans[facility] Initial Follow Up Call    Outreach made within 2 business days of discharge: Yes    Patient: Marianne Sargent   Patient : 1931 MRN: [id number]    Reason for Admission: Altered mental status and fatigue  Discharge Date: 22      Discharge Diagnoses:  Principal Problem:    Sepsis [facility])  Active Problems:    Atypical pneumonia    Palliative care patient    Essential hypertension    Acquired hypothyroidism    Seizure disorder ([address]    AMS (altered mental status)    UTI (urinary tract infection)  Resolved Problems:    * No resolved hospital problems. *     Spoke with: Sherre Lesches pts niece     Discharge department/facility: [facility]    TCM Interactive Patient Contact:  Was patient able to fill all prescriptions: Yes  Was patient instructed to bring all medications to the follow-up visit: Yes  Is patient taking all medications as directed in the discharge summary? Yes  Does patient understand their discharge instructions: Yes  Does patient have questions or concerns that need addressed prior to 7-14 day follow up office visit: no    Spoke to Sherre Lesches patients niece she said that patient was sent home with hospice and the hospice nurses have been there. She said that at this time she feels they have everything they need. I let her know to call us or the hospice nurses if they find they need anything. Scheduled appointment with PCP within 7-14 days    Follow Up  No future appointments.     Estrella Angeles MA

## 2022-05-25 NOTE — PROGRESS NOTES
Physician Progress Note      PATIENTAlec Draft  CSN #:                  [id number]  :                       1931  ADMIT DATE:       2022 11:05 AM  DISCH DATE:        2022 3:09 PM  RESPONDING  PROVIDER #:        Candy Reyes MD          QUERY TEXT:    Patient admitted with Sepsis and has Aspiration PNA documented on progress   notes dated 22 by Dr. Rainer Trevino. Notes by Dr. Franci Leggett on 22   state \"atypical pneumonia\" only. If possible, please document in the   progress notes and discharge summary if Aspiration PNA was: The medical record reflects the following:  Risk Factors: Sepsis, PNA  Clinical Indicators: CTA Pulmonary results note \"fluid present within the   esophagus (which may relate to gastroesophageal reflux and reported primary   peristalsis) increases risk for aspiration\". Multiple notes by Dr. Rainer Trevino   state Aspiration Pneumonia suspected. Treatment: aspiration precautions, MErrem IV, CTA pulmonary, CXR    Thank you in advance,    Aman Stoddard RN-BSN, [facility]  Clinical   [facility], [facility] [email or web address]  Options provided:  -- Aspiration PNA confirmed after study  -- Aspiration PNA  ruled out after study  -- Pneumonia only  -- Other - I will add my own diagnosis  -- Disagree - Not applicable / Not valid  -- Disagree - Clinically unable to determine / Unknown  -- Refer to Clinical Documentation Reviewer    PROVIDER RESPONSE TEXT:    This patient has only pneumonia, without aspiration pneumonia.     Query created by: Jeremiah Dupree on 2022 1:54 PM      Electronically signed by:  Candy Reyes MD 2022 2:05 PM

## 2022-06-12 PROBLEM — N39.0 UTI (URINARY TRACT INFECTION): Status: RESOLVED | Noted: 2021-08-10 | Resolved: 2022-06-12

## 2022-07-28 ENCOUNTER — HOSPITAL ENCOUNTER (OUTPATIENT)
Age: 87
Setting detail: SPECIMEN
Discharge: HOME | End: 2022-07-28

## 2022-07-28 LAB — SARS-COV-2, PCR: NOT DETECTED

## 2022-07-28 PROCEDURE — U0003 INFECTIOUS AGENT DETECTION BY NUCLEIC ACID (DNA OR RNA); SEVERE ACUTE RESPIRATORY SYNDROME CORONAVIRUS 2 (SARS-COV-2) (CORONAVIRUS DISEASE [COVID-19]), AMPLIFIED PROBE TECHNIQUE, MAKING USE OF HIGH THROUGHPUT TECHNOLOGIES AS DESCRIBED BY CMS-2020-01-R: HCPCS

## 2022-07-28 PROCEDURE — U0005 INFEC AGEN DETEC AMPLI PROBE: HCPCS

## 2022-07-30 PROBLEM — I69.398 VERTIGO AS LATE EFFECT OF STROKE: Status: ACTIVE | Noted: 2022-07-30

## 2022-07-30 PROBLEM — R42 VERTIGO AS LATE EFFECT OF STROKE: Status: ACTIVE | Noted: 2022-07-30

## 2022-09-25 ENCOUNTER — HOSPITAL ENCOUNTER (OUTPATIENT)
Age: 87
Setting detail: SPECIMEN
Discharge: HOME | End: 2022-09-25

## 2022-09-25 LAB — SARS-COV-2, PCR: DETECTED

## 2022-09-25 PROCEDURE — U0005 INFEC AGEN DETEC AMPLI PROBE: HCPCS

## 2022-09-25 PROCEDURE — U0003 INFECTIOUS AGENT DETECTION BY NUCLEIC ACID (DNA OR RNA); SEVERE ACUTE RESPIRATORY SYNDROME CORONAVIRUS 2 (SARS-COV-2) (CORONAVIRUS DISEASE [COVID-19]), AMPLIFIED PROBE TECHNIQUE, MAKING USE OF HIGH THROUGHPUT TECHNOLOGIES AS DESCRIBED BY CMS-2020-01-R: HCPCS

## 2022-09-26 PROBLEM — U07.1 COVID: Status: ACTIVE | Noted: 2022-09-26

## 2022-10-03 ENCOUNTER — TELEPHONE (OUTPATIENT)
Dept: INTERNAL MEDICINE | Age: 87
End: 2022-10-03

## 2022-10-03 NOTE — TELEPHONE ENCOUNTER
Spoke to Karan she said that pt is on hospice and has been since May. She said that hospice is taking care of all her needs and that she had gotten COVID from her family and is better from that but over all is not doing well. Karan said she is worried about her. She will let us know if she needs anything from us but hospice so far has handled everything.

## 2022-10-03 NOTE — TELEPHONE ENCOUNTER
Called and LM For Munira Jones to see if pt would need a follow up apt, the notes said she was discharged with hospice, but then they also say she was going home in stable health. Waiting for call back to see what the plan is.

## 2025-06-03 NOTE — Clinical Note
Forwarded to the [facility] for review      Thank you [facility] Staff   Librado Malagon was seen and treated in our emergency department on 5/2/2022. She may return to work on 05/05/2022. If you have any questions or concerns, please don't hesitate to call.       Jenniffer Cota